# Patient Record
Sex: FEMALE | Race: WHITE | NOT HISPANIC OR LATINO | Employment: OTHER | URBAN - NONMETROPOLITAN AREA
[De-identification: names, ages, dates, MRNs, and addresses within clinical notes are randomized per-mention and may not be internally consistent; named-entity substitution may affect disease eponyms.]

---

## 2017-01-13 ENCOUNTER — OFFICE VISIT (OUTPATIENT)
Dept: FAMILY MEDICINE CLINIC | Facility: CLINIC | Age: 72
End: 2017-01-13

## 2017-01-13 VITALS
BODY MASS INDEX: 33.97 KG/M2 | WEIGHT: 199 LBS | OXYGEN SATURATION: 94 % | DIASTOLIC BLOOD PRESSURE: 77 MMHG | HEIGHT: 64 IN | SYSTOLIC BLOOD PRESSURE: 151 MMHG | HEART RATE: 75 BPM

## 2017-01-13 DIAGNOSIS — F41.9 ANXIETY: ICD-10-CM

## 2017-01-13 DIAGNOSIS — E11.9 TYPE 2 DIABETES MELLITUS WITHOUT COMPLICATION, WITHOUT LONG-TERM CURRENT USE OF INSULIN (HCC): ICD-10-CM

## 2017-01-13 DIAGNOSIS — K21.9 GASTROESOPHAGEAL REFLUX DISEASE WITHOUT ESOPHAGITIS: ICD-10-CM

## 2017-01-13 DIAGNOSIS — N32.81 OVERACTIVE BLADDER: ICD-10-CM

## 2017-01-13 DIAGNOSIS — E83.52 HYPERCALCEMIA: ICD-10-CM

## 2017-01-13 DIAGNOSIS — E78.5 DYSLIPIDEMIA: ICD-10-CM

## 2017-01-13 DIAGNOSIS — E03.8 OTHER SPECIFIED HYPOTHYROIDISM: ICD-10-CM

## 2017-01-13 DIAGNOSIS — I10 ESSENTIAL HYPERTENSION: Primary | ICD-10-CM

## 2017-01-13 DIAGNOSIS — E53.8 VITAMIN B12 DEFICIENCY: ICD-10-CM

## 2017-01-13 PROCEDURE — 99214 OFFICE O/P EST MOD 30 MIN: CPT | Performed by: FAMILY MEDICINE

## 2017-01-13 RX ORDER — LANCETS 30 GAUGE
EACH MISCELLANEOUS
Qty: 100 EACH | Refills: 5 | Status: SHIPPED | OUTPATIENT
Start: 2017-01-13 | End: 2021-01-05 | Stop reason: SDUPTHER

## 2017-01-13 RX ORDER — CYCLOBENZAPRINE HCL 10 MG
10 TABLET ORAL 2 TIMES DAILY PRN
Qty: 60 TABLET | Refills: 2 | Status: SHIPPED | OUTPATIENT
Start: 2017-01-13 | End: 2017-04-11 | Stop reason: SDUPTHER

## 2017-01-13 RX ORDER — LOSARTAN POTASSIUM AND HYDROCHLOROTHIAZIDE 12.5; 5 MG/1; MG/1
1 TABLET ORAL 2 TIMES DAILY
Qty: 60 TABLET | Refills: 5 | Status: SHIPPED | OUTPATIENT
Start: 2017-01-13 | End: 2017-07-07 | Stop reason: SDUPTHER

## 2017-01-13 RX ORDER — LORATADINE 10 MG/1
CAPSULE, LIQUID FILLED ORAL
COMMUNITY
End: 2017-01-13 | Stop reason: SDUPTHER

## 2017-01-13 RX ORDER — LORATADINE 10 MG/1
1 CAPSULE, LIQUID FILLED ORAL DAILY
Qty: 30 EACH | Refills: 5 | Status: SHIPPED | OUTPATIENT
Start: 2017-01-13 | End: 2019-05-12

## 2017-01-13 RX ORDER — AMLODIPINE BESYLATE 5 MG/1
5 TABLET ORAL DAILY
Qty: 30 TABLET | Refills: 5 | Status: SHIPPED | OUTPATIENT
Start: 2017-01-13 | End: 2018-02-02 | Stop reason: SDUPTHER

## 2017-01-13 RX ORDER — DIAZEPAM 5 MG/1
5 TABLET ORAL 2 TIMES DAILY
Qty: 60 TABLET | Refills: 0 | Status: SHIPPED | OUTPATIENT
Start: 2017-01-13 | End: 2017-03-02 | Stop reason: SDUPTHER

## 2017-01-13 RX ORDER — METHOCARBAMOL 500 MG/1
500 TABLET, FILM COATED ORAL 2 TIMES DAILY
Qty: 60 TABLET | Refills: 2 | Status: CANCELLED | OUTPATIENT
Start: 2017-01-13

## 2017-01-13 RX ORDER — LANCETS 30 GAUGE
EACH MISCELLANEOUS
COMMUNITY
End: 2017-01-13 | Stop reason: SDUPTHER

## 2017-01-13 RX ORDER — LANOLIN ALCOHOL/MO/W.PET/CERES
1000 CREAM (GRAM) TOPICAL DAILY
Qty: 30 TABLET | Refills: 5 | Status: SHIPPED | OUTPATIENT
Start: 2017-01-13 | End: 2018-01-04 | Stop reason: SDUPTHER

## 2017-01-13 RX ORDER — CLOPIDOGREL BISULFATE 75 MG/1
75 TABLET ORAL DAILY
Qty: 30 TABLET | Refills: 5 | Status: SHIPPED | OUTPATIENT
Start: 2017-01-13 | End: 2017-07-07 | Stop reason: SDUPTHER

## 2017-01-13 NOTE — PATIENT INSTRUCTIONS
Try the B12 pills and see if they help as well as the shots.    Norvasc 5 mg orally daily (amlodipine) is the new blood pressure medication. Take every day WITH your other blood pressure medications. See if you have this already     Come back for fasting labs.  Stop by any time for a blood pressure check.

## 2017-01-13 NOTE — MR AVS SNAPSHOT
Bianka VALENCIA Agudelo   1/13/2017 9:50 AM   Office Visit    Dept Phone:  [phone]   Encounter #:  [id number]    Provider:  Eva Elliott MD   Department:  [facility]                Your Full Care Plan              Today's Medication Changes          These changes are accurate as of: 1/13/17 11:21 AM.  If you have any questions, ask your nurse or doctor.               New Medication(s)Ordered:     amLODIPine 5 MG tablet   Commonly known as:  NORVASC   Take 1 tablet by mouth Daily.   Started by:  Eva Elliott MD       vitamin B-12 1000 MCG tablet   Commonly known as:  CYANOCOBALAMIN   Take 1 tablet by mouth Daily.   Started by:  Eva Elliott MD         Medication(s)that have changed:     glucose blood test strip   1 each by Other route Daily. Use as instructed   What changed:    - when to take this  - reasons to take this   Changed by:  Eva Elliott MD       Lancets misc   Use as needed daily to check glucose levels   What changed:    - how to take this  - additional instructions   Changed by:  Eva Elliott MD       Loratadine 10 MG capsule   Take 1 tablet by mouth Daily.   What changed:    - how much to take  - when to take this   Changed by:  Eva Elliott MD         Stop taking medication(s)listed here:     methocarbamol 500 MG tablet   Commonly known as:  ROBAXIN   Stopped by:  Eva Elliott MD                Where to Get Your Medications      These medications were sent to [facility] [facility], [place] - [address] - [phone]  - [phone]   [address] [facility] [address]     Phone:  [phone]     amLODIPine 5 MG tablet    clopidogrel 75 MG tablet    cyclobenzaprine 10 MG tablet    glucose blood test strip    Lancets misc    Loratadine 10 MG capsule    losartan-hydrochlorothiazide 50-12.5 MG per tablet    vitamin B-12 1000 MCG tablet         You can get these  medications from any pharmacy     Bring a paper prescription for each of these medications     diazePAM 5 MG tablet                  Your Updated Medication List          This list is accurate as of: 1/13/17 11:21 AM.  Always use your most recent med list.                albuterol 108 (90 BASE) MCG/ACT inhaler   Commonly known as:  PROVENTIL HFA;VENTOLIN HFA   Inhale 2 puffs every 4 (four) hours as needed for wheezing.       amLODIPine 5 MG tablet   Commonly known as:  NORVASC   Take 1 tablet by mouth Daily.       aspirin 81 MG EC tablet   Take 1 tablet by mouth Daily.       atorvastatin 10 MG tablet   Commonly known as:  LIPITOR   Take 1 tablet by mouth daily.       clopidogrel 75 MG tablet   Commonly known as:  PLAVIX   Take 1 tablet by mouth Daily.       cyclobenzaprine 10 MG tablet   Commonly known as:  FLEXERIL   Take 1 tablet by mouth 2 (Two) Times a Day As Needed for muscle spasms.       diazePAM 5 MG tablet   Commonly known as:  VALIUM   Take 1 tablet by mouth 2 (Two) Times a Day.       dicyclomine 20 MG tablet   Commonly known as:  BENTYL   Take 1 tablet by mouth every 6 (six) hours as needed (abdominal pain).       glucose blood test strip   1 each by Other route Daily. Use as instructed       ibuprofen 600 MG tablet   Commonly known as:  ADVIL,MOTRIN   Take 1 tablet by mouth 3 (three) times a day as needed for mild pain (1-3).       insulin NPH-insulin regular (70-30) 100 UNIT/ML injection   Commonly known as:  novoLIN 70/30       Insulin Syringe/Needle U-500 31G X 6MM 0.5 ML misc   1 each 4 (Four) Times a Day.       JANUVIA 100 MG tablet   Generic drug:  SITagliptin   Take 1 tablet by mouth daily.       Lancets misc   Use as needed daily to check glucose levels       LANTUS 100 UNIT/ML injection   Generic drug:  insulin glargine   Inject 35 Units under the skin Every Evening.       levothyroxine 125 MCG tablet   Commonly known as:  SYNTHROID, LEVOTHROID   Take 1 tablet by mouth daily.       Loratadine 10  MG capsule   Take 1 tablet by mouth Daily.       losartan-hydrochlorothiazide 50-12.5 MG per tablet   Commonly known as:  HYZAAR   Take 1 tablet by mouth 2 (Two) Times a Day.       meclizine 25 MG tablet   Commonly known as:  ANTIVERT   Take 1 tablet by mouth 2 (two) times a day as needed for dizziness.       metFORMIN 1000 MG (OSM) 24 hr tablet   Commonly known as:  FORTAMET   Take 1 tablet by mouth 2 (two) times a day with meals.       nitroglycerin 6.5 MG CR capsule   Commonly known as:  NITRO-BID   Take 1 capsule by mouth 3 (three) times a day.       NOVOLOG MIX 70/30 (70-30) 100 UNIT/ML injection   Generic drug:  insulin aspart prot-insulin aspart   Inject 20 Units under the skin 2 (two) times a day with meals.       omeprazole 20 MG capsule   Commonly known as:  priLOSEC   Take 1 capsule by mouth 2 (two) times a day.       ondansetron ODT 4 MG disintegrating tablet   Commonly known as:  ZOFRAN-ODT   Take 1 tablet by mouth every 6 (six) hours as needed for nausea or vomiting.       oxybutynin 5 MG tablet   Commonly known as:  DITROPAN   Take 1 tablet by mouth 2 (Two) Times a Day.       vitamin B-12 1000 MCG tablet   Commonly known as:  CYANOCOBALAMIN   Take 1 tablet by mouth Daily.               We Performed the Following     Comprehensive Metabolic Panel     Hemoglobin A1c     Lipid Panel     T4, Free     TSH       You Were Diagnosed With        Codes Comments    Other specified hypothyroidism    -  Primary ICD-10-CM: E03.8  ICD-9-CM: 244.8     Type 2 diabetes mellitus without complication, without long-term current use of insulin     ICD-10-CM: E11.9  ICD-9-CM: 250.00       Medications to be Given to You by a Medical Professional     Due       Frequency    8/17/2016 cyanocobalamin injection 1,000 mcg  Every 28 Days    11/15/2016 cyanocobalamin injection 1,000 mcg  Every 28 Days      Instructions    Try the B12 pills and see if they help as well as the shots.    Norvasc 5 mg orally daily (amlodipine) is the  "new blood pressure medication. Take every day WITH your other blood pressure medications. See if you have this already     Come back for fasting labs.  Stop by any time for a blood pressure check.     Patient Instructions History      Upcoming Appointments     Visit Type Date Time Department    FOLLOW UP 2017  9:50 AM [facility]    FOLLOW UP 2017 10:30 AM [facility]      [facility]     [facility] [employer] allows you to send messages to your doctor, view your test results, renew your prescriptions, schedule appointments, and more. To sign up, go to [employer] and click on the Sign Up Now link in the New User? box. Enter your [employer] Activation Code exactly as it appears below along with the last four digits of your Social Security Number and your Date of Birth () to complete the sign-up process. If you do not sign up before the expiration date, you must request a new code.    [employer] Activation Code: [email or web address]  Expires: 2017 11:14 AM    If you have questions, you can email [email or web address] or call [phone] to talk to our [employer] staff. Remember, [employer] is NOT to be used for urgent needs. For medical emergencies, dial 911.               Other Info from Your Visit           Your Appointments     2017 10:30 AM EDT   Follow Up with Eva Elliott MD   [facility] MEDICAL [facility]ICINE (--)    [address]  [place] [address]  [facility] [id number] [phone]           Arrive 15 minutes prior to appointment.              Allergies     Nuts        Reason for Visit     Follow-up           Vital Signs     Blood Pressure Pulse Height Weight Oxygen Saturation Body Mass Index    151/77 (BP Location: Left arm, Patient Position: Sitting) 75 63.5\" (161.3 cm) 199 lb (90.3 kg) 94% 34.7 kg/m2    Smoking Status                   Never Smoker           Problems and Diagnoses Noted     Diabetes    Underactive thyroid        "

## 2017-01-16 NOTE — PROGRESS NOTES
"Bianka Agudelo     VITALS: Blood pressure 151/77, pulse 75, height 63.5\" (161.3 cm), weight 199 lb (90.3 kg), SpO2 94 %.    Subjective  Chief Complaint:   Chief Complaint   Patient presents with   • Follow-up        History of Present Illness:  Patient is a 71 y.o. female with a medical history significant for type II diabetes, hypertension, and hyperlipidemia who presents to clinic secondary to medical followup. Is doing well today. No new or acute complaints.      Patient has a history of hypertension and is on losartan/HCTZ 50/12.5 mg orally BID. We had started her on norvasc last time, but she has not taken any of it. Denies any side effects of the medications. Denies any dizziness, lightheadedness, blurry vision, chest pain, or edema. Patient does not take her blood pressures at home. Tries to follow a low-salt diet.    Patient also has a history of type II diabetes and is currently on metformin 1000 mg orally BID and Lantus 35 units every evening. She has been inconsistent with the januvia 100 mg orally daily and Novolog 70/30 20 units BID at meals. Last A1C in October 2016 was 6.9. Denies any side effects of her medications. Patient denies any changes in vision, polydipsia, polyuria, numbness or tingling, or hypoglycemic or hyperglycemic episodes. Patient does follow a diabetic diet and checks her glucose levels regularly. Blood glucose levels are usually in the 120s-130s fasting. Patient does have complications of nephropathy. No worsening or exacerbation of symptoms. No neuropathy or retinopathy. Last eye exam was July 2015 - going back April 2017. Last microalbumin was elevated done February 2016. Last foot exam was in 2015 and patient does not see a podiatrist. She has gone through diabetic teaching/nutrition education. Does take losartan 100 mg orally daily to provide for kidney protection and aspirin 81 mg orally daily to provide for cardiovascular protection.    Patient also has a history of " hyperlipidemia and is currently on atorvastatin 10 mg orally daily. Denies any side effects of the medication. Last lipid panel was 10/2016 and in normal limits. Denies any muscle weakness, jaundice or itching. Tries to follow a low cholesterol diet.    Patient has a history of GERD and is currently on prilosec 20 mg orally BID. Patient denies any side effects of the medication. Denies metallic tastes and has not been having increased symptoms during sleep. Has not had any recent exacerbations. Denies any nausea, vomiting, burping, hiccuping, or midepigastric pain.    Patient does have a history of hypothyroidism and is currently on synthroid 125 mcg orally daily. She is doing well on this medication. Denies any side effects. Denies fatigue, dizziness, palpitations, changes in weight, hair, or nails. Last thyroid panel was 10/2016 and within normal limits.    Patient has a history of anxiety and is currently on diazepam 5 mg orally BID. Patient states that this medication is working. Denies any side effects of the medication. Patient states that she is sleeping well and can get out of bed daily to accomplish daily activities. Has some anhedonia. Mood is stable. Has no feelings of guilt. Has good concentration and memory ability. Has some energy. Is able to make goals. Appetite is stable. Denies any suicidal or homicidal ideations. Does not have any auditory or visual hallucinations. [facility] [id number] WNL. No controlled medication seeking behavior.     Patient has a history of CAD with angina and is on plavix 75 mg orally daily, aspirin 81 mg orally daily, and nitro ER 6.5 mg orally TID for angina. We tried to switch her from nitro to Imdur in 2016 without much success. She reports that she has been doing fine on these medications, denies any side effects, and denies any chest pain.       Patient does have a history of muscle spasms especially in her back when she does physical activity, such as gardening. She is  currently on flexeril 10 mg orally BID as needed and ibuprofen 600 mg orally TID. Denies any side effects of the medications. Denies any sedation effects. Medications allow her enough flexibility and relief from the pain so that she can do activities other than those of daily living. Movement and ambulation are improved on these medications. Robaxin is fairly expensive for her and she would like to change it to a cheaper medication if possible. No pain medication seeking behavior. Tyler #[id number] is clean without any discrepancies.      Patient does have an overactive bladder and is currently on oxybutynin 5 mg orally daily. Medication is treating her well. No side effects. States that it does reduce her urinary frequency.     The following portions of the patient's history were reviewed and updated as appropriate: allergies, current medications, past family history, past medical history, past social history, past surgical history and problem list.    Past Medical History  Past Medical History   Diagnosis Date   • Anxiety    • CAD (coronary artery disease)    • Diabetes mellitus    • Dyslipidemia    • GERD (gastroesophageal reflux disease)    • H/O angina pectoris    • History of bone density study      February 2014   • History of mammogram 07/15/2013   • Hypertension    • Hypothyroidism    • Muscle spasms of both lower extremities    • Overactive bladder    • Vitamin B12 deficiency    • Vitamin D deficiency        Review of Systems  Constitutional: Denies any recent history of HAs, dizziness, fevers, chills, itching.  Eyes: Denies any changes in vision. Denies any blurry vision or diplopia.  Ears, Nose, Mouth, Throat: Denies any sore throat, rhinorrhea, or cough.  Cardiovascular: Denies any chest pain, pressure, or palpitations.  Respiratory: Denies any shortness of breath or wheezing.  Gastrointestinal: Denies any abdominal pain, nausea, vomiting, diarrhea, or constipation.  Genitourinary: Denies any changes in  urination.  Musculoskeletal: Denies any muscle weakness.  Skin and/or breasts: Denies any rashes.  Neurological: Denies any changes in balance or gait.  Psychiatric: Denies any anxiety, depression, or insomnia. Denies any suicidal or homicidal ideations.  Endocrine: Denies any heat or cold intolerance. Denies any voice changes, polydipsia, or polyuria.    Surgical History  Past Surgical History   Procedure Laterality Date   • Oophorectomy       1 removed   • Colonoscopy  01/01/2010   • Tubal abdominal ligation         Family History  Family History   Problem Relation Age of Onset   • Diabetes Mother    • Heart disease Mother    • Stroke Mother    • Cancer Father        Social History  Social History     Social History   • Marital status:      Spouse name: N/A   • Number of children: N/A   • Years of education: N/A     Occupational History   • Not on file.     Social History Main Topics   • Smoking status: Never Smoker   • Smokeless tobacco: Never Used   • Alcohol use No   • Drug use: No   • Sexual activity: Not on file     Other Topics Concern   • Not on file     Social History Narrative       Objective  Physical Exam  Gen: Patient in NAD. Pleasant and answers appropriately. A&Ox3.    Skin: Warm and dry with normal turgor. No purpura, rashes, or unusual pigmentation noted. Hair is normal in appearance and distribution.    HEENT: NC/AT. No lesions noted. Conjunctiva clear, sclera nonicteric. PERRL. O/P nonerythematous and moist without exudate.    Neck: Supple without lymph nodes palpated. FROM. No evidence of tracheal deviation or thyromegaly. Carotid pulses 2+/4 B/L without bruits.     Lungs: CTA B/L without rales, rhonchi, crackles, or wheezes.    Heart: RRR. S1 and S2 normal. No S3 or S4. No MRGT.    Abd: Soft, nontender,nondistended. (+)BSx4 quadrants. No HSM, masses, or bruits noted.    Extrem: No CCE. Radial pulses 2+/4 and equal B/L. FROMx4. No bone, joint, or muscle tenderness noted.    Neuro: No  focal motor/sensory deficits.    Procedures    Assessment/Plan  Bianka Agudelo is a 71 y.o. here for medical followup.  Diagnoses and all orders for this visit:    1) Hypertension  Elevated today. Discussed at length. Refilled losartan/HCTZ 50/12.5 mg orally BID. Will start norvasc 5 mg orally daily. Patient to follow up with a nursing appointment for blood pressure check in a month.     2) Eustachian tube dysfunction, bilateral  Improved. Patient using meclizine 25 mg orally every BID as needed for dizziness.      3) Hypercalcemia  Workup benign. Continue to monitor. Recheck today.     4) Type 2 diabetes mellitus without complication  -     Comprehensive Metabolic Panel  -     Hemoglobin A1c  -     Lipid Panel  Check labs today. On metformin 1000 mg orally BID and Lantus 35 units every evening. Not currently taking januvia 100 mg orally daily or Novolog 70/30 20 units BID at meals. Eye exam and microalbumin 2016. Patient to call [facility] for annual foot exam. Does take losartan 100 mg orally daily to provide for kidney protection and aspirin 81 mg orally daily to provide for cardiovascular protection.       5) GERD  Stable. Continue omeprazole 20 mg orally BID.      6) Hypothyroidism, unspecified hypothyroidism type  -     TSH  -     T4, Free  Stable. Continue synthroid 125 mcg orally daily. Check thyroid panel today.       7) Vitamin D deficiency   Stable 10/2016. Recheck 4/2017.      8) Anxiety.  -diazepam (VALIUM) 5 MG tablet; Take 1 tablet by mouth 2 (Two) Times a Day.  Well controlled. Refilled diazepam 5 mg orally BID.      9) CAD with angina  Stable and asymptomatic. Continue plavix 75 mg orally daily, aspirin 81 mg orally daily, and nitro ER 6.5 mg orally TID for angina.       10) Muscle spasms  Stable. Continue flexeril 10 mg orally BID as needed and ibuprofen 600 mg orally TID.        11) Overactive bladder  Stable. Continue oxybutynin 5 mg orally daily.       12) Asthma  Continue albuterol  inhaler as needed.      13) Vitamin B12 deficiency  Patient has a hard time coming in for B12 shots secondary to cost. Will try B12 1000 mcg orally daily to see if that helps.      14) Preventative Medicine  Colonoscopy 2010 UTD. Declines mammogram. DEXA scan in February 2017. Flu shot 2016 given. Declines pneumo for now.     Findings and plans discussed with patient who verbalizes understanding and agreement. Patient to followup at clinic PRN if symptoms continue or worsen or in 6 months for regular medical followup.   Eva Elliott MD    Other orders  Refilled:  -     Loratadine 10 MG capsule; Take 1 tablet by mouth Daily.  -     clopidogrel (PLAVIX) 75 MG tablet; Take 1 tablet by mouth Daily.  -     losartan-hydrochlorothiazide (HYZAAR) 50-12.5 MG per tablet; Take 1 tablet by mouth 2 (Two) Times a Day.  -     Cancel: methocarbamol (ROBAXIN) 500 MG tablet; Take 1 tablet by mouth 2 (Two) Times a Day.  -     diazePAM (VALIUM) 5 MG tablet; Take 1 tablet by mouth 2 (Two) Times a Day.  -     glucose blood test strip; 1 each by Other route Daily. Use as instructed  -     Lancets misc; Use as needed daily to check glucose levels  -     cyclobenzaprine (FLEXERIL) 10 MG tablet; Take 1 tablet by mouth 2 (Two) Times a Day As Needed for muscle spasms.  -     vitamin B-12 (CYANOCOBALAMIN) 1000 MCG tablet; Take 1 tablet by mouth Daily.  -     amLODIPine (NORVASC) 5 MG tablet; Take 1 tablet by mouth Daily.      Eva Elliott MD

## 2017-03-02 ENCOUNTER — TELEPHONE (OUTPATIENT)
Dept: FAMILY MEDICINE CLINIC | Facility: CLINIC | Age: 72
End: 2017-03-02

## 2017-03-02 DIAGNOSIS — F41.9 ANXIETY: Primary | ICD-10-CM

## 2017-03-02 RX ORDER — DIAZEPAM 5 MG/1
5 TABLET ORAL 2 TIMES DAILY
Qty: 60 TABLET | Refills: 0 | Status: SHIPPED | OUTPATIENT
Start: 2017-03-02 | End: 2017-04-03 | Stop reason: SDUPTHER

## 2017-03-02 NOTE — PROGRESS NOTES
Pete # [id number]  Reviewed and is consistent.  UDS reviewed and is consistent.  Patient comfort assessment guide reviewed and updated today.    As part of the patient's treatment plan they are being prescribed a controlled substance/ substances with potential for abuse.  This patient has been made aware of the appropriate use of such medications, including potential risk of somnolence, limited ability to drive and/or work safely, and potential for overdose.  It has also been made clear these medications are for use by the patient only, without concomitant use of alcohol or other substances unless prescribed/advised by medical provider.    Patient has completed prescribing agreement detailing terms of continued prescribing of controlled substances including monitoring PETE reports, urine drug screens, and pill counts.  The patient is aware PETE reports are reviewed on a regular basis and scanned into the chart.    History and physical exam exhibit continued safe and appropriate use of controlled substances.

## 2017-03-27 RX ORDER — LEVOTHYROXINE SODIUM 0.12 MG/1
125 TABLET ORAL DAILY
Qty: 30 TABLET | Refills: 5 | Status: SHIPPED | OUTPATIENT
Start: 2017-03-27 | End: 2017-04-11 | Stop reason: DRUGHIGH

## 2017-03-27 RX ORDER — METFORMIN HYDROCHLORIDE EXTENDED-RELEASE TABLETS 1000 MG/1
1000 TABLET, FILM COATED, EXTENDED RELEASE ORAL 2 TIMES DAILY WITH MEALS
Qty: 60 TABLET | Refills: 5 | Status: SHIPPED | OUTPATIENT
Start: 2017-03-27 | End: 2018-09-25 | Stop reason: SDUPTHER

## 2017-03-27 RX ORDER — SITAGLIPTIN 100 MG/1
100 TABLET, FILM COATED ORAL DAILY
Qty: 30 TABLET | Refills: 5 | Status: SHIPPED | OUTPATIENT
Start: 2017-03-27 | End: 2017-10-17 | Stop reason: SDUPTHER

## 2017-04-03 ENCOUNTER — TELEPHONE (OUTPATIENT)
Dept: FAMILY MEDICINE CLINIC | Facility: CLINIC | Age: 72
End: 2017-04-03

## 2017-04-03 RX ORDER — DIAZEPAM 5 MG/1
5 TABLET ORAL 2 TIMES DAILY
Qty: 60 TABLET | Refills: 0 | Status: SHIPPED | OUTPATIENT
Start: 2017-04-03 | End: 2017-05-11 | Stop reason: SDUPTHER

## 2017-04-03 NOTE — PROGRESS NOTES
Pete # [id number]  Reviewed and is consistent.  UDS 1/13/17 reviewed and is consistent.  Patient comfort assessment guide reviewed and updated today.    As part of the patient's treatment plan they are being prescribed a controlled substance/ substances with potential for abuse.  This patient has been made aware of the appropriate use of such medications, including potential risk of somnolence, limited ability to drive and/or work safely, and potential for overdose.  It has also been made clear these medications are for use by the patient only, without concomitant use of alcohol or other substances unless prescribed/advised by medical provider.    Patient has completed prescribing agreement detailing terms of continued prescribing of controlled substances including monitoring PETE reports, urine drug screens, and pill counts.  The patient is aware PETE reports are reviewed on a regular basis and scanned into the chart.    History and physical exam exhibit continued safe and appropriate use of controlled substances.

## 2017-04-04 ENCOUNTER — TELEPHONE (OUTPATIENT)
Dept: FAMILY MEDICINE CLINIC | Facility: CLINIC | Age: 72
End: 2017-04-04

## 2017-04-04 ENCOUNTER — CLINICAL SUPPORT (OUTPATIENT)
Dept: FAMILY MEDICINE CLINIC | Facility: CLINIC | Age: 72
End: 2017-04-04

## 2017-04-04 VITALS — OXYGEN SATURATION: 96 % | SYSTOLIC BLOOD PRESSURE: 146 MMHG | HEART RATE: 83 BPM | DIASTOLIC BLOOD PRESSURE: 84 MMHG

## 2017-04-04 NOTE — TELEPHONE ENCOUNTER
Patient came in for a BP check, BP is 146/84. Patient stated she is not taking one of her bp meds, I ask witch one she isn't taking and she stated she didn't know and wanted to make an appt. So we made her an appt for 4/11/17.

## 2017-04-11 ENCOUNTER — OFFICE VISIT (OUTPATIENT)
Dept: FAMILY MEDICINE CLINIC | Facility: CLINIC | Age: 72
End: 2017-04-11

## 2017-04-11 ENCOUNTER — TELEPHONE (OUTPATIENT)
Dept: FAMILY MEDICINE CLINIC | Facility: CLINIC | Age: 72
End: 2017-04-11

## 2017-04-11 VITALS
SYSTOLIC BLOOD PRESSURE: 153 MMHG | BODY MASS INDEX: 33.63 KG/M2 | OXYGEN SATURATION: 98 % | WEIGHT: 197 LBS | HEART RATE: 78 BPM | HEIGHT: 64 IN | DIASTOLIC BLOOD PRESSURE: 81 MMHG

## 2017-04-11 DIAGNOSIS — Z79.4 TYPE 2 DIABETES MELLITUS WITH HYPERGLYCEMIA, WITH LONG-TERM CURRENT USE OF INSULIN (HCC): ICD-10-CM

## 2017-04-11 DIAGNOSIS — I10 ESSENTIAL HYPERTENSION: ICD-10-CM

## 2017-04-11 DIAGNOSIS — M62.838 MUSCLE SPASM: ICD-10-CM

## 2017-04-11 DIAGNOSIS — E11.65 TYPE 2 DIABETES MELLITUS WITH HYPERGLYCEMIA, WITH LONG-TERM CURRENT USE OF INSULIN (HCC): ICD-10-CM

## 2017-04-11 DIAGNOSIS — R35.0 URINARY FREQUENCY: Primary | ICD-10-CM

## 2017-04-11 LAB
ALBUMIN SERPL-MCNC: 4.2 G/DL (ref 3.4–4.8)
ALBUMIN/GLOB SERPL: 1.5 G/DL (ref 1.5–2.5)
ALP SERPL-CCNC: 61 U/L (ref 35–104)
ALT SERPL W P-5'-P-CCNC: 42 U/L (ref 10–36)
ANION GAP SERPL CALCULATED.3IONS-SCNC: 5 MMOL/L (ref 3.6–11.2)
AST SERPL-CCNC: 38 U/L (ref 10–30)
BILIRUB BLD-MCNC: NEGATIVE MG/DL
BILIRUB SERPL-MCNC: 0.7 MG/DL (ref 0.2–1.8)
BUN BLD-MCNC: 16 MG/DL (ref 7–21)
BUN/CREAT SERPL: 28.1 (ref 7–25)
CALCIUM SPEC-SCNC: 9.7 MG/DL (ref 7.7–10)
CHLORIDE SERPL-SCNC: 104 MMOL/L (ref 99–112)
CLARITY, POC: CLEAR
CO2 SERPL-SCNC: 30 MMOL/L (ref 24.3–31.9)
COLOR UR: YELLOW
CREAT BLD-MCNC: 0.57 MG/DL (ref 0.43–1.29)
GFR SERPL CREATININE-BSD FRML MDRD: 105 ML/MIN/1.73
GLOBULIN UR ELPH-MCNC: 2.8 GM/DL
GLUCOSE BLD-MCNC: 195 MG/DL (ref 70–110)
GLUCOSE UR STRIP-MCNC: ABNORMAL MG/DL
HBA1C MFR BLD: 10.4 % (ref 4.5–5.7)
KETONES UR QL: NEGATIVE
LEUKOCYTE EST, POC: NEGATIVE
NITRITE UR-MCNC: NEGATIVE MG/ML
OSMOLALITY SERPL CALC.SUM OF ELEC: 284.1 MOSM/KG (ref 273–305)
PH UR: 6 [PH] (ref 5–8)
POTASSIUM BLD-SCNC: 4 MMOL/L (ref 3.5–5.3)
PROT SERPL-MCNC: 7 G/DL (ref 6–8)
PROT UR STRIP-MCNC: ABNORMAL MG/DL
RBC # UR STRIP: NEGATIVE /UL
SODIUM BLD-SCNC: 139 MMOL/L (ref 135–153)
SP GR UR: 1.03 (ref 1–1.03)
T4 FREE SERPL-MCNC: 1.2 NG/DL (ref 0.89–1.76)
TSH SERPL DL<=0.05 MIU/L-ACNC: 6.55 MIU/ML (ref 0.55–4.78)
UROBILINOGEN UR QL: NORMAL

## 2017-04-11 PROCEDURE — 81003 URINALYSIS AUTO W/O SCOPE: CPT | Performed by: FAMILY MEDICINE

## 2017-04-11 PROCEDURE — 99214 OFFICE O/P EST MOD 30 MIN: CPT | Performed by: FAMILY MEDICINE

## 2017-04-11 PROCEDURE — 84443 ASSAY THYROID STIM HORMONE: CPT | Performed by: FAMILY MEDICINE

## 2017-04-11 PROCEDURE — 83036 HEMOGLOBIN GLYCOSYLATED A1C: CPT | Performed by: FAMILY MEDICINE

## 2017-04-11 PROCEDURE — 84439 ASSAY OF FREE THYROXINE: CPT | Performed by: FAMILY MEDICINE

## 2017-04-11 PROCEDURE — 36415 COLL VENOUS BLD VENIPUNCTURE: CPT | Performed by: FAMILY MEDICINE

## 2017-04-11 PROCEDURE — 80053 COMPREHEN METABOLIC PANEL: CPT | Performed by: FAMILY MEDICINE

## 2017-04-11 RX ORDER — CYCLOBENZAPRINE HCL 10 MG
10 TABLET ORAL 2 TIMES DAILY PRN
Qty: 60 TABLET | Refills: 5 | Status: SHIPPED | OUTPATIENT
Start: 2017-04-11 | End: 2017-07-13 | Stop reason: SDUPTHER

## 2017-04-11 RX ORDER — ASPIRIN 81 MG/1
81 TABLET ORAL DAILY
Qty: 30 TABLET | Refills: 5 | Status: SHIPPED | OUTPATIENT
Start: 2017-04-11 | End: 2017-10-17 | Stop reason: SDUPTHER

## 2017-04-11 RX ORDER — LEVOTHYROXINE SODIUM 137 UG/1
137 TABLET ORAL DAILY
Qty: 30 TABLET | Refills: 5 | Status: SHIPPED | OUTPATIENT
Start: 2017-04-11 | End: 2017-08-07 | Stop reason: SDUPTHER

## 2017-04-11 RX ORDER — OXYBUTYNIN CHLORIDE 5 MG/1
5 TABLET ORAL 2 TIMES DAILY
Qty: 60 TABLET | Refills: 5 | Status: SHIPPED | OUTPATIENT
Start: 2017-04-11 | End: 2017-12-19 | Stop reason: SDUPTHER

## 2017-04-11 NOTE — TELEPHONE ENCOUNTER
----- Message from Eva Elliott MD sent at 4/11/2017  2:15 PM EDT -----  Can you call patient and let her know she really is a mess. (we were joking about it earlier today).    1) Her thyroid is off. I sent in a higher dosage of her thyroid medication. She needs to take synthroid 137 mcg orally daily instead of 125 mcg orally daily. We will recheck in 3 months. May be why she is feeling different too.    2) Her diabetes is really off. Half a year ago she was 6.9. She is now 10.4. Is she taking her insulin?   She should be on:   Lantus 35 at night  Novolin 70/30 20 units BID  Metformin 1000 mg orally BID    Please confirm. If she is, please have her increase the lantus to 38 units a day. Work on cutting out all carbs (she said she did for the last 2 weeks). We will then monitor; recheck when she comes in in a month, if it's better, I'll decrease her back down to 35.     3) Her liver enzymes have also slightly gone up because of the uncontrolled diabetes. It should go back if we control her diabetes.     I'll see her in a month. Need to work on this. Thanks.        Left a message for her to return call.      Left message to call the office.    Left message to return call.    Patient returned call & verbalized understanding of above she reports she is taking her insulin & metformin as we have it recorded.

## 2017-04-17 ENCOUNTER — TELEPHONE (OUTPATIENT)
Dept: FAMILY MEDICINE CLINIC | Facility: CLINIC | Age: 72
End: 2017-04-17

## 2017-04-17 RX ORDER — OMEPRAZOLE 20 MG/1
20 CAPSULE, DELAYED RELEASE ORAL 2 TIMES DAILY
Qty: 60 CAPSULE | Refills: 5 | Status: SHIPPED | OUTPATIENT
Start: 2017-04-17 | End: 2017-05-11 | Stop reason: SDUPTHER

## 2017-04-17 RX ORDER — ATORVASTATIN CALCIUM 10 MG/1
10 TABLET, FILM COATED ORAL DAILY
Qty: 30 TABLET | Refills: 5 | Status: SHIPPED | OUTPATIENT
Start: 2017-04-17 | End: 2017-08-03 | Stop reason: SDUPTHER

## 2017-05-11 ENCOUNTER — OFFICE VISIT (OUTPATIENT)
Dept: FAMILY MEDICINE CLINIC | Facility: CLINIC | Age: 72
End: 2017-05-11

## 2017-05-11 VITALS
DIASTOLIC BLOOD PRESSURE: 80 MMHG | HEIGHT: 64 IN | BODY MASS INDEX: 33.63 KG/M2 | OXYGEN SATURATION: 95 % | WEIGHT: 197 LBS | SYSTOLIC BLOOD PRESSURE: 144 MMHG | HEART RATE: 84 BPM

## 2017-05-11 DIAGNOSIS — K21.9 GASTROESOPHAGEAL REFLUX DISEASE WITHOUT ESOPHAGITIS: ICD-10-CM

## 2017-05-11 DIAGNOSIS — E53.8 VITAMIN B12 DEFICIENCY: ICD-10-CM

## 2017-05-11 DIAGNOSIS — F41.9 ANXIETY: ICD-10-CM

## 2017-05-11 DIAGNOSIS — N32.81 OVERACTIVE BLADDER: ICD-10-CM

## 2017-05-11 DIAGNOSIS — E11.65 TYPE 2 DIABETES MELLITUS WITH HYPERGLYCEMIA, WITH LONG-TERM CURRENT USE OF INSULIN (HCC): ICD-10-CM

## 2017-05-11 DIAGNOSIS — E03.9 ACQUIRED HYPOTHYROIDISM: ICD-10-CM

## 2017-05-11 DIAGNOSIS — Z79.4 TYPE 2 DIABETES MELLITUS WITH HYPERGLYCEMIA, WITH LONG-TERM CURRENT USE OF INSULIN (HCC): ICD-10-CM

## 2017-05-11 DIAGNOSIS — E78.5 DYSLIPIDEMIA: ICD-10-CM

## 2017-05-11 DIAGNOSIS — I10 ESSENTIAL HYPERTENSION: Primary | ICD-10-CM

## 2017-05-11 DIAGNOSIS — E55.9 VITAMIN D DEFICIENCY: ICD-10-CM

## 2017-05-11 DIAGNOSIS — I25.10 CORONARY ARTERY DISEASE INVOLVING NATIVE CORONARY ARTERY OF NATIVE HEART WITHOUT ANGINA PECTORIS: ICD-10-CM

## 2017-05-11 PROCEDURE — 99214 OFFICE O/P EST MOD 30 MIN: CPT | Performed by: FAMILY MEDICINE

## 2017-05-11 RX ORDER — INSULIN GLARGINE 100 [IU]/ML
35 INJECTION, SOLUTION SUBCUTANEOUS EVERY EVENING
Qty: 11 ML | Refills: 5 | Status: SHIPPED | OUTPATIENT
Start: 2017-05-11 | End: 2017-08-03 | Stop reason: SDUPTHER

## 2017-05-11 RX ORDER — NITROGLYCERIN 6.5 MG/1
6.5 CAPSULE ORAL 3 TIMES DAILY
Qty: 90 CAPSULE | Refills: 5 | Status: SHIPPED | OUTPATIENT
Start: 2017-05-11 | End: 2018-02-07 | Stop reason: SDUPTHER

## 2017-05-11 RX ORDER — DIAZEPAM 5 MG/1
5 TABLET ORAL 2 TIMES DAILY
Qty: 60 TABLET | Refills: 0 | Status: SHIPPED | OUTPATIENT
Start: 2017-05-11 | End: 2017-06-14 | Stop reason: SDUPTHER

## 2017-05-11 RX ORDER — OMEPRAZOLE 20 MG/1
20 CAPSULE, DELAYED RELEASE ORAL 2 TIMES DAILY
Qty: 60 CAPSULE | Refills: 5 | Status: SHIPPED | OUTPATIENT
Start: 2017-05-11 | End: 2017-12-07 | Stop reason: SDUPTHER

## 2017-06-14 ENCOUNTER — TELEPHONE (OUTPATIENT)
Dept: FAMILY MEDICINE CLINIC | Facility: CLINIC | Age: 72
End: 2017-06-14

## 2017-06-14 RX ORDER — DIAZEPAM 5 MG/1
5 TABLET ORAL 2 TIMES DAILY
Qty: 60 TABLET | Refills: 0 | Status: SHIPPED | OUTPATIENT
Start: 2017-06-14 | End: 2017-07-13 | Stop reason: SDUPTHER

## 2017-06-14 NOTE — PROGRESS NOTES
[facility] # [id number]  Reviewed and is consistent.  [facility] 5/2017 WN reviewed and is consistent.  Patient comfort assessment guide reviewed and updated today.    As part of the patient's treatment plan they are being prescribed a controlled substance/ substances with potential for abuse.  This patient has been made aware of the appropriate use of such medications, including potential risk of somnolence, limited ability to drive and/or work safely, and potential for overdose.  It has also been made clear these medications are for use by the patient only, without concomitant use of alcohol or other substances unless prescribed/advised by medical provider.    Patient has completed prescribing agreement detailing terms of continued prescribing of controlled substances including monitoring PETE reports, urine drug screens, and pill counts.  The patient is aware PETE reports are reviewed on a regular basis and scanned into the chart.    History and physical exam exhibit continued safe and appropriate use of controlled substances.

## 2017-07-07 RX ORDER — CLOPIDOGREL BISULFATE 75 MG/1
75 TABLET ORAL DAILY
Qty: 30 TABLET | Refills: 5 | Status: SHIPPED | OUTPATIENT
Start: 2017-07-07 | End: 2017-12-19 | Stop reason: SDUPTHER

## 2017-07-07 RX ORDER — LOSARTAN POTASSIUM AND HYDROCHLOROTHIAZIDE 12.5; 5 MG/1; MG/1
1 TABLET ORAL 2 TIMES DAILY
Qty: 60 TABLET | Refills: 5 | Status: SHIPPED | OUTPATIENT
Start: 2017-07-07 | End: 2017-08-03 | Stop reason: SDUPTHER

## 2017-07-13 ENCOUNTER — OFFICE VISIT (OUTPATIENT)
Dept: FAMILY MEDICINE CLINIC | Facility: CLINIC | Age: 72
End: 2017-07-13

## 2017-07-13 VITALS
HEIGHT: 64 IN | HEART RATE: 80 BPM | OXYGEN SATURATION: 95 % | DIASTOLIC BLOOD PRESSURE: 74 MMHG | BODY MASS INDEX: 33.63 KG/M2 | WEIGHT: 197 LBS | SYSTOLIC BLOOD PRESSURE: 150 MMHG

## 2017-07-13 DIAGNOSIS — E11.65 TYPE 2 DIABETES MELLITUS WITH HYPERGLYCEMIA, WITH LONG-TERM CURRENT USE OF INSULIN (HCC): ICD-10-CM

## 2017-07-13 DIAGNOSIS — M62.838 MUSCLE SPASMS OF BOTH LOWER EXTREMITIES: ICD-10-CM

## 2017-07-13 DIAGNOSIS — E83.52 HYPERCALCEMIA: ICD-10-CM

## 2017-07-13 DIAGNOSIS — F41.9 ANXIETY: Primary | ICD-10-CM

## 2017-07-13 DIAGNOSIS — E55.9 VITAMIN D DEFICIENCY: ICD-10-CM

## 2017-07-13 DIAGNOSIS — I10 ESSENTIAL HYPERTENSION: ICD-10-CM

## 2017-07-13 DIAGNOSIS — Z79.4 TYPE 2 DIABETES MELLITUS WITH HYPERGLYCEMIA, WITH LONG-TERM CURRENT USE OF INSULIN (HCC): ICD-10-CM

## 2017-07-13 DIAGNOSIS — E03.9 ACQUIRED HYPOTHYROIDISM: ICD-10-CM

## 2017-07-13 DIAGNOSIS — K21.9 GASTROESOPHAGEAL REFLUX DISEASE WITHOUT ESOPHAGITIS: ICD-10-CM

## 2017-07-13 PROCEDURE — 99214 OFFICE O/P EST MOD 30 MIN: CPT | Performed by: FAMILY MEDICINE

## 2017-07-13 RX ORDER — CYCLOBENZAPRINE HCL 10 MG
10 TABLET ORAL 2 TIMES DAILY PRN
Qty: 60 TABLET | Refills: 5 | Status: SHIPPED | OUTPATIENT
Start: 2017-07-13 | End: 2017-12-19 | Stop reason: SDUPTHER

## 2017-07-13 RX ORDER — DIAZEPAM 5 MG/1
5 TABLET ORAL 2 TIMES DAILY
Qty: 60 TABLET | Refills: 0 | Status: SHIPPED | OUTPATIENT
Start: 2017-07-13 | End: 2017-08-21 | Stop reason: SDUPTHER

## 2017-08-03 ENCOUNTER — OFFICE VISIT (OUTPATIENT)
Dept: FAMILY MEDICINE CLINIC | Facility: CLINIC | Age: 72
End: 2017-08-03

## 2017-08-03 VITALS
WEIGHT: 197 LBS | HEART RATE: 84 BPM | BODY MASS INDEX: 33.63 KG/M2 | HEIGHT: 64 IN | DIASTOLIC BLOOD PRESSURE: 79 MMHG | SYSTOLIC BLOOD PRESSURE: 162 MMHG | OXYGEN SATURATION: 97 %

## 2017-08-03 DIAGNOSIS — I10 ESSENTIAL HYPERTENSION: ICD-10-CM

## 2017-08-03 DIAGNOSIS — Z00.00 MEDICARE ANNUAL WELLNESS VISIT, SUBSEQUENT: Primary | ICD-10-CM

## 2017-08-03 LAB
ALBUMIN SERPL-MCNC: 4.5 G/DL (ref 3.4–4.8)
ALBUMIN UR-MCNC: 64.3 MG/L
ALBUMIN/GLOB SERPL: 1.6 G/DL (ref 1.5–2.5)
ALP SERPL-CCNC: 67 U/L (ref 35–104)
ALT SERPL W P-5'-P-CCNC: 53 U/L (ref 10–36)
ANION GAP SERPL CALCULATED.3IONS-SCNC: 6.7 MMOL/L (ref 3.6–11.2)
AST SERPL-CCNC: 55 U/L (ref 10–30)
BILIRUB SERPL-MCNC: 1.1 MG/DL (ref 0.2–1.8)
BUN BLD-MCNC: 16 MG/DL (ref 7–21)
BUN/CREAT SERPL: 22.5 (ref 7–25)
CALCIUM SPEC-SCNC: 10.4 MG/DL (ref 7.7–10)
CHLORIDE SERPL-SCNC: 103 MMOL/L (ref 99–112)
CHOLEST SERPL-MCNC: 227 MG/DL (ref 0–200)
CO2 SERPL-SCNC: 29.3 MMOL/L (ref 24.3–31.9)
CREAT BLD-MCNC: 0.71 MG/DL (ref 0.43–1.29)
GFR SERPL CREATININE-BSD FRML MDRD: 81 ML/MIN/1.73
GLOBULIN UR ELPH-MCNC: 2.9 GM/DL
GLUCOSE BLD-MCNC: 272 MG/DL (ref 70–110)
HBA1C MFR BLD: 11.1 % (ref 4.5–5.7)
HDLC SERPL-MCNC: 57 MG/DL (ref 60–100)
LDLC SERPL CALC-MCNC: 108 MG/DL (ref 0–100)
LDLC/HDLC SERPL: 1.9 {RATIO}
OSMOLALITY SERPL CALC.SUM OF ELEC: 288.4 MOSM/KG (ref 273–305)
POTASSIUM BLD-SCNC: 4.1 MMOL/L (ref 3.5–5.3)
PROT SERPL-MCNC: 7.4 G/DL (ref 6–8)
SODIUM BLD-SCNC: 139 MMOL/L (ref 135–153)
T4 FREE SERPL-MCNC: 1.12 NG/DL (ref 0.89–1.76)
TRIGL SERPL-MCNC: 309 MG/DL (ref 0–150)
TSH SERPL DL<=0.05 MIU/L-ACNC: 9.53 MIU/ML (ref 0.55–4.78)
VIT B12 BLD-MCNC: 498 PG/ML (ref 211–911)
VLDLC SERPL-MCNC: 61.8 MG/DL

## 2017-08-03 PROCEDURE — 36415 COLL VENOUS BLD VENIPUNCTURE: CPT | Performed by: FAMILY MEDICINE

## 2017-08-03 PROCEDURE — 80061 LIPID PANEL: CPT | Performed by: FAMILY MEDICINE

## 2017-08-03 PROCEDURE — 84439 ASSAY OF FREE THYROXINE: CPT | Performed by: FAMILY MEDICINE

## 2017-08-03 PROCEDURE — 82607 VITAMIN B-12: CPT | Performed by: FAMILY MEDICINE

## 2017-08-03 PROCEDURE — 99213 OFFICE O/P EST LOW 20 MIN: CPT | Performed by: FAMILY MEDICINE

## 2017-08-03 PROCEDURE — 80053 COMPREHEN METABOLIC PANEL: CPT | Performed by: FAMILY MEDICINE

## 2017-08-03 PROCEDURE — 82043 UR ALBUMIN QUANTITATIVE: CPT | Performed by: FAMILY MEDICINE

## 2017-08-03 PROCEDURE — G0439 PPPS, SUBSEQ VISIT: HCPCS | Performed by: FAMILY MEDICINE

## 2017-08-03 PROCEDURE — 84443 ASSAY THYROID STIM HORMONE: CPT | Performed by: FAMILY MEDICINE

## 2017-08-03 PROCEDURE — 83036 HEMOGLOBIN GLYCOSYLATED A1C: CPT | Performed by: FAMILY MEDICINE

## 2017-08-03 RX ORDER — INSULIN GLARGINE 100 [IU]/ML
35 INJECTION, SOLUTION SUBCUTANEOUS EVERY EVENING
Qty: 11 ML | Refills: 5 | Status: SHIPPED | OUTPATIENT
Start: 2017-08-03 | End: 2017-08-07 | Stop reason: SDUPTHER

## 2017-08-03 RX ORDER — ATORVASTATIN CALCIUM 10 MG/1
10 TABLET, FILM COATED ORAL DAILY
Qty: 30 TABLET | Refills: 5 | Status: SHIPPED | OUTPATIENT
Start: 2017-08-03 | End: 2018-02-20 | Stop reason: SDUPTHER

## 2017-08-03 RX ORDER — LOSARTAN POTASSIUM AND HYDROCHLOROTHIAZIDE 12.5; 5 MG/1; MG/1
1 TABLET ORAL 2 TIMES DAILY
Qty: 60 TABLET | Refills: 5 | Status: SHIPPED | OUTPATIENT
Start: 2017-08-03 | End: 2018-05-03 | Stop reason: SDUPTHER

## 2017-08-07 ENCOUNTER — TELEPHONE (OUTPATIENT)
Dept: FAMILY MEDICINE CLINIC | Facility: CLINIC | Age: 72
End: 2017-08-07

## 2017-08-07 RX ORDER — INSULIN GLARGINE 100 [IU]/ML
38 INJECTION, SOLUTION SUBCUTANEOUS EVERY EVENING
Qty: 10 ML | Refills: 2 | Status: SHIPPED | OUTPATIENT
Start: 2017-08-07 | End: 2017-12-02 | Stop reason: SDUPTHER

## 2017-08-07 RX ORDER — LEVOTHYROXINE SODIUM 0.12 MG/1
125 TABLET ORAL DAILY
Qty: 30 TABLET | Refills: 2 | Status: SHIPPED | OUTPATIENT
Start: 2017-08-07 | End: 2017-12-19 | Stop reason: SDUPTHER

## 2017-08-07 NOTE — TELEPHONE ENCOUNTER
----- Message from Eva Elliott MD sent at 8/6/2017 10:03 PM EDT -----  Please call Bianka. We have some stuff to work on.   1) Her thyroid is still bad. I am going to decrease that synthroid to 125 mcg if she is okay with it. She should be on 137 mcg right now. If she is okay with it, please send to pharm, #[phone] with 2 refills. Thanks.   2) Her A1C is worse. I really need to her to work on diet and exercise. I would like her to increase her lantus to 38 units a night. We can send new script with 2 refills if she needs it. Please also make her an appointment in a month-  I need to discuss this with her.   3) Her liver enzymes are also worse. Not super bad, but they have increased slightly. I really do need her to work on diet and exercise with a less processed food diet. We will recheck in a month - may need further liver workup.  4) Her cholesterol has also worsened too - I don't want to increase the atorvastatin because of the liver enzymes, but again, we need to work on the diet and exercise - if the diabetes get better, the cholesterol will too. Thanks.      Spoke with patient & she verbalized understanding.

## 2017-08-08 ENCOUNTER — PATIENT OUTREACH (OUTPATIENT)
Dept: CASE MANAGEMENT | Facility: OTHER | Age: 72
End: 2017-08-08

## 2017-08-08 NOTE — OUTREACH NOTE
Current Barriers & Concerns:  Barriers: Pain  The main concerns and/or symptoms the patient would like to address are: blood sugars    Education/instruction provided by Care Coordinator: CC educated on diabetes mgmt including diet, exercise and importance of regular glucose monitoring at home. Patient admits she had not been checking her glucose at home but states that after her recent bloodwork results, she knows she has to start. Patient's hgba1c had increased to 11.1% - CC advised pt that good glucose control is considered less than 7%, pt voiced understanding. Patient states that she knows what to eat/what to avoid but she just had not been doing it. Patient states that it is very difficult for her to be physically active due to pain in her hips and knees. CC advised pt of water aerobics as this type of exercise might allow her to be active while taking some of the pressure off of her joints. Patient states she is motivated to improve her blood glucose, states she does not want to lose her eyesight.     Follow Up Outreach Due: 2 weeks; Patient has CC contact info in case questions/concerns arise prior to next outreach call.

## 2017-08-08 NOTE — PROGRESS NOTES
"Bianka Agudelo     VITALS: Blood pressure 150/74, pulse 80, height 63.5\" (161.3 cm), weight 197 lb (89.4 kg), SpO2 95 %.    Subjective  Chief Complaint:   Chief Complaint   Patient presents with   • Follow-up   • Bloated        History of Present Illness:  Patient is a 71 y.o. female who presents to clinic secondary to medical followup. No new or acute concerns. She has been doing well. Feeling a little bloated today.      Patient has a history of hypertension and is on losartan/HCTZ 50/12.5 mg orally BID and norvasc 5 mg orally daily (occasionally). Denies any side effects of the medications. Denies any dizziness, lightheadedness, blurry vision, chest pain, or edema. Patient does not take her blood pressures at home. Tries to follow a low-salt diet.    Patient also has a history of type II diabetes and is currently on metformin 1000 mg orally BID and Lantus 38 units every evening. She has been inconsistent with the januvia 100 mg orally daily and Novolog 70/30 20 units BID at meals. Last A1C in 4/2017 was 10.4. Denies any side effects of her medications. Patient denies any changes in vision, polydipsia, polyuria, numbness or tingling, or hypoglycemic or hyperglycemic episodes. Patient does follow a diabetic diet and checks her glucose levels regularly. Blood glucose levels have been elevated. Patient does have complications of nephropathy. No worsening or exacerbation of symptoms. No neuropathy or retinopathy. Last eye exam was July 2015 - going back April 2017. Last microalbumin was elevated done February 2016. Last foot exam was in 2015 and patient does not see a podiatrist. She has gone through diabetic teaching/nutrition education. Does take losartan 100 mg orally daily to provide for kidney protection and aspirin 81 mg orally daily to provide for cardiovascular protection.    Patient also has a history of hyperlipidemia and is currently on atorvastatin 10 mg orally daily. Denies any side effects of the " medication. Last lipid panel was 10/2016 and in normal limits. Denies any muscle weakness, jaundice or itching. Tries to follow a low cholesterol diet.    Patient has a history of GERD and is currently on prilosec 20 mg orally BID. Patient denies any side effects of the medication. Denies metallic tastes and has not been having increased symptoms during sleep. Has not had any recent exacerbations. Denies any nausea, vomiting, burping, hiccuping, or midepigastric pain.    Patient does have a history of hypothyroidism and is currently on synthroid 137 mcg orally daily. She is doing well on this medication. Denies any side effects. Denies fatigue, dizziness, palpitations, changes in weight, hair, or nails. Last thyroid panel was 4/2017 and abnormal.    Patient has a history of anxiety and is currently on diazepam 5 mg orally BID. Patient states that this medication is working. Denies any side effects of the medication. Patient states that she is sleeping well and can get out of bed daily to accomplish daily activities. Has some anhedonia. Mood is stable. Has no feelings of guilt. Has good concentration and memory ability. Has some energy. Is able to make goals. Appetite is stable. Denies any suicidal or homicidal ideations. Does not have any auditory or visual hallucinations. [facility] [id number] WNL. No controlled medication seeking behavior.     Patient has a history of CAD with angina and is on plavix 75 mg orally daily, aspirin 81 mg orally daily, and nitro ER 6.5 mg orally TID for angina. We tried to switch her from nitro to Imdur in 2016 without much success. She reports that she has been doing fine on these medications, denies any side effects, and denies any chest pain.       Patient does have a history of muscle spasms especially in her back when she does physical activity, such as gardening. She is currently on flexeril 10 mg orally BID as needed and ibuprofen 600 mg orally TID. Denies any side effects of the  medications. Denies any sedation effects. Medications allow her enough flexibility and relief from the pain so that she can do activities other than those of daily living. Movement and ambulation are improved on these medications. Robaxin is fairly expensive for her and she would like to change it to a cheaper medication if possible. No pain medication seeking behavior. [facility] #[id number] is clean without any discrepancies.      Patient does have an overactive bladder and is currently on oxybutynin 5 mg orally daily. Medication is treating her well. No side effects. States that it does reduce her urinary frequency.  No complaints about any of the medications.    The following portions of the patient's history were reviewed and updated as appropriate: allergies, current medications, past family history, past medical history, past social history, past surgical history and problem list.    Past Medical History  Past Medical History:   Diagnosis Date   • Anxiety    • CAD (coronary artery disease)    • Diabetes mellitus    • Dyslipidemia    • GERD (gastroesophageal reflux disease)    • H/O angina pectoris    • History of bone density study     February 2014   • History of mammogram 07/15/2013   • Hypertension    • Hypothyroidism    • Muscle spasms of both lower extremities    • Overactive bladder    • Vitamin B12 deficiency    • Vitamin D deficiency        Review of Systems  Constitutional: Denies any recent history of HAs, dizziness, fevers, chills, itching.  Eyes: Denies any changes in vision. Denies any blurry vision or diplopia.  Ears, Nose, Mouth, Throat: Denies any sore throat, rhinorrhea, or cough.  Cardiovascular: Denies any chest pain, pressure, or palpitations.  Respiratory: Denies any shortness of breath or wheezing.  Gastrointestinal: Denies any diarrhea or constipation.  Genitourinary: Denies any changes in urination.  Musculoskeletal: Denies any muscle weakness.  Skin and/or breasts: Denies any  rashes.  Neurological: Denies any changes in balance or gait.  Psychiatric: Denies any anxiety, depression, or insomnia. Denies any suicidal or homicidal ideations.  Endocrine: Denies any heat or cold intolerance. Denies any voice changes, polydipsia, or polyuria.  Hematologic/Lymphatic: Denies any anemia or easy bruising.    Surgical History  Past Surgical History:   Procedure Laterality Date   • COLONOSCOPY  01/01/2010   • OOPHORECTOMY      1 removed   • TUBAL ABDOMINAL LIGATION         Family History  Family History   Problem Relation Age of Onset   • Diabetes Mother    • Heart disease Mother    • Stroke Mother    • Cancer Father        Social History  Social History     Social History   • Marital status:      Spouse name: N/A   • Number of children: N/A   • Years of education: N/A     Occupational History   • Not on file.     Social History Main Topics   • Smoking status: Never Smoker   • Smokeless tobacco: Never Used   • Alcohol use No   • Drug use: No   • Sexual activity: Not on file     Other Topics Concern   • Not on file     Social History Narrative       Objective  Physical Exam  Gen: Patient in NAD. Pleasant and answers appropriately. A&Ox3.    Skin: Warm and dry with normal turgor. No purpura, rashes, or unusual pigmentation noted. Hair is normal in appearance and distribution.    HEENT: NC/AT. No lesions noted. Conjunctiva clear, sclera nonicteric. PERRL. EOMI without nystagmus or strabismus. Fundi appear benign. No hemorrhages or exudates of eyes. Auditory canals are patent bilaterally without lesions. TMs intact,  nonerythematous, nonbulging without lesions. Nasal mucosa pink, nonerythematous, and nonedematous. Frontal and maxillary sinuses are nontender. O/P nonerythematous and moist without exudate.    Neck: Supple without lymph nodes palpated. FROM.     Lungs: CTA B/L without rales, rhonchi, crackles, or wheezes.    Heart: RRR. S1 and S2 normal. No S3 or S4. No MRGT.    Abd: Soft,  nontender,nondistended. (+)BSx4 quadrants. No HSM, masses, or abnormalities.    Extrem: No CCE. Radial pulses 2+/4 and equal B/L. FROMx4. No bone, joint, or muscle tenderness noted.    Neuro: No focal motor/sensory deficits.    Procedures    Assessment/Plan  Bianka Agudelo is a 71 y.o. here for medical followup.  Diagnoses and all orders for this visit:    Anxiety  -     diazePAM (VALIUM) 5 MG tablet; Take 1 tablet by mouth 2 (Two) Times a Day.  Well controlled. Refilled diazepam 5 mg orally BID.    Muscle spasms  -     cyclobenzaprine (FLEXERIL) 10 MG tablet; Take 1 tablet by mouth 2 (Two) Times a Day As Needed for Muscle Spasms.  Stable. Refilled flexeril 10 mg orally BID as needed and will continue ibuprofen 600 mg orally TID and Voltaren gel when not using ibuprofen.  .  Hypertension  Elevated today. Discussed at length. Continue losartan/HCTZ 50/12.5 mg orally BID. Discussed with patient at length to utilize norvasc 5 mg orally daily instead of occasionally. Patient to follow up with a nursing appointment for blood pressure check in a month.      Eustachian tube dysfunction, bilateral  Improved. Patient using meclizine 25 mg orally every BID as needed for dizziness.      Hypercalcemia  Workup benign. Continue to monitor.       Type 2 diabetes mellitus without complication  Uncontrolled. Recheck A1C and microalbumin at next visit. On metformin 1000 mg orally BID and Lantus 38 units every evening. Not currently taking januvia 100 mg orally daily or Novolog 70/30 20 units BID at meals - encouraged patient to do so. Eye exam and microalbumin 2016. Patient to call [facility] for annual foot exam. Does take losartan 100 mg orally daily to provide for kidney protection and aspirin 81 mg orally daily to provide for cardiovascular protection.       GERD  Stable. Continue omeprazole 20 mg orally BID.      Hypothyroidism, unspecified hypothyroidism type  Stable. Continue synthroid 137 mcg orally daily. Check  thyroid panel at next visit.      Vitamin D deficiency   Stable 10/2016. Recheck at next blood draw.        CAD with angina  Stable and asymptomatic. Continue plavix 75 mg orally daily, aspirin 81 mg orally daily, and nitro ER 6.5 mg orally TID for angina.       Overactive bladder  Stable. Continue oxybutynin 5 mg orally daily.       Asthma  Continue albuterol inhaler as needed.      Vitamin B12 deficiency  Patient has a hard time coming in for B12 shots secondary to cost. On B12 1000 mcg orally daily. Will need to recheck at next blood draw.      Preventative Medicine  Colonoscopy 2010 UTD. Declines mammogram. DEXA scan in February 2017. Flu shot 2016 given. Declines pneumo for now.      Findings and plans discussed with patient who verbalizes understanding and agreement. Patient to followup at clinic PRN if symptoms continue or worsen or in one month for regular medical followup. Will get labs at next visit as it is going to be a wellness visit - thyroid, A1C, CMP, microalbumin, lipids.      Eva Elliott MD

## 2017-08-22 RX ORDER — DIAZEPAM 5 MG/1
5 TABLET ORAL 2 TIMES DAILY
Qty: 60 TABLET | Refills: 0 | Status: SHIPPED | OUTPATIENT
Start: 2017-08-22 | End: 2017-10-10 | Stop reason: SDUPTHER

## 2017-08-22 NOTE — TELEPHONE ENCOUNTER
[facility] # [id number]  Reviewed and is consistent.  UDS  reviewed and is consistent.  Patient comfort assessment guide reviewed and updated today.    As part of the patient's treatment plan they are being prescribed a controlled substance/ substances with potential for abuse.  This patient has been made aware of the appropriate use of such medications, including potential risk of somnolence, limited ability to drive and/or work safely, and potential for overdose.  It has also been made clear these medications are for use by the patient only, without concomitant use of alcohol or other substances unless prescribed/advised by medical provider.    Patient has completed prescribing agreement detailing terms of continued prescribing of controlled substances including monitoring PETE reports, urine drug screens, and pill counts.  The patient is aware PETE reports are reviewed on a regular basis and scanned into the chart.    History and physical exam exhibit continued safe and appropriate use of controlled substances.

## 2017-08-28 NOTE — PROGRESS NOTES
QUICK REFERENCE INFORMATION:  The ABCs of the Annual Wellness Visit    Subsequent Medicare Wellness Visit    HEALTH RISK ASSESSMENT    1945    Recent Hospitalizations:  No hospitalization(s) within the last year..    Current Medical Providers:  Patient Care Team:  Eva Elliott MD as PCP - General (Family Medicine)  Eva Elliott MD as PCP - Family Medicine  Eva Elliott MD as PCP - Claims Attributed  Mirian Resendiz RN as Care Coordinator (Population Health)    Smoking Status:  History   Smoking Status   • Never Smoker   Smokeless Tobacco   • Never Used       Alcohol Consumption:  History   Alcohol Use No       Depression Screen:   PHQ-9 Depression Screening 8/3/2017   Little interest or pleasure in doing things 0   Feeling down, depressed, or hopeless 0   Trouble falling or staying asleep, or sleeping too much -   Feeling tired or having little energy -   Poor appetite or overeating -   Feeling bad about yourself - or that you are a failure or have let yourself or your family down -   Trouble concentrating on things, such as reading the newspaper or watching television -   Moving or speaking so slowly that other people could have noticed. Or the opposite - being so fidgety or restless that you have been moving around a lot more than usual -   Thoughts that you would be better off dead, or of hurting yourself in some way -   PHQ-9 Total Score 0   If you checked off any problems, how difficult have these problems made it for you to do your work, take care of things at home, or get along with other people? -       Health Habits and Functional and Cognitive Screening:  No flowsheet data found.         Does the patient have evidence of cognitive impairment? No    Aspirin use counseling: Taking ASA appropriately as indicated      Recent Lab Results:  CMP:  Lab Results   Component Value Date     (H) 10/21/2016    BUN 16 08/03/2017    CREATININE 0.71 08/03/2017    EGFRIFNONA 81 08/03/2017     EGFRIFAFRI 127 10/21/2016    BCR 22.5 08/03/2017     08/03/2017    K 4.1 08/03/2017    CO2 29.3 08/03/2017    CALCIUM 10.4 (H) 08/03/2017    PROTENTOTREF 6.9 10/21/2016    ALBUMIN 4.50 08/03/2017    LABGLOBREF 2.3 10/21/2016    LABIL2 1.6 08/03/2017    BILITOT 1.1 08/03/2017    ALKPHOS 67 08/03/2017    AST 55 (H) 08/03/2017    ALT 53 (H) 08/03/2017     Lipid Panel:  Lab Results   Component Value Date    CHOL 227 (H) 08/03/2017    TRIG 309 (H) 08/03/2017    HDL 57 (L) 08/03/2017    VLDL 61.8 08/03/2017    LDLCALC 108 (H) 08/03/2017    LDLHDL 1.90 08/03/2017     HbA1c:  Lab Results   Component Value Date    HGBA1C 11.10 (H) 08/03/2017       Visual Acuity:   Visual Acuity Screening    Right eye Left eye Both eyes   Without correction: 20/100 20/25 20/25   With correction:          Age-appropriate Screening Schedule:  Refer to the list below for future screening recommendations based on patient's age, sex and/or medical conditions. Orders for these recommended tests are listed in the plan section. The patient has been provided with a written plan.    Health Maintenance   Topic Date Due   • DIABETIC FOOT EXAM  06/14/2016   • DIABETIC EYE EXAM  03/07/2017   • INFLUENZA VACCINE  09/01/2017   • HEMOGLOBIN A1C  02/03/2018   • PNEUMOCOCCAL VACCINES (65+ LOW/MEDIUM RISK) (2 of 2 - PPSV23) 05/11/2018   • URINE MICROALBUMIN  08/03/2018   • MAMMOGRAM  09/14/2018   • COLONOSCOPY  06/14/2020   • TDAP/TD VACCINES (3 - Td) 05/11/2027   • ZOSTER VACCINE  Addressed        Subjective   History of Present Illness    Bianka Agudelo is a 71 y.o. female who presents for an Subsequent Wellness Visit.    The following portions of the patient's history were reviewed and updated as appropriate: allergies, current medications, past family history, past medical history, past social history, past surgical history and problem list.    Outpatient Medications Prior to Visit   Medication Sig Dispense Refill   • albuterol (PROVENTIL HFA;VENTOLIN  HFA) 108 (90 BASE) MCG/ACT inhaler Inhale 2 puffs every 4 (four) hours as needed for wheezing. 1 inhaler 5   • amLODIPine (NORVASC) 5 MG tablet Take 1 tablet by mouth Daily. 30 tablet 5   • aspirin 81 MG EC tablet Take 1 tablet by mouth Daily. 30 tablet 5   • clopidogrel (PLAVIX) 75 MG tablet Take 1 tablet by mouth Daily. 30 tablet 5   • cyclobenzaprine (FLEXERIL) 10 MG tablet Take 1 tablet by mouth 2 (Two) Times a Day As Needed for Muscle Spasms. 60 tablet 5   • diclofenac (VOLTAREN) 1 % gel gel Apply 4 gm to affected areas below the waist QID PRN pain and 2 gm to affected areas above the waist QID PRN pain 100 g 1   • dicyclomine (BENTYL) 20 MG tablet Take 1 tablet by mouth every 6 (six) hours as needed (abdominal pain). 30 tablet 0   • glucose blood test strip 1 each by Other route Daily. Use as instructed 100 each 5   • ibuprofen (ADVIL,MOTRIN) 600 MG tablet Take 1 tablet by mouth 3 (three) times a day as needed for mild pain (1-3). 90 tablet 5   • Insulin Syringe/Needle U-500 31G X 6MM 0.5 ML misc 1 each 4 (Four) Times a Day. 120 each 11   • JANUVIA 100 MG tablet Take 1 tablet by mouth Daily. 30 tablet 5   • Lancets misc Use as needed daily to check glucose levels 100 each 5   • Loratadine 10 MG capsule Take 1 tablet by mouth Daily. 30 each 5   • meclizine (ANTIVERT) 25 MG tablet Take 1 tablet by mouth 2 (two) times a day as needed for dizziness. 60 tablet 5   • metFORMIN (FORTAMET) 1000 MG (OSM) 24 hr tablet Take 1 tablet by mouth 2 (Two) Times a Day With Meals. 60 tablet 5   • nitroglycerin (NITRO-BID) 6.5 MG CR capsule Take 1 capsule by mouth 3 (Three) Times a Day. 90 capsule 5   • NOVOLOG MIX 70/30 (70-30) 100 UNIT/ML injection Inject 20 Units under the skin 2 (two) times a day with meals. 1200 mL 5   • omeprazole (priLOSEC) 20 MG capsule Take 1 capsule by mouth 2 (Two) Times a Day. 60 capsule 5   • ondansetron ODT (ZOFRAN-ODT) 4 MG disintegrating tablet Take 1 tablet by mouth every 6 (six) hours as needed  for nausea or vomiting. 12 tablet 0   • oxybutynin (DITROPAN) 5 MG tablet Take 1 tablet by mouth 2 (Two) Times a Day. 60 tablet 5   • vitamin B-12 (CYANOCOBALAMIN) 1000 MCG tablet Take 1 tablet by mouth Daily. 30 tablet 5   • atorvastatin (LIPITOR) 10 MG tablet Take 1 tablet by mouth Daily. 30 tablet 5   • diazePAM (VALIUM) 5 MG tablet Take 1 tablet by mouth 2 (Two) Times a Day. 60 tablet 0   • insulin NPH-insulin regular (novoLIN 70/30) (70-30) 100 UNIT/ML injection Inject 20 Units under the skin 2 (Two) Times a Day With Meals. 12 mL 5   • LANTUS 100 UNIT/ML injection Inject 35 Units under the skin Every Evening. 11 mL 5   • levothyroxine (SYNTHROID) 137 MCG tablet Take 1 tablet by mouth Daily. 30 tablet 5   • losartan-hydrochlorothiazide (HYZAAR) 50-12.5 MG per tablet Take 1 tablet by mouth 2 (Two) Times a Day. 60 tablet 5     No facility-administered medications prior to visit.        Patient Active Problem List   Diagnosis   • Vitamin D deficiency   • Vitamin B12 deficiency   • Overactive bladder   • Hypothyroidism   • Hypertension   • GERD (gastroesophageal reflux disease)   • Dyslipidemia   • Diabetes mellitus   • Anxiety   • CAD (coronary artery disease)   • Hypercalcemia       Advance Care Planning:  has an advance directive - a copy HAS NOT been provided. Have asked the patient to send this to us to add to record.    Identification of Risk Factors:  Risk factors include: weight , cardiovascular risk, inactivity, inadequate social support, caretaker stress, depression and financial stress.    Review of Systems  Constitutional: Denies any recent history of HAs, dizziness, fevers, chills, itching.  Eyes: Denies any changes in vision. Denies any blurry vision or diplopia.  Ears, Nose, Mouth, Throat: Denies any sore throat, rhinorrhea, or cough.  Cardiovascular: Denies any chest pain, pressure, or palpitations.  Respiratory: Denies any shortness of breath or wheezing.  Gastrointestinal: Denies any diarrhea or  "constipation.  Genitourinary: Denies any changes in urination.  Musculoskeletal: Denies any muscle weakness.  Skin and/or breasts: Denies any rashes.  Neurological: Denies any changes in balance or gait.  Psychiatric: Denies any anxiety, depression, or insomnia. Denies any suicidal or homicidal ideations.  Endocrine: Denies any heat or cold intolerance. Denies any voice changes, polydipsia, or polyuria.  Hematologic/Lymphatic: Denies any anemia or easy bruising.    Compared to one year ago, the patient feels her physical health is the same.  Compared to one year ago, the patient feels her mental health is the same.    Objective     Physical Exam    Vitals:    08/03/17 0954   BP: 162/79   BP Location: Left arm   Patient Position: Sitting   Pulse: 84   SpO2: 97%   Weight: 197 lb (89.4 kg)   Height: 63.5\" (161.3 cm)       Body mass index is 34.35 kg/(m^2).  Discussed the patient's BMI with her. The BMI is above average; BMI management plan is completed.    Gen: Patient in NAD. Pleasant and answers appropriately. A&Ox3.    Skin: Warm and dry with normal turgor. No purpura, rashes, or unusual pigmentation noted. Hair is normal in appearance and distribution.    HEENT: NC/AT. No lesions noted. Conjunctiva clear, sclera nonicteric. PERRL. EOMI without nystagmus or strabismus. Fundi appear benign. No hemorrhages or exudates of eyes. Auditory canals are patent bilaterally without lesions. TMs intact,  nonerythematous, nonbulging without lesions. Nasal mucosa pink, nonerythematous, and nonedematous. Frontal and maxillary sinuses are nontender. O/P nonerythematous and moist without exudate.    Neck: Supple without lymph nodes palpated. FROM.      Lungs: CTA B/L without rales, rhonchi, crackles, or wheezes.    Heart: RRR. S1 and S2 normal. No S3 or S4. No MRGT.    Abd: Soft, nontender,nondistended. (+)BSx4 quadrants. No HSM, masses, or abnormalities.    Extrem: No CCE. Radial pulses 2+/4 and equal B/L. FROMx4. No bone, joint, " or muscle tenderness noted.    Neuro: No focal motor/sensory deficits.      Assessment/Plan   Patient Self-Management and Personalized Health Advice  The patient has been provided with information about: diet, exercise, weight management, prevention of cardiac or vascular disease and the relationship between weight and GERD and preventive services including:   · Exercise counseling provided, Nutrition counseling provided.    Visit Diagnoses:    ICD-10-CM ICD-9-CM   1. Medicare annual wellness visit, subsequent Z00.00 V70.0   2. Essential hypertension I10 401.9       Orders Placed This Encounter   Procedures   • Hemoglobin A1c   • Comprehensive Metabolic Panel   • TSH   • T4, Free   • Lipid Panel   • Vitamin B12   • MicroAlbumin, Urine, Random   • Osmolality, Calculated     Standing Status:   Future     Number of Occurrences:   1     Standing Expiration Date:   8/3/2018       Outpatient Encounter Prescriptions as of 8/3/2017   Medication Sig Dispense Refill   • albuterol (PROVENTIL HFA;VENTOLIN HFA) 108 (90 BASE) MCG/ACT inhaler Inhale 2 puffs every 4 (four) hours as needed for wheezing. 1 inhaler 5   • amLODIPine (NORVASC) 5 MG tablet Take 1 tablet by mouth Daily. 30 tablet 5   • aspirin 81 MG EC tablet Take 1 tablet by mouth Daily. 30 tablet 5   • atorvastatin (LIPITOR) 10 MG tablet Take 1 tablet by mouth Daily. 30 tablet 5   • clopidogrel (PLAVIX) 75 MG tablet Take 1 tablet by mouth Daily. 30 tablet 5   • cyclobenzaprine (FLEXERIL) 10 MG tablet Take 1 tablet by mouth 2 (Two) Times a Day As Needed for Muscle Spasms. 60 tablet 5   • diclofenac (VOLTAREN) 1 % gel gel Apply 4 gm to affected areas below the waist QID PRN pain and 2 gm to affected areas above the waist QID PRN pain 100 g 1   • dicyclomine (BENTYL) 20 MG tablet Take 1 tablet by mouth every 6 (six) hours as needed (abdominal pain). 30 tablet 0   • glucose blood test strip 1 each by Other route Daily. Use as instructed 100 each 5   • ibuprofen  (ADVIL,MOTRIN) 600 MG tablet Take 1 tablet by mouth 3 (three) times a day as needed for mild pain (1-3). 90 tablet 5   • insulin NPH-insulin regular (novoLIN 70/30) (70-30) 100 UNIT/ML injection Inject 20 Units under the skin 2 (Two) Times a Day With Meals. 12 mL 5   • Insulin Syringe/Needle U-500 31G X 6MM 0.5 ML misc 1 each 4 (Four) Times a Day. 120 each 11   • JANUVIA 100 MG tablet Take 1 tablet by mouth Daily. 30 tablet 5   • Lancets misc Use as needed daily to check glucose levels 100 each 5   • Loratadine 10 MG capsule Take 1 tablet by mouth Daily. 30 each 5   • losartan-hydrochlorothiazide (HYZAAR) 50-12.5 MG per tablet Take 1 tablet by mouth 2 (Two) Times a Day. 60 tablet 5   • meclizine (ANTIVERT) 25 MG tablet Take 1 tablet by mouth 2 (two) times a day as needed for dizziness. 60 tablet 5   • metFORMIN (FORTAMET) 1000 MG (OSM) 24 hr tablet Take 1 tablet by mouth 2 (Two) Times a Day With Meals. 60 tablet 5   • nitroglycerin (NITRO-BID) 6.5 MG CR capsule Take 1 capsule by mouth 3 (Three) Times a Day. 90 capsule 5   • NOVOLOG MIX 70/30 (70-30) 100 UNIT/ML injection Inject 20 Units under the skin 2 (two) times a day with meals. 1200 mL 5   • omeprazole (priLOSEC) 20 MG capsule Take 1 capsule by mouth 2 (Two) Times a Day. 60 capsule 5   • ondansetron ODT (ZOFRAN-ODT) 4 MG disintegrating tablet Take 1 tablet by mouth every 6 (six) hours as needed for nausea or vomiting. 12 tablet 0   • oxybutynin (DITROPAN) 5 MG tablet Take 1 tablet by mouth 2 (Two) Times a Day. 60 tablet 5   • vitamin B-12 (CYANOCOBALAMIN) 1000 MCG tablet Take 1 tablet by mouth Daily. 30 tablet 5   • [DISCONTINUED] atorvastatin (LIPITOR) 10 MG tablet Take 1 tablet by mouth Daily. 30 tablet 5   • [DISCONTINUED] diazePAM (VALIUM) 5 MG tablet Take 1 tablet by mouth 2 (Two) Times a Day. 60 tablet 0   • [DISCONTINUED] insulin NPH-insulin regular (novoLIN 70/30) (70-30) 100 UNIT/ML injection Inject 20 Units under the skin 2 (Two) Times a Day With  Meals. 12 mL 5   • [DISCONTINUED] LANTUS 100 UNIT/ML injection Inject 35 Units under the skin Every Evening. 11 mL 5   • [DISCONTINUED] LANTUS 100 UNIT/ML injection Inject 35 Units under the skin Every Evening. 11 mL 5   • [DISCONTINUED] levothyroxine (SYNTHROID) 137 MCG tablet Take 1 tablet by mouth Daily. 30 tablet 5   • [DISCONTINUED] losartan-hydrochlorothiazide (HYZAAR) 50-12.5 MG per tablet Take 1 tablet by mouth 2 (Two) Times a Day. 60 tablet 5     No facility-administered encounter medications on file as of 8/3/2017.        Reviewed use of high risk medication in the elderly: yes  Reviewed for potential of harmful drug interactions in the elderly: yes    Hypertension  Patient to monitor. Elevated today. Patient to continue losartan/HCTZ 50/12.5 mg orally BID and norvasc 5 mg orally daily. Will take a second norvasc at night if blood pressures are elevated. Continue to monitor.    Follow Up:  Return in about 3 months (around 11/3/2017).     An After Visit Summary and PPPS with all of these plans were given to the patient.

## 2017-08-29 ENCOUNTER — TELEPHONE (OUTPATIENT)
Dept: FAMILY MEDICINE CLINIC | Facility: CLINIC | Age: 72
End: 2017-08-29

## 2017-08-29 NOTE — TELEPHONE ENCOUNTER
----- Message from Eva Elliott MD sent at 8/28/2017  2:39 AM EDT -----  Please send a copy of Bianka's most recent labs to her eye doctor, Dr. Fletcher (I think that's the right name). Thanks.        Done

## 2017-09-15 ENCOUNTER — PATIENT OUTREACH (OUTPATIENT)
Dept: CASE MANAGEMENT | Facility: OTHER | Age: 72
End: 2017-09-15

## 2017-09-15 NOTE — OUTREACH NOTE
Current Barriers & Concerns:     The main concerns and/or symptoms the patient would like to address are: blood glucose.    Education/instruction provided by Care Coordinator: CC asked patient about any changes she had made to her diet or exercise habits. Patient reports she has been eating healthier, including a lot of salads without dressing. Pt states she hasn't checked her BG in a couple of days, but that the last time she checked it, it was 152. CC praised patient for her dietary changes and let her know that although she hadn't previously been checking her BG, that it was probably staying well over 200 considering her a1c was 11.1%, so a reading of 152 is likely a big improvement. Patient denies any questions/concerns or needs at this time but has CC name/number if something comes up prior to next outreach.    Follow Up Outreach Due: 1-2 months or PRN

## 2017-10-10 ENCOUNTER — TELEPHONE (OUTPATIENT)
Dept: FAMILY MEDICINE CLINIC | Facility: CLINIC | Age: 72
End: 2017-10-10

## 2017-10-10 RX ORDER — DIAZEPAM 5 MG/1
5 TABLET ORAL 2 TIMES DAILY
Qty: 60 TABLET | Refills: 0 | Status: SHIPPED | OUTPATIENT
Start: 2017-10-10 | End: 2017-11-17 | Stop reason: SDUPTHER

## 2017-10-10 NOTE — PROGRESS NOTES
[facility] # [id number]  Reviewed and is consistent.  [facility] 5/2017 reviewed and is consistent.  Patient comfort assessment guide reviewed and updated today.    As part of the patient's treatment plan they are being prescribed a controlled substance/ substances with potential for abuse.  This patient has been made aware of the appropriate use of such medications, including potential risk of somnolence, limited ability to drive and/or work safely, and potential for overdose.  It has also been made clear these medications are for use by the patient only, without concomitant use of alcohol or other substances unless prescribed/advised by medical provider.    Patient has completed prescribing agreement detailing terms of continued prescribing of controlled substances including monitoring PETE reports, urine drug screens, and pill counts.  The patient is aware PETE reports are reviewed on a regular basis and scanned into the chart.    History and physical exam exhibit continued safe and appropriate use of controlled substances.

## 2017-10-17 ENCOUNTER — TELEPHONE (OUTPATIENT)
Dept: FAMILY MEDICINE CLINIC | Facility: CLINIC | Age: 72
End: 2017-10-17

## 2017-10-17 RX ORDER — ASPIRIN 81 MG/1
81 TABLET ORAL DAILY
Qty: 30 TABLET | Refills: 3 | Status: SHIPPED | OUTPATIENT
Start: 2017-10-17 | End: 2018-02-17 | Stop reason: SDUPTHER

## 2017-10-17 RX ORDER — SITAGLIPTIN 100 MG/1
100 TABLET, FILM COATED ORAL DAILY
Qty: 30 TABLET | Refills: 3 | Status: SHIPPED | OUTPATIENT
Start: 2017-10-17 | End: 2018-02-20 | Stop reason: SDUPTHER

## 2017-11-17 ENCOUNTER — TELEPHONE (OUTPATIENT)
Dept: FAMILY MEDICINE CLINIC | Facility: CLINIC | Age: 72
End: 2017-11-17

## 2017-11-17 RX ORDER — DIAZEPAM 5 MG/1
5 TABLET ORAL 2 TIMES DAILY
Qty: 60 TABLET | Refills: 0 | Status: SHIPPED | OUTPATIENT
Start: 2017-11-17 | End: 2017-12-19 | Stop reason: SDUPTHER

## 2017-11-17 RX ORDER — IBUPROFEN 600 MG/1
600 TABLET ORAL 3 TIMES DAILY PRN
Qty: 90 TABLET | Refills: 5 | Status: SHIPPED | OUTPATIENT
Start: 2017-11-17 | End: 2018-10-11 | Stop reason: DRUGHIGH

## 2017-11-17 NOTE — PROGRESS NOTES
[facility] # [id number]  Reviewed and is consistent.  [facility] 5/2017 reviewed and is consistent.  Patient comfort assessment guide reviewed and updated today.    As part of the patient's treatment plan they are being prescribed a controlled substance/ substances with potential for abuse.  This patient has been made aware of the appropriate use of such medications, including potential risk of somnolence, limited ability to drive and/or work safely, and potential for overdose.  It has also been made clear these medications are for use by the patient only, without concomitant use of alcohol or other substances unless prescribed/advised by medical provider.    Patient has completed prescribing agreement detailing terms of continued prescribing of controlled substances including monitoring PETE reports, urine drug screens, and pill counts.  The patient is aware PETE reports are reviewed on a regular basis and scanned into the chart.    History and physical exam exhibit continued safe and appropriate use of controlled substances.

## 2017-11-29 ENCOUNTER — PATIENT OUTREACH (OUTPATIENT)
Dept: CASE MANAGEMENT | Facility: OTHER | Age: 72
End: 2017-11-29

## 2017-11-29 ENCOUNTER — EPISODE CHANGES (OUTPATIENT)
Dept: CASE MANAGEMENT | Facility: OTHER | Age: 72
End: 2017-11-29

## 2017-11-29 NOTE — OUTREACH NOTE
Current Barriers & Concerns:     The main concerns and/or symptoms the patient would like to address are: blood glucose    Education/instruction provided by Care Coordinator: Patient reports her blood sugar levels are ranging from 150's to 190's and she has been trying to work on her diet. CC educ on diabetes mgmt, pt voiced understanding. Pt states she is most concerned about diabetes affecting her vision. CC asked patient about recent missed appointment with her PCP on Nov 3rd. Patient stated she didn't know she had missed an appointment. CC offered to schedule for pt and pt agreed - CC scheduled patient to see her PCP on Dec 19th at 10:10 am, pt agreed to date/time and asked that CC call to remind her of the appointment; CC agreed.    Follow Up Outreach Due: Dec 18th for an appointment reminder.

## 2017-12-04 RX ORDER — INSULIN GLARGINE 100 [IU]/ML
INJECTION, SOLUTION SUBCUTANEOUS
Qty: 10 ML | Refills: 0 | Status: SHIPPED | OUTPATIENT
Start: 2017-12-04 | End: 2017-12-24 | Stop reason: SDUPTHER

## 2017-12-07 RX ORDER — OMEPRAZOLE 20 MG/1
20 CAPSULE, DELAYED RELEASE ORAL 2 TIMES DAILY
Qty: 60 CAPSULE | Refills: 5 | Status: SHIPPED | OUTPATIENT
Start: 2017-12-07 | End: 2018-07-12 | Stop reason: SDUPTHER

## 2017-12-18 ENCOUNTER — PATIENT OUTREACH (OUTPATIENT)
Dept: CASE MANAGEMENT | Facility: OTHER | Age: 72
End: 2017-12-18

## 2017-12-18 NOTE — OUTREACH NOTE
RN Care Coordinator contacted patient to remind her of tomorrow's appointment with her PCP. Patient states she is doing well, and celebrating her birthday today. Patient had no questions/concerns at this time. CC reminded patient of her availability in case she wanted to discuss anything after her appointment tomorrow, pt voiced understanding. CC will outreach again in a month or PRN.

## 2017-12-19 ENCOUNTER — OFFICE VISIT (OUTPATIENT)
Dept: FAMILY MEDICINE CLINIC | Facility: CLINIC | Age: 72
End: 2017-12-19

## 2017-12-19 VITALS
HEART RATE: 89 BPM | WEIGHT: 195 LBS | OXYGEN SATURATION: 96 % | HEIGHT: 64 IN | SYSTOLIC BLOOD PRESSURE: 134 MMHG | DIASTOLIC BLOOD PRESSURE: 78 MMHG | BODY MASS INDEX: 33.29 KG/M2

## 2017-12-19 DIAGNOSIS — E55.9 VITAMIN D DEFICIENCY: ICD-10-CM

## 2017-12-19 DIAGNOSIS — E53.8 B12 DEFICIENCY: ICD-10-CM

## 2017-12-19 DIAGNOSIS — E11.3393 TYPE 2 DIABETES MELLITUS WITH BOTH EYES AFFECTED BY MODERATE NONPROLIFERATIVE RETINOPATHY WITHOUT MACULAR EDEMA, WITH LONG-TERM CURRENT USE OF INSULIN (HCC): ICD-10-CM

## 2017-12-19 DIAGNOSIS — Z79.4 TYPE 2 DIABETES MELLITUS WITH BOTH EYES AFFECTED BY MODERATE NONPROLIFERATIVE RETINOPATHY WITHOUT MACULAR EDEMA, WITH LONG-TERM CURRENT USE OF INSULIN (HCC): ICD-10-CM

## 2017-12-19 DIAGNOSIS — E03.8 OTHER SPECIFIED HYPOTHYROIDISM: Primary | ICD-10-CM

## 2017-12-19 LAB
25(OH)D3 SERPL-MCNC: 16 NG/ML
ALBUMIN SERPL-MCNC: 4.1 G/DL (ref 3.4–4.8)
ALBUMIN/GLOB SERPL: 1.5 G/DL (ref 1.5–2.5)
ALP SERPL-CCNC: 77 U/L (ref 35–104)
ALT SERPL W P-5'-P-CCNC: 44 U/L (ref 10–36)
ANION GAP SERPL CALCULATED.3IONS-SCNC: 9 MMOL/L (ref 3.6–11.2)
AST SERPL-CCNC: 39 U/L (ref 10–30)
BILIRUB SERPL-MCNC: 0.8 MG/DL (ref 0.2–1.8)
BUN BLD-MCNC: 12 MG/DL (ref 7–21)
BUN/CREAT SERPL: 14.8 (ref 7–25)
CALCIUM SPEC-SCNC: 9.8 MG/DL (ref 7.7–10)
CHLORIDE SERPL-SCNC: 103 MMOL/L (ref 99–112)
CHOLEST SERPL-MCNC: 211 MG/DL (ref 0–200)
CO2 SERPL-SCNC: 27 MMOL/L (ref 24.3–31.9)
CREAT BLD-MCNC: 0.81 MG/DL (ref 0.43–1.29)
GFR SERPL CREATININE-BSD FRML MDRD: 70 ML/MIN/1.73
GLOBULIN UR ELPH-MCNC: 2.7 GM/DL
GLUCOSE BLD-MCNC: 340 MG/DL (ref 70–110)
HBA1C MFR BLD: 10.9 % (ref 4.5–5.7)
HDLC SERPL-MCNC: 55 MG/DL (ref 60–100)
LDLC SERPL CALC-MCNC: 104 MG/DL (ref 0–100)
LDLC/HDLC SERPL: 1.89 {RATIO}
OSMOLALITY SERPL CALC.SUM OF ELEC: 290.7 MOSM/KG (ref 273–305)
POTASSIUM BLD-SCNC: 3.6 MMOL/L (ref 3.5–5.3)
PROT SERPL-MCNC: 6.8 G/DL (ref 6–8)
SODIUM BLD-SCNC: 139 MMOL/L (ref 135–153)
T4 FREE SERPL-MCNC: 1.28 NG/DL (ref 0.89–1.76)
TRIGL SERPL-MCNC: 260 MG/DL (ref 0–150)
TSH SERPL DL<=0.05 MIU/L-ACNC: 2.77 MIU/ML (ref 0.55–4.78)
VIT B12 BLD-MCNC: 401 PG/ML (ref 211–911)
VLDLC SERPL-MCNC: 52 MG/DL

## 2017-12-19 PROCEDURE — 99214 OFFICE O/P EST MOD 30 MIN: CPT | Performed by: FAMILY MEDICINE

## 2017-12-19 PROCEDURE — 80061 LIPID PANEL: CPT | Performed by: FAMILY MEDICINE

## 2017-12-19 PROCEDURE — 83036 HEMOGLOBIN GLYCOSYLATED A1C: CPT | Performed by: FAMILY MEDICINE

## 2017-12-19 PROCEDURE — 84443 ASSAY THYROID STIM HORMONE: CPT | Performed by: FAMILY MEDICINE

## 2017-12-19 PROCEDURE — 84439 ASSAY OF FREE THYROXINE: CPT | Performed by: FAMILY MEDICINE

## 2017-12-19 PROCEDURE — 82306 VITAMIN D 25 HYDROXY: CPT | Performed by: FAMILY MEDICINE

## 2017-12-19 PROCEDURE — 82607 VITAMIN B-12: CPT | Performed by: FAMILY MEDICINE

## 2017-12-19 PROCEDURE — 80053 COMPREHEN METABOLIC PANEL: CPT | Performed by: FAMILY MEDICINE

## 2017-12-19 RX ORDER — DIAZEPAM 5 MG/1
5 TABLET ORAL EVERY 12 HOURS PRN
Qty: 60 TABLET | Refills: 0 | Status: SHIPPED | OUTPATIENT
Start: 2017-12-19 | End: 2018-01-25 | Stop reason: SDUPTHER

## 2017-12-19 RX ORDER — CYCLOBENZAPRINE HCL 10 MG
10 TABLET ORAL 2 TIMES DAILY PRN
Qty: 60 TABLET | Refills: 5 | Status: SHIPPED | OUTPATIENT
Start: 2017-12-19 | End: 2019-01-02 | Stop reason: SDUPTHER

## 2017-12-19 RX ORDER — LEVOTHYROXINE SODIUM 0.12 MG/1
125 TABLET ORAL DAILY
Qty: 30 TABLET | Refills: 2 | Status: SHIPPED | OUTPATIENT
Start: 2017-12-19 | End: 2018-05-23 | Stop reason: SDUPTHER

## 2017-12-19 RX ORDER — OXYBUTYNIN CHLORIDE 5 MG/1
5 TABLET ORAL
Qty: 60 TABLET | Refills: 5 | Status: SHIPPED | OUTPATIENT
Start: 2017-12-19 | End: 2018-09-17 | Stop reason: SDUPTHER

## 2017-12-19 RX ORDER — CLOPIDOGREL BISULFATE 75 MG/1
75 TABLET ORAL DAILY
Qty: 30 TABLET | Refills: 5 | Status: SHIPPED | OUTPATIENT
Start: 2017-12-19 | End: 2018-07-03 | Stop reason: SDUPTHER

## 2017-12-26 RX ORDER — INSULIN GLARGINE 100 [IU]/ML
INJECTION, SOLUTION SUBCUTANEOUS
Qty: 10 ML | Refills: 0 | Status: SHIPPED | OUTPATIENT
Start: 2017-12-26 | End: 2018-01-22 | Stop reason: SDUPTHER

## 2018-01-03 ENCOUNTER — TELEPHONE (OUTPATIENT)
Dept: FAMILY MEDICINE CLINIC | Facility: CLINIC | Age: 73
End: 2018-01-03

## 2018-01-03 RX ORDER — ERGOCALCIFEROL 1.25 MG/1
50000 CAPSULE ORAL WEEKLY
Qty: 8 CAPSULE | Refills: 0 | Status: SHIPPED | OUTPATIENT
Start: 2018-01-03 | End: 2019-05-12

## 2018-01-03 NOTE — TELEPHONE ENCOUNTER
That's fine if she is taking the oral B12 tablets - I remember now what the deal was about the shots. Just make sure that she takes the tablets daily. Thanks.      Left a message to return call.      Patient notified & verbalized understanding.

## 2018-01-03 NOTE — TELEPHONE ENCOUNTER
----- Message from Eva Elliott MD sent at 1/3/2018  1:09 AM EST -----  Please call Bianka. Several things:  1) Her cholesterol is pretty stable. It hasn't really moved. At her next appointment, I'm going to discuss with her about either trying an increase in her atorvastatin or adding on a fibrate. She does have increased liver enzymes because of a fatty liver so it makes things a bit tricky, but one of the things that she needs to do is to work on her diet and exercise.  2) Is she still taking the B12 tablets daily? I would recommend B12 shots monthly instead of the tablets.  3) Her D is also low. I would recommend Vitamin D3 29458 IU weekly, #8 with no refills - send to pharmacy if she is agreeable - it can help with fatigue.  4) Her diabetes is just a bit better, but not even close to being good. She should be on lantus 38 units at night and 70/30 20 units BID. Please increase lantus to 40 units at night and 70/30 to 22 units BID. Also because of how bad these numbers are, I'd like her to schedule an appointment late January/early February so we can work on these numbers. Please have her work on a diabetic diet and exercise. (She may be cheating - has said she's been eating sweets in the past). Can send in new scripts to pharmacy. Thanks.      Spoke with patient ,she reports she hasnt been taking any B12 in awhile,reports her insurance does not pay for B12 injections that they stopped paying for them months ago,she is agreeable to the weekly Vit. D (sent to pharmacy),follow up scheduled & she verbalized understanding of insulin increases.

## 2018-01-03 NOTE — PROGRESS NOTES
"Bianka Augdelo     VITALS: Blood pressure 134/78, pulse 89, height 161.3 cm (63.5\"), weight 88.5 kg (195 lb), SpO2 96 %.    Subjective  Chief Complaint:   Chief Complaint   Patient presents with   • Follow-up        History of Present Illness:  Patient is a 72 y.o. female who presents to clinic secondary to medical followup. No new or acute concerns. She has been doing well.       Patient has a history of hypertension and is on losartan/HCTZ 50/12.5 mg orally BID and norvasc 5 mg orally daily (occasionally). Denies any side effects of the medications. Denies any dizziness, lightheadedness, blurry vision, chest pain, or edema. Patient does not take her blood pressures at home. Tries to follow a low-salt diet.    Patient also has a history of type II diabetes and is currently on metformin 1000 mg orally BID and Lantus 38 units every evening. She has been inconsistent with the januvia 100 mg orally daily and Novolog 70/30 20 units BID at meals. Last A1C in 4/2017 was 10.4. Denies any side effects of her medications. Patient denies any changes in vision, polydipsia, polyuria, numbness or tingling, or hypoglycemic or hyperglycemic episodes. Patient does follow a diabetic diet and checks her glucose levels regularly. Blood glucose levels have been elevated. Patient does have complications of nephropathy. No worsening or exacerbation of symptoms. No neuropathy or retinopathy. Last eye exam was July 2015 - going back April 2017. Last microalbumin was elevated done 8/2017. Last foot exam was in 2015 and patient does not see a podiatrist. She has gone through diabetic teaching/nutrition education. Does take losartan 100 mg orally daily to provide for kidney protection and aspirin 81 mg orally daily to provide for cardiovascular protection.    Patient also has a history of hyperlipidemia and is currently on atorvastatin 10 mg orally daily. Denies any side effects of the medication. Last lipid panel was 8/2017 and in normal " limits. Denies any muscle weakness, jaundice or itching. Tries to follow a low cholesterol diet.    Patient has a history of GERD and is currently on prilosec 20 mg orally BID. Patient denies any side effects of the medication. Denies metallic tastes and has not been having increased symptoms during sleep. Has not had any recent exacerbations. Denies any nausea, vomiting, burping, hiccuping, or midepigastric pain.    Patient does have a history of hypothyroidism and is currently on synthroid 137 mcg orally daily. She is doing well on this medication. Denies any side effects. Denies fatigue, dizziness, palpitations, changes in weight, hair, or nails. Last thyroid panel was 4/2017 and abnormal.    Patient has a history of anxiety and is currently on diazepam 5 mg orally BID. Patient states that this medication is working. Denies any side effects of the medication. Patient states that she is sleeping well and can get out of bed daily to accomplish daily activities. Has some anhedonia. Mood is stable. Has no feelings of guilt. Has good concentration and memory ability. Has some energy. Is able to make goals. Appetite is stable. Denies any suicidal or homicidal ideations. Does not have any auditory or visual hallucinations. PETE [id number] WNL. No controlled medication seeking behavior.     Patient has a history of CAD with angina and is on plavix 75 mg orally daily, aspirin 81 mg orally daily, and nitro ER 6.5 mg orally TID for angina. We tried to switch her from nitro to Imdur in 2016 without much success. She reports that she has been doing fine on these medications, denies any side effects, and denies any chest pain.       Patient does have a history of muscle spasms especially in her back when she does physical activity, such as gardening. She is currently on flexeril 10 mg orally BID as needed and ibuprofen 600 mg orally TID. Denies any side effects of the medications. Denies any sedation effects. Medications allow  her enough flexibility and relief from the pain so that she can do activities other than those of daily living. Movement and ambulation are improved on these medications. Robaxin is fairly expensive for her and she would like to change it to a cheaper medication if possible. No pain medication seeking behavior. Tyler #[id number] is clean without any discrepancies.      Patient does have an overactive bladder and is currently on oxybutynin 5 mg orally daily. Medication is treating her well. No side effects. States that it does reduce her urinary frequency.  No complaints about any of the medications.    The following portions of the patient's history were reviewed and updated as appropriate: allergies, current medications, past family history, past medical history, past social history, past surgical history and problem list.    Past Medical History  Past Medical History:   Diagnosis Date   • Anxiety    • CAD (coronary artery disease)    • Diabetes mellitus    • Dyslipidemia    • GERD (gastroesophageal reflux disease)    • H/O angina pectoris    • History of bone density study     February 2014   • History of mammogram 07/15/2013   • Hypertension    • Hypothyroidism    • Muscle spasms of both lower extremities    • Overactive bladder    • Vitamin B12 deficiency    • Vitamin D deficiency        Review of Systems  Constitutional: Denies any recent history of HAs, dizziness, fevers, chills, itching.  Eyes: Denies any changes in vision. Denies any blurry vision or diplopia.  Ears, Nose, Mouth, Throat: Denies any sore throat, rhinorrhea, or cough.  Cardiovascular: Denies any chest pain, pressure, or palpitations.  Respiratory: Denies any shortness of breath or wheezing.  Gastrointestinal: Denies any abdominal pain, nausea, vomiting, diarrhea, or constipation.  Genitourinary: Denies any changes in urination.  Musculoskeletal: Denies any muscle weakness.  Skin and/or breasts: Denies any rashes.  Neurological: Denies any  changes in balance or gait.  Psychiatric: Denies any anxiety, depression, or insomnia. Denies any suicidal or homicidal ideations.  Endocrine: Denies any heat or cold intolerance. Denies any voice changes, polydipsia, or polyuria.  Hematologic/Lymphatic: Denies any anemia or easy bruising.    Surgical History  Past Surgical History:   Procedure Laterality Date   • COLONOSCOPY  01/01/2010   • OOPHORECTOMY      1 removed   • TUBAL ABDOMINAL LIGATION         Family History  Family History   Problem Relation Age of Onset   • Diabetes Mother    • Heart disease Mother    • Stroke Mother    • Cancer Father        Social History  Social History     Social History   • Marital status:      Spouse name: N/A   • Number of children: N/A   • Years of education: N/A     Occupational History   • Not on file.     Social History Main Topics   • Smoking status: Never Smoker   • Smokeless tobacco: Never Used   • Alcohol use No   • Drug use: No   • Sexual activity: Not on file     Other Topics Concern   • Not on file     Social History Narrative       Objective  Physical Exam  Gen: Patient in NAD. Pleasant and answers appropriately. A&Ox3.    Skin: Warm and dry with normal turgor. No purpura, rashes, or unusual pigmentation noted. Hair is normal in appearance and distribution.    HEENT: NC/AT. No lesions noted. Conjunctiva clear, sclera nonicteric. PERRL. EOMI without nystagmus or strabismus. Fundi appear benign. No hemorrhages or exudates of eyes. Auditory canals are patent bilaterally without lesions. TMs intact,  nonerythematous, nonbulging without lesions. Nasal mucosa pink, nonerythematous, and nonedematous. Frontal and maxillary sinuses are nontender. O/P nonerythematous and moist without exudate.    Neck: Supple without lymph nodes palpated. FROM.     Lungs: CTA B/L without rales, rhonchi, crackles, or wheezes.    Heart: RRR. S1 and S2 normal. No S3 or S4. No MRGT.    Abd: Soft, nontender,nondistended. (+)BSx4 quadrants.      Extrem: No CCE. Radial pulses 2+/4 and equal B/L. FROMx4. No bone, joint, or muscle tenderness noted.    Neuro: No focal motor/sensory deficits.    Procedures    Assessment/Plan  Bianka Agudelo is a 72 y.o. here for medical followup.  Diagnoses and all orders for this visit:    Other specified hypothyroidism  -     TSH  -     T4, Free  -     Comprehensive Metabolic Panel  -     Osmolality, Calculated; Future  -     Osmolality, Calculated    Type 2 diabetes mellitus with both eyes affected by moderate nonproliferative retinopathy without macular edema, with long-term current use of insulin  -     Hemoglobin A1c    B12 deficiency  -     Vitamin B12    Vitamin D deficiency  -     Vitamin D 25 Hydroxy    Other orders  Refilled:  -     diazePAM (VALIUM) 5 MG tablet; Take 1 tablet by mouth Every 12 (Twelve) Hours As Needed for Anxiety.  -     levothyroxine (SYNTHROID, LEVOTHROID) 125 MCG tablet; Take 1 tablet by mouth Daily.  -     cyclobenzaprine (FLEXERIL) 10 MG tablet; Take 1 tablet by mouth 2 (Two) Times a Day As Needed for Muscle Spasms.  -     clopidogrel (PLAVIX) 75 MG tablet; Take 1 tablet by mouth Daily.  -     oxybutynin (DITROPAN) 5 MG tablet; Take 1 tablet by mouth 2 (Two) Times a Day.    Findings and plans discussed with patient who verbalizes understanding and agreement. Will followup with patient once results are in. Patient to followup at clinic PRN or in three months for further medical followup.    Eva Elliott MD

## 2018-01-03 NOTE — TELEPHONE ENCOUNTER
That's fine if she is taking the oral B12 tablets - I remember now what the deal was about the shots. Just make sure that she takes the tablets daily. Thanks.

## 2018-01-04 RX ORDER — LANOLIN ALCOHOL/MO/W.PET/CERES
1000 CREAM (GRAM) TOPICAL DAILY
Qty: 30 TABLET | Refills: 5 | Status: SHIPPED | OUTPATIENT
Start: 2018-01-04 | End: 2018-09-17 | Stop reason: SDUPTHER

## 2018-01-22 RX ORDER — INSULIN GLARGINE 100 [IU]/ML
INJECTION, SOLUTION SUBCUTANEOUS
Qty: 10 ML | Refills: 5 | Status: SHIPPED | OUTPATIENT
Start: 2018-01-22 | End: 2018-05-03 | Stop reason: ALTCHOICE

## 2018-01-25 ENCOUNTER — OFFICE VISIT (OUTPATIENT)
Dept: FAMILY MEDICINE CLINIC | Facility: CLINIC | Age: 73
End: 2018-01-25

## 2018-01-25 VITALS
BODY MASS INDEX: 33.8 KG/M2 | SYSTOLIC BLOOD PRESSURE: 149 MMHG | WEIGHT: 198 LBS | HEIGHT: 64 IN | DIASTOLIC BLOOD PRESSURE: 75 MMHG | HEART RATE: 83 BPM

## 2018-01-25 DIAGNOSIS — M62.838 MUSCLE SPASMS OF BOTH LOWER EXTREMITIES: ICD-10-CM

## 2018-01-25 DIAGNOSIS — E55.9 VITAMIN D DEFICIENCY: ICD-10-CM

## 2018-01-25 DIAGNOSIS — E53.8 B12 DEFICIENCY: ICD-10-CM

## 2018-01-25 DIAGNOSIS — K21.9 GASTROESOPHAGEAL REFLUX DISEASE WITHOUT ESOPHAGITIS: ICD-10-CM

## 2018-01-25 DIAGNOSIS — Z79.4 TYPE 2 DIABETES MELLITUS WITH HYPERGLYCEMIA, WITH LONG-TERM CURRENT USE OF INSULIN (HCC): Primary | ICD-10-CM

## 2018-01-25 DIAGNOSIS — F41.9 ANXIETY: ICD-10-CM

## 2018-01-25 DIAGNOSIS — I10 ESSENTIAL HYPERTENSION: ICD-10-CM

## 2018-01-25 DIAGNOSIS — E11.65 TYPE 2 DIABETES MELLITUS WITH HYPERGLYCEMIA, WITH LONG-TERM CURRENT USE OF INSULIN (HCC): Primary | ICD-10-CM

## 2018-01-25 DIAGNOSIS — H69.83 EUSTACHIAN TUBE DYSFUNCTION, BILATERAL: ICD-10-CM

## 2018-01-25 DIAGNOSIS — E03.8 OTHER SPECIFIED HYPOTHYROIDISM: ICD-10-CM

## 2018-01-25 DIAGNOSIS — I25.10 CORONARY ARTERY DISEASE INVOLVING NATIVE CORONARY ARTERY OF NATIVE HEART WITHOUT ANGINA PECTORIS: ICD-10-CM

## 2018-01-25 LAB — GLUCOSE BLDC GLUCOMTR-MCNC: 348 MG/DL (ref 70–130)

## 2018-01-25 PROCEDURE — 99214 OFFICE O/P EST MOD 30 MIN: CPT | Performed by: FAMILY MEDICINE

## 2018-01-25 PROCEDURE — 82962 GLUCOSE BLOOD TEST: CPT | Performed by: FAMILY MEDICINE

## 2018-01-25 RX ORDER — DIAZEPAM 5 MG/1
5 TABLET ORAL EVERY 12 HOURS PRN
Qty: 60 TABLET | Refills: 0 | Status: SHIPPED | OUTPATIENT
Start: 2018-01-25 | End: 2018-03-22 | Stop reason: SDUPTHER

## 2018-01-25 RX ORDER — INSULIN GLARGINE 100 [IU]/ML
42 INJECTION, SOLUTION SUBCUTANEOUS NIGHTLY
Qty: 13 ML | Refills: 5 | Status: SHIPPED | OUTPATIENT
Start: 2018-01-25 | End: 2018-05-03 | Stop reason: ALTCHOICE

## 2018-01-29 ENCOUNTER — HOSPITAL ENCOUNTER (EMERGENCY)
Facility: HOSPITAL | Age: 73
Discharge: HOME | End: 2018-01-29
Attending: EMERGENCY MEDICINE | Admitting: EMERGENCY MEDICINE

## 2018-01-29 ENCOUNTER — APPOINTMENT (OUTPATIENT)
Dept: GENERAL RADIOLOGY | Facility: HOSPITAL | Age: 73
End: 2018-01-29

## 2018-01-29 VITALS
RESPIRATION RATE: 20 BRPM | HEART RATE: 80 BPM | DIASTOLIC BLOOD PRESSURE: 84 MMHG | HEIGHT: 64 IN | SYSTOLIC BLOOD PRESSURE: 142 MMHG | WEIGHT: 185 LBS | BODY MASS INDEX: 31.58 KG/M2 | OXYGEN SATURATION: 96 % | TEMPERATURE: 97.3 F

## 2018-01-29 DIAGNOSIS — S93.401A SPRAIN OF RIGHT ANKLE, UNSPECIFIED LIGAMENT, INITIAL ENCOUNTER: Primary | ICD-10-CM

## 2018-01-29 PROCEDURE — 99283 EMERGENCY DEPT VISIT LOW MDM: CPT

## 2018-01-29 PROCEDURE — 73610 X-RAY EXAM OF ANKLE: CPT | Performed by: RADIOLOGY

## 2018-01-29 PROCEDURE — 73630 X-RAY EXAM OF FOOT: CPT | Performed by: RADIOLOGY

## 2018-01-29 PROCEDURE — 73610 X-RAY EXAM OF ANKLE: CPT

## 2018-01-29 PROCEDURE — 73630 X-RAY EXAM OF FOOT: CPT

## 2018-01-30 ENCOUNTER — PATIENT OUTREACH (OUTPATIENT)
Dept: CASE MANAGEMENT | Facility: OTHER | Age: 73
End: 2018-01-30

## 2018-01-30 NOTE — OUTREACH NOTE
Current Barriers & Concerns:     The main concerns and/or symptoms the patient would like to address are: ankle sprain and blood sugar    Education/instruction provided by Care Coordinator: Patient reports she sprained her ankle yesterday and went to the ED. Pt states she was given a boot to wear but that she has found it very difficult to get around in the boot so she took it off. CC educ on RICE, rest, ice, compression, and elevation, pt voiced understanding. CC discussed diabetes management and carb counting as patient has been instructed by her PCP to reduce daily carbs to  in an effort to get blood glucose levels under control. Patient states she has been really trying but sometimes it is overwhelming. CC offered to send a few low carb recipes with the amount of carbs listed to help her with this, pt accepted.     Follow Up Outreach Due: 1 month

## 2018-02-02 ENCOUNTER — TELEPHONE (OUTPATIENT)
Dept: FAMILY MEDICINE CLINIC | Facility: CLINIC | Age: 73
End: 2018-02-02

## 2018-02-02 RX ORDER — AMLODIPINE BESYLATE 5 MG/1
5 TABLET ORAL DAILY
Qty: 30 TABLET | Refills: 5 | Status: SHIPPED | OUTPATIENT
Start: 2018-02-02 | End: 2018-08-05 | Stop reason: SDUPTHER

## 2018-02-02 NOTE — TELEPHONE ENCOUNTER
Requests refill on amlodopine. [facility]. Patient states she has refill on bottle but when she called pharmacy they told her she didn't have refills.      Refilled per orders.

## 2018-02-02 NOTE — TELEPHONE ENCOUNTER
Requests refill on amlodopine. [facility]. Patient states she has refill on bottle but when she called pharmacy they told her she didn't have refills.

## 2018-02-07 RX ORDER — NITROGLYCERIN 6.5 MG/1
6.5 CAPSULE ORAL 3 TIMES DAILY
Qty: 90 CAPSULE | Refills: 5 | Status: SHIPPED | OUTPATIENT
Start: 2018-02-07 | End: 2018-02-08 | Stop reason: SDUPTHER

## 2018-02-08 RX ORDER — NITROGLYCERIN 6.5 MG/1
6.5 CAPSULE ORAL 3 TIMES DAILY
Qty: 90 CAPSULE | Refills: 5 | Status: SHIPPED | OUTPATIENT
Start: 2018-02-08 | End: 2018-09-17 | Stop reason: SDUPTHER

## 2018-02-14 NOTE — PROGRESS NOTES
"Bianka Agudelo     VITALS: Blood pressure 149/75, pulse 83, height 161.3 cm (63.5\"), weight 89.8 kg (198 lb).    Subjective  Chief Complaint:   Chief Complaint   Patient presents with   • Follow-up   • Diabetes        History of Present Illness:  Patient is a 72 y.o. female who presents to clinic secondary to medical followup. No new or acute concerns. She has been doing well.       Patient has hypertension and is on losartan/HCTZ 50/12.5 mg orally BID and norvasc 5 mg orally daily (occasionally). Denies any side effects of the medications. Denies any dizziness, lightheadedness, blurry vision, chest pain, or edema. Patient does not take her blood pressures at home. Tries to follow a low-salt diet.    Patient also has type II diabetes and is currently on metformin 1000 mg orally BID and Lantus 38 units every evening. She has been inconsistent with the januvia 100 mg orally daily and Novolog 70/30 20 units BID at meals. Last A1C in 12/2017 was 10.9. Denies any side effects of her medications. Patient denies any changes in vision, polydipsia, polyuria, numbness or tingling, or hypoglycemic or hyperglycemic episodes. Patient does follow a diabetic diet and checks her glucose levels regularly. Blood glucose levels have been elevated. Patient does have complications of nephropathy. No worsening or exacerbation of symptoms. No neuropathy or retinopathy. Last eye exam was April 2017. Last microalbumin was elevated done 8/2017. Last foot exam was in 2015 and patient does see a podiatrist. She has gone through diabetic teaching/nutrition education. Does take losartan 100 mg orally daily to provide for kidney protection and aspirin 81 mg orally daily to provide for cardiovascular protection.    Patient also has hyperlipidemia and is currently on atorvastatin 10 mg orally daily. Denies any side effects of the medication. Last lipid panel was 8/2017 and in normal limits. Denies any muscle weakness, jaundice or itching. Tries to " follow a low cholesterol diet.    Patient has GERD and is currently on prilosec 20 mg orally BID. Patient denies any side effects of the medication. Denies metallic tastes and has not been having increased symptoms during sleep. Has not had any recent exacerbations. Denies any nausea, vomiting, burping, hiccuping, or midepigastric pain.    Patient does have hypothyroidism and is currently on synthroid 125 mcg orally daily. She is doing well on this medication. Denies any side effects. Denies fatigue, dizziness, palpitations, changes in weight, hair, or nails. Last thyroid panel was 12/2017 and normal.    Patient has anxiety and is currently on diazepam 5 mg orally BID. Patient states that this medication is working. Denies any side effects of the medication. Patient states that she is sleeping well and can get out of bed daily to accomplish daily activities. Has some anhedonia. Mood is stable. Has no feelings of guilt. Has good concentration and memory ability. Has some energy. Is able to make goals. Appetite is stable. Denies any suicidal or homicidal ideations. Does not have any auditory or visual hallucinations. PETE [id number] WNL. No controlled medication seeking behavior.     Patient has CAD with angina and is on plavix 75 mg orally daily, aspirin 81 mg orally daily, and nitro ER 6.5 mg orally TID for angina. We tried to switch her from nitro to Imdur in 2016 without much success. She reports that she has been doing fine on these medications, denies any side effects, and denies any chest pain.       Patient does have muscle spasms especially in her back when she does physical activity, such as gardening. She is currently on flexeril 10 mg orally BID as needed and ibuprofen 600 mg orally TID. Denies any side effects of the medications. Denies any sedation effects. Medications allow her enough flexibility and relief from the pain so that she can do activities other than those of daily living. Movement and  ambulation are improved on these medications. Robaxin is fairly expensive for her and she would like to change it to a cheaper medication if possible. No pain medication seeking behavior. Tyler #[id number] is clean without any discrepancies.      Patient does have an overactive bladder and is currently on oxybutynin 5 mg orally daily. Medication is treating her well. No side effects. States that it does reduce her urinary frequency.    No complaints about any of the medications.    The following portions of the patient's history were reviewed and updated as appropriate: allergies, current medications, past family history, past medical history, past social history, past surgical history and problem list.    Past Medical History  Past Medical History:   Diagnosis Date   • Anxiety    • CAD (coronary artery disease)    • Diabetes mellitus    • Dyslipidemia    • GERD (gastroesophageal reflux disease)    • H/O angina pectoris    • History of bone density study     February 2014   • History of mammogram 07/15/2013   • Hypertension    • Hypothyroidism    • Muscle spasms of both lower extremities    • Overactive bladder    • Vitamin B12 deficiency    • Vitamin D deficiency        Review of Systems  Constitutional: Denies any recent history of HAs, dizziness, fevers, chills, itching.  Eyes: Denies any changes in vision. Denies any blurry vision or diplopia.  Ears, Nose, Mouth, Throat: Denies any sore throat, rhinorrhea, or cough.  Cardiovascular: Denies any chest pain, pressure, or palpitations.  Respiratory: Denies any shortness of breath or wheezing.  Gastrointestinal: Denies any abdominal pain, nausea, vomiting, diarrhea, or constipation.  Genitourinary: Denies any changes in urination.  Musculoskeletal: Denies any muscle weakness.  Skin and/or breasts: Denies any rashes.  Neurological: Denies any changes in balance or gait.  Psychiatric: Denies any anxiety, depression, or insomnia. Denies any suicidal or homicidal  ideations.    Surgical History  Past Surgical History:   Procedure Laterality Date   • COLONOSCOPY  01/01/2010   • OOPHORECTOMY      1 removed   • TUBAL ABDOMINAL LIGATION         Family History  Family History   Problem Relation Age of Onset   • Diabetes Mother    • Heart disease Mother    • Stroke Mother    • Cancer Father        Social History  Social History     Social History   • Marital status:      Spouse name: N/A   • Number of children: N/A   • Years of education: N/A     Occupational History   • Not on file.     Social History Main Topics   • Smoking status: Never Smoker   • Smokeless tobacco: Never Used   • Alcohol use No   • Drug use: No   • Sexual activity: Not on file     Other Topics Concern   • Not on file     Social History Narrative       Objective  Physical Exam  Gen: Patient in NAD. Pleasant and answers appropriately. A&Ox3.    Skin: Warm and dry with normal turgor. No purpura, rashes, or unusual pigmentation noted. Hair is normal in appearance and distribution.    HEENT: NC/AT. No lesions noted. Conjunctiva clear, sclera nonicteric. PERRL. EOMI without nystagmus or strabismus. Fundi appear benign. No hemorrhages or exudates of eyes. Auditory canals are patent bilaterally without lesions. TMs intact,  nonerythematous, nonbulging without lesions. Nasal mucosa pink, nonerythematous, and nonedematous. Frontal and maxillary sinuses are nontender. O/P nonerythematous and moist without exudate.    Neck: Supple without lymph nodes palpated. FROM.     Lungs: CTA B/L without rales, rhonchi, crackles, or wheezes.    Heart: RRR. S1 and S2 normal. No S3 or S4. No MRGT.    Abd: Soft, nontender,nondistended. (+)BSx4 quadrants.     Extrem: No CCE. Radial pulses 2+/4 and equal B/L. FROMx4. No bone, joint, or muscle tenderness noted.    Neuro: No focal motor/sensory deficits.    Procedures    Assessment/Plan  Bianka Agudelo is a 72 y.o. here for medical followup.  Diagnoses and all orders for this  visit:    Type 2 diabetes mellitus with hyperglycemia, with long-term current use of insulin  -     POCT Glucose  Uncontrolled. Recheck A1C and microalbumin at next visit. On metformin 1000 mg orally BID and will increase Lantus to 42 units every evening. May need to switch to basaglar secondary to insurance. Not currently taking januvia 100 mg orally daily or Novolog 70/30 20 units BID at meals - encouraged patient to do so. Eye exam and microalbumin 2017 done. Patient to call [facility] for annual foot exam. Does take losartan 100 mg orally daily to provide for kidney protection and aspirin 81 mg orally daily to provide for cardiovascular protection.     Anxiety  Well controlled. Refilled diazepam 5 mg orally BID.     Muscle spasms  Stable. Continue flexeril 10 mg orally BID as needed and will continue ibuprofen 600 mg orally TID and Voltaren gel when not using ibuprofen.  .  Hypertension  Elevated today. Discussed at length. Continue losartan/HCTZ 50/12.5 mg orally BID and norvasc 5 mg orally daily. Patient to follow up with a nursing appointment for blood pressure check in a month.      Eustachian tube dysfunction, bilateral  Improved. Patient using meclizine 25 mg orally every BID as needed for dizziness.      Hypercalcemia  Workup benign. Continue to monitor.         GERD  Stable. Continue omeprazole 20 mg orally BID.      Hypothyroidism, unspecified hypothyroidism type  Stable. Continue synthroid 125 mcg orally daily. Check thyroid panel 3/2018.      Vitamin D deficiency   Decreased 12/2017. Currently on 24328 IU weekly repletion.       CAD with angina  Stable and asymptomatic. Continue plavix 75 mg orally daily, aspirin 81 mg orally daily, and nitro ER 6.5 mg orally TID for angina.       Overactive bladder  Stable. Continue oxybutynin 5 mg orally daily.       Asthma  Continue albuterol inhaler as needed.      Vitamin B12 deficiency  Patient has a hard time coming in for B12 shots secondary to  cost. On B12 1000 mcg orally daily. 12/2017 levels okay.      Preventative Medicine  Colonoscopy 2010 UTD. Declines mammogram. Declined DEXA scan. Flu shot 2017 given. Declines pneumo for now.      Findings and plans discussed with patient who verbalizes understanding and agreement. Patient to followup at clinic PRN if symptoms continue or worsen or in two months for regular medical followup.     Other orders  Refilled:  -     diazePAM (VALIUM) 5 MG tablet; Take 1 tablet by mouth Every 12 (Twelve) Hours As Needed for Anxiety.  -     Insulin Glargine (BASAGLAR KWIKPEN) 100 UNIT/ML injection pen; Inject 42 Units under the skin Every Night.    Eva Elliott MD

## 2018-02-19 ENCOUNTER — TELEPHONE (OUTPATIENT)
Dept: FAMILY MEDICINE CLINIC | Facility: CLINIC | Age: 73
End: 2018-02-19

## 2018-02-19 RX ORDER — ACETAMINOPHEN/DIPHENHYDRAMINE 500MG-25MG
TABLET ORAL
Qty: 30 TABLET | Refills: 3 | Status: SHIPPED | OUTPATIENT
Start: 2018-02-19 | End: 2018-05-03 | Stop reason: SDUPTHER

## 2018-02-19 NOTE — TELEPHONE ENCOUNTER
Patient & her pharmacy called ,reports her Lantus is no longer covered,suggested Levemir or basaglar.

## 2018-02-19 NOTE — TELEPHONE ENCOUNTER
I already sent in basaglar 42 units in the middle of January. Did they not get it? Thanks.         Spoke with [facility] she said she would check on this & call us back if she did not have it.

## 2018-02-20 RX ORDER — SITAGLIPTIN 100 MG/1
TABLET, FILM COATED ORAL
Qty: 30 TABLET | Refills: 3 | Status: SHIPPED | OUTPATIENT
Start: 2018-02-20 | End: 2018-05-03 | Stop reason: SDUPTHER

## 2018-02-20 RX ORDER — ATORVASTATIN CALCIUM 10 MG/1
TABLET, FILM COATED ORAL
Qty: 30 TABLET | Refills: 5 | Status: SHIPPED | OUTPATIENT
Start: 2018-02-20 | End: 2018-08-14 | Stop reason: SDUPTHER

## 2018-03-22 ENCOUNTER — TELEPHONE (OUTPATIENT)
Dept: FAMILY MEDICINE CLINIC | Facility: CLINIC | Age: 73
End: 2018-03-22

## 2018-03-22 RX ORDER — DIAZEPAM 5 MG/1
5 TABLET ORAL EVERY 12 HOURS PRN
Qty: 60 TABLET | Refills: 0 | Status: SHIPPED | OUTPATIENT
Start: 2018-03-22 | End: 2018-04-30 | Stop reason: SDUPTHER

## 2018-03-22 NOTE — PROGRESS NOTES
[facility] # [id number]  Reviewed and is consistent.  [facility] 12/2017 reviewed and is consistent.  Patient comfort assessment guide reviewed and updated today.    As part of the patient's treatment plan they are being prescribed a controlled substance/ substances with potential for abuse.  This patient has been made aware of the appropriate use of such medications, including potential risk of somnolence, limited ability to drive and/or work safely, and potential for overdose.  It has also been made clear these medications are for use by the patient only, without concomitant use of alcohol or other substances unless prescribed/advised by medical provider.    Patient has completed prescribing agreement detailing terms of continued prescribing of controlled substances including monitoring PETE reports, urine drug screens, and pill counts.  The patient is aware PETE reports are reviewed on a regular basis and scanned into the chart.    History and physical exam exhibit continued safe and appropriate use of controlled substances.

## 2018-03-22 NOTE — TELEPHONE ENCOUNTER
Ready for . Please let her know.      Left a message to return call.      Patient returned call & verbalized understanding.

## 2018-04-24 ENCOUNTER — EPISODE CHANGES (OUTPATIENT)
Dept: CASE MANAGEMENT | Facility: OTHER | Age: 73
End: 2018-04-24

## 2018-04-30 ENCOUNTER — TELEPHONE (OUTPATIENT)
Dept: FAMILY MEDICINE CLINIC | Facility: CLINIC | Age: 73
End: 2018-04-30

## 2018-04-30 RX ORDER — DIAZEPAM 5 MG/1
5 TABLET ORAL EVERY 12 HOURS PRN
Qty: 60 TABLET | Refills: 0 | Status: SHIPPED | OUTPATIENT
Start: 2018-04-30 | End: 2018-05-31 | Stop reason: SDUPTHER

## 2018-05-01 NOTE — TELEPHONE ENCOUNTER
Please call and let her know that it is ready for pickup. Also, she missed her March appointment and we really need to get her diabetes down. When you call her, please let her know that I am going to be out July, August, Sept on maternity leave so I would like to see her and get some more things in order before I leave and set her up with an appointment. Thanks.    Attempted to contact patient,no answer at home & voice mailbox is not set up,will attempt later.      Patient notified & a follow up scheduled.

## 2018-05-01 NOTE — TELEPHONE ENCOUNTER
Please call and let her know that it is ready for pickup. Also, she missed her March appointment and we really need to get her diabetes down. When you call her, please let her know that I am going to be out July, August, Sept on maternity leave so I would like to see her and get some more things in order before I leave and set her up with an appointment. Thanks.

## 2018-05-03 ENCOUNTER — OFFICE VISIT (OUTPATIENT)
Dept: FAMILY MEDICINE CLINIC | Facility: CLINIC | Age: 73
End: 2018-05-03

## 2018-05-03 VITALS
DIASTOLIC BLOOD PRESSURE: 72 MMHG | SYSTOLIC BLOOD PRESSURE: 126 MMHG | OXYGEN SATURATION: 98 % | HEART RATE: 82 BPM | BODY MASS INDEX: 33.63 KG/M2 | HEIGHT: 64 IN | WEIGHT: 197 LBS

## 2018-05-03 DIAGNOSIS — Z79.4 TYPE 2 DIABETES MELLITUS WITH HYPERGLYCEMIA, WITH LONG-TERM CURRENT USE OF INSULIN (HCC): Primary | ICD-10-CM

## 2018-05-03 DIAGNOSIS — I25.10 CORONARY ARTERY DISEASE INVOLVING NATIVE CORONARY ARTERY OF NATIVE HEART WITHOUT ANGINA PECTORIS: ICD-10-CM

## 2018-05-03 DIAGNOSIS — E55.9 VITAMIN D DEFICIENCY: ICD-10-CM

## 2018-05-03 DIAGNOSIS — I10 ESSENTIAL HYPERTENSION: ICD-10-CM

## 2018-05-03 DIAGNOSIS — E53.8 B12 DEFICIENCY: ICD-10-CM

## 2018-05-03 DIAGNOSIS — F41.9 ANXIETY: ICD-10-CM

## 2018-05-03 DIAGNOSIS — E11.65 TYPE 2 DIABETES MELLITUS WITH HYPERGLYCEMIA, WITH LONG-TERM CURRENT USE OF INSULIN (HCC): Primary | ICD-10-CM

## 2018-05-03 DIAGNOSIS — E03.8 OTHER SPECIFIED HYPOTHYROIDISM: ICD-10-CM

## 2018-05-03 DIAGNOSIS — K21.9 GASTROESOPHAGEAL REFLUX DISEASE WITHOUT ESOPHAGITIS: ICD-10-CM

## 2018-05-03 LAB
ALBUMIN SERPL-MCNC: 4.1 G/DL (ref 3.4–4.8)
ALBUMIN/GLOB SERPL: 1.6 G/DL (ref 1.5–2.5)
ALP SERPL-CCNC: 69 U/L (ref 35–104)
ALT SERPL W P-5'-P-CCNC: 39 U/L (ref 10–36)
ANION GAP SERPL CALCULATED.3IONS-SCNC: 7.5 MMOL/L (ref 3.6–11.2)
AST SERPL-CCNC: 29 U/L (ref 10–30)
BASOPHILS # BLD AUTO: 0.02 10*3/MM3 (ref 0–0.3)
BASOPHILS NFR BLD AUTO: 0.3 % (ref 0–2)
BILIRUB SERPL-MCNC: 1 MG/DL (ref 0.2–1.8)
BUN BLD-MCNC: 18 MG/DL (ref 7–21)
BUN/CREAT SERPL: 26.1 (ref 7–25)
CALCIUM SPEC-SCNC: 9.6 MG/DL (ref 7.7–10)
CHLORIDE SERPL-SCNC: 102 MMOL/L (ref 99–112)
CO2 SERPL-SCNC: 26.5 MMOL/L (ref 24.3–31.9)
CREAT BLD-MCNC: 0.69 MG/DL (ref 0.43–1.29)
DEPRECATED RDW RBC AUTO: 43.4 FL (ref 37–54)
EOSINOPHIL # BLD AUTO: 0.26 10*3/MM3 (ref 0–0.7)
EOSINOPHIL NFR BLD AUTO: 3.6 % (ref 0–7)
ERYTHROCYTE [DISTWIDTH] IN BLOOD BY AUTOMATED COUNT: 13.7 % (ref 11.5–14.5)
GFR SERPL CREATININE-BSD FRML MDRD: 84 ML/MIN/1.73
GLOBULIN UR ELPH-MCNC: 2.5 GM/DL
GLUCOSE BLD-MCNC: 215 MG/DL (ref 70–110)
HBA1C MFR BLD: 11.4 % (ref 4.5–5.7)
HCT VFR BLD AUTO: 41.4 % (ref 37–47)
HGB BLD-MCNC: 13.9 G/DL (ref 12–16)
IMM GRANULOCYTES # BLD: 0.03 10*3/MM3 (ref 0–0.03)
IMM GRANULOCYTES NFR BLD: 0.4 % (ref 0–0.5)
LYMPHOCYTES # BLD AUTO: 3.46 10*3/MM3 (ref 1–3)
LYMPHOCYTES NFR BLD AUTO: 47.8 % (ref 16–46)
MCH RBC QN AUTO: 29.8 PG (ref 27–33)
MCHC RBC AUTO-ENTMCNC: 33.6 G/DL (ref 33–37)
MCV RBC AUTO: 88.8 FL (ref 80–94)
MONOCYTES # BLD AUTO: 0.48 10*3/MM3 (ref 0.1–0.9)
MONOCYTES NFR BLD AUTO: 6.6 % (ref 0–12)
NEUTROPHILS # BLD AUTO: 2.99 10*3/MM3 (ref 1.4–6.5)
NEUTROPHILS NFR BLD AUTO: 41.3 % (ref 40–75)
OSMOLALITY SERPL CALC.SUM OF ELEC: 280.3 MOSM/KG (ref 273–305)
PLATELET # BLD AUTO: 214 10*3/MM3 (ref 130–400)
PMV BLD AUTO: 12.1 FL (ref 6–10)
POTASSIUM BLD-SCNC: 3.9 MMOL/L (ref 3.5–5.3)
PROT SERPL-MCNC: 6.6 G/DL (ref 6–8)
RBC # BLD AUTO: 4.66 10*6/MM3 (ref 4.2–5.4)
SODIUM BLD-SCNC: 136 MMOL/L (ref 135–153)
T4 FREE SERPL-MCNC: 1.11 NG/DL (ref 0.89–1.76)
TSH SERPL DL<=0.05 MIU/L-ACNC: 6.47 MIU/ML (ref 0.55–4.78)
WBC NRBC COR # BLD: 7.24 10*3/MM3 (ref 4.5–12.5)

## 2018-05-03 PROCEDURE — 80053 COMPREHEN METABOLIC PANEL: CPT | Performed by: FAMILY MEDICINE

## 2018-05-03 PROCEDURE — 36415 COLL VENOUS BLD VENIPUNCTURE: CPT | Performed by: FAMILY MEDICINE

## 2018-05-03 PROCEDURE — 99214 OFFICE O/P EST MOD 30 MIN: CPT | Performed by: FAMILY MEDICINE

## 2018-05-03 PROCEDURE — 84439 ASSAY OF FREE THYROXINE: CPT | Performed by: FAMILY MEDICINE

## 2018-05-03 PROCEDURE — 83036 HEMOGLOBIN GLYCOSYLATED A1C: CPT | Performed by: FAMILY MEDICINE

## 2018-05-03 PROCEDURE — 85025 COMPLETE CBC W/AUTO DIFF WBC: CPT | Performed by: FAMILY MEDICINE

## 2018-05-03 PROCEDURE — 84443 ASSAY THYROID STIM HORMONE: CPT | Performed by: FAMILY MEDICINE

## 2018-05-03 RX ORDER — INSULIN DETEMIR 100 [IU]/ML
42 INJECTION, SOLUTION SUBCUTANEOUS NIGHTLY
Refills: 0 | COMMUNITY
Start: 2018-02-20 | End: 2018-12-10 | Stop reason: SDUPTHER

## 2018-05-03 RX ORDER — ASPIRIN 81 MG/1
81 TABLET ORAL DAILY
Qty: 30 TABLET | Refills: 5 | Status: SHIPPED | OUTPATIENT
Start: 2018-05-03 | End: 2018-11-29 | Stop reason: SDUPTHER

## 2018-05-03 RX ORDER — LOSARTAN POTASSIUM AND HYDROCHLOROTHIAZIDE 12.5; 5 MG/1; MG/1
1 TABLET ORAL 2 TIMES DAILY
Qty: 60 TABLET | Refills: 5 | Status: SHIPPED | OUTPATIENT
Start: 2018-05-03 | End: 2019-08-20 | Stop reason: SDUPTHER

## 2018-05-17 NOTE — PROGRESS NOTES
"Bianka Agudelo     VITALS: Blood pressure 126/72, pulse 82, height 161.3 cm (63.5\"), weight 89.4 kg (197 lb), SpO2 98 %.    Subjective  Chief Complaint:   Chief Complaint   Patient presents with   • Follow-up   • Diabetes        History of Present Illness:  Patient is a 72 y.o. female who presents to clinic secondary to medical followup. No new or acute concerns. She has been doing well.       Patient has hypertension and is on losartan/HCTZ 50/12.5 mg orally BID and norvasc 5 mg orally daily (occasionally). Denies any side effects of the medications. Denies any dizziness, lightheadedness, blurry vision, chest pain, or edema. Patient does not take her blood pressures at home. Tries to follow a low-salt diet.    Patient also has type II diabetes and is currently on metformin 1000 mg orally BID and basaglar 42 units every evening. She has been inconsistent with the januvia 100 mg orally daily and Novolog 70/30 20 units BID at meals. Has recently switched from lantus to basaglar secondary to insurance and feels that basaglar does not do her as well as the lantus does. Last A1C in 12/2017 was 10.9. Denies any side effects of her medications. Patient denies any changes in vision, polydipsia, polyuria, numbness or tingling, or hypoglycemic or hyperglycemic episodes. Patient does follow a diabetic diet and checks her glucose levels regularly. Blood glucose levels have been elevated. Patient does have complications of nephropathy. No worsening or exacerbation of symptoms. No neuropathy or retinopathy. Last eye exam was April 2017. Last microalbumin was elevated done 8/2017. Last foot exam was in 2015 and patient does see a podiatrist. She has gone through diabetic teaching/nutrition education. Does take losartan 100 mg orally daily to provide for kidney protection and aspirin 81 mg orally daily to provide for cardiovascular protection.    Patient also has hyperlipidemia and is currently on atorvastatin 10 mg orally daily. " Denies any side effects of the medication. Last lipid panel was 8/2017 and in normal limits. Denies any muscle weakness, jaundice or itching. Tries to follow a low cholesterol diet.    Patient has GERD and is currently on prilosec 20 mg orally BID. Patient denies any side effects of the medication. Denies metallic tastes and has not been having increased symptoms during sleep. Has not had any recent exacerbations. Denies any nausea, vomiting, burping, hiccuping, or midepigastric pain.    Patient does have hypothyroidism and is currently on synthroid 125 mcg orally daily. She is doing well on this medication. Denies any side effects. Denies fatigue, dizziness, palpitations, changes in weight, hair, or nails. Last thyroid panel was 12/2017 and normal.    Patient has anxiety and is currently on diazepam 5 mg orally BID. Patient states that this medication is working. Denies any side effects of the medication. Patient states that she is sleeping well and can get out of bed daily to accomplish daily activities. Has some anhedonia. Mood is stable. Has no feelings of guilt. Has good concentration and memory ability. Has some energy. Is able to make goals. Appetite is stable. Denies any suicidal or homicidal ideations. Does not have any auditory or visual hallucinations. PETE [id number] WNL. No controlled medication seeking behavior.     Patient has CAD with angina and is on plavix 75 mg orally daily, aspirin 81 mg orally daily, and nitro ER 6.5 mg orally TID for angina. We tried to switch her from nitro to Imdur in 2016 without much success. She reports that she has been doing fine on these medications, denies any side effects, and denies any chest pain.       Patient does have muscle spasms especially in her back when she does physical activity, such as gardening. She is currently on flexeril 10 mg orally BID as needed and ibuprofen 600 mg orally TID. Denies any side effects of the medications. Denies any sedation  effects. Medications allow her enough flexibility and relief from the pain so that she can do activities other than those of daily living. Movement and ambulation are improved on these medications. Robaxin is fairly expensive for her and she would like to change it to a cheaper medication if possible. No pain medication seeking behavior. Tyler #[id number] is clean without any discrepancies.      Patient does have an overactive bladder and is currently on oxybutynin 5 mg orally daily. Medication is treating her well. No side effects. States that it does reduce her urinary frequency.  No complaints about any of the medications.    The following portions of the patient's history were reviewed and updated as appropriate: allergies, current medications, past family history, past medical history, past social history, past surgical history and problem list.    Past Medical History  Past Medical History:   Diagnosis Date   • Anxiety    • CAD (coronary artery disease)    • Diabetes mellitus    • Dyslipidemia    • GERD (gastroesophageal reflux disease)    • H/O angina pectoris    • History of bone density study     February 2014   • History of mammogram 07/15/2013   • Hypertension    • Hypothyroidism    • Muscle spasms of both lower extremities    • Overactive bladder    • Vitamin B12 deficiency    • Vitamin D deficiency        Review of Systems  Constitutional: Denies any recent history of HAs, dizziness, fevers, chills, itching.  Eyes: Denies any changes in vision. Denies any blurry vision or diplopia.  Ears, Nose, Mouth, Throat: Denies any sore throat, rhinorrhea, or cough.  Cardiovascular: Denies any chest pain, pressure, or palpitations.  Respiratory: Denies any shortness of breath or wheezing.  Gastrointestinal: Denies any abdominal pain, nausea, vomiting, diarrhea, or constipation.  Genitourinary: Denies any changes in urination.  Musculoskeletal: Denies any muscle weakness.  Skin and/or breasts: Denies any  rashes.  Neurological: Denies any changes in balance or gait.  Psychiatric: Denies any anxiety, depression, or insomnia. Denies any suicidal or homicidal ideations.  Endocrine: Denies any heat or cold intolerance. Denies any voice changes, polydipsia, or polyuria.  Hematologic/Lymphatic: Denies any anemia or easy bruising.    Surgical History  Past Surgical History:   Procedure Laterality Date   • COLONOSCOPY  01/01/2010   • OOPHORECTOMY      1 removed   • TUBAL ABDOMINAL LIGATION         Family History  Family History   Problem Relation Age of Onset   • Diabetes Mother    • Heart disease Mother    • Stroke Mother    • Cancer Father        Social History  Social History     Social History   • Marital status:      Spouse name: N/A   • Number of children: N/A   • Years of education: N/A     Occupational History   • Not on file.     Social History Main Topics   • Smoking status: Never Smoker   • Smokeless tobacco: Never Used   • Alcohol use No   • Drug use: No   • Sexual activity: Not on file     Other Topics Concern   • Not on file     Social History Narrative   • No narrative on file       Objective  Physical Exam  Gen: Patient in NAD. Pleasant and answers appropriately. A&Ox3.    Skin: Warm and dry with normal turgor. No purpura, rashes, or unusual pigmentation noted. Hair is normal in appearance and distribution.    HEENT: NC/AT. No lesions noted. Conjunctiva clear, sclera nonicteric. PERRL. EOMI without nystagmus or strabismus. Fundi appear benign. No hemorrhages or exudates of eyes. Auditory canals are patent bilaterally without lesions. TMs intact,  nonerythematous, nonbulging without lesions. Nasal mucosa pink, nonerythematous, and nonedematous. Frontal and maxillary sinuses are nontender. O/P nonerythematous and moist without exudate.    Neck: Supple without lymph nodes palpated. FROM.     Lungs: CTA B/L without rales, rhonchi, crackles, or wheezes.    Heart: RRR. S1 and S2 normal. No S3 or S4. No  MRGT.    Abd: Soft, nontender,nondistended. (+)BSx4 quadrants.     Extrem: No CCE. Radial pulses 2+/4 and equal B/L. FROMx4. No bone, joint, or muscle tenderness noted.    Neuro: No focal motor/sensory deficits.    Procedures    Assessment/Plan  Bianka Agudelo is a 72 y.o. here for medical followup.  Diagnoses and all orders for this visit:    Type 2 diabetes mellitus with hyperglycemia, with long-term current use of insulin  -     Comprehensive Metabolic Panel  -     CBC & Differential  -     Hemoglobin A1c  -     CBC Auto Differential  -     Osmolality, Calculated; Future  -     Osmolality, Calculated  Uncontrolled. Recheck A1C today. Microalbumin elevated 8/2017. On metformin 1000 mg orally BID and will try to change back to Lantus at 42 units every evening. Encouraged patient to take januvia 100 mg orally daily or Novolog 70/30 22 units BID at meals - encouraged patient to do so. Eye exam and microalbumin 2017 done. Patient to call [facility] for annual foot exam. Does take losartan 100 mg orally daily to provide for kidney protection and aspirin 81 mg orally daily to provide for cardiovascular protection.     Other specified hypothyroidism  -     TSH  -     T4, Free  Stable. Continue synthroid 125 mcg orally daily. Check thyroid panel today.    Anxiety  Well controlled. Continue diazepam 5 mg orally BID.     Muscle spasms  Stable. Continue flexeril 10 mg orally BID as needed and will continue ibuprofen 600 mg orally TID and Voltaren gel when not using ibuprofen.  .  Hypertension  Stable today. Discussed at length. Continue losartan/HCTZ 50/12.5 mg orally BID and norvasc 5 mg orally daily.     Eustachian tube dysfunction, bilateral  Improved. Patient using meclizine 25 mg orally every BID as needed for dizziness.      Hypercalcemia  Workup benign. Continue to monitor.         GERD  Stable. Continue omeprazole 20 mg orally BID.        Vitamin D deficiency   Decreased 12/2017. Finished 03863 IU weekly  repletion. Will need to recheck.      CAD with angina  Stable and asymptomatic. Continue plavix 75 mg orally daily, aspirin 81 mg orally daily, and nitro ER 6.5 mg orally TID for angina.       Overactive bladder  Stable. Continue oxybutynin 5 mg orally daily.       Asthma  Continue albuterol inhaler as needed.      Vitamin B12 deficiency  Patient has a hard time coming in for B12 shots secondary to cost. On B12 1000 mcg orally daily. 12/2017 levels okay.      Preventative Medicine  Colonoscopy 2010 UTD. Declines mammogram. Declined DEXA scan. Flu shot 2017 given. Declines pneumo for now.      Other orders  Refilled:  -     aspirin (RA ASPIRIN EC ADULT LOW ST) 81 MG EC tablet; Take 1 tablet by mouth Daily.  -     SITagliptin (JANUVIA) 100 MG tablet; Take 1 tablet by mouth Daily.  -     losartan-hydrochlorothiazide (HYZAAR) 50-12.5 MG per tablet; Take 1 tablet by mouth 2 (Two) Times a Day.  -     Insulin Glargine (LANTUS SOLOSTAR) 100 UNIT/ML injection pen; Inject 42 Units under the skin Every Night.    Findings and plans discussed with patient who verbalizes understanding and agreement. Will followup with patient once results are in. Patient to followup at clinic PRN or in six months for further medical followup.    Eva lEliott MD

## 2018-05-23 RX ORDER — LEVOTHYROXINE SODIUM 0.12 MG/1
TABLET ORAL
Qty: 30 TABLET | Refills: 2 | Status: SHIPPED | OUTPATIENT
Start: 2018-05-23 | End: 2018-09-05 | Stop reason: SDUPTHER

## 2018-05-23 RX ORDER — HUMAN INSULIN 100 [USP'U]/ML
INJECTION, SUSPENSION SUBCUTANEOUS
Qty: 20 ML | Refills: 5 | Status: SHIPPED | OUTPATIENT
Start: 2018-05-23 | End: 2019-03-01 | Stop reason: SDUPTHER

## 2018-05-31 ENCOUNTER — OFFICE VISIT (OUTPATIENT)
Dept: FAMILY MEDICINE CLINIC | Facility: CLINIC | Age: 73
End: 2018-05-31

## 2018-05-31 VITALS
OXYGEN SATURATION: 96 % | WEIGHT: 198 LBS | BODY MASS INDEX: 33.8 KG/M2 | HEART RATE: 87 BPM | SYSTOLIC BLOOD PRESSURE: 155 MMHG | HEIGHT: 64 IN | DIASTOLIC BLOOD PRESSURE: 74 MMHG

## 2018-05-31 DIAGNOSIS — E11.65 TYPE 2 DIABETES MELLITUS WITH HYPERGLYCEMIA, WITH LONG-TERM CURRENT USE OF INSULIN (HCC): ICD-10-CM

## 2018-05-31 DIAGNOSIS — Z79.4 TYPE 2 DIABETES MELLITUS WITH HYPERGLYCEMIA, WITH LONG-TERM CURRENT USE OF INSULIN (HCC): ICD-10-CM
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
## 2018-05-31 DIAGNOSIS — F41.9 ANXIETY: ICD-10-CM

## 2018-05-31 DIAGNOSIS — L23.7 POISON IVY: Primary | ICD-10-CM

## 2018-05-31 PROCEDURE — 96372 THER/PROPH/DIAG INJ SC/IM: CPT | Performed by: FAMILY MEDICINE

## 2018-05-31 PROCEDURE — 99214 OFFICE O/P EST MOD 30 MIN: CPT | Performed by: FAMILY MEDICINE

## 2018-05-31 RX ORDER — DOXYCYCLINE HYCLATE 100 MG/1
200 TABLET, DELAYED RELEASE ORAL ONCE
Qty: 2 TABLET | Refills: 0 | Status: SHIPPED | OUTPATIENT
Start: 2018-05-31 | End: 2018-05-31

## 2018-05-31 RX ORDER — DIAZEPAM 5 MG/1
5 TABLET ORAL EVERY 12 HOURS PRN
Qty: 60 TABLET | Refills: 0 | Status: SHIPPED | OUTPATIENT
Start: 2018-05-31 | End: 2018-07-09 | Stop reason: SDUPTHER

## 2018-05-31 RX ORDER — METHYLPREDNISOLONE ACETATE 80 MG/ML
40 INJECTION, SUSPENSION INTRA-ARTICULAR; INTRALESIONAL; INTRAMUSCULAR; SOFT TISSUE ONCE
Status: COMPLETED | OUTPATIENT
Start: 2018-05-31 | End: 2018-05-31

## 2018-05-31 RX ORDER — PREDNISONE 10 MG/1
TABLET ORAL
Qty: 30 TABLET | Refills: 0 | Status: SHIPPED | OUTPATIENT
Start: 2018-05-31 | End: 2018-10-29

## 2018-05-31 RX ADMIN — METHYLPREDNISOLONE ACETATE 40 MG: 80 INJECTION, SUSPENSION INTRA-ARTICULAR; INTRALESIONAL; INTRAMUSCULAR; SOFT TISSUE at 14:58

## 2018-06-14 NOTE — PROGRESS NOTES
"Bianka Agudelo     VITALS: Blood pressure 155/74, pulse 87, height 161.3 cm (63.5\"), weight 89.8 kg (198 lb), SpO2 96 %.    Subjective  Chief Complaint:   Chief Complaint   Patient presents with   • Rash     arms   • Eye Drainage        History of Present Illness:  Patient is a 72 y.o.  female who presents to clinic secondary to an acute concern.    Patient was seen today for these conditions and concerns:  Patient states that she was gardening and must have gotten into some poison ivy She has a rash on her bilateral arms that is not very swollen, but very itchy and red. She states that she has been scratching. She states that she may have swiped her eyes after coming into contact with the poison ivy because her eyes are becoming scratchy and having some serous drainage also. No fevers or chills.     No complaints about any of the medications.    The following portions of the patient's history were reviewed and updated as appropriate: allergies, current medications, past family history, past medical history, past social history, past surgical history and problem list.    Past Medical History  Past Medical History:   Diagnosis Date   • Anxiety    • CAD (coronary artery disease)    • Diabetes mellitus    • Dyslipidemia    • GERD (gastroesophageal reflux disease)    • H/O angina pectoris    • History of bone density study     February 2014   • History of mammogram 07/15/2013   • Hypertension    • Hypothyroidism    • Muscle spasms of both lower extremities    • Overactive bladder    • Vitamin B12 deficiency    • Vitamin D deficiency        Review of Systems  Constitutional: Denies any recent history of HAs, dizziness, fevers, chills.  Eyes: Denies any changes in vision. Denies any blurry vision or diplopia.  Ears, Nose, Mouth, Throat: Denies any sore throat, rhinorrhea, or cough.  Cardiovascular: Denies any chest pain, pressure, or palpitations.  Respiratory: Denies any shortness of breath or wheezing.  Gastrointestinal: " Denies any abdominal pain, nausea, vomiting, diarrhea, or constipation.  Genitourinary: Denies any changes in urination.  Musculoskeletal: Denies any muscle weakness.  Psychiatric: Denies any anxiety, depression, or insomnia. Denies any suicidal or homicidal ideations.  Endocrine: Denies any heat or cold intolerance. Denies any voice changes, polydipsia, or polyuria.  Hematologic/Lymphatic: Denies any anemia or easy bruising.    Surgical History  Past Surgical History:   Procedure Laterality Date   • COLONOSCOPY  01/01/2010   • OOPHORECTOMY      1 removed   • TUBAL ABDOMINAL LIGATION         Family History  Family History   Problem Relation Age of Onset   • Diabetes Mother    • Heart disease Mother    • Stroke Mother    • Cancer Father        Social History  Social History     Social History   • Marital status:      Spouse name: N/A   • Number of children: N/A   • Years of education: N/A     Occupational History   • Not on file.     Social History Main Topics   • Smoking status: Never Smoker   • Smokeless tobacco: Never Used   • Alcohol use No   • Drug use: No   • Sexual activity: Not on file     Other Topics Concern   • Not on file     Social History Narrative   • No narrative on file       Objective  Physical Exam  Gen: Patient in NAD. Pleasant and answers appropriately. A&Ox3.    Skin: Warm and dry with normal turgor. No purpura or unusual pigmentation noted. Hair is normal in appearance and distribution. Erythematous, slightly edematous poison ivy rash with some excoriation on both upper extremities.     HEENT: NC/AT. No lesions noted. Conjunctiva injected, sclera nonicteric. PERRL. EOMI without nystagmus or strabismus. Fundi appear benign. No hemorrhages or exudates of eyes. Auditory canals are patent bilaterally without lesions. TMs intact,  nonerythematous, nonbulging without lesions. Nasal mucosa pink, nonerythematous, and nonedematous. Frontal and maxillary sinuses are nontender. O/P nonerythematous  and moist without exudate.    Neck: Supple without lymph nodes palpated. FROM.     Lungs: CTA B/L without rales, rhonchi, crackles, or wheezes.    Heart: RRR. S1 and S2 normal. No S3 or S4. No MRGT.    Abd: Soft, nontender,nondistended. (+)BSx4 quadrants.     Extrem: No CCE. Radial pulses 2+/4 and equal B/L. FROMx4. No bone, joint, or muscle tenderness noted.    Neuro: No focal motor/sensory deficits.    Procedures    Assessment/Plan  Bianka Agudelo is a 72 y.o. here for an acute concern.  Diagnoses and all orders for this visit:    Poison ivy  -     methylPREDNISolone acetate (DEPO-medrol) injection 40 mg; Inject 0.5 mL into the shoulder, thigh, or buttocks 1 (One) Time.  -     predniSONE (DELTASONE) 10 MG tablet; Take 40 mg orally dailyx 4 days, then 30 mg orally daily x 3 days, then 20 mg daily x 2 days, then 10 mg.    Anxiety  -     diazePAM (VALIUM) 5 MG tablet; Take 1 tablet by mouth Every 12 (Twelve) Hours As Needed for Anxiety.    Tick Bite  -     doxycycline (DORYX) 100 MG enteric coated tablet; Take 2 tablets by mouth 1 (One) Time for 1 dose.    Type II Diabetes  Uncontrolled. Will be exacerbated with the prednsione. WATCH your sugars.  If they are high, increase the long acting to 45 units at night and then call on Monday.     Patient's Body mass index is 34.52 kg/m². BMI is above normal parameters. Recommendations include: educational material, exercise counseling and nutrition counseling.      Findings and plans discussed with patient who verbalizes understanding and agreement. Will followup with patient once results are in. Patient to followup at clinic PRN or in four months for further medical followup.    Eva Elliott MD

## 2018-07-03 RX ORDER — CLOPIDOGREL BISULFATE 75 MG/1
TABLET ORAL
Qty: 30 TABLET | Refills: 5 | Status: SHIPPED | OUTPATIENT
Start: 2018-07-03 | End: 2019-01-02 | Stop reason: SDUPTHER

## 2018-07-09 RX ORDER — DIAZEPAM 5 MG/1
5 TABLET ORAL EVERY 12 HOURS PRN
Qty: 60 TABLET | Refills: 0 | Status: SHIPPED | OUTPATIENT
Start: 2018-07-09 | End: 2018-08-29 | Stop reason: SDUPTHER

## 2018-07-09 NOTE — TELEPHONE ENCOUNTER
----- Message from Eva Elliott MD sent at 7/9/2018  6:04 AM EDT -----  Please call and check in with Bianka.  1) How are her sugars? She had failed levemir and basaglar and should be currently on lantus 42 units at night.  2) Is she feeling tired at all? I hadn't had a chance to adjust her thyroid meds - if she is, she should be on 125 mcg synthroid - we can increase it to 137 mcg synthroid orally daily, #30 with 2 refills, please send to pharm. Thanks.        Called and spoke with patient, she is taking Levemir 42 units qhs, she said her sugars are running about the same, said the last time she checked it was 192 fasting. She said she is not feeling tired said the B12 shot helped her.

## 2018-07-09 NOTE — TELEPHONE ENCOUNTER
Okay. Let her know to increase the levemir to 46 units at night and see how that does her. Does she want to try something else? If we have samples of toujeo or soliqua, she can get some and do the same dosage (go a little lower on soliqua, start at 40 or something), and see if those treat her better.     I'm going to wait on the thyroid - we'll recheck in October. In the meantime, stop by for monthly B12 shots. Thanks.

## 2018-07-09 NOTE — TELEPHONE ENCOUNTER
Okay. Let her know to increase the levemir to 46 units at night and see how that does her. Does she want to try something else? If we have samples of toujeo or soliqua, she can get some and do the same dosage (go a little lower on soliqua, start at 40 or something), and see if those treat her better.     I'm going to wait on the thyroid - we'll recheck in October. In the meantime, stop by for monthly B12 shots. Thanks.         Spoke with Bianka, she said she would just take the Levemir she did not want to try anything new.

## 2018-07-12 RX ORDER — OMEPRAZOLE 20 MG/1
20 CAPSULE, DELAYED RELEASE ORAL 2 TIMES DAILY
Qty: 60 CAPSULE | Refills: 5 | Status: SHIPPED | OUTPATIENT
Start: 2018-07-12 | End: 2018-08-01 | Stop reason: SDUPTHER

## 2018-08-01 RX ORDER — OMEPRAZOLE 20 MG/1
20 CAPSULE, DELAYED RELEASE ORAL 2 TIMES DAILY
Qty: 180 CAPSULE | Refills: 1 | Status: SHIPPED | OUTPATIENT
Start: 2018-08-01 | End: 2018-11-21 | Stop reason: SDUPTHER

## 2018-08-06 RX ORDER — AMLODIPINE BESYLATE 5 MG/1
TABLET ORAL
Qty: 30 TABLET | Refills: 5 | Status: SHIPPED | OUTPATIENT
Start: 2018-08-06 | End: 2019-01-03 | Stop reason: SDUPTHER

## 2018-08-14 RX ORDER — ATORVASTATIN CALCIUM 10 MG/1
10 TABLET, FILM COATED ORAL DAILY
Qty: 90 TABLET | Refills: 0 | Status: SHIPPED | OUTPATIENT
Start: 2018-08-14 | End: 2018-11-21 | Stop reason: SDUPTHER

## 2018-08-29 ENCOUNTER — TELEPHONE (OUTPATIENT)
Dept: FAMILY MEDICINE CLINIC | Facility: CLINIC | Age: 73
End: 2018-08-29

## 2018-08-29 RX ORDER — DIAZEPAM 5 MG/1
5 TABLET ORAL EVERY 12 HOURS PRN
Qty: 60 TABLET | Refills: 0 | Status: SHIPPED | OUTPATIENT
Start: 2018-08-29 | End: 2018-10-03 | Stop reason: SDUPTHER

## 2018-09-04 RX ORDER — LEVOTHYROXINE SODIUM 0.12 MG/1
TABLET ORAL
Qty: 30 TABLET | Refills: 2 | OUTPATIENT
Start: 2018-09-04

## 2018-09-05 DIAGNOSIS — E03.9 HYPOTHYROIDISM, UNSPECIFIED TYPE: Primary | ICD-10-CM

## 2018-09-05 RX ORDER — LEVOTHYROXINE SODIUM 0.12 MG/1
125 TABLET ORAL DAILY
Qty: 30 TABLET | Refills: 0 | Status: SHIPPED | OUTPATIENT
Start: 2018-09-05 | End: 2018-10-02 | Stop reason: SDUPTHER

## 2018-09-05 NOTE — TELEPHONE ENCOUNTER
Patient called for a refill on her Levothyroxine,is still taking 125 mcg,reports she does not feel more fatigued than usual,will come in next week for repeat lab per orders. Refilled x 1 month pending follow up labs per orders.

## 2018-09-17 RX ORDER — OXYBUTYNIN CHLORIDE 5 MG/1
5 TABLET ORAL
Qty: 60 TABLET | Refills: 1 | Status: SHIPPED | OUTPATIENT
Start: 2018-09-17 | End: 2018-11-21 | Stop reason: SDUPTHER

## 2018-09-17 RX ORDER — NITROGLYCERIN 6.5 MG/1
6.5 CAPSULE ORAL 3 TIMES DAILY
Qty: 90 CAPSULE | Refills: 1 | Status: SHIPPED | OUTPATIENT
Start: 2018-09-17 | End: 2019-03-11 | Stop reason: SDUPTHER

## 2018-09-17 RX ORDER — LANOLIN ALCOHOL/MO/W.PET/CERES
1000 CREAM (GRAM) TOPICAL DAILY
Qty: 30 TABLET | Refills: 1 | Status: SHIPPED | OUTPATIENT
Start: 2018-09-17 | End: 2018-12-10 | Stop reason: SDUPTHER

## 2018-09-18 ENCOUNTER — OFFICE VISIT (OUTPATIENT)
Dept: FAMILY MEDICINE CLINIC | Facility: CLINIC | Age: 73
End: 2018-09-18

## 2018-09-18 ENCOUNTER — LAB (OUTPATIENT)
Dept: FAMILY MEDICINE CLINIC | Facility: CLINIC | Age: 73
End: 2018-09-18

## 2018-09-18 VITALS
SYSTOLIC BLOOD PRESSURE: 131 MMHG | DIASTOLIC BLOOD PRESSURE: 66 MMHG | HEART RATE: 81 BPM | WEIGHT: 198 LBS | HEIGHT: 64 IN | OXYGEN SATURATION: 94 % | BODY MASS INDEX: 33.8 KG/M2 | TEMPERATURE: 98.2 F

## 2018-09-18 DIAGNOSIS — E03.9 HYPOTHYROIDISM, UNSPECIFIED TYPE: ICD-10-CM

## 2018-09-18 DIAGNOSIS — Z87.828 HISTORY OF LACERATION OF SKIN: Primary | ICD-10-CM

## 2018-09-18 LAB — TSH SERPL DL<=0.05 MIU/L-ACNC: 7.88 MIU/ML (ref 0.55–4.78)

## 2018-09-18 PROCEDURE — 84443 ASSAY THYROID STIM HORMONE: CPT | Performed by: NURSE PRACTITIONER

## 2018-09-18 PROCEDURE — 99213 OFFICE O/P EST LOW 20 MIN: CPT | Performed by: FAMILY MEDICINE

## 2018-09-18 PROCEDURE — 36415 COLL VENOUS BLD VENIPUNCTURE: CPT

## 2018-09-23 NOTE — PROGRESS NOTES
Her TSH is still a little elevated.  Is she taking her levothyroxine on an empty stomach and waiting before taking any other medicaitons?

## 2018-09-24 ENCOUNTER — TELEPHONE (OUTPATIENT)
Dept: FAMILY MEDICINE CLINIC | Facility: CLINIC | Age: 73
End: 2018-09-24

## 2018-09-24 NOTE — TELEPHONE ENCOUNTER
----- Message from SARHA Villarreal sent at 9/23/2018  7:14 PM EDT -----  Her TSH is still a little elevated.  Is she taking her levothyroxine on an empty stomach and waiting before taking any other medicaitons?    Spoke with patient,she was not taking it correctly instructed in proper administration & she verbalized understanding.

## 2018-09-25 RX ORDER — METFORMIN HYDROCHLORIDE 500 MG/1
500 TABLET, EXTENDED RELEASE ORAL 2 TIMES DAILY WITH MEALS
Qty: 60 TABLET | Refills: 0 | Status: SHIPPED | OUTPATIENT
Start: 2018-09-25 | End: 2019-05-30 | Stop reason: SDUPTHER

## 2018-10-02 RX ORDER — LEVOTHYROXINE SODIUM 0.12 MG/1
125 TABLET ORAL DAILY
Qty: 30 TABLET | Refills: 2 | Status: SHIPPED | OUTPATIENT
Start: 2018-10-02 | End: 2019-01-11 | Stop reason: DRUGHIGH

## 2018-10-03 ENCOUNTER — TELEPHONE (OUTPATIENT)
Dept: FAMILY MEDICINE CLINIC | Facility: CLINIC | Age: 73
End: 2018-10-03

## 2018-10-03 DIAGNOSIS — F41.9 ANXIETY: Primary | ICD-10-CM

## 2018-10-03 RX ORDER — DIAZEPAM 5 MG/1
5 TABLET ORAL EVERY 12 HOURS PRN
Qty: 60 TABLET | Refills: 0 | Status: SHIPPED | OUTPATIENT
Start: 2018-10-03 | End: 2018-12-10 | Stop reason: SDUPTHER

## 2018-10-03 NOTE — PROGRESS NOTES
[facility] # [id number]  Reviewed and is consistent.  [facility] 5/2018 reviewed and is consistent.  Patient comfort assessment guide reviewed and updated today.    As part of the patient's treatment plan they are being prescribed a controlled substance/ substances with potential for abuse.  This patient has been made aware of the appropriate use of such medications, including potential risk of somnolence, limited ability to drive and/or work safely, and potential for overdose.  It has also been made clear these medications are for use by the patient only, without concomitant use of alcohol or other substances unless prescribed/advised by medical provider.    Patient has completed prescribing agreement detailing terms of continued prescribing of controlled substances including monitoring PETE reports, urine drug screens, and pill counts.  The patient is aware PETE reports are reviewed on a regular basis and scanned into the chart.    History and physical exam exhibit continued safe and appropriate use of controlled substances.

## 2018-10-04 NOTE — TELEPHONE ENCOUNTER
Please let her know ready for pickup. Thanks.       Left a message to return call.      Left a detailed message that her RX is here at the clinic for her to .

## 2018-10-11 ENCOUNTER — OFFICE VISIT (OUTPATIENT)
Dept: FAMILY MEDICINE CLINIC | Facility: CLINIC | Age: 73
End: 2018-10-11

## 2018-10-11 VITALS
SYSTOLIC BLOOD PRESSURE: 150 MMHG | OXYGEN SATURATION: 98 % | HEIGHT: 64 IN | WEIGHT: 195 LBS | HEART RATE: 83 BPM | DIASTOLIC BLOOD PRESSURE: 81 MMHG | BODY MASS INDEX: 33.29 KG/M2

## 2018-10-11 DIAGNOSIS — E11.65 UNCONTROLLED TYPE 2 DIABETES MELLITUS WITH HYPERGLYCEMIA (HCC): ICD-10-CM

## 2018-10-11 DIAGNOSIS — Z79.4 TYPE 2 DIABETES MELLITUS WITH BOTH EYES AFFECTED BY MODERATE NONPROLIFERATIVE RETINOPATHY WITHOUT MACULAR EDEMA, WITH LONG-TERM CURRENT USE OF INSULIN (HCC): ICD-10-CM

## 2018-10-11 DIAGNOSIS — E11.3393 TYPE 2 DIABETES MELLITUS WITH BOTH EYES AFFECTED BY MODERATE NONPROLIFERATIVE RETINOPATHY WITHOUT MACULAR EDEMA, WITH LONG-TERM CURRENT USE OF INSULIN (HCC): ICD-10-CM

## 2018-10-11 DIAGNOSIS — E03.8 OTHER SPECIFIED HYPOTHYROIDISM: ICD-10-CM

## 2018-10-11 DIAGNOSIS — I25.119 CORONARY ARTERY DISEASE INVOLVING NATIVE CORONARY ARTERY OF NATIVE HEART WITH ANGINA PECTORIS (HCC): ICD-10-CM

## 2018-10-11 DIAGNOSIS — Z00.00 MEDICARE ANNUAL WELLNESS VISIT, SUBSEQUENT: Primary | ICD-10-CM

## 2018-10-11 PROCEDURE — 90662 IIV NO PRSV INCREASED AG IM: CPT | Performed by: FAMILY MEDICINE

## 2018-10-11 PROCEDURE — G0439 PPPS, SUBSEQ VISIT: HCPCS | Performed by: FAMILY MEDICINE

## 2018-10-11 PROCEDURE — G0008 ADMIN INFLUENZA VIRUS VAC: HCPCS | Performed by: FAMILY MEDICINE

## 2018-10-11 RX ORDER — ALBUTEROL SULFATE 90 UG/1
2 AEROSOL, METERED RESPIRATORY (INHALATION) EVERY 4 HOURS PRN
Qty: 1 INHALER | Refills: 5 | Status: SHIPPED | OUTPATIENT
Start: 2018-10-11 | End: 2019-12-12 | Stop reason: SDUPTHER

## 2018-10-11 RX ORDER — MECLIZINE HYDROCHLORIDE 25 MG/1
25 TABLET ORAL 2 TIMES DAILY PRN
Qty: 60 TABLET | Refills: 5 | Status: SHIPPED | OUTPATIENT
Start: 2018-10-11 | End: 2019-05-12

## 2018-10-11 RX ORDER — IBUPROFEN 800 MG/1
800 TABLET ORAL EVERY 8 HOURS PRN
Qty: 90 TABLET | Refills: 5 | Status: SHIPPED | OUTPATIENT
Start: 2018-10-11 | End: 2018-10-29

## 2018-10-30 NOTE — PROGRESS NOTES
QUICK REFERENCE INFORMATION:  The ABCs of the Annual Wellness Visit    Subsequent Medicare Wellness Visit    HEALTH RISK ASSESSMENT    1945    Recent Hospitalizations:  No hospitalization(s) within the last year..        Current Medical Providers:  Patient Care Team:  Eva Elliott MD as PCP - General (Family Medicine)  Eva Elliott MD as PCP - Family Medicine  Kenny Posada MD as PCP - [facility]  Mirian Resendiz RN as Care Coordinator (Population Health)        Smoking Status:  History   Smoking Status   • Never Smoker   Smokeless Tobacco   • Never Used       Alcohol Consumption:  History   Alcohol Use No       Depression Screen:   PHQ-2/PHQ-9 Depression Screening 10/11/2018   Little interest or pleasure in doing things 0   Feeling down, depressed, or hopeless 0   Trouble falling or staying asleep, or sleeping too much -   Feeling tired or having little energy -   Poor appetite or overeating -   Feeling bad about yourself - or that you are a failure or have let yourself or your family down -   Trouble concentrating on things, such as reading the newspaper or watching television -   Moving or speaking so slowly that other people could have noticed. Or the opposite - being so fidgety or restless that you have been moving around a lot more than usual -   Thoughts that you would be better off dead, or of hurting yourself in some way -   Total Score 0   If you checked off any problems, how difficult have these problems made it for you to do your work, take care of things at home, or get along with other people? -       Health Habits and Functional and Cognitive Screening:  Functional & Cognitive Status 10/11/2018   Do you have difficulty preparing food and eating? No   Do you have difficulty bathing yourself, getting dressed or grooming yourself? No   Do you have difficulty using the toilet? No   Do you have difficulty moving around from place to place? No   Do you have trouble with steps  or getting out of a bed or a chair? No   In the past year have you fallen or experienced a near fall? No   Current Diet Well Balanced Diet   Dental Exam Up to date   Eye Exam Not up to date   Exercise (times per week) 2 times per week   Current Exercise Activities Include Walking   Do you need help using the phone?  No   Are you deaf or do you have serious difficulty hearing?  No   Do you need help with transportation? No   Do you need help shopping? No   Do you need help preparing meals?  No   Do you need help with housework?  No   Do you need help with laundry? No   Do you need help taking your medications? No   Do you need help managing money? No   Do you ever drive or ride in a car without wearing a seat belt? No   Have you felt unusual stress, anger or loneliness in the last month? Yes   Who do you live with? Sibling   If you need help, do you have trouble finding someone available to you? No   Have you been bothered in the last four weeks by sexual problems? No           Does the patient have evidence of cognitive impairment? No    Aspirin use counseling: Taking ASA appropriately as indicated      Recent Lab Results:  CMP:  Lab Results   Component Value Date     (H) 10/21/2016    BUN 18 05/03/2018    CREATININE 0.69 05/03/2018    EGFRIFNONA 84 05/03/2018    EGFRIFAFRI 127 10/21/2016    BCR 26.1 (H) 05/03/2018     05/03/2018    K 3.9 05/03/2018    CO2 26.5 05/03/2018    CALCIUM 9.6 05/03/2018    PROTENTOTREF 6.9 10/21/2016    ALBUMIN 4.10 05/03/2018    LABGLOBREF 2.3 10/21/2016    LABIL2 2.0 10/21/2016    BILITOT 1.0 05/03/2018    ALKPHOS 69 05/03/2018    AST 29 05/03/2018    ALT 39 (H) 05/03/2018     Lipid Panel:  Lab Results   Component Value Date    CHOL 211 (H) 12/19/2017    TRIG 260 (H) 12/19/2017    HDL 55 (L) 12/19/2017    VLDL 52 12/19/2017    LDLHDL 1.89 12/19/2017     HbA1c:  Lab Results   Component Value Date    HGBA1C 11.40 (H) 05/03/2018       Visual Acuity:   Visual Acuity Screening  "   Right eye Left eye Both eyes   Without correction: 20/50 20/25 20/20   With correction:          Age-appropriate Screening Schedule:  Refer to the list below for future screening recommendations based on patient's age, sex and/or medical conditions. Orders for these recommended tests are listed in the plan section. The patient has been provided with a written plan.    Health Maintenance   Topic Date Due   • DIABETIC FOOT EXAM  06/13/2016   • DIABETIC EYE EXAM  03/07/2017   • ZOSTER VACCINE (2 of 2) 07/06/2017   • PNEUMOCOCCAL VACCINES (65+ LOW/MEDIUM RISK) (2 of 2 - PPSV23) 05/11/2018   • URINE MICROALBUMIN  08/03/2018   • MAMMOGRAM  09/14/2018   • HEMOGLOBIN A1C  11/03/2018   • COLONOSCOPY  06/14/2020   • TDAP/TD VACCINES (3 - Td) 05/11/2027   • INFLUENZA VACCINE  Completed        Subjective   History of Present Illness    Bianka Agudelo is a 72 y.o. female who presents for an Subsequent Wellness Visit.    The following portions of the patient's history were reviewed and updated as appropriate: allergies, current medications, past family history, past medical history, past social history, past surgical history and problem list.    Outpatient Medications Prior to Visit   Medication Sig Dispense Refill   • amLODIPine (NORVASC) 5 MG tablet take 1 tablet by mouth once daily 30 tablet 5   • aspirin (RA ASPIRIN EC ADULT LOW ST) 81 MG EC tablet Take 1 tablet by mouth Daily. 30 tablet 5   • atorvastatin (LIPITOR) 10 MG tablet Take 1 tablet by mouth Daily. 90 tablet 0   • BD INSULIN SYRINGE ULTRAFINE 31G X 15/64\" 0.5 ML misc   1   • clopidogrel (PLAVIX) 75 MG tablet take 1 tablet by mouth once daily 30 tablet 5   • cyclobenzaprine (FLEXERIL) 10 MG tablet Take 1 tablet by mouth 2 (Two) Times a Day As Needed for Muscle Spasms. 60 tablet 5   • diazePAM (VALIUM) 5 MG tablet Take 1 tablet by mouth Every 12 (Twelve) Hours As Needed for Anxiety. 60 tablet 0   • diclofenac (VOLTAREN) 1 % gel gel Apply 4 gm to affected areas below " the waist QID PRN pain and 2 gm to affected areas above the waist QID PRN pain 100 g 1   • glucose blood test strip 1 each by Other route Daily. Use as instructed 100 each 5   • Insulin Syringe/Needle U-500 31G X 6MM 0.5 ML misc 1 each 4 (Four) Times a Day. 120 each 11   • Lancets misc Use as needed daily to check glucose levels 100 each 5   • LEVEMIR 100 UNIT/ML injection Inject 42 Units as directed Every Night.  0   • levothyroxine (SYNTHROID, LEVOTHROID) 125 MCG tablet Take 1 tablet by mouth Daily. 30 tablet 2   • Loratadine 10 MG capsule Take 1 tablet by mouth Daily. 30 each 5   • losartan-hydrochlorothiazide (HYZAAR) 50-12.5 MG per tablet Take 1 tablet by mouth 2 (Two) Times a Day. 60 tablet 5   • metFORMIN (GLUCOPHAGE-XR) 500 MG 24 hr tablet Take 1 tablet by mouth 2 (Two) Times a Day With Meals. 60 tablet 0   • nitroglycerin (NITRO-BID) 6.5 MG CR capsule Take 1 capsule by mouth 3 (Three) Times a Day. 90 capsule 1   • NOVOLIN 70/30 (70-30) 100 UNIT/ML injection INJECT 20 UNITS UNDER THE SKIN TWICE A DAY WITH MEALS 20 mL 5   • omeprazole (priLOSEC) 20 MG capsule Take 1 capsule by mouth 2 (Two) Times a Day. 180 capsule 1   • ondansetron ODT (ZOFRAN-ODT) 4 MG disintegrating tablet Take 1 tablet by mouth every 6 (six) hours as needed for nausea or vomiting. 12 tablet 0   • oxybutynin (DITROPAN) 5 MG tablet Take 1 tablet by mouth 2 (Two) Times a Day. 60 tablet 1   • SITagliptin (JANUVIA) 100 MG tablet Take 1 tablet by mouth Daily. 90 tablet 0   • vitamin B-12 (CYANOCOBALAMIN) 1000 MCG tablet Take 1 tablet by mouth Daily. 30 tablet 1   • albuterol (PROVENTIL HFA;VENTOLIN HFA) 108 (90 BASE) MCG/ACT inhaler Inhale 2 puffs every 4 (four) hours as needed for wheezing. 1 inhaler 5   • ibuprofen (ADVIL,MOTRIN) 600 MG tablet Take 1 tablet by mouth 3 (Three) Times a Day As Needed for Mild Pain . 90 tablet 5   • meclizine (ANTIVERT) 25 MG tablet Take 1 tablet by mouth 2 (two) times a day as needed for dizziness. 60 tablet 5    • Insulin Glargine (LANTUS SOLOSTAR) 100 UNIT/ML injection pen Inject 42 Units under the skin Every Night. 13 mL 5   • NOVOLOG MIX 70/30 (70-30) 100 UNIT/ML injection Inject 20 Units under the skin 2 (two) times a day with meals. 1200 mL 5   • vitamin D (ERGOCALCIFEROL) 33413 units capsule capsule Take 1 capsule by mouth 1 (One) Time Per Week. 8 capsule 0   • predniSONE (DELTASONE) 10 MG tablet Take 40 mg orally dailyx 4 days, then 30 mg orally daily x 3 days, then 20 mg daily x 2 days, then 10 mg. 30 tablet 0     No facility-administered medications prior to visit.        Patient Active Problem List   Diagnosis   • Vitamin D deficiency   • Vitamin B12 deficiency   • Overactive bladder   • Hypothyroidism   • Hypertension   • GERD (gastroesophageal reflux disease)   • Dyslipidemia   • Type 2 diabetes mellitus with both eyes affected by moderate nonproliferative retinopathy without macular edema, with long-term current use of insulin (CMS/[facility])   • Anxiety   • Coronary artery disease involving native coronary artery of native heart with angina pectoris (CMS/[facility])   • Hypercalcemia   • History of laceration of skin       Advance Care Planning:  has an advance directive - a copy HAS NOT been provided. Have asked the patient to send this to us to add to record.    Identification of Risk Factors:  Risk factors include: weight , unhealthy diet, cardiovascular risk, inactivity, increased fall risk, chronic pain, caretaker stress, isolation, depression, financial stress, vision limitations and polypharmacy.    Review of Systems  Constitutional: Denies any recent history of HAs, dizziness, fevers, chills.  Eyes: Denies any changes in vision. Denies any blurry vision or diplopia.  Ears, Nose, Mouth, Throat: Denies any sore throat, rhinorrhea, or cough.  Cardiovascular: Denies any chest pain, pressure, or palpitations.  Respiratory: Denies any shortness of breath or wheezing.  Gastrointestinal: Denies any abdominal pain,  "nausea, vomiting, diarrhea, or constipation.  Genitourinary: Denies any changes in urination.  Musculoskeletal: Denies any muscle weakness.  Psychiatric: Denies any anxiety, depression, or insomnia. Denies any suicidal or homicidal ideations.  Endocrine: Denies any heat or cold intolerance. Denies any voice changes, polydipsia, or polyuria.  Hematologic/Lymphatic: Denies any anemia or easy bruising.    Compared to one year ago, the patient feels her physical health is the same.  Compared to one year ago, the patient feels her mental health is the same.    Objective     Physical Exam    Vitals:    10/11/18 1113   BP: 150/81   BP Location: Left arm   Patient Position: Sitting   Pulse: 83   SpO2: 98%   Weight: 88.5 kg (195 lb)   Height: 161.3 cm (63.5\")       Patient's Body mass index is 34 kg/m². BMI is above normal parameters. Recommendations include: exercise counseling and nutrition counseling.    Gen: Patient in NAD. Pleasant and answers appropriately. A&Ox3.    Skin: Warm and dry with normal turgor. No purpura or unusual pigmentation noted. Hair is normal in appearance and distribution. Erythematous, slightly edematous poison ivy rash with some excoriation on both upper extremities.     HEENT: NC/AT. No lesions noted. Conjunctiva injected, sclera nonicteric. PERRL. EOMI without nystagmus or strabismus. Fundi appear benign. No hemorrhages or exudates of eyes. Auditory canals are patent bilaterally without lesions. TMs intact,  nonerythematous, nonbulging without lesions. Nasal mucosa pink, nonerythematous, and nonedematous. Frontal and maxillary sinuses are nontender. O/P nonerythematous and moist without exudate.    Neck: Supple without lymph nodes palpated. FROM.      Lungs: CTA B/L without rales, rhonchi, crackles, or wheezes.    Heart: RRR. S1 and S2 normal. No S3 or S4. No MRGT.    Abd: Soft, nontender,nondistended. (+)BSx4 quadrants.     Extrem: No CCE. Radial pulses 2+/4 and equal B/L. FROMx4. No bone, " joint, or muscle tenderness noted.    Neuro: No focal motor/sensory deficits.    Assessment/Plan   Patient Self-Management and Personalized Health Advice  The patient has been provided with information about: diet, exercise, weight management, prevention of cardiac or vascular disease, the relationship between weight and GERD and designing advance directives and preventive services including:   · Advance directive, Alcohol use counseling completed, Exercise counseling provided, Fall Risk assessment done, Fall Risk plan of care done, Glaucoma screening recommended, Influenza vaccine, Nutrition counseling provided, Pneumococcal vaccine .    Visit Diagnoses:    ICD-10-CM ICD-9-CM   1. Medicare annual wellness visit, subsequent Z00.00 V70.0   2. Other specified hypothyroidism E03.8 244.8   3. Uncontrolled type 2 diabetes mellitus with hyperglycemia (CMS/HCC) E11.65 250.02   4. Type 2 diabetes mellitus with both eyes affected by moderate nonproliferative retinopathy without macular edema, with long-term current use of insulin (CMS/HCC) E11.3393 250.50    Z79.4 362.05     V58.67   5. Coronary artery disease involving native coronary artery of native heart with angina pectoris (CMS/HCC) I25.119 414.01     413.9       Orders Placed This Encounter   Procedures   • Flu Vaccine High Dose PF 65YR+ ([id number])   • Lipid Panel     Standing Status:   Future   • Comprehensive Metabolic Panel     Standing Status:   Future   • TSH     Standing Status:   Future   • T4, Free     Standing Status:   Future   • Hemoglobin A1c     Standing Status:   Future   • MicroAlbumin, Urine, Random - Urine, Clean Catch     Standing Status:   Future       Outpatient Encounter Prescriptions as of 10/11/2018   Medication Sig Dispense Refill   • albuterol (PROVENTIL HFA;VENTOLIN HFA) 108 (90 Base) MCG/ACT inhaler Inhale 2 puffs Every 4 (Four) Hours As Needed for Wheezing. 1 inhaler 5   • amLODIPine (NORVASC) 5 MG tablet take 1 tablet by mouth once daily  "30 tablet 5   • aspirin (RA ASPIRIN EC ADULT LOW ST) 81 MG EC tablet Take 1 tablet by mouth Daily. 30 tablet 5   • atorvastatin (LIPITOR) 10 MG tablet Take 1 tablet by mouth Daily. 90 tablet 0   • BD INSULIN SYRINGE ULTRAFINE 31G X 15/64\" 0.5 ML misc   1   • clopidogrel (PLAVIX) 75 MG tablet take 1 tablet by mouth once daily 30 tablet 5   • cyclobenzaprine (FLEXERIL) 10 MG tablet Take 1 tablet by mouth 2 (Two) Times a Day As Needed for Muscle Spasms. 60 tablet 5   • diazePAM (VALIUM) 5 MG tablet Take 1 tablet by mouth Every 12 (Twelve) Hours As Needed for Anxiety. 60 tablet 0   • diclofenac (VOLTAREN) 1 % gel gel Apply 4 gm to affected areas below the waist QID PRN pain and 2 gm to affected areas above the waist QID PRN pain 100 g 1   • glucose blood test strip 1 each by Other route Daily. Use as instructed 100 each 5   • Insulin Syringe/Needle U-500 31G X 6MM 0.5 ML misc 1 each 4 (Four) Times a Day. 120 each 11   • Lancets misc Use as needed daily to check glucose levels 100 each 5   • LEVEMIR 100 UNIT/ML injection Inject 42 Units as directed Every Night.  0   • levothyroxine (SYNTHROID, LEVOTHROID) 125 MCG tablet Take 1 tablet by mouth Daily. 30 tablet 2   • Loratadine 10 MG capsule Take 1 tablet by mouth Daily. 30 each 5   • losartan-hydrochlorothiazide (HYZAAR) 50-12.5 MG per tablet Take 1 tablet by mouth 2 (Two) Times a Day. 60 tablet 5   • meclizine (ANTIVERT) 25 MG tablet Take 1 tablet by mouth 2 (Two) Times a Day As Needed for dizziness. 60 tablet 5   • metFORMIN (GLUCOPHAGE-XR) 500 MG 24 hr tablet Take 1 tablet by mouth 2 (Two) Times a Day With Meals. 60 tablet 0   • nitroglycerin (NITRO-BID) 6.5 MG CR capsule Take 1 capsule by mouth 3 (Three) Times a Day. 90 capsule 1   • NOVOLIN 70/30 (70-30) 100 UNIT/ML injection INJECT 20 UNITS UNDER THE SKIN TWICE A DAY WITH MEALS 20 mL 5   • omeprazole (priLOSEC) 20 MG capsule Take 1 capsule by mouth 2 (Two) Times a Day. 180 capsule 1   • ondansetron ODT (ZOFRAN-ODT) " 4 MG disintegrating tablet Take 1 tablet by mouth every 6 (six) hours as needed for nausea or vomiting. 12 tablet 0   • oxybutynin (DITROPAN) 5 MG tablet Take 1 tablet by mouth 2 (Two) Times a Day. 60 tablet 1   • SITagliptin (JANUVIA) 100 MG tablet Take 1 tablet by mouth Daily. 90 tablet 0   • vitamin B-12 (CYANOCOBALAMIN) 1000 MCG tablet Take 1 tablet by mouth Daily. 30 tablet 1   • [DISCONTINUED] albuterol (PROVENTIL HFA;VENTOLIN HFA) 108 (90 BASE) MCG/ACT inhaler Inhale 2 puffs every 4 (four) hours as needed for wheezing. 1 inhaler 5   • [DISCONTINUED] ibuprofen (ADVIL,MOTRIN) 600 MG tablet Take 1 tablet by mouth 3 (Three) Times a Day As Needed for Mild Pain . 90 tablet 5   • [DISCONTINUED] meclizine (ANTIVERT) 25 MG tablet Take 1 tablet by mouth 2 (two) times a day as needed for dizziness. 60 tablet 5   • Insulin Glargine (LANTUS SOLOSTAR) 100 UNIT/ML injection pen Inject 42 Units under the skin Every Night. 13 mL 5   • NOVOLOG MIX 70/30 (70-30) 100 UNIT/ML injection Inject 20 Units under the skin 2 (two) times a day with meals. 1200 mL 5   • vitamin D (ERGOCALCIFEROL) 63365 units capsule capsule Take 1 capsule by mouth 1 (One) Time Per Week. 8 capsule 0   • [DISCONTINUED] ibuprofen (ADVIL,MOTRIN) 800 MG tablet Take 1 tablet by mouth Every 8 (Eight) Hours As Needed for Mild Pain . 90 tablet 5   • [DISCONTINUED] predniSONE (DELTASONE) 10 MG tablet Take 40 mg orally dailyx 4 days, then 30 mg orally daily x 3 days, then 20 mg daily x 2 days, then 10 mg. 30 tablet 0     No facility-administered encounter medications on file as of 10/11/2018.        Reviewed use of high risk medication in the elderly: yes  Reviewed for potential of harmful drug interactions in the elderly: yes    Follow Up:  Return in about 3 months (around 1/11/2019).     An After Visit Summary and PPPS with all of these plans were given to the patient.

## 2018-11-12 ENCOUNTER — TELEPHONE (OUTPATIENT)
Dept: FAMILY MEDICINE CLINIC | Facility: CLINIC | Age: 73
End: 2018-11-12

## 2018-11-13 RX ORDER — FLUCONAZOLE 150 MG/1
150 TABLET ORAL ONCE
Qty: 1 TABLET | Refills: 0 | Status: SHIPPED | OUTPATIENT
Start: 2018-11-13 | End: 2018-11-13

## 2018-11-13 NOTE — TELEPHONE ENCOUNTER
Is it bad enough that she needs a pill? I would prefer her to use the cream, but will send the pill if she needs to. She has a history. Thanks.     Spoke with patient she said she thinks she needs the pill,it helps her better & wants it sent to [facility] (closer for her to ).

## 2018-11-13 NOTE — TELEPHONE ENCOUNTER
Is it bad enough that she needs a pill? I would prefer her to use the cream, but will send the pill if she needs to. She has a history. Thanks.

## 2018-11-21 ENCOUNTER — TELEPHONE (OUTPATIENT)
Dept: FAMILY MEDICINE CLINIC | Facility: CLINIC | Age: 73
End: 2018-11-21

## 2018-11-21 RX ORDER — OXYBUTYNIN CHLORIDE 5 MG/1
5 TABLET ORAL
Qty: 60 TABLET | Refills: 1 | Status: SHIPPED | OUTPATIENT
Start: 2018-11-21 | End: 2019-04-24 | Stop reason: SDUPTHER

## 2018-11-21 RX ORDER — ATORVASTATIN CALCIUM 10 MG/1
10 TABLET, FILM COATED ORAL DAILY
Qty: 90 TABLET | Refills: 0 | Status: SHIPPED | OUTPATIENT
Start: 2018-11-21 | End: 2019-02-19 | Stop reason: SDUPTHER

## 2018-11-21 RX ORDER — OMEPRAZOLE 20 MG/1
20 CAPSULE, DELAYED RELEASE ORAL 2 TIMES DAILY
Qty: 180 CAPSULE | Refills: 1 | Status: SHIPPED | OUTPATIENT
Start: 2018-11-21 | End: 2019-08-25 | Stop reason: SDUPTHER

## 2018-11-21 NOTE — TELEPHONE ENCOUNTER
PATIENT NEEDS US TO CALL [facility] AND SEE WHAT ALL MEDS SHE NEEDS REFILLS ON. SHE WASN'T FOR SURE.

## 2018-11-29 RX ORDER — ACETAMINOPHEN/DIPHENHYDRAMINE 500MG-25MG
TABLET ORAL
Qty: 30 TABLET | Refills: 5 | Status: SHIPPED | OUTPATIENT
Start: 2018-11-29 | End: 2019-04-24 | Stop reason: SDUPTHER

## 2018-12-04 RX ORDER — PSYLLIUM HUSK 3.4 G/7G
POWDER ORAL
Qty: 30 EACH | Refills: 5 | Status: SHIPPED | OUTPATIENT
Start: 2018-12-04 | End: 2019-05-12

## 2018-12-10 ENCOUNTER — TELEPHONE (OUTPATIENT)
Dept: FAMILY MEDICINE CLINIC | Facility: CLINIC | Age: 73
End: 2018-12-10

## 2018-12-10 RX ORDER — DIAZEPAM 5 MG/1
5 TABLET ORAL EVERY 12 HOURS PRN
Qty: 60 TABLET | Refills: 0 | Status: SHIPPED | OUTPATIENT
Start: 2018-12-10 | End: 2019-01-11 | Stop reason: SDUPTHER

## 2018-12-10 RX ORDER — LANOLIN ALCOHOL/MO/W.PET/CERES
1000 CREAM (GRAM) TOPICAL DAILY
Qty: 30 TABLET | Refills: 0 | Status: SHIPPED | OUTPATIENT
Start: 2018-12-10 | End: 2019-12-12 | Stop reason: SDUPTHER

## 2018-12-10 RX ORDER — FLUCONAZOLE 150 MG/1
150 TABLET ORAL WEEKLY
Qty: 3 TABLET | Refills: 0 | Status: SHIPPED | OUTPATIENT
Start: 2018-12-10 | End: 2019-05-12

## 2018-12-10 RX ORDER — INSULIN DETEMIR 100 [IU]/ML
42 INJECTION, SOLUTION SUBCUTANEOUS NIGHTLY
Qty: 10 ML | Refills: 0 | Status: SHIPPED | OUTPATIENT
Start: 2018-12-10 | End: 2019-01-11 | Stop reason: SDUPTHER

## 2018-12-10 NOTE — TELEPHONE ENCOUNTER
Please let her know that I sent in three. If she doesn't need to take all three, she shouldn't - save the extra for a rainy day. Thanks.       Patient notified & verbalized understanding.

## 2018-12-10 NOTE — TELEPHONE ENCOUNTER
Please let her know that I sent in three. If she doesn't need to take all three, she shouldn't - save the extra for a rainy day. Thanks.

## 2018-12-11 ENCOUNTER — LAB (OUTPATIENT)
Dept: FAMILY MEDICINE CLINIC | Facility: CLINIC | Age: 73
End: 2018-12-11

## 2018-12-11 DIAGNOSIS — Z00.00 MEDICARE ANNUAL WELLNESS VISIT, SUBSEQUENT: ICD-10-CM

## 2018-12-11 DIAGNOSIS — E11.3393 TYPE 2 DIABETES MELLITUS WITH BOTH EYES AFFECTED BY MODERATE NONPROLIFERATIVE RETINOPATHY WITHOUT MACULAR EDEMA, WITH LONG-TERM CURRENT USE OF INSULIN (HCC): ICD-10-CM

## 2018-12-11 DIAGNOSIS — E03.8 OTHER SPECIFIED HYPOTHYROIDISM: ICD-10-CM

## 2018-12-11 DIAGNOSIS — Z79.4 TYPE 2 DIABETES MELLITUS WITH BOTH EYES AFFECTED BY MODERATE NONPROLIFERATIVE RETINOPATHY WITHOUT MACULAR EDEMA, WITH LONG-TERM CURRENT USE OF INSULIN (HCC): ICD-10-CM

## 2018-12-11 LAB
ALBUMIN SERPL-MCNC: 4.1 G/DL (ref 3.4–4.8)
ALBUMIN UR-MCNC: 160 MG/L
ALBUMIN/GLOB SERPL: 1.6 G/DL (ref 1.5–2.5)
ALP SERPL-CCNC: 71 U/L (ref 35–104)
ALT SERPL W P-5'-P-CCNC: 33 U/L (ref 10–36)
ANION GAP SERPL CALCULATED.3IONS-SCNC: 8.2 MMOL/L (ref 3.6–11.2)
AST SERPL-CCNC: 29 U/L (ref 10–30)
BILIRUB SERPL-MCNC: 0.8 MG/DL (ref 0.2–1.8)
BUN BLD-MCNC: 14 MG/DL (ref 7–21)
BUN/CREAT SERPL: 21.5 (ref 7–25)
CALCIUM SPEC-SCNC: 9.4 MG/DL (ref 7.7–10)
CHLORIDE SERPL-SCNC: 102 MMOL/L (ref 99–112)
CHOLEST SERPL-MCNC: 143 MG/DL (ref 0–200)
CO2 SERPL-SCNC: 26.8 MMOL/L (ref 24.3–31.9)
CREAT BLD-MCNC: 0.65 MG/DL (ref 0.43–1.29)
GFR SERPL CREATININE-BSD FRML MDRD: 90 ML/MIN/1.73
GLOBULIN UR ELPH-MCNC: 2.5 GM/DL
GLUCOSE BLD-MCNC: 267 MG/DL (ref 70–110)
HBA1C MFR BLD: 12.1 % (ref 4.5–5.7)
HDLC SERPL-MCNC: 46 MG/DL (ref 60–100)
LDLC SERPL CALC-MCNC: 65 MG/DL (ref 0–100)
LDLC/HDLC SERPL: 1.42 {RATIO}
OSMOLALITY SERPL CALC.SUM OF ELEC: 283.7 MOSM/KG (ref 273–305)
POTASSIUM BLD-SCNC: 4.1 MMOL/L (ref 3.5–5.3)
PROT SERPL-MCNC: 6.6 G/DL (ref 6–8)
SODIUM BLD-SCNC: 137 MMOL/L (ref 135–153)
T4 FREE SERPL-MCNC: 1.27 NG/DL (ref 0.89–1.76)
TRIGL SERPL-MCNC: 158 MG/DL (ref 0–150)
TSH SERPL DL<=0.05 MIU/L-ACNC: 7.22 MIU/ML (ref 0.55–4.78)
VLDLC SERPL-MCNC: 31.6 MG/DL

## 2018-12-11 PROCEDURE — 80053 COMPREHEN METABOLIC PANEL: CPT | Performed by: FAMILY MEDICINE

## 2018-12-11 PROCEDURE — 82043 UR ALBUMIN QUANTITATIVE: CPT | Performed by: FAMILY MEDICINE

## 2018-12-11 PROCEDURE — 80061 LIPID PANEL: CPT | Performed by: FAMILY MEDICINE

## 2018-12-11 PROCEDURE — 84439 ASSAY OF FREE THYROXINE: CPT | Performed by: FAMILY MEDICINE

## 2018-12-11 PROCEDURE — 83036 HEMOGLOBIN GLYCOSYLATED A1C: CPT | Performed by: FAMILY MEDICINE

## 2018-12-11 PROCEDURE — 84443 ASSAY THYROID STIM HORMONE: CPT | Performed by: FAMILY MEDICINE

## 2018-12-11 NOTE — PROGRESS NOTES
[facility] # [id number]  Reviewed and is consistent.  [facility] 5/2018 reviewed and is consistent.  Patient comfort assessment guide reviewed and updated today.    As part of the patient's treatment plan they are being prescribed a controlled substance/ substances with potential for abuse.  This patient has been made aware of the appropriate use of such medications, including potential risk of somnolence, limited ability to drive and/or work safely, and potential for overdose.  It has also been made clear these medications are for use by the patient only, without concomitant use of alcohol or other substances unless prescribed/advised by medical provider.    Patient has completed prescribing agreement detailing terms of continued prescribing of controlled substances including monitoring PETE reports, urine drug screens, and pill counts.  The patient is aware PTEE reports are reviewed on a regular basis and scanned into the chart.    History and physical exam exhibit continued safe and appropriate use of controlled substances.

## 2019-01-03 RX ORDER — CLOPIDOGREL BISULFATE 75 MG/1
TABLET ORAL
Qty: 30 TABLET | Refills: 5 | Status: SHIPPED | OUTPATIENT
Start: 2019-01-03 | End: 2019-06-28 | Stop reason: SDUPTHER

## 2019-01-03 RX ORDER — CYCLOBENZAPRINE HCL 10 MG
TABLET ORAL
Qty: 60 TABLET | Refills: 5 | Status: SHIPPED | OUTPATIENT
Start: 2019-01-03 | End: 2019-12-01 | Stop reason: SDUPTHER

## 2019-01-03 RX ORDER — AMLODIPINE BESYLATE 5 MG/1
5 TABLET ORAL DAILY
Qty: 30 TABLET | Refills: 5 | Status: SHIPPED | OUTPATIENT
Start: 2019-01-03 | End: 2019-07-31 | Stop reason: SDUPTHER

## 2019-01-11 ENCOUNTER — OFFICE VISIT (OUTPATIENT)
Dept: FAMILY MEDICINE CLINIC | Facility: CLINIC | Age: 74
End: 2019-01-11

## 2019-01-11 VITALS
WEIGHT: 199 LBS | TEMPERATURE: 97.6 F | BODY MASS INDEX: 33.97 KG/M2 | SYSTOLIC BLOOD PRESSURE: 154 MMHG | DIASTOLIC BLOOD PRESSURE: 82 MMHG | HEIGHT: 64 IN | HEART RATE: 80 BPM

## 2019-01-11 DIAGNOSIS — E11.3393 TYPE 2 DIABETES MELLITUS WITH BOTH EYES AFFECTED BY MODERATE NONPROLIFERATIVE RETINOPATHY WITHOUT MACULAR EDEMA, WITH LONG-TERM CURRENT USE OF INSULIN (HCC): ICD-10-CM

## 2019-01-11 DIAGNOSIS — E11.69 HYPERLIPIDEMIA DUE TO TYPE 2 DIABETES MELLITUS (HCC): ICD-10-CM

## 2019-01-11 DIAGNOSIS — I15.2 HYPERTENSION ASSOCIATED WITH DIABETES (HCC): ICD-10-CM

## 2019-01-11 DIAGNOSIS — Z79.4 TYPE 2 DIABETES MELLITUS WITH BOTH EYES AFFECTED BY MODERATE NONPROLIFERATIVE RETINOPATHY WITHOUT MACULAR EDEMA, WITH LONG-TERM CURRENT USE OF INSULIN (HCC): ICD-10-CM

## 2019-01-11 DIAGNOSIS — F41.9 ANXIETY: ICD-10-CM

## 2019-01-11 DIAGNOSIS — E03.8 OTHER SPECIFIED HYPOTHYROIDISM: ICD-10-CM

## 2019-01-11 DIAGNOSIS — E11.59 HYPERTENSION ASSOCIATED WITH DIABETES (HCC): ICD-10-CM

## 2019-01-11 DIAGNOSIS — I25.119 CORONARY ARTERY DISEASE INVOLVING NATIVE CORONARY ARTERY OF NATIVE HEART WITH ANGINA PECTORIS (HCC): ICD-10-CM

## 2019-01-11 DIAGNOSIS — K21.9 GASTROESOPHAGEAL REFLUX DISEASE WITHOUT ESOPHAGITIS: ICD-10-CM

## 2019-01-11 DIAGNOSIS — E78.5 HYPERLIPIDEMIA DUE TO TYPE 2 DIABETES MELLITUS (HCC): ICD-10-CM

## 2019-01-11 DIAGNOSIS — M25.551 RIGHT HIP PAIN: Primary | ICD-10-CM

## 2019-01-11 PROCEDURE — 99214 OFFICE O/P EST MOD 30 MIN: CPT | Performed by: FAMILY MEDICINE

## 2019-01-11 RX ORDER — LEVOTHYROXINE SODIUM 137 UG/1
137 TABLET ORAL DAILY
Qty: 30 TABLET | Refills: 5 | Status: SHIPPED | OUTPATIENT
Start: 2019-01-11 | End: 2019-04-24 | Stop reason: SDUPTHER

## 2019-01-11 RX ORDER — INSULIN DETEMIR 100 [IU]/ML
46 INJECTION, SOLUTION SUBCUTANEOUS NIGHTLY
Qty: 15 ML | Refills: 5 | Status: SHIPPED | OUTPATIENT
Start: 2019-01-11 | End: 2019-06-24 | Stop reason: DRUGHIGH

## 2019-01-11 RX ORDER — DIAZEPAM 5 MG/1
5 TABLET ORAL EVERY 12 HOURS PRN
Qty: 60 TABLET | Refills: 0 | Status: SHIPPED | OUTPATIENT
Start: 2019-01-11 | End: 2019-03-20 | Stop reason: SDUPTHER

## 2019-01-11 RX ORDER — LEVOTHYROXINE SODIUM 0.12 MG/1
125 TABLET ORAL DAILY
Qty: 30 TABLET | Refills: 2 | Status: CANCELLED | OUTPATIENT
Start: 2019-01-11

## 2019-01-11 NOTE — PATIENT INSTRUCTIONS
Increase levemir to 46 units at night.   Keep the short acting the same.  Work on your diabetic diet.

## 2019-01-29 NOTE — PROGRESS NOTES
"Bianka Agudelo     VITALS: Blood pressure 154/82, pulse 80, temperature 97.6 °F (36.4 °C), temperature source Oral, height 161.3 cm (63.5\"), weight 90.3 kg (199 lb), not currently breastfeeding.    Subjective  Chief Complaint:   Chief Complaint   Patient presents with   • Hip Pain     patient had a fall         History of Present Illness:  Patient is a 73 y.o. female who presents to clinic secondary to medical followup. Patient fell on her right hip since her last visit. She states that it is a little sore, but she is able to ambulate well on it. Has been using heating pads. Declines xray.      Patient has hypertension and is on losartan/HCTZ 50/12.5 mg orally BID and norvasc 5 mg orally daily (occasionally). Denies any side effects of the medications. Denies any dizziness, lightheadedness, blurry vision, chest pain, or edema. Patient does not take her blood pressures at home. Tries to follow a low-salt diet.    Patient also has type II diabetes and is currently on metformin 1000 mg orally BID and basaglar 42 units every evening. She has been inconsistent with the januvia 100 mg orally daily and Novolog 70/30 20 units BID at meals. Has recently switched from lantus to basaglar secondary to insurance and feels that basaglar does not do her as well as the lantus does. Last A1C in 12/2018 was 12.1. Denies any side effects of her medications. Patient denies any changes in vision, polydipsia, polyuria, numbness or tingling, or hypoglycemic or hyperglycemic episodes. Patient does follow a diabetic diet and checks her glucose levels regularly. Blood glucose levels have been elevated. Patient does have complications of nephropathy. No worsening or exacerbation of symptoms. No neuropathy or retinopathy. Last eye exam was April 2017. Last microalbumin was elevated done 12/2018. Last foot exam was in 2017 and patient does see a podiatrist. She has gone through diabetic teaching/nutrition education. Does take losartan 100 mg " orally daily to provide for kidney protection and aspirin 81 mg orally daily to provide for cardiovascular protection.    Patient also has hyperlipidemia and is currently on atorvastatin 10 mg orally daily. Denies any side effects of the medication. Last lipid panel was 12/2018 and in normal limits. Denies any muscle weakness, jaundice or itching. Tries to follow a low cholesterol diet.    Patient has GERD and is currently on prilosec 20 mg orally BID. Patient denies any side effects of the medication. Denies metallic tastes and has not been having increased symptoms during sleep. Has not had any recent exacerbations. Denies any nausea, vomiting, burping, hiccuping, or midepigastric pain.    Patient does have hypothyroidism and is currently on synthroid 125 mcg orally daily. She is doing well on this medication. Denies any side effects. Denies fatigue, dizziness, palpitations, changes in weight, hair, or nails. Last thyroid panel was 12/[id number] normal.    Patient has anxiety and is currently on diazepam 5 mg orally BID. Patient states that this medication is working. Denies any side effects of the medication. Patient states that she is sleeping well and can get out of bed daily to accomplish daily activities. Has some anhedonia. Mood is stable. Has no feelings of guilt. Has good concentration and memory ability. Has some energy. Is able to make goals. Appetite is stable. Denies any suicidal or homicidal ideations. Does not have any auditory or visual hallucinations. [facility] [phone] WNL. No controlled medication seeking behavior.     Patient has CAD with angina and is on plavix 75 mg orally daily, aspirin 81 mg orally daily, and nitro ER 6.5 mg orally TID for angina. We tried to switch her from nitro to Imdur in 2016 without much success. She reports that she has been doing fine on these medications, denies any side effects, and denies any chest pain.       Patient does have muscle spasms especially in her back  when she does physical activity, such as gardening. She is currently on flexeril 10 mg orally BID as needed and ibuprofen 600 mg orally TID. Denies any side effects of the medications. Denies any sedation effects. Medications allow her enough flexibility and relief from the pain so that she can do activities other than those of daily living. Movement and ambulation are improved on these medications. Robaxin is fairly expensive for her and she would like to change it to a cheaper medication if possible. No pain medication seeking behavior. Tyler #[id number] is clean without any discrepancies.      Patient does have an overactive bladder and is currently on oxybutynin 5 mg orally daily. Medication is treating her well. No side effects. States that it does reduce her urinary frequency.    No complaints about any of the medications.    The following portions of the patient's history were reviewed and updated as appropriate: allergies, current medications, past family history, past medical history, past social history, past surgical history and problem list.    Past Medical History  Past Medical History:   Diagnosis Date   • Anxiety    • CAD (coronary artery disease)    • Diabetes mellitus (CMS/[facility])    • Dyslipidemia    • GERD (gastroesophageal reflux disease)    • H/O angina pectoris    • History of bone density study     February 2014   • History of mammogram 07/15/2013   • Hypertension    • Hypothyroidism    • Muscle spasms of both lower extremities    • Overactive bladder    • Vitamin B12 deficiency    • Vitamin D deficiency        Review of Systems  Constitutional: Denies any recent history of HAs, dizziness, fevers, chills, itching.  Eyes: Denies any changes in vision. Denies any blurry vision or diplopia.  Ears, Nose, Mouth, Throat: Denies any sore throat, rhinorrhea, or cough.  Cardiovascular: Denies any chest pain, pressure, or palpitations.  Respiratory: Denies any shortness of breath or  wheezing.  Gastrointestinal: Denies any abdominal pain, nausea, vomiting, diarrhea, or constipation.  Genitourinary: Denies any changes in urination.  Musculoskeletal: Denies any muscle weakness.  Skin and/or breasts: Denies any rashes.  Neurological: Denies any changes in balance or gait.  Psychiatric: Denies any anxiety, depression, or insomnia. Denies any suicidal or homicidal ideations.  Endocrine: Denies any heat or cold intolerance. Denies any voice changes, polydipsia, or polyuria.  Hematologic/Lymphatic: Denies any anemia or easy bruising.    Surgical History  Past Surgical History:   Procedure Laterality Date   • COLONOSCOPY  01/01/2010   • OOPHORECTOMY      1 removed   • TUBAL ABDOMINAL LIGATION         Family History  Family History   Problem Relation Age of Onset   • Diabetes Mother    • Heart disease Mother    • Stroke Mother    • Cancer Father        Social History  Social History     Socioeconomic History   • Marital status:      Spouse name: Not on file   • Number of children: Not on file   • Years of education: Not on file   • Highest education level: Not on file   Social Needs   • Financial resource strain: Not on file   • Food insecurity - worry: Not on file   • Food insecurity - inability: Not on file   • Transportation needs - medical: Not on file   • Transportation needs - non-medical: Not on file   Occupational History   • Not on file   Tobacco Use   • Smoking status: Never Smoker   • Smokeless tobacco: Never Used   Substance and Sexual Activity   • Alcohol use: No   • Drug use: No   • Sexual activity: Not on file   Other Topics Concern   • Not on file   Social History Narrative   • Not on file       Objective  Physical Exam    Bianka had a diabetic foot exam performed today.   During the foot exam she had a monofilament test performed.    Neurological Sensory Findings - Unaltered hot/cold right ankle/foot discrimination and unaltered hot/cold left ankle/foot discrimination. Unaltered  sharp/dull right ankle/foot discrimination and unaltered sharp/dull left ankle/foot discrimination. No right ankle/foot altered proprioception and no left ankle/foot altered proprioception  Vascular Status -  Her right foot exhibits normal foot vasculature  and no edema. Her left foot exhibits normal foot vasculature  and no edema.  Skin Integrity  -  Her right foot skin is intact.Her left foot skin is intact..    Gen: Patient in NAD. Pleasant and answers appropriately. A&Ox3.    Skin: Warm and dry with normal turgor. No purpura, rashes, or unusual pigmentation noted. Hair is normal in appearance and distribution.    HEENT: NC/AT. No lesions noted. Conjunctiva clear, sclera nonicteric. PERRL. EOMI without nystagmus or strabismus. Fundi appear benign. No hemorrhages or exudates of eyes. Auditory canals are patent bilaterally without lesions. TMs intact,  nonerythematous, nonbulging without lesions. Nasal mucosa pink, nonerythematous, and nonedematous. Frontal and maxillary sinuses are nontender. O/P nonerythematous and moist without exudate.    Neck: Supple without lymph nodes palpated. FROM.     Lungs: CTA B/L without rales, rhonchi, crackles, or wheezes.    Heart: RRR. S1 and S2 normal. No S3 or S4. No MRGT.    Abd: Soft, nontender,nondistended. (+)BSx4 quadrants.     Extrem: No CCE. Radial pulses 2+/4 and equal B/L. FROMx4. No bone, joint, or muscle tenderness noted.    Neuro: No focal motor/sensory deficits.    Procedures    Assessment/Plan  Bianka Agudelo is a 73 y.o. here for medical followup.  Diagnoses and all orders for this visit:    Right hip pain  -     diclofenac (VOLTAREN) 1 % gel gel; Apply 4 gm to affected areas below the waist QID PRN pain and 2 gm to affected areas above the waist QID PRN pain    Type 2 diabetes mellitus with both eyes affected by moderate nonproliferative retinopathy without macular edema, with long-term current use of insulin (CMS/[facility])  -     LEVEMIR 100 UNIT/ML injection; Inject 46  Units under the skin into the appropriate area as directed Every Night.  Uncontrolled. Recheck A1C 3/2019. Microalbumin elevated 12/2018. On metformin 1000 mg orally BID and will increase levemir to 46 units every evening. Encouraged patient to take januvia 100 mg orally daily or Novolog 70/30 22 units BID at meals. Eye exam overdue. Foot exam done today. Does take losartan 100 mg orally daily to provide for kidney protection and aspirin 81 mg orally daily to provide for cardiovascular protection.     Other specified hypothyroidism  -     levothyroxine (SYNTHROID, LEVOTHROID) 137 MCG tablet; Take 1 tablet by mouth Daily.  Continue synthroid 137 mcg orally daily. Check thyroid panel 3/2019.    Coronary artery disease involving native coronary artery of native heart with angina pectoris (CMS/HCC)  Stable and asymptomatic. Continue plavix 75 mg orally daily, aspirin 81 mg orally daily, and nitro ER 6.5 mg orally TID for angina.     Anxiety  -     diazePAM (VALIUM) 5 MG tablet; Take 1 tablet by mouth Every 12 (Twelve) Hours As Needed for Anxiety.  Well controlled. Continue diazepam 5 mg orally BID.  Pete # [id number]  Reviewed and is consistent.  UDS 5/2018 reviewed and is consistent.  Patient comfort assessment guide reviewed and updated today.    As part of the patient's treatment plan they are being prescribed a controlled substance/ substances with potential for abuse.  This patient has been made aware of the appropriate use of such medications, including potential risk of somnolence, limited ability to drive and/or work safely, and potential for overdose.  It has also been made clear these medications are for use by the patient only, without concomitant use of alcohol or other substances unless prescribed/advised by medical provider.    Patient has completed prescribing agreement detailing terms of continued prescribing of controlled substances including monitoring PETE reports, urine drug screens, and pill  counts.  The patient is aware PETE reports are reviewed on a regular basis and scanned into the chart.    History and physical exam exhibit continued safe and appropriate use of controlled substances.    Hypertension associated with diabetes (CMS/HCC)  Elevated today. Discussed at length. Continue losartan/HCTZ 50/12.5 mg orally BID and norvasc 5 mg orally daily.       Gastroesophageal reflux disease without esophagitis  Stable. Continue omeprazole 20 mg orally BID.    Hyperlipidemia due to type 2 diabetes mellitus (CMS/HCC)    Muscle spasms  Stable. Continue flexeril 10 mg orally BID as needed and will continue ibuprofen 600 mg orally TID and Voltaren gel when not using ibuprofen.  .  Eustachian tube dysfunction, bilateral  Improved. Patient using meclizine 25 mg orally every BID as needed for dizziness.      Hypercalcemia  Workup benign. Continue to monitor.         Vitamin D deficiency   Decreased 12/2017. Finished 12071 IU weekly repletion. Will need to recheck.        Overactive bladder  Stable. Continue oxybutynin 5 mg orally daily.       Asthma  Continue albuterol inhaler as needed.      Vitamin B12 deficiency  Patient has a hard time coming in for B12 shots secondary to cost. On B12 1000 mcg orally daily. 12/2017 levels okay.      Preventative Medicine  Colonoscopy 2010 UTD. Declines mammogram. Declined DEXA scan. Flu shot 2018 given. Declines pneumo for now.      Patient's Body mass index is 34.69 kg/m². BMI is above normal parameters. Recommendations include: exercise counseling and nutrition counseling.     Findings and plans discussed with patient who verbalizes understanding and agreement. Will followup with patient once results are in. Patient to followup at clinic PRN or in three months for further medical followup.    Eva Elliott MD

## 2019-02-19 RX ORDER — ATORVASTATIN CALCIUM 10 MG/1
TABLET, FILM COATED ORAL
Qty: 90 TABLET | Refills: 0 | Status: SHIPPED | OUTPATIENT
Start: 2019-02-19 | End: 2019-04-24 | Stop reason: SDUPTHER

## 2019-03-01 RX ORDER — SITAGLIPTIN 100 MG/1
TABLET, FILM COATED ORAL
Qty: 90 TABLET | Refills: 0 | Status: SHIPPED | OUTPATIENT
Start: 2019-03-01 | End: 2019-04-24 | Stop reason: SDUPTHER

## 2019-03-01 RX ORDER — HUMAN INSULIN 100 [USP'U]/ML
INJECTION, SUSPENSION SUBCUTANEOUS
Qty: 20 ML | Refills: 5 | Status: SHIPPED | OUTPATIENT
Start: 2019-03-01 | End: 2019-05-12 | Stop reason: SDUPTHER

## 2019-03-11 RX ORDER — NITROGLYCERIN 6.5 MG/1
CAPSULE ORAL
Qty: 90 CAPSULE | Refills: 5 | Status: SHIPPED | OUTPATIENT
Start: 2019-03-11 | End: 2020-04-07 | Stop reason: SDUPTHER

## 2019-03-20 ENCOUNTER — TELEPHONE (OUTPATIENT)
Dept: FAMILY MEDICINE CLINIC | Facility: CLINIC | Age: 74
End: 2019-03-20

## 2019-03-20 DIAGNOSIS — F41.9 ANXIETY: Primary | ICD-10-CM

## 2019-03-20 RX ORDER — DIAZEPAM 5 MG/1
5 TABLET ORAL EVERY 12 HOURS PRN
Qty: 60 TABLET | Refills: 0 | Status: SHIPPED | OUTPATIENT
Start: 2019-03-20 | End: 2019-04-24 | Stop reason: SDUPTHER

## 2019-03-20 NOTE — TELEPHONE ENCOUNTER
Please call and let her know that it's ready for pickup. Thanks.      Left a detailed message that the requested Rx is ready to  at the office.

## 2019-03-20 NOTE — PROGRESS NOTES
[facility] # [id number]  Reviewed and is consistent.  RUST 5/2018 reviewed and is consistent.  Patient comfort assessment guide reviewed and updated today.    As part of the patient's treatment plan they are being prescribed a controlled substance/ substances with potential for abuse.  This patient has been made aware of the appropriate use of such medications, including potential risk of somnolence, limited ability to drive and/or work safely, and potential for overdose.  It has also been made clear these medications are for use by the patient only, without concomitant use of alcohol or other substances unless prescribed/advised by medical provider.    Patient has completed prescribing agreement detailing terms of continued prescribing of controlled substances including monitoring PETE reports, urine drug screens, and pill counts.  The patient is aware PETE reports are reviewed on a regular basis and scanned into the chart.    History and physical exam exhibit continued safe and appropriate use of controlled substances.

## 2019-04-19 RX ORDER — HUMAN INSULIN 100 [USP'U]/ML
INJECTION, SUSPENSION SUBCUTANEOUS
Qty: 20 ML | Refills: 5 | OUTPATIENT
Start: 2019-04-19

## 2019-04-24 ENCOUNTER — OFFICE VISIT (OUTPATIENT)
Dept: FAMILY MEDICINE CLINIC | Facility: CLINIC | Age: 74
End: 2019-04-24

## 2019-04-24 VITALS
HEIGHT: 64 IN | WEIGHT: 195 LBS | BODY MASS INDEX: 33.29 KG/M2 | OXYGEN SATURATION: 94 % | TEMPERATURE: 98 F | SYSTOLIC BLOOD PRESSURE: 134 MMHG | HEART RATE: 84 BPM | DIASTOLIC BLOOD PRESSURE: 70 MMHG

## 2019-04-24 DIAGNOSIS — F41.9 ANXIETY: ICD-10-CM

## 2019-04-24 DIAGNOSIS — E11.3393 TYPE 2 DIABETES MELLITUS WITH BOTH EYES AFFECTED BY MODERATE NONPROLIFERATIVE RETINOPATHY WITHOUT MACULAR EDEMA, WITH LONG-TERM CURRENT USE OF INSULIN (HCC): ICD-10-CM

## 2019-04-24 DIAGNOSIS — E03.8 OTHER SPECIFIED HYPOTHYROIDISM: ICD-10-CM

## 2019-04-24 DIAGNOSIS — Z79.4 TYPE 2 DIABETES MELLITUS WITH BOTH EYES AFFECTED BY MODERATE NONPROLIFERATIVE RETINOPATHY WITHOUT MACULAR EDEMA, WITH LONG-TERM CURRENT USE OF INSULIN (HCC): ICD-10-CM

## 2019-04-24 DIAGNOSIS — M25.551 RIGHT HIP PAIN: Primary | ICD-10-CM

## 2019-04-24 DIAGNOSIS — E78.5 DYSLIPIDEMIA: ICD-10-CM

## 2019-04-24 DIAGNOSIS — N32.81 OVERACTIVE BLADDER: ICD-10-CM

## 2019-04-24 LAB
ALBUMIN SERPL-MCNC: 4.2 G/DL (ref 3.5–5.2)
ALBUMIN/GLOB SERPL: 1.4 G/DL
ALP SERPL-CCNC: 65 U/L (ref 39–117)
ALT SERPL W P-5'-P-CCNC: 29 U/L (ref 1–33)
ANION GAP SERPL CALCULATED.3IONS-SCNC: 12.4 MMOL/L
AST SERPL-CCNC: 30 U/L (ref 1–32)
BILIRUB SERPL-MCNC: 0.7 MG/DL (ref 0.2–1.2)
BUN BLD-MCNC: 14 MG/DL (ref 8–23)
BUN/CREAT SERPL: 22.6 (ref 7–25)
CALCIUM SPEC-SCNC: 10.5 MG/DL (ref 8.6–10.5)
CHLORIDE SERPL-SCNC: 98 MMOL/L (ref 98–107)
CO2 SERPL-SCNC: 25.6 MMOL/L (ref 22–29)
CREAT BLD-MCNC: 0.62 MG/DL (ref 0.57–1)
GFR SERPL CREATININE-BSD FRML MDRD: 94 ML/MIN/1.73
GLOBULIN UR ELPH-MCNC: 2.9 GM/DL
GLUCOSE BLD-MCNC: 283 MG/DL (ref 65–99)
HBA1C MFR BLD: 11.33 % (ref 4.8–5.6)
POTASSIUM BLD-SCNC: 4.1 MMOL/L (ref 3.5–5.2)
PROT SERPL-MCNC: 7.1 G/DL (ref 6–8.5)
SODIUM BLD-SCNC: 136 MMOL/L (ref 136–145)
T4 FREE SERPL-MCNC: 1.34 NG/DL (ref 0.93–1.7)
TSH SERPL DL<=0.05 MIU/L-ACNC: 4.83 MIU/ML (ref 0.27–4.2)

## 2019-04-24 PROCEDURE — 83036 HEMOGLOBIN GLYCOSYLATED A1C: CPT | Performed by: FAMILY MEDICINE

## 2019-04-24 PROCEDURE — 99214 OFFICE O/P EST MOD 30 MIN: CPT | Performed by: FAMILY MEDICINE

## 2019-04-24 PROCEDURE — 80053 COMPREHEN METABOLIC PANEL: CPT | Performed by: FAMILY MEDICINE

## 2019-04-24 PROCEDURE — 84443 ASSAY THYROID STIM HORMONE: CPT | Performed by: FAMILY MEDICINE

## 2019-04-24 PROCEDURE — 84439 ASSAY OF FREE THYROXINE: CPT | Performed by: FAMILY MEDICINE

## 2019-04-24 RX ORDER — ATORVASTATIN CALCIUM 10 MG/1
10 TABLET, FILM COATED ORAL DAILY
Qty: 90 TABLET | Refills: 1 | Status: SHIPPED | OUTPATIENT
Start: 2019-04-24 | End: 2019-11-19 | Stop reason: SDUPTHER

## 2019-04-24 RX ORDER — ASPIRIN 81 MG/1
81 TABLET ORAL DAILY
Qty: 90 TABLET | Refills: 1 | Status: SHIPPED | OUTPATIENT
Start: 2019-04-24 | End: 2019-08-28 | Stop reason: SDUPTHER

## 2019-04-24 RX ORDER — LEVOTHYROXINE SODIUM 137 UG/1
137 TABLET ORAL DAILY
Qty: 90 TABLET | Refills: 1 | Status: SHIPPED | OUTPATIENT
Start: 2019-04-24 | End: 2019-10-24 | Stop reason: SDUPTHER

## 2019-04-24 RX ORDER — IBUPROFEN 800 MG/1
800 TABLET ORAL EVERY 8 HOURS
Refills: 0 | COMMUNITY
Start: 2019-04-05 | End: 2019-12-02 | Stop reason: SDUPTHER

## 2019-04-24 RX ORDER — OXYBUTYNIN CHLORIDE 5 MG/1
5 TABLET ORAL
Qty: 180 TABLET | Refills: 1 | Status: SHIPPED | OUTPATIENT
Start: 2019-04-24 | End: 2019-08-28 | Stop reason: SDUPTHER

## 2019-04-24 RX ORDER — DIAZEPAM 5 MG/1
5 TABLET ORAL EVERY 12 HOURS PRN
Qty: 60 TABLET | Refills: 0 | Status: SHIPPED | OUTPATIENT
Start: 2019-04-24 | End: 2019-06-24 | Stop reason: SDUPTHER

## 2019-04-26 ENCOUNTER — HOSPITAL ENCOUNTER (OUTPATIENT)
Dept: GENERAL RADIOLOGY | Facility: HOSPITAL | Age: 74
Discharge: HOME | End: 2019-04-26
Admitting: FAMILY MEDICINE

## 2019-04-26 PROCEDURE — 73523 X-RAY EXAM HIPS BI 5/> VIEWS: CPT | Performed by: RADIOLOGY

## 2019-04-26 PROCEDURE — 72100 X-RAY EXAM L-S SPINE 2/3 VWS: CPT

## 2019-04-26 PROCEDURE — 73523 X-RAY EXAM HIPS BI 5/> VIEWS: CPT

## 2019-04-26 PROCEDURE — 72100 X-RAY EXAM L-S SPINE 2/3 VWS: CPT | Performed by: RADIOLOGY

## 2019-05-01 ENCOUNTER — TELEPHONE (OUTPATIENT)
Dept: FAMILY MEDICINE CLINIC | Facility: CLINIC | Age: 74
End: 2019-05-01

## 2019-05-02 NOTE — TELEPHONE ENCOUNTER
Let her know - both hips have moderate arthritis - not very bad. However, the arthritis in her lower spine is worse than the hips - I think that's what is giving her trouble. I would recommend PT to see if we can get it better and if not, it would be a MRI or ortho referral. What does she think?

## 2019-05-02 NOTE — TELEPHONE ENCOUNTER
Let her know - both hips have moderate arthritis - not very bad. However, the arthritis in her lower spine is worse than the hips - I think that's what is giving her trouble. I would recommend PT to see if we can get it better and if not, it would be a MRI or ortho referral. What does she think?      Left a message to return call.    Spoke with patient she wants to think about it because she has problems with transportation & will let you know if she decides to.

## 2019-05-07 ENCOUNTER — TELEPHONE (OUTPATIENT)
Dept: FAMILY MEDICINE CLINIC | Facility: CLINIC | Age: 74
End: 2019-05-07

## 2019-05-07 NOTE — TELEPHONE ENCOUNTER
Patient called is willing to try Therapy but must be the closest to her location because she has transportation problems & requests her appointments be around 10-11 am.

## 2019-05-09 DIAGNOSIS — M25.552 BILATERAL HIP PAIN: ICD-10-CM

## 2019-05-09 DIAGNOSIS — G89.29 CHRONIC BILATERAL LOW BACK PAIN WITHOUT SCIATICA: Primary | ICD-10-CM

## 2019-05-09 DIAGNOSIS — M25.551 BILATERAL HIP PAIN: ICD-10-CM

## 2019-05-09 DIAGNOSIS — M54.50 CHRONIC BILATERAL LOW BACK PAIN WITHOUT SCIATICA: Primary | ICD-10-CM

## 2019-05-13 NOTE — PROGRESS NOTES
"Bianka Agudelo     VITALS: Blood pressure 134/70, pulse 84, temperature 98 °F (36.7 °C), temperature source Oral, height 161.3 cm (63.5\"), weight 88.5 kg (195 lb), SpO2 94 %, not currently breastfeeding.    Subjective  Chief Complaint:   Chief Complaint   Patient presents with   • Hip Pain     right         History of Present Illness:  Patient is a 73 y.o. female who presents to clinic secondary to medical followup.   She continues to have some right hip pain, but she states that she thinks it is coming more from her lower back.  She has been doing some stretching exercises at home with some success.  She denies any numbness or tingling of her lower extremities.  She denies any changes in her bowel movements or any urinary incontinence.      Patient has hypertension and is on losartan/HCTZ 50/12.5 mg orally BID and norvasc 5 mg orally daily (occasionally). Denies any side effects of the medications. Denies any dizziness, lightheadedness, blurry vision, chest pain, or edema. Patient does not take her blood pressures at home. Tries to follow a low-salt diet.    Patient also has type II diabetes and is currently on metformin 1000 mg orally BID and basaglar 42 units every evening. She has been inconsistent with the januvia 100 mg orally daily and Novolog 70/30 20 units BID at meals. Has recently switched from lantus to basaglar secondary to insurance and feels that basaglar does not do her as well as the lantus does. Last A1C in 12/2018 was 12.1. Denies any side effects of her medications. Patient denies any changes in vision, polydipsia, polyuria, numbness or tingling, or hypoglycemic or hyperglycemic episodes. Patient does follow a diabetic diet and checks her glucose levels regularly. Blood glucose levels have been elevated. Patient does have complications of nephropathy. No worsening or exacerbation of symptoms. No neuropathy or retinopathy. Last eye exam was April 2017. Last microalbumin was elevated done 12/2018. " Last foot exam was in 2017 and patient does see a podiatrist. She has gone through diabetic teaching/nutrition education. Does take losartan 100 mg orally daily to provide for kidney protection and aspirin 81 mg orally daily to provide for cardiovascular protection.    Patient also has hyperlipidemia and is currently on atorvastatin 10 mg orally daily. Denies any side effects of the medication. Last lipid panel was 12/2018 and in normal limits. Denies any muscle weakness, jaundice or itching. Tries to follow a low cholesterol diet.    Patient has GERD and is currently on prilosec 20 mg orally BID. Patient denies any side effects of the medication. Denies metallic tastes and has not been having increased symptoms during sleep. Has not had any recent exacerbations. Denies any nausea, vomiting, burping, hiccuping, or midepigastric pain.    Patient does have hypothyroidism and is currently on synthroid 125 mcg orally daily. She is doing well on this medication. Denies any side effects. Denies fatigue, dizziness, palpitations, changes in weight, hair, or nails. Last thyroid panel was 12/2018 and was normal.    Patient has anxiety and is currently on diazepam 5 mg orally BID. Patient states that this medication is working. Denies any side effects of the medication. Patient states that she is sleeping well and can get out of bed daily to accomplish daily activities. Has some anhedonia. Mood is stable. Has no feelings of guilt. Has good concentration and memory ability. Has some energy. Is able to make goals. Appetite is stable. Denies any suicidal or homicidal ideations. Does not have any auditory or visual hallucinations. [facility] [phone] WNL. No controlled medication seeking behavior.     Patient has CAD with angina and is on plavix 75 mg orally daily, aspirin 81 mg orally daily, and nitro ER 6.5 mg orally TID for angina. We tried to switch her from nitro to Imdur in 2016 without much success. She reports that she has  been doing fine on these medications, denies any side effects, and denies any chest pain.       Patient does have muscle spasms especially in her back when she does physical activity, such as gardening. She is currently on flexeril 10 mg orally BID as needed and ibuprofen 600 mg orally TID. Denies any side effects of the medications. Denies any sedation effects. Medications allow her enough flexibility and relief from the pain so that she can do activities other than those of daily living. Movement and ambulation are improved on these medications. Robaxin is fairly expensive for her and she would like to change it to a cheaper medication if possible. No pain medication seeking behavior. [facility] #[id number] is clean without any discrepancies.      Patient does have an overactive bladder and is currently on oxybutynin 5 mg orally daily. Medication is treating her well. No side effects. States that it does reduce her urinary frequency.    No complaints about any of the medications.    The following portions of the patient's history were reviewed and updated as appropriate: allergies, current medications, past family history, past medical history, past social history, past surgical history and problem list.    Past Medical History  Past Medical History:   Diagnosis Date   • Anxiety    • CAD (coronary artery disease)    • Diabetes mellitus (CMS/[facility])    • Dyslipidemia    • GERD (gastroesophageal reflux disease)    • H/O angina pectoris    • History of bone density study     February 2014   • History of mammogram 07/15/2013   • Hypertension    • Hypothyroidism    • Muscle spasms of both lower extremities    • Overactive bladder    • Vitamin B12 deficiency    • Vitamin D deficiency        Review of Systems  Constitutional: Denies any recent history of HAs, dizziness, fevers, chills, itching.  Eyes: Denies any changes in vision. Denies any blurry vision or diplopia.  Ears, Nose, Mouth, Throat: Denies any sore throat,  rhinorrhea, or cough.  Cardiovascular: Denies any chest pain, pressure, or palpitations.  Respiratory: Denies any shortness of breath or wheezing.  Gastrointestinal: Denies any abdominal pain, nausea, vomiting, diarrhea, or constipation.  Genitourinary: Denies any changes in urination.  Musculoskeletal: Denies any muscle weakness.  Skin and/or breasts: Denies any rashes.  Neurological: Denies any changes in balance or gait.  Psychiatric: Denies any anxiety, depression, or insomnia. Denies any suicidal or homicidal ideations.  Endocrine: Denies any heat or cold intolerance. Denies any voice changes, polydipsia, or polyuria.  Hematologic/Lymphatic: Denies any anemia or easy bruising.    Surgical History  Past Surgical History:   Procedure Laterality Date   • COLONOSCOPY  01/01/2010   • OOPHORECTOMY      1 removed   • TUBAL ABDOMINAL LIGATION         Family History  Family History   Problem Relation Age of Onset   • Diabetes Mother    • Heart disease Mother    • Stroke Mother    • Cancer Father        Social History  Social History     Socioeconomic History   • Marital status:      Spouse name: Not on file   • Number of children: Not on file   • Years of education: Not on file   • Highest education level: Not on file   Tobacco Use   • Smoking status: Never Smoker   • Smokeless tobacco: Never Used   Substance and Sexual Activity   • Alcohol use: No   • Drug use: No       Objective  Physical Exam    Gen: Patient in NAD. Pleasant and answers appropriately. A&Ox3.    Skin: Warm and dry with normal turgor. No purpura, rashes, or unusual pigmentation noted. Hair is normal in appearance and distribution.    HEENT: NC/AT. No lesions noted. Conjunctiva clear, sclera nonicteric. PERRL. EOMI without nystagmus or strabismus. Fundi appear benign. No hemorrhages or exudates of eyes. Auditory canals are patent bilaterally without lesions. TMs intact,  nonerythematous, nonbulging without lesions. Nasal mucosa pink,  nonerythematous, and nonedematous. Frontal and maxillary sinuses are nontender. O/P nonerythematous and moist without exudate.    Neck: Supple without lymph nodes palpated. FROM.     Lungs: CTA B/L without rales, rhonchi, crackles, or wheezes.    Heart: RRR. S1 and S2 normal. No S3 or S4. No MRGT.    Abd: Soft, nontender,nondistended. (+)BSx4 quadrants.     Extrem: No CCE. Radial pulses 2+/4 and equal B/L. FROMx4. No bone, joint, or muscle tenderness noted.    Neuro: No focal motor/sensory deficits.    Procedures    Assessment/Plan  Bianka Agudelo is a 73 y.o. here for medical followup.  Diagnoses and all orders for this visit:    Right hip pain  -     XR Spine Lumbar 2 or 3 View  -     XR Hip With or Without Pelvis 2 - 3 View Right  -     Cancel: XR Hip With or Without Pelvis 2 - 3 View Left  -     XR Hips Bilateral With or Without Pelvis 5 View  Walker with seat written for patient as she is having trouble ambulating.    Other specified hypothyroidism  -     levothyroxine (SYNTHROID, LEVOTHROID) 137 MCG tablet; Take 1 tablet by mouth Daily.  -     TSH  -     T4, Free    Type 2 diabetes mellitus with both eyes affected by moderate nonproliferative retinopathy without macular edema, with long-term current use of insulin (CMS/Former[facility])  -     SITagliptin (JANUVIA) 100 MG tablet; Take 1 tablet by mouth Daily.  -     aspirin (RA ASPIRIN EC ADULT LOW ST) 81 MG EC tablet; Take 1 tablet by mouth Daily.  -     Comprehensive Metabolic Panel  -     Hemoglobin A1c  Diabetic supplies also written.    Overactive bladder  -     oxybutynin (DITROPAN) 5 MG tablet; Take 1 tablet by mouth 2 (Two) Times a Day.    Anxiety  -     diazePAM (VALIUM) 5 MG tablet; Take 1 tablet by mouth Every 12 (Twelve) Hours As Needed for Anxiety.    Dyslipidemia  -     atorvastatin (LIPITOR) 10 MG tablet; Take 1 tablet by mouth Daily.      Patient's Body mass index is 34 kg/m². BMI is above normal parameters. Recommendations include: exercise counseling and  nutrition counseling.        Findings and plans discussed with patient who verbalizes understanding and agreement. Will followup with patient once results are in. Patient to followup at clinic PRN or in three months for further medical followup.    Eva Elliott MD    EMR Dragon/Transcription Disclaimer:  Much of this encounter note is an electronic transcription/translation of spoken language to printed text.  The electronic translation of spoken language may permit erroneous, or at times, nonsensical words or phrases to be inadvertently transcribed.  Although I have reviewed the note for such errors, some may still exist.

## 2019-05-21 ENCOUNTER — HOSPITAL ENCOUNTER (OUTPATIENT)
Dept: PHYSICAL THERAPY | Facility: HOSPITAL | Age: 74
Setting detail: THERAPIES SERIES
Discharge: HOME | End: 2019-05-21

## 2019-05-21 DIAGNOSIS — M54.50 CHRONIC BILATERAL LOW BACK PAIN WITHOUT SCIATICA: Primary | ICD-10-CM

## 2019-05-21 DIAGNOSIS — M25.551 BILATERAL HIP PAIN: ICD-10-CM

## 2019-05-21 DIAGNOSIS — M25.552 BILATERAL HIP PAIN: ICD-10-CM

## 2019-05-21 DIAGNOSIS — G89.29 CHRONIC BILATERAL LOW BACK PAIN WITHOUT SCIATICA: Primary | ICD-10-CM

## 2019-05-21 PROCEDURE — 97163 PT EVAL HIGH COMPLEX 45 MIN: CPT | Performed by: PHYSICAL THERAPIST

## 2019-05-21 NOTE — THERAPY EVALUATION
Outpatient Physical Therapy Ortho Initial Evaluation  SARAH Richards     Patient Name: Bianka Agudelo  : 1945  MRN: [id number]  Today's Date: 2019      Visit Date: 2019    Patient Active Problem List   Diagnosis   • Vitamin D deficiency   • Vitamin B12 deficiency   • Overactive bladder   • Hypothyroidism   • Hypertension   • GERD (gastroesophageal reflux disease)   • Dyslipidemia   • Type 2 diabetes mellitus with both eyes affected by moderate nonproliferative retinopathy without macular edema, with long-term current use of insulin (CMS/[facility])   • Anxiety   • Coronary artery disease involving native coronary artery of native heart with angina pectoris (CMS/[facility])   • Hypercalcemia   • History of laceration of skin        Past Medical History:   Diagnosis Date   • Anxiety    • CAD (coronary artery disease)    • Diabetes mellitus (CMS/[facility])    • Dyslipidemia    • GERD (gastroesophageal reflux disease)    • H/O angina pectoris    • History of bone density study     2014   • History of mammogram 07/15/2013   • Hypertension    • Hypothyroidism    • Muscle spasms of both lower extremities    • Overactive bladder    • Vitamin B12 deficiency    • Vitamin D deficiency         Past Surgical History:   Procedure Laterality Date   • COLONOSCOPY  2010   • OOPHORECTOMY      1 removed   • TUBAL ABDOMINAL LIGATION         Visit Dx:     ICD-10-CM ICD-9-CM   1. Chronic bilateral low back pain without sciatica M54.5 724.2    G89.29 338.29   2. Bilateral hip pain M25.551 719.45    M25.552          Patient History     Row Name 19 1000             History    Chief Complaint  Pain  -AD      Type of Pain  Back pain;Hip pain  -AD      Date Current Problem(s) Began  -- 2019  -AD      Brief Description of Current Complaint  Pt reports low back and right hip pain since 2019. She stated the pain increases with bending over. She stated she fell on the left side in December, but is unsure if that  is related to low back pain. She stated she experiences numbness and tingling in both upper thighs, which pt reports is worse on the right side. She reported she is unable to stand for prolonged periods of time due to pain. The patient stated she experiences relief with ibuprofen and a muscle relaxer. She stated she has some relief while lying down on her left side. She stated she has to lean on the grocery cart while shopping to tolerated prolonged standing and walking, as well as decreasing weight on the right side.   -AD      Patient/Caregiver Goals  Relieve pain;Return to prior level of function;Improve mobility;Improve strength  -AD      Current Tobacco Use  None  -AD      Patient's Rating of General Health  Fair  -AD      Hand Dominance  right-handed  -AD      Patient seeing anyone else for problem(s)?  Physician  -AD      What clinical tests have you had for this problem?  X-ray  -AD      Results of Clinical Tests  L4-5 DDD, bilateral hip osteoarthritis  -AD      Are you or can you be pregnant  No  -AD         Pain     Pain Location  Back;Hip  -AD      Pain at Present  7  -AD      Pain at Best  5 With ibuprofen  -AD      Pain at Worst  9  -AD      Pain Frequency  Constant/continuous  -AD      Pain Description  Pins and needles;Numbness;Stabbing  -AD      What Performance Factors Make the Current Problem(s) WORSE?  Daily activities, housework  -AD      What Performance Factors Make the Current Problem(s) BETTER?  Ibuprofen, lying down  -AD      Tolerance Time- Standing  5-10 minutes  -AD      Difficulties with ADL's?  Bathing, dressing  -AD         Fall Risk Assessment    Any falls in the past year:  Yes  -AD      Number of falls reported in the last 12 months  1  -AD      Factors that contributed to the fall:  Lost balance  -AD         Daily Activities    Primary Language  English  -AD      Are you able to read  Yes  -AD      Are you able to write  Yes  -AD      How does patient learn best?  Demonstration   -AD      Pt Participated in POC and Goals  Yes  -AD         Safety    Are you being hurt, hit, or frightened by anyone at home or in your life?  No  -AD      Are you being neglected by a caregiver  No  -AD        User Key  (r) = Recorded By, (t) = Taken By, (c) = Cosigned By    Initials Name Provider Type    AD Dalton, Ashley Claudene, MYRNA Physical Therapist          PT Ortho     Row Name 05/21/19 1000       Posture/Observations    Posture/Observations Comments  Flexed forward posture, decreased right side weight bearing. LBP improved with postural correction.  -AD       Neural Tension Signs- Lower Quarter Clearing    SLR  -- Seated SLR positive on right.  -AD       Pathological Reflexes- Lower Quarter Clearing    Clonus  Bilateral:;Negative  -AD       Sensory Screen for Light Touch- Lower Quarter Clearing    L2 (anterior mid thigh)  Right:;Diminished  -AD    L3 (distal anterior thigh)  Right:;Diminished  -AD    L4 (medial lower leg/foot)  Right:;Diminished  -AD       Myotomal Screen- Lower Quarter Clearing    Hip flexion (L2)  Right:;3+ (Fair +);Left:;4 (Good)  -AD    Knee extension (L3)  Right:;3+ (Fair +);Left:;4 (Good)  -AD    Knee flexion (S2)  Right:;4- (Good -);Left:;4 (Good)  -AD       Lumbar ROM Screen- Lower Quarter Clearing    Lumbar Flexion  Impaired 25% available  -AD    Lumbar Extension  Impaired 25% available  -AD    Lumbar Lateral Flexion  Impaired 25% available  -AD    Lumbar Rotation  Impaired 25% available  -AD       Special Tests/Palpation    Special Tests/Palpation  -- Positive right seated SLR  -AD       Sensation    Additional Comments  Diminished RLE L2-L4 sensation  -AD      User Key  (r) = Recorded By, (t) = Taken By, (c) = Cosigned By    Initials Name Provider Type    AD Dalton, Ashley Claudene, PT Physical Therapist                      Therapy Education  Given: HEP, Symptoms/condition management, Pain management, Posture/body mechanics, Fall prevention and home safety, Mobility  training  Program: New  How Provided: Verbal, Demonstration  Provided to: Patient  Level of Understanding: Teach back education performed, Verbalized, Demonstrated     PT OP Goals     Row Name 05/21/19 1100          PT Short Term Goals    STG Date to Achieve  06/04/19  -AD     STG 1  Pt will be instructed in HEP for pain relief.  -AD     STG 1 Progress  New  -AD     STG 2  Pt will demonstrate 25% improvement in lumbar ROM for improved function.  -AD     STG 2 Progress  New  -AD     STG 3  Pt will report no radicular pain below the knee, demonstrating centralization.  -AD     STG 3 Progress  New  -AD        Long Term Goals    LTG Date to Achieve  06/20/19  -AD     LTG 1  Pt will report the absence of radicular pain, with no more than 2/10 centralized low back pain.  -AD     LTG 1 Progress  New  -AD     LTG 2  Pt will report at least 40/80 on the Modified Oswestry for improved functional independence.  -AD     LTG 2 Progress  New  -AD     LTG 3  Pt will demonstrate 4+/5 BLE strength for improved function and balance.  -AD     LTG 3 Progress  New  -AD     LTG 4  Pt will demonstrate proper understanding of HEP for improved independence.  -AD        Time Calculation    PT Goal Re-Cert Due Date  06/20/19  -AD       User Key  (r) = Recorded By, (t) = Taken By, (c) = Cosigned By    Initials Name Provider Type    AD Dalton, Ashley Claudene, PT Physical Therapist          PT Assessment/Plan     Row Name 05/21/19 1129          PT Assessment    Functional Limitations  Decreased safety during functional activities;Impaired gait;Limitation in home management;Limitations in community activities;Performance in self-care ADL;Limitations in functional capacity and performance;Performance in leisure activities  -AD     Impairments  Balance;Endurance;Gait;Motor function;Joint mobility;Joint integrity;Muscle strength;Pain;Poor body mechanics;Posture;Range of motion;Sensation  -AD     Assessment Comments  The patient is a 73 year old  female presenting to the clinic with low back and bilateral hip pain. She reported 12/80 on the LEFS, demonstrated impaired lumbar ROM, and presented with bilateral lower extremity weakness. She also had a positive right seated straight leg raise. With postural correction, the patient reported decreased pain. She was provided with education regarding lumbar roll and the importance of upright posture. With ten repetitions of standing flexion, the patient reported peripheralization of pain into the right knee. Standing extension was then performed, with centralization from the right knee to the hip occurring with twenty repetitions. Sustained extension was then achieved with prone lying, with patient reporting abolished left hip pain and diminished low back and right hip pain. With prone press ups, the patient reported further decrease in pain. Based on assessment, the patient may have a posterior derangement with a directional preference into extension. She was instructed in a HEP involving prone lying and prone press-ups. She was educated on radicular pain and instructed to contact PT if HEP results in peripheralized pain. She verbalized and demonstrated understanding of HEP. She will be progressed as tolerated, with directional preference and radicular pain monitored.   -AD     Please refer to paper survey for additional self-reported information  Yes  -AD     Rehab Potential  Good  -AD     Patient/caregiver participated in establishment of treatment plan and goals  Yes  -AD     Patient would benefit from skilled therapy intervention  Yes  -AD        PT Plan    PT Frequency  2x/week  -AD     Predicted Duration of Therapy Intervention (Therapy Eval)  4 weeks  -AD     Planned CPT's?  PT EVAL HIGH COMPLEXITY: 09318;PT THER ACT EA 15 MIN: 14487;PT THER PROC EA 15 MIN: 12858;PT RE-EVAL: 23114;PT MANUAL THERAPY EA 15 MIN: 04105;PT NEUROMUSC RE-EDUCATION EA 15 MIN: 60682;PT GAIT TRAINING EA 15 MIN: 34339;PT SELF CARE/HOME  MGMT/TRAIN EA 15: 89540;PT HOT OR COLD PACK TREAT MCARE;PT ELECTRICAL STIM UNATTEND: ;PT ELECTRICAL STIM ATTD EA 15 MIN: 83345;PT TRACTION LUMBAR: 62417;PT ULTRASOUND EA 15 MIN: 80314;PT HOT/COLD PACK WC NONMCARE: 19120;PT THER SUPP EA 15 MIN;PT IONTOPHORESIS EA 15 MIN: 15150  -AD     Physical Therapy Interventions (Optional Details)  balance training;gait training;gross motor skills;neuromuscular re-education;motor coordination training;modalities;manual therapy techniques;lumbar stabilization;joint mobilization;home exercise program;patient/family education;postural re-education;ROM (Range of Motion);stair training;strengthening;stretching;transfer training  -AD     PT Plan Comments  The above noted interventions will be used to address functional limitations and impairments. She will be progressed as tolerated.  -AD       User Key  (r) = Recorded By, (t) = Taken By, (c) = Cosigned By    Initials Name Provider Type    AD Dalton, Ashley Claudene, MYRNA Physical Therapist            Exercises     Row Name 05/21/19 1100             Exercise 1    Exercise Name 1  HEP: Prone lying, prone push-ups  -AD        User Key  (r) = Recorded By, (t) = Taken By, (c) = Cosigned By    Initials Name Provider Type    AD Dalton, Ashley Claudene, MYRNA Physical Therapist                        Outcome Measure Options: Lower Extremity Functional Scale (LEFS)  Lower Extremity Functional Index  Any of your usual work, housework or school activities: Quite a bit of difficulty  Your usual hobbies, recreational or sporting activities: Extreme difficulty or unable to perform activity  Getting into or out of the bath: Moderate difficulty  Walking between rooms: Quite a bit of difficulty  Putting on your shoes or socks: Quite a bit of difficulty  Squatting: Extreme difficulty or unable to perform activity  Lifting an object, like a bag of groceries from the floor: Extreme difficulty or unable to perform activity  Performing light activities  around your home: Quite a bit of difficulty  Performing heavy activities around your home: Extreme difficulty or unable to perform activity  Getting into or out of a car: Extreme difficulty or unable to perform activity  Walking 2 blocks: Extreme difficulty or unable to perform activity  Walking a mile: Extreme difficulty or unable to perform activity  Going up or down 10 stairs (about 1 flight of stairs): Extreme difficulty or unable to perform activity  Standing for 1 hour: Quite a bit of difficulty  Sitting for 1 hour: No difficulty  Running on even ground: Extreme difficulty or unable to perform activity  Running on uneven ground: Extreme difficulty or unable to perform activity  Making sharp turns while running fast: Extreme difficulty or unable to perform activity  Hopping: Extreme difficulty or unable to perform activity  Rolling over in bed: Quite a bit of difficulty  Total: 12      Time Calculation:     Start Time: 1000  Stop Time: 1100  Time Calculation (min): 60 min     Therapy Charges for Today     Code Description Service Date Service Provider Modifiers Qty    38875578848 HC PT EVAL HIGH COMPLEXITY 4 5/21/2019 Dalton, Ashley Claudene, PT GP 1          PT G-Codes  Outcome Measure Options: Lower Extremity Functional Scale (LEFS)  Total: 12         Ashley Claudene Dalton, PT  5/21/2019

## 2019-05-24 ENCOUNTER — HOSPITAL ENCOUNTER (OUTPATIENT)
Dept: PHYSICAL THERAPY | Facility: HOSPITAL | Age: 74
Setting detail: THERAPIES SERIES
Discharge: HOME | End: 2019-05-24

## 2019-05-24 DIAGNOSIS — G89.29 CHRONIC BILATERAL LOW BACK PAIN WITHOUT SCIATICA: Primary | ICD-10-CM

## 2019-05-24 DIAGNOSIS — M25.551 BILATERAL HIP PAIN: ICD-10-CM

## 2019-05-24 DIAGNOSIS — M25.552 BILATERAL HIP PAIN: ICD-10-CM

## 2019-05-24 DIAGNOSIS — M54.50 CHRONIC BILATERAL LOW BACK PAIN WITHOUT SCIATICA: Primary | ICD-10-CM

## 2019-05-24 PROCEDURE — G0283 ELEC STIM OTHER THAN WOUND: HCPCS

## 2019-05-24 PROCEDURE — 97140 MANUAL THERAPY 1/> REGIONS: CPT

## 2019-05-24 PROCEDURE — 97110 THERAPEUTIC EXERCISES: CPT

## 2019-05-24 NOTE — THERAPY TREATMENT NOTE
Outpatient Physical Therapy Ortho Treatment Note   Maurice     Patient Name: Bianka Agudelo  : 1945  MRN: [id number]  Today's Date: 2019      Visit Date: 2019    Visit Dx:    ICD-10-CM ICD-9-CM   1. Chronic bilateral low back pain without sciatica M54.5 724.2    G89.29 338.29   2. Bilateral hip pain M25.551 719.45    M25.552        Patient Active Problem List   Diagnosis   • Vitamin D deficiency   • Vitamin B12 deficiency   • Overactive bladder   • Hypothyroidism   • Hypertension   • GERD (gastroesophageal reflux disease)   • Dyslipidemia   • Type 2 diabetes mellitus with both eyes affected by moderate nonproliferative retinopathy without macular edema, with long-term current use of insulin (CMS/[facility])   • Anxiety   • Coronary artery disease involving native coronary artery of native heart with angina pectoris (CMS/[facility])   • Hypercalcemia   • History of laceration of skin        Past Medical History:   Diagnosis Date   • Anxiety    • CAD (coronary artery disease)    • Diabetes mellitus (CMS/[facility])    • Dyslipidemia    • GERD (gastroesophageal reflux disease)    • H/O angina pectoris    • History of bone density study     2014   • History of mammogram 07/15/2013   • Hypertension    • Hypothyroidism    • Muscle spasms of both lower extremities    • Overactive bladder    • Vitamin B12 deficiency    • Vitamin D deficiency         Past Surgical History:   Procedure Laterality Date   • COLONOSCOPY  2010   • OOPHORECTOMY      1 removed   • TUBAL ABDOMINAL LIGATION                         PT Assessment/Plan     Row Name 19 1027          PT Assessment    Assessment Comments  Tx today consisted of mh and estim to low back followed by ther ex with extension exercises followed by stm and grade 3-4 mobs and ended with ice.  Pt responded well to tx today with reports of centralization of pain and no reports of pain following session.  Pt encouraged to increased prone lying and st back bends  at home.  -RT        PT Plan    PT Plan Comments  Will follow progressing mobility and function.  -RT       User Key  (r) = Recorded By, (t) = Taken By, (c) = Cosigned By    Initials Name Provider Type    RT Jamal Montenegro, PT Physical Therapist          Modalities     Row Name 05/24/19 0700             Subjective Comments    Subjective Comments  Pt reports having increased back and right leg pain today.  -RT         Subjective Pain    Able to rate subjective pain?  yes  -RT      Pre-Treatment Pain Level  9  -RT      Post-Treatment Pain Level  0  -RT         Moist Heat    MH Applied  Yes  -RT      Location  low back  -RT      Rx Minutes  15 mins  -RT      MH Prior to Rx  Yes  -RT         ELECTRICAL STIMULATION    Attended/Unattended  Unattended  -RT      Stimulation Type  IFC  -RT      Location/Electrode Placement/Other  lumbar region  -RT       PT E-Stim Unattended (Manual) Minutes  15  -RT        User Key  (r) = Recorded By, (t) = Taken By, (c) = Cosigned By    Initials Name Provider Type    RT Jamal Montenegro, PT Physical Therapist        Exercises     Row Name 05/24/19 0700             Subjective Comments    Subjective Comments  Pt reports having increased back and right leg pain today.  -RT         Subjective Pain    Able to rate subjective pain?  yes  -RT      Pre-Treatment Pain Level  9  -RT      Post-Treatment Pain Level  0  -RT         Exercise 1    Exercise Name 1  HEP; st ext x30, scap sq 10, gs 10, ppt 10, prone lying 2min  -RT      Cueing 1  Verbal;Tactile  -RT      Time 1  15 min  -RT        User Key  (r) = Recorded By, (t) = Taken By, (c) = Cosigned By    Initials Name Provider Type    RT Jamal Montenegro, PT Physical Therapist                      Manual Rx (last 36 hours)      Manual Treatments     Row Name 05/24/19 0900             Manual Rx 1    Manual Rx 1 Location  lumbar region  -RT      Manual Rx 1 Type  stm and grade3-4 mobs low back  -RT      Manual Rx 1 Grade  3-4  -RT      Manual  Rx 1 Duration  15min  -RT        User Key  (r) = Recorded By, (t) = Taken By, (c) = Cosigned By    Initials Name Provider Type    RT Jamal Montenegro, PT Physical Therapist              Therapy Education  Given: HEP, Symptoms/condition management, Pain management, Posture/body mechanics, Fall prevention and home safety, Mobility training  Program: Reinforced  How Provided: Verbal, Demonstration  Provided to: Patient  Level of Understanding: Teach back education performed, Verbalized, Demonstrated              Time Calculation:   Start Time: 0800  Stop Time: 0858  Time Calculation (min): 58 min  Therapy Charges for Today     Code Description Service Date Service Provider Modifiers Qty    35350952308 HC PT THER PROC EA 15 MIN 5/24/2019 Jamal Montenegro, PT GP 1    99851123665 HC PT MANUAL THERAPY EA 15 MIN 5/24/2019 Jamal Montenegro, PT GP 1    83827972767 HC PT ELECTRICAL STIM UNATTENDED 5/24/2019 Jamal Montenegro, PT  1                    Jamal Montenegro, PT  5/24/2019

## 2019-05-28 ENCOUNTER — HOSPITAL ENCOUNTER (OUTPATIENT)
Dept: PHYSICAL THERAPY | Facility: HOSPITAL | Age: 74
Setting detail: THERAPIES SERIES
Discharge: HOME | End: 2019-05-28

## 2019-05-28 DIAGNOSIS — M25.551 BILATERAL HIP PAIN: ICD-10-CM

## 2019-05-28 DIAGNOSIS — M25.552 BILATERAL HIP PAIN: ICD-10-CM

## 2019-05-28 DIAGNOSIS — G89.29 CHRONIC BILATERAL LOW BACK PAIN WITHOUT SCIATICA: Primary | ICD-10-CM

## 2019-05-28 DIAGNOSIS — M54.50 CHRONIC BILATERAL LOW BACK PAIN WITHOUT SCIATICA: Primary | ICD-10-CM

## 2019-05-28 PROCEDURE — G0283 ELEC STIM OTHER THAN WOUND: HCPCS

## 2019-05-28 PROCEDURE — 97140 MANUAL THERAPY 1/> REGIONS: CPT

## 2019-05-28 PROCEDURE — 97110 THERAPEUTIC EXERCISES: CPT

## 2019-05-28 RX ORDER — SYRING-NEEDL,DISP,INSUL,0.3 ML 31GX15/64"
SYRINGE, EMPTY DISPOSABLE MISCELLANEOUS
Qty: 100 EACH | Refills: 1 | Status: SHIPPED | OUTPATIENT
Start: 2019-05-28 | End: 2019-08-18 | Stop reason: SDUPTHER

## 2019-05-28 NOTE — THERAPY TREATMENT NOTE
Outpatient Physical Therapy Ortho Treatment Note   Maurice     Patient Name: Bianka Agudelo  : 1945  MRN: [id number]  Today's Date: 2019      Visit Date: 2019    Visit Dx:    ICD-10-CM ICD-9-CM   1. Chronic bilateral low back pain without sciatica M54.5 724.2    G89.29 338.29   2. Bilateral hip pain M25.551 719.45    M25.552        Patient Active Problem List   Diagnosis   • Vitamin D deficiency   • Vitamin B12 deficiency   • Overactive bladder   • Hypothyroidism   • Hypertension   • GERD (gastroesophageal reflux disease)   • Dyslipidemia   • Type 2 diabetes mellitus with both eyes affected by moderate nonproliferative retinopathy without macular edema, with long-term current use of insulin (CMS/[facility])   • Anxiety   • Coronary artery disease involving native coronary artery of native heart with angina pectoris (CMS/[facility])   • Hypercalcemia   • History of laceration of skin        Past Medical History:   Diagnosis Date   • Anxiety    • CAD (coronary artery disease)    • Diabetes mellitus (CMS/[facility])    • Dyslipidemia    • GERD (gastroesophageal reflux disease)    • H/O angina pectoris    • History of bone density study     2014   • History of mammogram 07/15/2013   • Hypertension    • Hypothyroidism    • Muscle spasms of both lower extremities    • Overactive bladder    • Vitamin B12 deficiency    • Vitamin D deficiency         Past Surgical History:   Procedure Laterality Date   • COLONOSCOPY  2010   • OOPHORECTOMY      1 removed   • TUBAL ABDOMINAL LIGATION         PT Ortho     Row Name 19 1200       Subjective Comments    Subjective Comments  Patient states that she was sore after last session.  -AC       Subjective Pain    Able to rate subjective pain?  yes  -AC    Pre-Treatment Pain Level  9  -AC    Post-Treatment Pain Level  2  -AC      User Key  (r) = Recorded By, (t) = Taken By, (c) = Cosigned By    Initials Name Provider Type    Joy Winston, TUCKER Physical  Therapy Assistant                      PT Assessment/Plan     Row Name 05/28/19 1210          PT Assessment    Assessment Comments  Patient tolerated treatment session well with rest breaks taken as needed by the patient. STM performed to help decrease tightness and pain. Educated patient to perform ther-ex per her tolerance, patient verbalized understanding. No adverse reactions with modalities or treatment session. Decrease in pain noted following treatment session.  -AC        PT Plan    PT Plan Comments  Continue per PT's POC, progress per the patient's tolerance.  -AC       User Key  (r) = Recorded By, (t) = Taken By, (c) = Cosigned By    Initials Name Provider Type    Joy Winston PTA Physical Therapy Assistant          Modalities     Row Name 05/28/19 1200             Moist Heat    MH Applied  Yes No redness noted following moist heat  -AC      Location  low back  -AC      Rx Minutes  15 mins  -AC      MH Prior to Rx  Yes  -AC         Ice    Ice Applied  Yes  -AC      Location  low back  -AC      Rx Minutes  Other: 5 minutes  -AC      Ice S/P Rx  Yes  -AC         ELECTRICAL STIMULATION    Attended/Unattended  Unattended No irritation noted following estim  -AC      Stimulation Type  IFC  -AC      Max mAmp  -- per the patient's tolerance, 15 minutes with MH  -AC      Location/Electrode Placement/Other  lumbar region  -AC       PT E-Stim Unattended (Manual) Minutes  15  -AC        User Key  (r) = Recorded By, (t) = Taken By, (c) = Cosigned By    Initials Name Provider Type    Joy Winston PTA Physical Therapy Assistant        Exercises     Row Name 05/28/19 1200 05/28/19 1100          Subjective Comments    Subjective Comments  Patient states that she was sore after last session.  -AC  --        Subjective Pain    Able to rate subjective pain?  yes  -AC  --     Pre-Treatment Pain Level  9  -AC  --     Post-Treatment Pain Level  2  -AC  --        Total Minutes    15502 - PT  Therapeutic Exercise Minutes  15  -AC  --     96779 - PT Manual Therapy Minutes  --  15  -AC        Exercise 1    Exercise Name 1  st ext x10, scap squeeze x10, GS x10, PPT x10, prone lying 3 min, prone on elbows x10  -AC  --     Cueing 1  Verbal;Tactile;Demo  -AC  --     Time 1  15 minutes  -AC  --       User Key  (r) = Recorded By, (t) = Taken By, (c) = Cosigned By    Initials Name Provider Type    AC Joy Edwards PTA Physical Therapy Assistant                      Manual Rx (last 36 hours)      Manual Treatments     Row Name 05/28/19 1100             Total Minutes    29081 - PT Manual Therapy Minutes  15  -AC         Manual Rx 1    Manual Rx 1 Location  lumbar region  -AC      Manual Rx 1 Type  STM, grade two and three mobs  -AC      Manual Rx 1 Grade  per the patient's tolerance  -AC      Manual Rx 1 Duration  15min  -AC        User Key  (r) = Recorded By, (t) = Taken By, (c) = Cosigned By    Initials Name Provider Type    AC Joy Edwards PTA Physical Therapy Assistant              Therapy Education  Given: HEP, Symptoms/condition management, Posture/body mechanics, Pain management  Program: Reinforced, New  How Provided: Verbal, Demonstration  Provided to: Patient  Level of Understanding: Verbalized, Demonstrated              Time Calculation:   Start Time: 1100  Stop Time: 1200  Time Calculation (min): 60 min  Therapy Charges for Today     Code Description Service Date Service Provider Modifiers Qty    98060394441 HC PT THER PROC EA 15 MIN 5/28/2019 Joy Edwards PTA GP 1    19042839609 HC PT MANUAL THERAPY EA 15 MIN 5/28/2019 Joy Edwards PTA GP 1    11480479637 HC PT ELECTRICAL STIM UNATTENDED 5/28/2019 Joy Edwards PTA  1                    Joy Edwards PTA  5/28/2019

## 2019-05-30 ENCOUNTER — HOSPITAL ENCOUNTER (OUTPATIENT)
Dept: PHYSICAL THERAPY | Facility: HOSPITAL | Age: 74
Setting detail: THERAPIES SERIES
Discharge: HOME | End: 2019-05-30

## 2019-05-30 ENCOUNTER — TELEPHONE (OUTPATIENT)
Dept: FAMILY MEDICINE CLINIC | Facility: CLINIC | Age: 74
End: 2019-05-30

## 2019-05-30 DIAGNOSIS — M54.50 CHRONIC BILATERAL LOW BACK PAIN WITHOUT SCIATICA: Primary | ICD-10-CM

## 2019-05-30 DIAGNOSIS — M25.551 BILATERAL HIP PAIN: ICD-10-CM

## 2019-05-30 DIAGNOSIS — M25.552 BILATERAL HIP PAIN: ICD-10-CM

## 2019-05-30 DIAGNOSIS — G89.29 CHRONIC BILATERAL LOW BACK PAIN WITHOUT SCIATICA: Primary | ICD-10-CM

## 2019-05-30 PROCEDURE — G0283 ELEC STIM OTHER THAN WOUND: HCPCS

## 2019-05-30 PROCEDURE — 97140 MANUAL THERAPY 1/> REGIONS: CPT

## 2019-05-30 PROCEDURE — 97110 THERAPEUTIC EXERCISES: CPT

## 2019-05-30 RX ORDER — METFORMIN HYDROCHLORIDE 500 MG/1
TABLET, EXTENDED RELEASE ORAL
Qty: 60 TABLET | Refills: 0 | Status: SHIPPED | OUTPATIENT
Start: 2019-05-30 | End: 2019-07-03 | Stop reason: SDUPTHER

## 2019-05-30 NOTE — THERAPY TREATMENT NOTE
Outpatient Physical Therapy Ortho Treatment Note   Maurice     Patient Name: Bianka Agudelo  : 1945  MRN: [id number]  Today's Date: 2019      Visit Date: 2019    Visit Dx:    ICD-10-CM ICD-9-CM   1. Chronic bilateral low back pain without sciatica M54.5 724.2    G89.29 338.29   2. Bilateral hip pain M25.551 719.45    M25.552        Patient Active Problem List   Diagnosis   • Vitamin D deficiency   • Vitamin B12 deficiency   • Overactive bladder   • Hypothyroidism   • Hypertension   • GERD (gastroesophageal reflux disease)   • Dyslipidemia   • Type 2 diabetes mellitus with both eyes affected by moderate nonproliferative retinopathy without macular edema, with long-term current use of insulin (CMS/[facility])   • Anxiety   • Coronary artery disease involving native coronary artery of native heart with angina pectoris (CMS/[facility])   • Hypercalcemia   • History of laceration of skin        Past Medical History:   Diagnosis Date   • Anxiety    • CAD (coronary artery disease)    • Diabetes mellitus (CMS/[facility])    • Dyslipidemia    • GERD (gastroesophageal reflux disease)    • H/O angina pectoris    • History of bone density study     2014   • History of mammogram 07/15/2013   • Hypertension    • Hypothyroidism    • Muscle spasms of both lower extremities    • Overactive bladder    • Vitamin B12 deficiency    • Vitamin D deficiency         Past Surgical History:   Procedure Laterality Date   • COLONOSCOPY  2010   • OOPHORECTOMY      1 removed   • TUBAL ABDOMINAL LIGATION         PT Ortho     Row Name 19 1100       Subjective Comments    Subjective Comments  Patient reports of soreness following last session. Patient states that is having more soreness on the right side of her low back.  -AC       Subjective Pain    Able to rate subjective pain?  yes  -AC    Pre-Treatment Pain Level  9  -AC    Post-Treatment Pain Level  3  -AC    Row Name 19 1200       Subjective Comments    Subjective  Comments  Patient states that she was sore after last session.  -AC       Subjective Pain    Able to rate subjective pain?  yes  -AC    Pre-Treatment Pain Level  9  -AC    Post-Treatment Pain Level  2  -AC      User Key  (r) = Recorded By, (t) = Taken By, (c) = Cosigned By    Initials Name Provider Type    Joy Winston PTA Physical Therapy Assistant                      PT Assessment/Plan     Row Name 05/30/19 1157          PT Assessment    Assessment Comments  Treatment session kept the same secondary to patient's reports of increased soreness following last treatment session. Patient tolerated treatment session well with rest breaks taken as needed by the patient. STM performed to help decrease tightness and pain in the B) low back. Decrease in pain noted following treatment session.  -AC        PT Plan    PT Plan Comments  Continue per PT's POC, progress per the patient's tolerance.  -AC       User Key  (r) = Recorded By, (t) = Taken By, (c) = Cosigned By    Initials Name Provider Type    Joy Winston PTA Physical Therapy Assistant          Modalities     Row Name 05/30/19 1100             Moist Heat    MH Applied  Yes No redness noted following moist heat  -AC      Location  low back  -AC      Rx Minutes  15 mins  -AC      MH Prior to Rx  Yes  -AC         Ice    Ice Applied  Yes  -AC      Location  low back  -AC      Rx Minutes  Other: 5 minutes  -AC      Ice S/P Rx  Yes  -AC         ELECTRICAL STIMULATION    Attended/Unattended  Unattended No irritation noted following estim  -AC      Stimulation Type  IFC  -AC      Max mAmp  -- per the patient's tolerance, 15 minutes with MH  -AC      Location/Electrode Placement/Other  lumbar region  -AC       PT E-Stim Unattended (Manual) Minutes  15  -AC        User Key  (r) = Recorded By, (t) = Taken By, (c) = Cosigned By    Initials Name Provider Type    Joy Winston PTA Physical Therapy Assistant        Exercises     Row  Name 05/30/19 1100             Subjective Comments    Subjective Comments  Patient reports of soreness following last session. Patient states that is having more soreness on the right side of her low back.  -AC         Subjective Pain    Able to rate subjective pain?  yes  -AC      Pre-Treatment Pain Level  9  -AC      Post-Treatment Pain Level  3  -AC         Total Minutes    93457 - PT Therapeutic Exercise Minutes  15  -AC      84293 - PT Manual Therapy Minutes  15  -AC         Exercise 1    Exercise Name 1  st ext x10, scap squeeze x10, GS x10, PPT x10, prone lying 3 min, prone on elbows x10  -AC      Cueing 1  Verbal;Tactile;Demo  -AC      Time 1  15 minutes  -AC        User Key  (r) = Recorded By, (t) = Taken By, (c) = Cosigned By    Initials Name Provider Type    AC Joy Edwards PTA Physical Therapy Assistant                      Manual Rx (last 36 hours)      Manual Treatments     Row Name 05/30/19 1100             Total Minutes    97835 - PT Manual Therapy Minutes  15  -AC         Manual Rx 1    Manual Rx 1 Location  lumbar region  -AC      Manual Rx 1 Type  STM, grade two and three mobs  -AC      Manual Rx 1 Grade  per the patient's tolerance  -AC      Manual Rx 1 Duration  15min  -AC        User Key  (r) = Recorded By, (t) = Taken By, (c) = Cosigned By    Initials Name Provider Type    Joy Winston PTA Physical Therapy Assistant              Therapy Education  Given: HEP, Symptoms/condition management, Pain management, Posture/body mechanics  Program: Reinforced  How Provided: Verbal, Demonstration  Provided to: Patient  Level of Understanding: Verbalized, Demonstrated              Time Calculation:   Start Time: 1100  Stop Time: 1155  Time Calculation (min): 55 min  Therapy Charges for Today     Code Description Service Date Service Provider Modifiers Qty    18603763482 HC PT THER PROC EA 15 MIN 5/30/2019 Joy Edwards PTA GP 1    76164556306 HC PT MANUAL THERAPY EA 15  MIN 5/30/2019 Joy Edwards, PTA GP 1    22156116120 HC PT ELECTRICAL STIM UNATTENDED 5/30/2019 Joy Edwards, TUCKER  1                    Joy Edwards, TUCKER  5/30/2019

## 2019-05-30 NOTE — TELEPHONE ENCOUNTER
Patient called reports [facility] is waiting on a form so she can get her diabetic supplies,do you have this?

## 2019-06-03 ENCOUNTER — HOSPITAL ENCOUNTER (OUTPATIENT)
Dept: PHYSICAL THERAPY | Facility: HOSPITAL | Age: 74
Setting detail: THERAPIES SERIES
Discharge: HOME | End: 2019-06-03

## 2019-06-03 DIAGNOSIS — M25.552 BILATERAL HIP PAIN: ICD-10-CM

## 2019-06-03 DIAGNOSIS — M54.50 CHRONIC BILATERAL LOW BACK PAIN WITHOUT SCIATICA: Primary | ICD-10-CM

## 2019-06-03 DIAGNOSIS — M25.551 BILATERAL HIP PAIN: ICD-10-CM

## 2019-06-03 DIAGNOSIS — G89.29 CHRONIC BILATERAL LOW BACK PAIN WITHOUT SCIATICA: Primary | ICD-10-CM

## 2019-06-03 PROCEDURE — G0283 ELEC STIM OTHER THAN WOUND: HCPCS

## 2019-06-03 PROCEDURE — 97140 MANUAL THERAPY 1/> REGIONS: CPT

## 2019-06-03 PROCEDURE — 97110 THERAPEUTIC EXERCISES: CPT

## 2019-06-03 NOTE — THERAPY TREATMENT NOTE
Outpatient Physical Therapy Ortho Treatment Note  SARAH Richards     Patient Name: Bianka Agudelo  : 1945  MRN: [id number]  Today's Date: 6/3/2019      Visit Date: 2019    Visit Dx:    ICD-10-CM ICD-9-CM   1. Chronic bilateral low back pain without sciatica M54.5 724.2    G89.29 338.29   2. 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 pectoris (CMS/[facility])   • Hypercalcemia   • History of laceration of skin        Past Medical History:   Diagnosis Date   • Anxiety    • CAD (coronary artery disease)    • Diabetes mellitus (CMS/[facility])    • Dyslipidemia    • GERD (gastroesophageal reflux disease)    • H/O angina pectoris    • History of bone density study     2014   • History of mammogram 07/15/2013   • Hypertension    • Hypothyroidism    • Muscle spasms of both lower extremities    • Overactive bladder    • Vitamin B12 deficiency    • Vitamin D deficiency         Past Surgical History:   Procedure Laterality Date   • COLONOSCOPY  2010   • OOPHORECTOMY      1 removed   • TUBAL ABDOMINAL LIGATION         PT Ortho     Row Name 19 1500       Subjective Comments    Subjective Comments  Patient arrives to therapy w/ reports of 8/10 low back and bilateral hip pain.  Patient states she feels her hip pain may have improved some, however she reports of no significant change.    -TUCKER       Subjective Pain    Able to rate subjective pain?  yes  -TUCKER    Pre-Treatment Pain Level  8  -TUCKER    Post-Treatment Pain Level  5  -TUCKER        Posture/Observations    Posture/Observations Comments  Pt ambulates into clinic w/ forward flexed posture, slow pace,  utilizing rollator.    -TUCKER      User Key  (r) = Recorded By, (t) = Taken By, (c) = Cosigned By    Initials Name Provider Type    Gayatri Vick PTA Physical Therapy Assistant                      PT Assessment/Plan     Row Name 06/03/19 1617          PT Assessment    Assessment Comments  Patient completed today's session w/ reports of slight decrease in pain following, 5/10.  Pt received MH and Estim to low back in seated position f/b therex as listed w/ focus on improved lumbar ROM, lumbar stability and improved postural awareness.  Treatment concluded manual therapy and cryotherapy in prone position.  Pt received cues and demonstration throughout session for improved mechanics and for max benefit w/ activities.  Pt will be progressed as tolerated.  No adverse reactions observed following modalities.   -TUCKER        PT Plan    PT Plan Comments  Continue with PT's POC and progress tx as tolerated by patient.   -TUCKER       User Key  (r) = Recorded By, (t) = Taken By, (c) = Cosigned By    Initials Name Provider Type    Gayatri Vick PTA Physical Therapy Assistant          Modalities     Row Name 06/03/19 1500             Moist Heat    MH Applied  Yes no redness observed following MH  -TUCKER      Location  low back  -TUCKER      Rx Minutes  15 mins  -TUCKER      MH Prior to Rx  Yes w/ estim in seated position  -TUCKER         Ice    Ice Applied  Yes  -TUCKER      Location  low back  -TUCKER      Rx Minutes  10 mins  -TUCKER      Ice S/P Rx  Yes  -TUCKER         ELECTRICAL STIMULATION    Attended/Unattended  Unattended no skin irritation observed following estim  -TUCKER      Stimulation Type  IFC  -TUCKER      Max mAmp  -- as to pt's tolerance  -TUCKER      Location/Electrode Placement/Other  lumbar region  -TUCKER       PT E-Stim Unattended (Manual) Minutes  15  -TUCKER        User Key  (r) = Recorded By, (t) = Taken By, (c) =  Cosigned By    Initials Name Provider Type    Gayatri Vick PTA Physical Therapy Assistant        Exercises     Row Name 06/03/19 1500             Subjective Comments    Subjective Comments  Patient arrives to therapy w/ reports of 8/10 low back and bilateral hip pain.  Patient states she feels her hip pain may have improved some, however she reports of no significant change.    -TUCKER         Subjective Pain    Able to rate subjective pain?  yes  -TUCKER      Pre-Treatment Pain Level  8  -TUCKER      Post-Treatment Pain Level  5  -TUCKER         Total Minutes    59788 - PT Therapeutic Exercise Minutes  20  -TUCKER      35472 - PT Manual Therapy Minutes  10  -TUCKER         Exercise 1    Exercise Name 1  St. ext (gentle) x15, scap squeeze 10x2, GS 10x2, PPT 10x2, prone lying, QS 10x2, Clams B) x 10  -TUCKER      Cueing 1  Verbal;Tactile;Demo  -TUCKER      Time 1  20 min  -TUCKER        User Key  (r) = Recorded By, (t) = Taken By, (c) = Cosigned By    Initials Name Provider Type    Gayatri Vick PTA Physical Therapy Assistant                      Manual Rx (last 36 hours)      Manual Treatments     Row Name 06/03/19 1500             Total Minutes    20290 - PT Manual Therapy Minutes  10  -TUCKER         Manual Rx 1    Manual Rx 1 Location  lumbar region  -TUCKER      Manual Rx 1 Type  STM, PA glides to lumbar spine  -TUCKER      Manual Rx 1 Grade  per the patient's tolerance, grade II and III  -TUCKER      Manual Rx 1 Duration  10 min  -TUCKER        User Key  (r) = Recorded By, (t) = Taken By, (c) = Cosigned By    Initials Name Provider Type    Gayatri Vick PTA Physical Therapy Assistant              Therapy Education  Given: HEP, Symptoms/condition management, Pain management, Posture/body mechanics  Program: Reinforced  How Provided: Verbal, Demonstration  Provided to: Patient  Level of Understanding: Verbalized, Demonstrated              Time Calculation:   Start Time: 1445  Stop Time: 1545  Time Calculation (min): 60  min  Therapy Charges for Today     Code Description Service Date Service Provider Modifiers Qty    82767170080 HC PT ELECTRICAL STIM UNATTENDED 6/3/2019 Gayatri Roque, PTA  1    76375383590 HC PT THER PROC EA 15 MIN 6/3/2019 Gayatri Roque, PTA GP 1    19438840920 HC PT MANUAL THERAPY EA 15 MIN 6/3/2019 Gayatri Roque, PTA GP 1                    Gayatri Chisholm. TUCKER Roque  6/3/2019

## 2019-06-07 ENCOUNTER — HOSPITAL ENCOUNTER (OUTPATIENT)
Dept: PHYSICAL THERAPY | Facility: HOSPITAL | Age: 74
Setting detail: THERAPIES SERIES
Discharge: HOME | End: 2019-06-07

## 2019-06-07 DIAGNOSIS — M25.552 BILATERAL HIP PAIN: ICD-10-CM

## 2019-06-07 DIAGNOSIS — G89.29 CHRONIC BILATERAL LOW BACK PAIN WITHOUT SCIATICA: Primary | ICD-10-CM

## 2019-06-07 DIAGNOSIS — M25.551 BILATERAL HIP PAIN: ICD-10-CM

## 2019-06-07 DIAGNOSIS — M54.50 CHRONIC BILATERAL LOW BACK PAIN WITHOUT SCIATICA: Primary | ICD-10-CM

## 2019-06-07 PROCEDURE — 97110 THERAPEUTIC EXERCISES: CPT

## 2019-06-07 PROCEDURE — G0283 ELEC STIM OTHER THAN WOUND: HCPCS

## 2019-06-07 PROCEDURE — 97140 MANUAL THERAPY 1/> REGIONS: CPT

## 2019-06-07 NOTE — THERAPY TREATMENT NOTE
Outpatient Physical Therapy Ortho Treatment Note   Maurice     Patient Name: Bianka Agudelo  : 1945  MRN: [id number]  Today's Date: 2019      Visit Date: 2019    Visit Dx:    ICD-10-CM ICD-9-CM   1. Chronic bilateral low back pain without sciatica M54.5 724.2    G89.29 338.29   2. Bilateral hip pain M25.551 719.45    M25.552        Patient Active Problem List   Diagnosis   • Vitamin D deficiency   • Vitamin B12 deficiency   • Overactive bladder   • Hypothyroidism   • Hypertension   • GERD (gastroesophageal reflux disease)   • Dyslipidemia   • Type 2 diabetes mellitus with both eyes affected by moderate nonproliferative retinopathy without macular edema, with long-term current use of insulin (CMS/[facility])   • Anxiety   • Coronary artery disease involving native coronary artery of native heart with angina pectoris (CMS/[facility])   • Hypercalcemia   • History of laceration of skin        Past Medical History:   Diagnosis Date   • Anxiety    • CAD (coronary artery disease)    • Diabetes mellitus (CMS/[facility])    • Dyslipidemia    • GERD (gastroesophageal reflux disease)    • H/O angina pectoris    • History of bone density study     2014   • History of mammogram 07/15/2013   • Hypertension    • Hypothyroidism    • Muscle spasms of both lower extremities    • Overactive bladder    • Vitamin B12 deficiency    • Vitamin D deficiency         Past Surgical History:   Procedure Laterality Date   • COLONOSCOPY  2010   • OOPHORECTOMY      1 removed   • TUBAL ABDOMINAL LIGATION         PT Ortho     Row Name 19 1000       Subjective Comments    Subjective Comments  Patient states that is having increased soreness in her hips. Patient states that the weather has made her worse.  -AC       Subjective Pain    Able to rate subjective pain?  yes  -AC    Pre-Treatment Pain Level  8  -AC    Post-Treatment Pain Level  2  -AC      User Key  (r) = Recorded By, (t) = Taken By, (c) = Cosigned By    Initials Name  Provider Type    Joy Winston PTA Physical Therapy Assistant                      PT Assessment/Plan     Row Name 06/07/19 1104          PT Assessment    Assessment Comments  Patient tolerated treatment session well with rest breaks taken as needed by the patient. New ther-ex added per the patient's tolerance, patient demonstrated and understood new ther-ex with no increase in pain noted. Educated patient to perform ther-ex per her tolerance, patient verbalized understanding. No adverse reactions with modalities or treatment session. Verbal and tactile cues needed on proper technique of exercises. Decrease in pain noted following treatment session.   -AC        PT Plan    PT Plan Comments  Continue per PT's POC, progress per the patient's tolerance.  -AC       User Key  (r) = Recorded By, (t) = Taken By, (c) = Cosigned By    Initials Name Provider Type    Joy Winston PTA Physical Therapy Assistant          Modalities     Row Name 06/07/19 1100             Moist Heat    MH Applied  Yes No redness noted following moist heat  -AC      Location  low back  -AC      Rx Minutes  15 mins  -AC      MH Prior to Rx  Yes  -AC         Ice    Ice Applied  Yes  -AC      Location  low back  -AC      Rx Minutes  Other: 8 minutes  -AC      Ice S/P Rx  Yes  -AC         ELECTRICAL STIMULATION    Attended/Unattended  Unattended No irritation noted following estim  -AC      Stimulation Type  IFC  -AC      Max mAmp  -- per the patient's tolerance, 15 minutes with MH  -AC      Location/Electrode Placement/Other  lumbar region  -AC       PT E-Stim Unattended (Manual) Minutes  15  -AC        User Key  (r) = Recorded By, (t) = Taken By, (c) = Cosigned By    Initials Name Provider Type    Joy Winston PTA Physical Therapy Assistant        Exercises     Row Name 06/07/19 1100 06/07/19 1000          Subjective Comments    Subjective Comments  --  Patient states that is having increased soreness in  her hips. Patient states that the weather has made her worse.  -AC        Subjective Pain    Able to rate subjective pain?  --  yes  -AC     Pre-Treatment Pain Level  --  8  -AC     Post-Treatment Pain Level  --  2  -AC        Total Minutes    59400 - PT Therapeutic Exercise Minutes  --  15  -AC     33301 - PT Manual Therapy Minutes  10  -AC  --        Exercise 1    Exercise Name 1  --  St. ext(gentle) x15, scap squeeze x15, GS 10x2, PPT x10, supine clams x15, ball squeeze x10, prone lying x4min  -AC     Cueing 1  --  Verbal;Tactile;Demo  -AC     Time 1  --  15 minutes  -AC       User Key  (r) = Recorded By, (t) = Taken By, (c) = Cosigned By    Initials Name Provider Type    AC Joy Edwards PTA Physical Therapy Assistant                      Manual Rx (last 36 hours)      Manual Treatments     Row Name 06/07/19 1100             Total Minutes    38636 - PT Manual Therapy Minutes  10  -AC         Manual Rx 1    Manual Rx 1 Location  lumbar region  -AC      Manual Rx 1 Type  STM, PA glides to lumbar spine  -AC      Manual Rx 1 Grade  per the patient's tolerance, grade II and III  -AC      Manual Rx 1 Duration  10 min  -AC        User Key  (r) = Recorded By, (t) = Taken By, (c) = Cosigned By    Initials Name Provider Type    AC Joy Edwards PTA Physical Therapy Assistant              Therapy Education  Given: HEP, Symptoms/condition management, Pain management, Posture/body mechanics  Program: Reinforced, New  How Provided: Verbal, Demonstration  Provided to: Patient  Level of Understanding: Verbalized, Demonstrated              Time Calculation:   Start Time: 0948  Stop Time: 1050  Time Calculation (min): 62 min  Therapy Charges for Today     Code Description Service Date Service Provider Modifiers Qty    14925186308 HC PT THER PROC EA 15 MIN 6/7/2019 Joy Edwards PTA GP 1    11011540307 HC PT MANUAL THERAPY EA 15 MIN 6/7/2019 Joy Edwards PTA GP 1    32534958224 HC PT  ELECTRICAL STIM UNATTENDED 6/7/2019 Joy Edwards, PTA  1                    Joy Edwards, PTA  6/7/2019

## 2019-06-10 ENCOUNTER — HOSPITAL ENCOUNTER (OUTPATIENT)
Dept: PHYSICAL THERAPY | Facility: HOSPITAL | Age: 74
Setting detail: THERAPIES SERIES
Discharge: HOME | End: 2019-06-10

## 2019-06-10 DIAGNOSIS — M25.552 BILATERAL HIP PAIN: ICD-10-CM

## 2019-06-10 DIAGNOSIS — G89.29 CHRONIC BILATERAL LOW BACK PAIN WITHOUT SCIATICA: Primary | ICD-10-CM

## 2019-06-10 DIAGNOSIS — M25.551 BILATERAL HIP PAIN: ICD-10-CM

## 2019-06-10 DIAGNOSIS — M54.50 CHRONIC BILATERAL LOW BACK PAIN WITHOUT SCIATICA: Primary | ICD-10-CM

## 2019-06-10 PROCEDURE — 97140 MANUAL THERAPY 1/> REGIONS: CPT | Performed by: PHYSICAL THERAPIST

## 2019-06-10 PROCEDURE — G0283 ELEC STIM OTHER THAN WOUND: HCPCS | Performed by: PHYSICAL THERAPIST

## 2019-06-10 PROCEDURE — 97110 THERAPEUTIC EXERCISES: CPT | Performed by: PHYSICAL THERAPIST

## 2019-06-10 NOTE — THERAPY TREATMENT NOTE
Outpatient Physical Therapy Ortho Treatment Note  SARAH Richards     Patient Name: Bianka Agudelo  : 1945  MRN: [id number]  Today's Date: 6/10/2019      Visit Date: 06/10/2019    Visit Dx:    ICD-10-CM ICD-9-CM   1. Chronic bilateral low back pain without sciatica M54.5 724.2    G89.29 338.29   2. Bilateral hip pain M25.551 719.45    M25.552        Patient Active Problem List   Diagnosis   • Vitamin D deficiency   • Vitamin B12 deficiency   • Overactive bladder   • Hypothyroidism   • Hypertension   • GERD (gastroesophageal reflux disease)   • Dyslipidemia   • Type 2 diabetes mellitus with both eyes affected by moderate nonproliferative retinopathy without macular edema, with long-term current use of insulin (CMS/[facility])   • Anxiety   • Coronary artery disease involving native coronary artery of native heart with angina pectoris (CMS/[facility])   • Hypercalcemia   • History of laceration of skin        Past Medical History:   Diagnosis Date   • Anxiety    • CAD (coronary artery disease)    • Diabetes mellitus (CMS/[facility])    • Dyslipidemia    • GERD (gastroesophageal reflux disease)    • H/O angina pectoris    • History of bone density study     2014   • History of mammogram 07/15/2013   • Hypertension    • Hypothyroidism    • Muscle spasms of both lower extremities    • Overactive bladder    • Vitamin B12 deficiency    • Vitamin D deficiency         Past Surgical History:   Procedure Laterality Date   • COLONOSCOPY  2010   • OOPHORECTOMY      1 removed   • TUBAL ABDOMINAL LIGATION         PT Ortho     Row Name 06/10/19 1300       Subjective Comments    Subjective Comments  Patient reports that she has 8/10 pain today.  She notes that pain is worse on the right side of her back.   -BE       Subjective Pain    Able to rate subjective pain?  yes  -BE    Pre-Treatment Pain Level  8  -BE    Post-Treatment Pain Level  0  -BE      User Key  (r) = Recorded By, (t) = Taken By, (c) = Cosigned By    Initials Name  Provider Type    BE Suha Mattson PT Physical Therapist                      PT Assessment/Plan     Row Name 06/10/19 1311          PT Assessment    Assessment Comments  Therapy session initiated by Jamal Montenegro PT, with MH and ESTIM to the lumbar region.  Patient performed ther ex per flow sheet, with exercises focusing on LE strengthening, core strengthening.  Exercises performed in extension, to promote decreased radicular symptoms.  Session concluded with manual therapy, including soft tissue mobilizations and PA glides.  Patient reported 8/10 pain pre-tx and 0/10 pain post-tx.  She will continue to be progressed per her tolerance and POC.  -BE        PT Plan    PT Plan Comments  Progress per patient's tolerance and POC.  -BE       User Key  (r) = Recorded By, (t) = Taken By, (c) = Cosigned By    Initials Name Provider Type    BE Suha Mattson PT Physical Therapist          Modalities     Row Name 06/10/19 1300             Moist Heat    MH Applied  Yes no redness following MH  -BE      Location  low back  -BE      Rx Minutes  15 mins  -BE      MH Prior to Rx  Yes with ESTIM in seated position  -BE         ELECTRICAL STIMULATION    Attended/Unattended  Unattended No irritation noted following estim  -BE      Stimulation Type  IFC  -BE      Max mAmp  -- per the patient's tolerance  -BE      Location/Electrode Placement/Other  lumbar region  -BE       PT E-Stim Unattended (Manual) Minutes  15  -BE        User Key  (r) = Recorded By, (t) = Taken By, (c) = Cosigned By    Initials Name Provider Type    BE Suha Mattson PT Physical Therapist        Exercises     Row Name 06/10/19 1300             Subjective Comments    Subjective Comments  Patient reports that she has 8/10 pain today.  She notes that pain is worse on the right side of her back.   -BE         Subjective Pain    Able to rate subjective pain?  yes  -BE      Pre-Treatment Pain Level  8  -BE      Post-Treatment Pain Level  0  -BE          Total Minutes    56607 - PT Therapeutic Exercise Minutes  25  -BE      67687 - PT Manual Therapy Minutes  15  -BE         Exercise 1    Exercise Name 1  St. ext(gentle) x15, scap squeeze 2x10, GS 10x2, PPT x10, supine clams x15, ball squeeze x15, prone lying x4min, QS 2x10, Prone on elbows 10x  -BE      Cueing 1  Verbal;Tactile;Demo  -BE      Time 1  25 min  -BE        User Key  (r) = Recorded By, (t) = Taken By, (c) = Cosigned By    Initials Name Provider Type    BE Suha Mattson PT Physical Therapist                      Manual Rx (last 36 hours)      Manual Treatments     Row Name 06/10/19 1300             Total Minutes    48467 - PT Manual Therapy Minutes  15  -BE         Manual Rx 1    Manual Rx 1 Location  lumbar region  -BE      Manual Rx 1 Type  STM, PA glides to lumbar spine  -BE      Manual Rx 1 Grade  per the patient's tolerance; grade I, II, and III  -BE      Manual Rx 1 Duration  15 min  -BE        User Key  (r) = Recorded By, (t) = Taken By, (c) = Cosigned By    Initials Name Provider Type    BE Suha Mattson, PT Physical Therapist              Therapy Education  Given: HEP, Symptoms/condition management, Pain management, Posture/body mechanics  Program: Reinforced, Progressed  How Provided: Verbal, Demonstration  Provided to: Patient  Level of Understanding: Verbalized, Demonstrated              Time Calculation:   Start Time: 1052  Stop Time: 1155  Time Calculation (min): 63 min  Therapy Charges for Today     Code Description Service Date Service Provider Modifiers Qty    10343721905 HC PT THER PROC EA 15 MIN 6/10/2019 Suha Mattson, PT GP 2    03242918028 HC PT MANUAL THERAPY EA 15 MIN 6/10/2019 Suha Mattson, PT GP 1    47863147420 HC PT ELECTRICAL STIM UNATTENDED 6/10/2019 Suha Mattson PT  1                    Suha Mattson PT  6/10/2019

## 2019-06-12 ENCOUNTER — HOSPITAL ENCOUNTER (OUTPATIENT)
Dept: PHYSICAL THERAPY | Facility: HOSPITAL | Age: 74
Setting detail: THERAPIES SERIES
Discharge: HOME | End: 2019-06-12

## 2019-06-12 DIAGNOSIS — M25.551 BILATERAL HIP PAIN: ICD-10-CM

## 2019-06-12 DIAGNOSIS — M54.50 CHRONIC BILATERAL LOW BACK PAIN WITHOUT SCIATICA: Primary | ICD-10-CM

## 2019-06-12 DIAGNOSIS — G89.29 CHRONIC BILATERAL LOW BACK PAIN WITHOUT SCIATICA: Primary | ICD-10-CM

## 2019-06-12 DIAGNOSIS — M25.552 BILATERAL HIP PAIN: ICD-10-CM

## 2019-06-12 PROCEDURE — 97110 THERAPEUTIC EXERCISES: CPT

## 2019-06-12 PROCEDURE — 97140 MANUAL THERAPY 1/> REGIONS: CPT

## 2019-06-12 PROCEDURE — G0283 ELEC STIM OTHER THAN WOUND: HCPCS

## 2019-06-12 NOTE — THERAPY TREATMENT NOTE
Outpatient Physical Therapy Ortho Treatment Note   Maurice     Patient Name: Bianka Agudelo  : 1945  MRN: [id number]  Today's Date: 2019      Visit Date: 2019    Visit Dx:    ICD-10-CM ICD-9-CM   1. Chronic bilateral low back pain without sciatica M54.5 724.2    G89.29 338.29   2. Bilateral hip pain M25.551 719.45    M25.552        Patient Active Problem List   Diagnosis   • Vitamin D deficiency   • Vitamin B12 deficiency   • Overactive bladder   • Hypothyroidism   • Hypertension   • GERD (gastroesophageal reflux disease)   • Dyslipidemia   • Type 2 diabetes mellitus with both eyes affected by moderate nonproliferative retinopathy without macular edema, with long-term current use of insulin (CMS/[facility])   • Anxiety   • Coronary artery disease involving native coronary artery of native heart with angina pectoris (CMS/[facility])   • Hypercalcemia   • History of laceration of skin        Past Medical History:   Diagnosis Date   • Anxiety    • CAD (coronary artery disease)    • Diabetes mellitus (CMS/[facility])    • Dyslipidemia    • GERD (gastroesophageal reflux disease)    • H/O angina pectoris    • History of bone density study     2014   • History of mammogram 07/15/2013   • Hypertension    • Hypothyroidism    • Muscle spasms of both lower extremities    • Overactive bladder    • Vitamin B12 deficiency    • Vitamin D deficiency         Past Surgical History:   Procedure Laterality Date   • COLONOSCOPY  2010   • OOPHORECTOMY      1 removed   • TUBAL ABDOMINAL LIGATION         PT Ortho     Row Name 19 1300       Subjective Comments    Subjective Comments  Patient arrives to therapy w/ complaints of increased low back and R) hip pain.  Pt states she believes her pain is increased due to weather changes.   -TUCKER       Subjective Pain    Able to rate subjective pain?  yes  -TUCKER    Pre-Treatment Pain Level  9  -TUCKER    Post-Treatment Pain Level  0  -TUCKER    Row Name 06/10/19 1300       Subjective  Comments    Subjective Comments  Patient reports that she has 8/10 pain today.  She notes that pain is worse on the right side of her back.   -BE       Subjective Pain    Able to rate subjective pain?  yes  -BE    Pre-Treatment Pain Level  8  -BE    Post-Treatment Pain Level  0  -BE      User Key  (r) = Recorded By, (t) = Taken By, (c) = Cosigned By    Initials Name Provider Type    Gayatri Vick PTA Physical Therapy Assistant    Suha Guzman, PT Physical Therapist                      PT Assessment/Plan     Row Name 06/12/19 1507          PT Assessment    Assessment Comments  Patient responded well to today's session w/ no reports of pain noted at conclusion.  MH and Estim applied to low back in seated position f/b therex as listed, and manual therapy including STM and PA glides.  Pt increased LE strengthening reps w/ good tolerance noted.  Pt required verbal and tactile cues throughout session for improved mechanics w/ therex.  Treatment concluded with cryotherapy.  Pt continues to progress as tolerated.  No adverse reactions observed following modalities.   -TUCKER        PT Plan    PT Plan Comments  Continue with PT's POC and progress tx as tolerated by patient.   -TUCKER       User Key  (r) = Recorded By, (t) = Taken By, (c) = Cosigned By    Initials Name Provider Type    Gayatri Vick PTA Physical Therapy Assistant          Modalities     Row Name 06/12/19 1300             Moist Heat    MH Applied  Yes no redness observed following MH  -TUCKER      Location  low back  -TUCKER      Rx Minutes  15 mins  -TUCKER      MH Prior to Rx  Yes w/ estim in seated position  -TUCKER         Ice    Ice Applied  Yes pt in prone position  -TUCKER      Location  low back  -TUCKER      Rx Minutes  10 mins  -TUCKER      Ice S/P Rx  Yes  -TUCKER         ELECTRICAL STIMULATION    Attended/Unattended  Unattended no skin irritation observed following  -TUCKER      Stimulation Type  IFC  -TUCKER      Max mAmp  -- as to pt's tolerance  -TUCKER       Location/Electrode Placement/Other  lumbar region  -TUCKER       PT E-Stim Unattended (Manual) Minutes  15  -TUCKER        User Key  (r) = Recorded By, (t) = Taken By, (c) = Cosigned By    Initials Name Provider Type    Gayatri Vick PTA Physical Therapy Assistant        Exercises     Row Name 06/12/19 1500 06/12/19 1300          Subjective Comments    Subjective Comments  --  Patient arrives to therapy w/ complaints of increased low back and R) hip pain.  Pt states she believes her pain is increased due to weather changes.   -TUCKER        Subjective Pain    Able to rate subjective pain?  --  yes  -TUCKER     Pre-Treatment Pain Level  --  9  -TUCKER     Post-Treatment Pain Level  --  0  -TUCKER        Total Minutes    95678 - PT Therapeutic Exercise Minutes  --  28  -TUCKER     48594 - PT Manual Therapy Minutes  10  -TUCKER  --        Exercise 1    Exercise Name 1  --  st. gentle extension x15, scap squeeze 10x3, GS 15x2, PPT 10x2, clams (sidelying) 10x2, ball squeeze 10x3, prone lying x 4min, QS 10x3, SAQ 10x2  -TUCKER     Cueing 1  --  Verbal;Tactile;Demo  -TUCKER     Time 1  --  28 min  -TUCKER       User Key  (r) = Recorded By, (t) = Taken By, (c) = Cosigned By    Initials Name Provider Type    Gayatri Vick PTA Physical Therapy Assistant                      Manual Rx (last 36 hours)      Manual Treatments     Row Name 06/12/19 1500             Total Minutes    68369 - PT Manual Therapy Minutes  10  -TUCKER         Manual Rx 1    Manual Rx 1 Location  lumbar region  -TUCKER      Manual Rx 1 Type  STM, PA glides to lumbar spine  -TUCKER      Manual Rx 1 Grade  per the patient's tolerance; grade I, II  -TUCKER      Manual Rx 1 Duration  10 min  -TUCKER        User Key  (r) = Recorded By, (t) = Taken By, (c) = Cosigned By    Initials Name Provider Type    Gayatri Vick PTA Physical Therapy Assistant              Therapy Education  Given: HEP, Symptoms/condition management, Pain management, Posture/body mechanics  Program:  Reinforced  How Provided: Verbal, Demonstration  Provided to: Patient  Level of Understanding: Verbalized, Demonstrated              Time Calculation:   Start Time: 1302  Stop Time: 1415  Time Calculation (min): 73 min  Therapy Charges for Today     Code Description Service Date Service Provider Modifiers Qty    20376834241 HC PT THER PROC EA 15 MIN 6/12/2019 Gayatri Roque, TUCKER GP 2    42171420849 HC PT MANUAL THERAPY EA 15 MIN 6/12/2019 Gayatri Roque, TUCKER GP 1    91632927940 HC PT ELECTRICAL STIM UNATTENDED 6/12/2019 Gayatri Roque, TUCKER  1                    Gayatri Chisholm. TUCKER Roque  6/12/2019

## 2019-06-17 ENCOUNTER — HOSPITAL ENCOUNTER (OUTPATIENT)
Dept: PHYSICAL THERAPY | Facility: HOSPITAL | Age: 74
Setting detail: THERAPIES SERIES
Discharge: HOME | End: 2019-06-17

## 2019-06-17 DIAGNOSIS — M54.50 CHRONIC BILATERAL LOW BACK PAIN WITHOUT SCIATICA: Primary | ICD-10-CM

## 2019-06-17 DIAGNOSIS — M25.552 BILATERAL HIP PAIN: ICD-10-CM

## 2019-06-17 DIAGNOSIS — M25.551 BILATERAL HIP PAIN: ICD-10-CM

## 2019-06-17 DIAGNOSIS — G89.29 CHRONIC BILATERAL LOW BACK PAIN WITHOUT SCIATICA: Primary | ICD-10-CM

## 2019-06-17 PROCEDURE — G0283 ELEC STIM OTHER THAN WOUND: HCPCS | Performed by: PHYSICAL THERAPIST

## 2019-06-17 PROCEDURE — 97110 THERAPEUTIC EXERCISES: CPT | Performed by: PHYSICAL THERAPIST

## 2019-06-17 NOTE — THERAPY DISCHARGE NOTE
Outpatient Physical Therapy Ortho Treatment Note/Discharge Summary  SARAH Richards     Patient Name: Bianka Agudelo  : 1945  MRN: [id number]  Today's Date: 2019      Visit Date: 2019    Visit Dx:    ICD-10-CM ICD-9-CM   1. Chronic bilateral low back pain without sciatica M54.5 724.2    G89.29 338.29   2. Bilateral hip pain M25.551 719.45    M25.552        Patient Active Problem List   Diagnosis   • Vitamin D deficiency   • Vitamin B12 deficiency   • Overactive bladder   • Hypothyroidism   • Hypertension   • GERD (gastroesophageal reflux disease)   • Dyslipidemia   • Type 2 diabetes mellitus with both eyes affected by moderate nonproliferative retinopathy without macular edema, with long-term current use of insulin (CMS/[facility])   • Anxiety   • Coronary artery disease involving native coronary artery of native heart with angina pectoris (CMS/[facility])   • Hypercalcemia   • History of laceration of skin        Past Medical History:   Diagnosis Date   • Anxiety    • CAD (coronary artery disease)    • Diabetes mellitus (CMS/[facility])    • Dyslipidemia    • GERD (gastroesophageal reflux disease)    • H/O angina pectoris    • History of bone density study     2014   • History of mammogram 07/15/2013   • Hypertension    • Hypothyroidism    • Muscle spasms of both lower extremities    • Overactive bladder    • Vitamin B12 deficiency    • Vitamin D deficiency         Past Surgical History:   Procedure Laterality Date   • COLONOSCOPY  2010   • OOPHORECTOMY      1 removed   • TUBAL ABDOMINAL LIGATION         PT Ortho     Row Name 19 1100       Subjective Comments    Subjective Comments  Pt reports 2/10 low back and right hip pain prior to today's session. She stated she has not noticed improvements since beginning physical therapy. She reported she is scheduled to return to [facility] this week.  -AD       Subjective Pain    Able to rate subjective pain?  yes  -AD    Pre-Treatment Pain Level  2  -AD     Post-Treatment Pain Level  2  -AD       Myotomal Screen- Lower Quarter Clearing    Hip flexion (L2)  Right:;3+ (Fair +);Left:;4 (Good)  -AD    Knee extension (L3)  Right:;3+ (Fair +);Left:;4 (Good)  -AD    Knee flexion (S2)  Right:;4- (Good -);Left:;4 (Good)  -AD       Lumbar ROM Screen- Lower Quarter Clearing    Lumbar Flexion  Impaired 25% available  -AD    Lumbar Extension  Impaired 25% available  -AD    Lumbar Lateral Flexion  Impaired 25% available  -AD    Lumbar Rotation  Impaired 25% available  -AD      User Key  (r) = Recorded By, (t) = Taken By, (c) = Cosigned By    Initials Name Provider Type    AD Dalton, Ashley Claudene, PT Physical Therapist                      PT Assessment/Plan     Row Name 06/17/19 1116          PT Assessment    Functional Limitations  Decreased safety during functional activities;Impaired gait;Limitation in home management;Limitations in community activities;Performance in self-care ADL;Limitations in functional capacity and performance;Performance in leisure activities  -AD     Impairments  Balance;Endurance;Gait;Motor function;Joint mobility;Joint integrity;Muscle strength;Pain;Poor body mechanics;Posture;Range of motion;Sensation  -AD     Assessment Comments  A re-evaluation was performed during today's session, with patient demonstrating no change in lumbar range of motion and bilateral lower extremity strength. Due to lack of progress, discharge was discussed with the patient during today's session. She was agreeable to discharge at today's time, stating she returns to [facility] this week and is hopeful to find answers. She performed therapeutic exercises during today's session to review HEP, with patient demonstrating proper understanding.    -AD        PT Plan    PT Plan Comments  Due to lack of progress, patient is being discharged at this time.  -AD       User Key  (r) = Recorded By, (t) = Taken By, (c) = Cosigned By    Initials Name Provider Type    AD Dalton, Ashley Claudene,  PT Physical Therapist          Modalities     Row Name 06/17/19 1100             Moist Heat    MH Applied  Yes With IFC, no skin irritation observed.  -AD      Location  Lumbar  -AD      Rx Minutes  15 mins  -AD      MH Prior to Rx  Yes  -AD         Ice    Ice Applied  Yes No skin irritation observed.  -AD      Location  Lumbar  -AD      Rx Minutes  Other: 6 minutes  -AD      Ice S/P Rx  Yes  -AD         ELECTRICAL STIMULATION    Attended/Unattended  Unattended With MH, no skin irritation observed.  -AD      Stimulation Type  IFC  -AD      Max mAmp  -- as to pt's tolerance  -AD      Location/Electrode Placement/Other  Lumbar  -AD       PT E-Stim Unattended (Manual) Minutes  15  -AD        User Key  (r) = Recorded By, (t) = Taken By, (c) = Cosigned By    Initials Name Provider Type    AD Dalton, Ashley Claudene, PT Physical Therapist          Exercises     Row Name 06/17/19 1100             Subjective Comments    Subjective Comments  Pt reports 2/10 low back and right hip pain prior to today's session. She stated she has not noticed improvements since beginning physical therapy. She reported she is scheduled to return to [facility] this week.  -AD         Subjective Pain    Able to rate subjective pain?  yes  -AD      Pre-Treatment Pain Level  2  -AD      Post-Treatment Pain Level  2  -AD         Total Minutes    45021 - PT Therapeutic Exercise Minutes  30  -AD         Exercise 1    Exercise Name 1  Scapular squeeze, GS, PPT, supine clams, ball squeeze, QS, SAQ  -AD      Cueing 1  Verbal;Tactile;Demo  -AD      Time 1  30 minutes  -AD      Additional Comments  Re-eval  -AD        User Key  (r) = Recorded By, (t) = Taken By, (c) = Cosigned By    Initials Name Provider Type    AD Dalton, Ashley Claudene, PT Physical Therapist                         PT OP Goals     Row Name 06/17/19 1100          PT Short Term Goals    STG 1  Pt will be instructed in HEP for pain relief.  -AD     STG 1 Progress  Met  -AD     STG 2  Pt will  demonstrate 25% improvement in lumbar ROM for improved function.  -AD     STG 2 Progress  Not Met  -AD     STG 2 Progress Comments  No change  -AD     STG 3  Pt will report no radicular pain below the knee, demonstrating centralization.  -AD     STG 3 Progress  Met  -AD     STG 3 Progress Comments  No pain reported below the knee.  -AD        Long Term Goals    LTG 1  Pt will report the absence of radicular pain, with no more than 2/10 centralized low back pain.  -AD     LTG 1 Progress  Not Met  -AD     LTG 2  Pt will report at least 40/80 on the Modified Oswestry for improved functional independence.  -AD     LTG 2 Progress  Not Met  -AD     LTG 2 Progress Comments  21/80  -AD     LTG 3  Pt will demonstrate 4+/5 BLE strength for improved function and balance.  -AD     LTG 3 Progress  Not Met  -AD     LTG 3 Progress Comments  No change in strength observed.  -AD     LTG 4  Pt will demonstrate proper understanding of HEP for improved independence.  -AD     LTG 4 Progress  Met  -AD       User Key  (r) = Recorded By, (t) = Taken By, (c) = Cosigned By    Initials Name Provider Type    AD Dalton, Ashley Claudene, PT Physical Therapist          Therapy Education  Given: HEP, Symptoms/condition management, Pain management, Posture/body mechanics  Program: Reinforced  How Provided: Verbal, Demonstration  Provided to: Patient  Level of Understanding: Verbalized, Demonstrated    Outcome Measure Options: Lower Extremity Functional Scale (LEFS)  Lower Extremity Functional Index  Any of your usual work, housework or school activities: Moderate difficulty  Your usual hobbies, recreational or sporting activities: Moderate difficulty  Getting into or out of the bath: Moderate difficulty  Walking between rooms: Moderate difficulty  Putting on your shoes or socks: Moderate difficulty  Squatting: Moderate difficulty  Lifting an object, like a bag of groceries from the floor: Quite a bit of difficulty  Performing light activities  around your home: A little bit of difficulty  Performing heavy activities around your home: Extreme difficulty or unable to perform activity  Getting into or out of a car: Moderate difficulty  Walking 2 blocks: Extreme difficulty or unable to perform activity  Walking a mile: Extreme difficulty or unable to perform activity  Going up or down 10 stairs (about 1 flight of stairs): Extreme difficulty or unable to perform activity  Standing for 1 hour: Extreme difficulty or unable to perform activity  Sitting for 1 hour: Moderate difficulty  Running on even ground: Extreme difficulty or unable to perform activity  Running on uneven ground: Extreme difficulty or unable to perform activity  Making sharp turns while running fast: Extreme difficulty or unable to perform activity  Hopping: Extreme difficulty or unable to perform activity  Rolling over in bed: Quite a bit of difficulty  Total: 21      Time Calculation:   Start Time: 0957  Stop Time: 1050  Time Calculation (min): 53 min  Therapy Charges for Today     Code Description Service Date Service Provider Modifiers Qty    27299534528 HC PT THER PROC EA 15 MIN 6/17/2019 Dalton, Ashley Claudene, PT GP 2    88076869135 HC PT ELECTRICAL STIM UNATTENDED 6/17/2019 Dalton, Ashley Claudene, PT  1          PT G-Codes  Outcome Measure Options: Lower Extremity Functional Scale (LEFS)  Total: 21     OP PT Discharge Summary  Date of Discharge: 06/17/19  Reason for Discharge: Lack of progress  Outcomes Achieved: Refer to plan of care for updates on goals achieved  Discharge Destination: Home with home program  Discharge Instructions/Additional Comments: The patient was evaluated on 5/21/19 and attended eight follow-up appointments. Due to lack of progress, the patient is being discharged at this time. She stated she is scheduled to return to [facility] this week. The patient was agreeable to discharge from physical therapy. Thank you for the referral.      Ashley Claudene Dalton,  PT  6/17/2019

## 2019-06-19 ENCOUNTER — HOSPITAL ENCOUNTER (EMERGENCY)
Facility: HOSPITAL | Age: 74
Discharge: HOME | End: 2019-06-19
Admitting: FAMILY MEDICINE

## 2019-06-19 VITALS
HEART RATE: 70 BPM | SYSTOLIC BLOOD PRESSURE: 135 MMHG | WEIGHT: 188 LBS | OXYGEN SATURATION: 100 % | TEMPERATURE: 98.7 F | RESPIRATION RATE: 18 BRPM | DIASTOLIC BLOOD PRESSURE: 80 MMHG | BODY MASS INDEX: 32.1 KG/M2 | HEIGHT: 64 IN

## 2019-06-19 DIAGNOSIS — G89.29 ACUTE EXACERBATION OF CHRONIC LOW BACK PAIN: Primary | ICD-10-CM

## 2019-06-19 DIAGNOSIS — M54.50 ACUTE EXACERBATION OF CHRONIC LOW BACK PAIN: Primary | ICD-10-CM

## 2019-06-19 PROCEDURE — 99283 EMERGENCY DEPT VISIT LOW MDM: CPT

## 2019-06-19 PROCEDURE — 96372 THER/PROPH/DIAG INJ SC/IM: CPT

## 2019-06-19 PROCEDURE — 25010000002 KETOROLAC TROMETHAMINE PER 15 MG: Performed by: NURSE PRACTITIONER

## 2019-06-19 PROCEDURE — 25010000002 DEXAMETHASONE PER 1 MG: Performed by: NURSE PRACTITIONER

## 2019-06-19 RX ORDER — KETOROLAC TROMETHAMINE 30 MG/ML
30 INJECTION, SOLUTION INTRAMUSCULAR; INTRAVENOUS ONCE
Status: COMPLETED | OUTPATIENT
Start: 2019-06-19 | End: 2019-06-19

## 2019-06-19 RX ORDER — DEXAMETHASONE SODIUM PHOSPHATE 4 MG/ML
4 INJECTION, SOLUTION INTRA-ARTICULAR; INTRALESIONAL; INTRAMUSCULAR; INTRAVENOUS; SOFT TISSUE ONCE
Status: COMPLETED | OUTPATIENT
Start: 2019-06-19 | End: 2019-06-19

## 2019-06-19 RX ADMIN — DEXAMETHASONE SODIUM PHOSPHATE 4 MG: 4 INJECTION, SOLUTION INTRAMUSCULAR; INTRAVENOUS at 20:53

## 2019-06-19 RX ADMIN — KETOROLAC TROMETHAMINE 30 MG: 30 INJECTION, SOLUTION INTRAMUSCULAR; INTRAVENOUS at 20:53

## 2019-06-21 ENCOUNTER — TELEPHONE (OUTPATIENT)
Dept: FAMILY MEDICINE CLINIC | Facility: CLINIC | Age: 74
End: 2019-06-21

## 2019-06-21 NOTE — TELEPHONE ENCOUNTER
Patient called reports her right hip is killing her,went to PT reports it just made it worse,had to go to ER & got 2 injections it got so bad,is wondering if she could get an MRI?

## 2019-06-21 NOTE — TELEPHONE ENCOUNTER
Yes, she probably could because she's done it all, but I need it documented - I hate to ask her, but can you give her an appointment for next week so we can document it all for the insurance. Let her know that it's a bad insurance year and won't approve of it until she has documentation, including a physical exam of no improvement. Thanks.      Patient notified & a follow up scheduled.

## 2019-06-21 NOTE — TELEPHONE ENCOUNTER
Yes, she probably could because she's done it all, but I need it documented - I hate to ask her, but can you give her an appointment for next week so we can document it all for the insurance. Let her know that it's a bad insurance year and won't approve of it until she has documentation, including a physical exam of no improvement. Thanks.

## 2019-06-24 ENCOUNTER — OFFICE VISIT (OUTPATIENT)
Dept: FAMILY MEDICINE CLINIC | Facility: CLINIC | Age: 74
End: 2019-06-24

## 2019-06-24 VITALS
BODY MASS INDEX: 32.78 KG/M2 | TEMPERATURE: 97.8 F | SYSTOLIC BLOOD PRESSURE: 130 MMHG | HEIGHT: 64 IN | WEIGHT: 192 LBS | OXYGEN SATURATION: 98 % | HEART RATE: 84 BPM | DIASTOLIC BLOOD PRESSURE: 81 MMHG

## 2019-06-24 DIAGNOSIS — E03.8 OTHER SPECIFIED HYPOTHYROIDISM: ICD-10-CM

## 2019-06-24 DIAGNOSIS — G89.29 CHRONIC LOW BACK PAIN WITH RIGHT-SIDED SCIATICA, UNSPECIFIED BACK PAIN LATERALITY: ICD-10-CM

## 2019-06-24 DIAGNOSIS — F41.9 ANXIETY: ICD-10-CM

## 2019-06-24 DIAGNOSIS — M54.41 CHRONIC LOW BACK PAIN WITH RIGHT-SIDED SCIATICA, UNSPECIFIED BACK PAIN LATERALITY: ICD-10-CM

## 2019-06-24 DIAGNOSIS — E11.3393 TYPE 2 DIABETES MELLITUS WITH BOTH EYES AFFECTED BY MODERATE NONPROLIFERATIVE RETINOPATHY WITHOUT MACULAR EDEMA, WITH LONG-TERM CURRENT USE OF INSULIN (HCC): Primary | ICD-10-CM

## 2019-06-24 DIAGNOSIS — Z79.4 TYPE 2 DIABETES MELLITUS WITH BOTH EYES AFFECTED BY MODERATE NONPROLIFERATIVE RETINOPATHY WITHOUT MACULAR EDEMA, WITH LONG-TERM CURRENT USE OF INSULIN (HCC): Primary | ICD-10-CM

## 2019-06-24 DIAGNOSIS — M25.551 RIGHT HIP PAIN: ICD-10-CM

## 2019-06-24 LAB
ALBUMIN SERPL-MCNC: 4.3 G/DL (ref 3.5–5.2)
ALBUMIN/GLOB SERPL: 1.5 G/DL
ALP SERPL-CCNC: 61 U/L (ref 39–117)
ALT SERPL W P-5'-P-CCNC: 34 U/L (ref 1–33)
ANION GAP SERPL CALCULATED.3IONS-SCNC: 12.1 MMOL/L
AST SERPL-CCNC: 24 U/L (ref 1–32)
BILIRUB SERPL-MCNC: 0.7 MG/DL (ref 0.2–1.2)
BUN BLD-MCNC: 14 MG/DL (ref 8–23)
BUN/CREAT SERPL: 19.4 (ref 7–25)
CALCIUM SPEC-SCNC: 10.3 MG/DL (ref 8.6–10.5)
CHLORIDE SERPL-SCNC: 100 MMOL/L (ref 98–107)
CO2 SERPL-SCNC: 26.9 MMOL/L (ref 22–29)
CREAT BLD-MCNC: 0.72 MG/DL (ref 0.57–1)
GFR SERPL CREATININE-BSD FRML MDRD: 79 ML/MIN/1.73
GLOBULIN UR ELPH-MCNC: 2.9 GM/DL
GLUCOSE BLD-MCNC: 211 MG/DL (ref 65–99)
POTASSIUM BLD-SCNC: 3.8 MMOL/L (ref 3.5–5.2)
PROT SERPL-MCNC: 7.2 G/DL (ref 6–8.5)
SODIUM BLD-SCNC: 139 MMOL/L (ref 136–145)
T4 FREE SERPL-MCNC: 1.38 NG/DL (ref 0.93–1.7)
TSH SERPL DL<=0.05 MIU/L-ACNC: 3.49 MIU/ML (ref 0.27–4.2)

## 2019-06-24 PROCEDURE — 84443 ASSAY THYROID STIM HORMONE: CPT | Performed by: FAMILY MEDICINE

## 2019-06-24 PROCEDURE — 84439 ASSAY OF FREE THYROXINE: CPT | Performed by: FAMILY MEDICINE

## 2019-06-24 PROCEDURE — 80053 COMPREHEN METABOLIC PANEL: CPT | Performed by: FAMILY MEDICINE

## 2019-06-24 PROCEDURE — 99214 OFFICE O/P EST MOD 30 MIN: CPT | Performed by: FAMILY MEDICINE

## 2019-06-24 RX ORDER — INSULIN DETEMIR 100 [IU]/ML
50 INJECTION, SOLUTION SUBCUTANEOUS NIGHTLY
Qty: 15 ML | Refills: 5 | Status: SHIPPED | OUTPATIENT
Start: 2019-06-24 | End: 2020-02-17

## 2019-06-24 RX ORDER — DIAZEPAM 5 MG/1
5 TABLET ORAL EVERY 12 HOURS PRN
Qty: 60 TABLET | Refills: 0 | Status: SHIPPED | OUTPATIENT
Start: 2019-06-24 | End: 2019-08-05 | Stop reason: SDUPTHER

## 2019-06-28 RX ORDER — CLOPIDOGREL BISULFATE 75 MG/1
TABLET ORAL
Qty: 90 TABLET | Refills: 1 | Status: SHIPPED | OUTPATIENT
Start: 2019-06-28 | End: 2019-09-10 | Stop reason: SDUPTHER

## 2019-07-05 RX ORDER — METFORMIN HYDROCHLORIDE 500 MG/1
TABLET, EXTENDED RELEASE ORAL
Qty: 60 TABLET | Refills: 2 | Status: SHIPPED | OUTPATIENT
Start: 2019-07-05 | End: 2019-09-24 | Stop reason: SDUPTHER

## 2019-07-10 ENCOUNTER — TELEPHONE (OUTPATIENT)
Dept: FAMILY MEDICINE CLINIC | Facility: CLINIC | Age: 74
End: 2019-07-10

## 2019-07-10 NOTE — TELEPHONE ENCOUNTER
INSURANCE DENIED AUTH ON MRI LSPINE. REASON LISTED BELOW.  THE MRI OF HIP WAS APPROVED.    Based on  [employer] Preface Imaging Guidelines Section: PREFACE 3 Clinical Information, we cannot approve this request.  Your records do not show results of face-to-face contact with your doctor within the last 60 days that relates to the test requested. This contact should include an office visit, an exam done by your doctor, details regarding what brought you to the doctor, and lab tests. Advanced imaging, detailed picture study, is not supported without these results

## 2019-07-14 NOTE — PROGRESS NOTES
"Bianka Agudelo     VITALS: Blood pressure 130/81, pulse 84, temperature 97.8 °F (36.6 °C), temperature source Oral, height 162.6 cm (64.02\"), weight 87.1 kg (192 lb), SpO2 98 %, not currently breastfeeding.    Subjective  Chief Complaint:   Chief Complaint   Patient presents with   • Extremity Weakness     legs    • Back Pain        History of Present Illness:  Patient is a 73 y.o. female who presents to clinic secondary to medical followup.   She continues to have  worsening right hip pain, but she states that she thinks it is coming more from her lower back.  She has been doing some stretching exercises at home with some success.  She denies any numbness or tingling of her lower extremities.  She denies any changes in her bowel movements or any urinary incontinence.  We have sent her to physical therapy.  She is approximately done 4 weeks worth.  Patient also states that the pain was so bad, she had to go to the ER for this.      Patient has hypertension and is on losartan/HCTZ 50/12.5 mg orally BID and norvasc 5 mg orally daily (occasionally). Denies any side effects of the medications. Denies any dizziness, lightheadedness, blurry vision, chest pain, or edema. Patient does not take her blood pressures at home. Tries to follow a low-salt diet.    Patient also has type II diabetes and is currently on metformin 1000 mg orally BID and basaglar 42 units every evening. She has been inconsistent with the januvia 100 mg orally daily and Novolog 70/30 20 units BID at meals. Has recently switched from lantus to basaglar secondary to insurance and feels that basaglar does not do her as well as the lantus does. Last A1C in April 2019 was 11.33. Denies any side effects of her medications. Patient denies any changes in vision, polydipsia, polyuria, numbness or tingling, or hypoglycemic or hyperglycemic episodes. Patient does follow a diabetic diet and checks her glucose levels regularly. Blood glucose levels have been elevated. " Patient does have complications of nephropathy. No worsening or exacerbation of symptoms. No neuropathy or retinopathy. Last eye exam was April 2017. Last microalbumin was elevated done 12/2018. Last foot exam was in January 2019 and patient does see a podiatrist. She has gone through diabetic teaching/nutrition education. Does take losartan 100 mg orally daily to provide for kidney protection and aspirin 81 mg orally daily to provide for cardiovascular protection.    Patient also has hyperlipidemia and is currently on atorvastatin 10 mg orally daily. Denies any side effects of the medication. Last lipid panel was 12/2018 and in normal limits. Denies any muscle weakness, jaundice or itching. Tries to follow a low cholesterol diet.    Patient has GERD and is currently on prilosec 20 mg orally BID. Patient denies any side effects of the medication. Denies metallic tastes and has not been having increased symptoms during sleep. Has not had any recent exacerbations. Denies any nausea, vomiting, burping, hiccuping, or midepigastric pain.    Patient does have hypothyroidism and is currently on synthroid 137 mcg orally daily. She is doing well on this medication. Denies any side effects. Denies fatigue, dizziness, palpitations, changes in weight, hair, or nails. Last thyroid panel was April 2019 and was abnormal.    Patient has anxiety and is currently on diazepam 5 mg orally BID. Patient states that this medication is working. Denies any side effects of the medication. Patient states that she is sleeping well and can get out of bed daily to accomplish daily activities. Has some anhedonia. Mood is stable. Has no feelings of guilt. Has good concentration and memory ability. Has some energy. Is able to make goals. Appetite is stable. Denies any suicidal or homicidal ideations. Does not have any auditory or visual hallucinations. [facility] [phone] WNL. No controlled medication seeking behavior.  January 2019 UDS was  consistent.    Patient has CAD with angina and is on plavix 75 mg orally daily, aspirin 81 mg orally daily, and nitro ER 6.5 mg orally TID for angina. We tried to switch her from nitro to Imdur in 2016 without much success. She reports that she has been doing fine on these medications, denies any side effects, and denies any chest pain.       Patient does have muscle spasms especially in her back when she does physical activity, such as gardening. She is currently on flexeril 10 mg orally BID as needed and ibuprofen 600 mg orally TID. Denies any side effects of the medications. Denies any sedation effects. Medications allow her enough flexibility and relief from the pain so that she can do activities other than those of daily living. Movement and ambulation are improved on these medications. Robaxin is fairly expensive for her and she would like to change it to a cheaper medication if possible. No pain medication seeking behavior. [facility] # [id number] is clean without any discrepancies.      Patient does have an overactive bladder and is currently on oxybutynin 5 mg orally daily. Medication is treating her well. No side effects. States that it does reduce her urinary frequency.    No complaints about any of the medications.    The following portions of the patient's history were reviewed and updated as appropriate: allergies, current medications, past family history, past medical history, past social history, past surgical history and problem list.    Past Medical History  Past Medical History:   Diagnosis Date   • Anxiety    • CAD (coronary artery disease)    • Diabetes mellitus (CMS/[facility])    • Dyslipidemia    • GERD (gastroesophageal reflux disease)    • H/O angina pectoris    • History of bone density study     February 2014   • History of mammogram 07/15/2013   • Hypertension    • Hypothyroidism    • Muscle spasms of both lower extremities    • Overactive bladder    • Vitamin B12 deficiency    • Vitamin D deficiency         Review of Systems  Constitutional: Denies any recent history of HAs, dizziness, fevers, chills, itching.  Eyes: Denies any changes in vision. Denies any blurry vision or diplopia.  Ears, Nose, Mouth, Throat: Denies any sore throat, rhinorrhea, or cough.  Cardiovascular: Denies any chest pain, pressure, or palpitations.  Respiratory: Denies any shortness of breath or wheezing.  Gastrointestinal: Denies any abdominal pain, nausea, vomiting, diarrhea, or constipation.  Genitourinary: Denies any changes in urination.  Musculoskeletal: Denies any muscle weakness.  Skin and/or breasts: Denies any rashes.  Neurological: Denies any changes in balance or gait.  Psychiatric: Denies any anxiety, depression, or insomnia. Denies any suicidal or homicidal ideations.  Endocrine: Denies any heat or cold intolerance. Denies any voice changes, polydipsia, or polyuria.  Hematologic/Lymphatic: Denies any anemia or easy bruising.    Surgical History  Past Surgical History:   Procedure Laterality Date   • COLONOSCOPY  01/01/2010   • OOPHORECTOMY      1 removed   • TUBAL ABDOMINAL LIGATION         Family History  Family History   Problem Relation Age of Onset   • Diabetes Mother    • Heart disease Mother    • Stroke Mother    • Cancer Father        Social History  Social History     Socioeconomic History   • Marital status:      Spouse name: Not on file   • Number of children: Not on file   • Years of education: Not on file   • Highest education level: Not on file   Tobacco Use   • Smoking status: Never Smoker   • Smokeless tobacco: Never Used   Substance and Sexual Activity   • Alcohol use: No   • Drug use: No       Objective  Physical Exam    Gen: Patient in NAD. Pleasant and answers appropriately. A&Ox3.    Skin: Warm and dry with normal turgor. No purpura, rashes, or unusual pigmentation noted. Hair is normal in appearance and distribution.    HEENT: NC/AT. No lesions noted. Conjunctiva clear, sclera nonicteric. PERRL.  EOMI without nystagmus or strabismus. Fundi appear benign. No hemorrhages or exudates of eyes. Auditory canals are patent bilaterally without lesions. TMs intact,  nonerythematous, nonbulging without lesions. Nasal mucosa pink, nonerythematous, and nonedematous. Frontal and maxillary sinuses are nontender. O/P nonerythematous and moist without exudate.    Neck: Supple without lymph nodes palpated. FROM.     Lungs: CTA B/L without rales, rhonchi, crackles, or wheezes.    Heart: RRR. S1 and S2 normal. No S3 or S4. No MRGT.    Abd: Soft, nontender,nondistended. (+)BSx4 quadrants.     Extrem: No CCE. Radial pulses 2+/4 and equal B/L. FROMx4. No bone, joint, or muscle tenderness noted.    Neuro: No focal motor/sensory deficits.    Procedures    Assessment/Plan  Bianka Agudelo is a 73 y.o. here for medical followup.  Diagnoses and all orders for this visit:    Type 2 diabetes mellitus with both eyes affected by moderate nonproliferative retinopathy without macular edema, with long-term current use of insulin (CMS/[facility])  -     LEVEMIR 100 UNIT/ML injection; Inject 50 Units under the skin into the appropriate area as directed Every Night.  Will increase Levemir to 50 units at night.  Discussed compliance.    Anxiety  -     diazePAM (VALIUM) 5 MG tablet; Take 1 tablet by mouth Every 12 (Twelve) Hours As Needed for Anxiety.  [facility] #[id number] reviewed and is consistent.  [facility] January 2019 reviewed and is consistent.  Patient comfort assessment guide reviewed and updated today.    As part of the patient's treatment plan they are being prescribed a controlled substance/ substances with potential for abuse.  This patient has been made aware of the appropriate use of such medications, including potential risk of somnolence, limited ability to drive and/or work safely, and potential for overdose.  It has also been made clear these medications are for use by the patient only, without concomitant use of alcohol or other substances unless  prescribed/advised by medical provider.    Patient has completed prescribing agreement detailing terms of continued prescribing of controlled substances including monitoring PETE reports, urine drug screens, and pill counts.  The patient is aware PETE reports are reviewed on a regular basis and scanned into the chart.    History and physical exam exhibit continued safe and appropriate use of controlled substances.    Other specified hypothyroidism  -     TSH; Future  -     T4, Free; Future  -     Comprehensive Metabolic Panel; Future  We will check today.  In the meantime, patient to continue Synthroid 137 mcg orally daily.    Right hip pain  -     diclofenac (VOLTAREN) 1 % gel gel; Apply 4 gm to affected areas below the waist QID PRN pain and 2 gm to affected areas above the waist QID PRN pain  -     MRI hip right wo contrast; Future  Secondary to worsening symptoms, will order right hip MRI.  Will try Voltaren gel. Patient to continue physical therapy.    Chronic low back pain with right-sided sciatica, unspecified back pain laterality  -     diclofenac (VOLTAREN) 1 % gel gel; Apply 4 gm to affected areas below the waist QID PRN pain and 2 gm to affected areas above the waist QID PRN pain  -     MRI Lumbar Spine Without Contrast; Future  Secondary to worsening symptoms, will order lumbar back MRI. Will try Voltaren gel.  Patient to continue physical therapy.      Patient's Body mass index is 32.94 kg/m². BMI is above normal parameters. Recommendations include: exercise counseling and nutrition counseling.     Findings and plans discussed with patient who verbalizes understanding and agreement. Will followup with patient once results are in. Patient to followup at clinic PRN or in 1 month for further medical followup.    Eva Elliott MD    EMR Dragon/Transcription Disclaimer:  Much of this encounter note is an electronic transcription/translation of spoken language to printed text.  The electronic  translation of spoken language may permit erroneous, or at times, nonsensical words or phrases to be inadvertently transcribed.  Although I have reviewed the note for such errors, some may still exist.

## 2019-07-15 ENCOUNTER — HOSPITAL ENCOUNTER (OUTPATIENT)
Dept: MRI IMAGING | Facility: HOSPITAL | Age: 74
Discharge: HOME | End: 2019-07-15
Admitting: FAMILY MEDICINE

## 2019-07-15 DIAGNOSIS — M25.551 RIGHT HIP PAIN: ICD-10-CM

## 2019-07-15 PROCEDURE — 73721 MRI JNT OF LWR EXTRE W/O DYE: CPT

## 2019-07-15 PROCEDURE — 73721 MRI JNT OF LWR EXTRE W/O DYE: CPT | Performed by: RADIOLOGY

## 2019-07-16 ENCOUNTER — TELEPHONE (OUTPATIENT)
Dept: FAMILY MEDICINE CLINIC | Facility: CLINIC | Age: 74
End: 2019-07-16

## 2019-07-16 RX ORDER — INSULIN ASPART 100 [IU]/ML
22 INJECTION, SUSPENSION SUBCUTANEOUS 2 TIMES DAILY WITH MEALS
Qty: 30 ML | Refills: 5 | Status: SHIPPED | OUTPATIENT
Start: 2019-07-16 | End: 2020-03-12 | Stop reason: SDUPTHER

## 2019-07-17 NOTE — TELEPHONE ENCOUNTER
It's not her hip. Her hip just showed mild OA. They denied her MRI lumbar. I will have to find out why and we may have to put it in again at her next appointment next week. Please let her know to keep that. Thanks.

## 2019-07-17 NOTE — TELEPHONE ENCOUNTER
It's not her hip. Her hip just showed mild OA. They denied her MRI lumbar. I will have to find out why and we may have to put it in again at her next appointment next week. Please let her know to keep that. Thanks.      Spoke with patient & she verbalized understanding.

## 2019-07-30 ENCOUNTER — NURSE TRIAGE (OUTPATIENT)
Dept: CALL CENTER | Facility: HOSPITAL | Age: 74
End: 2019-07-30

## 2019-07-31 RX ORDER — AMLODIPINE BESYLATE 5 MG/1
TABLET ORAL
Qty: 150 TABLET | Refills: 0 | Status: SHIPPED | OUTPATIENT
Start: 2019-07-31 | End: 2020-01-02

## 2019-07-31 NOTE — TELEPHONE ENCOUNTER
Caller's greatest concern:  Took 40 units humalog 70/30 when she was only to take 22 units.  She had not taken her BG today.  While on the phone, BG checked, 450.  She wanted to know whether to take her regular dose of Levemir, she was advised to go ahead and take the regular dose of Levemir and call her PCP.    Reason for Disposition  • [1] Blood glucose > 240 mg/dl (13 mmol/l) AND [2] uses insulin (e.g., insulin-dependent, all people with type 1 diabetes)    Additional Information  • Negative: Unconscious or difficult to awaken  • Negative: Acting confused (e.g., disoriented, slurred speech)  • Negative: Very weak (e.g., can't stand)  • Negative: Sounds like a life-threatening emergency to the triager  • Negative: [1] Vomiting AND [2] signs of dehydration (e.g., very dry mouth, lightheaded, etc.)  • Negative: [1] Blood glucose > 240 mg/dl (13 mmol/l) AND [2] rapid breathing  • Negative: Blood glucose > 500 mg/dl (27.5 mmol/l)  • Negative: [1] Blood glucose > 240 mg/dl (13 mmol/l) AND [2] urine ketones moderate-large (or more than 1+)  • Negative: [1] Blood glucose > 240 mg/dl (13 mmol/l) AND [2] blood ketones > 1.5 mmol/l  • Negative: [1] Blood glucose > 240 mg/dl (13 mmol/l) AND [2] vomiting AND [3] unable to check for ketones (in blood or urine)  • Negative: [1] New onset Diabetes suspected (e.g., frequent urination, weak, weight loss) AND [2] vomiting or rapid breathing  • Negative: Vomiting lasts > 4 hours  • Negative: Patient sounds very sick or weak to the triager  • Negative: Fever > 100.5 F (38.1 C)  • Negative: Blood glucose > 400 mg/dl (22 mmol/l)  • Negative: [1] Blood glucose > 300 mg/dl (16.5 mmol/l) AND [2] two or more times in a row  • Negative: Urine ketones moderate - large  • Negative: Caller has URGENT medication or insulin pump question and triager unable to answer question  • Negative: [1] Symptoms of high blood sugar (e.g., frequent urination, weak, weight loss) AND [2] not able to test blood  "glucose  • Negative: New onset Diabetes suspected (e.g., frequent urination, weakness, weight loss)  • Negative: Caller has NON-URGENT medication question about med that PCP prescribed and triager unable to answer question    Answer Assessment - Initial Assessment Questions  1. BLOOD GLUCOSE: \"What is your blood glucose level?\"       450  2. ONSET: \"When did you check the blood glucose?\"      During triage  3. USUAL RANGE: \"What is your glucose level usually?\" (e.g., usual fasting morning value, usual evening value)      300  4. KETONES: \"Do you check for ketones (urine or blood test strips)?\" If yes, ask: \"What does the test show now?\"       denies  5. TYPE 1 or 2:  \"Do you know what type of diabetes you have?\"  (e.g., Type 1, Type 2, Gestational; doesn't know)       DMT2  6. INSULIN: \"Do you take insulin?\" If yes, ask: \"Have you missed any shots recently?\"      Yes; denies missed doses  7. DIABETES PILLS: \"Do you take any pills for your diabetes?\" If yes, ask: \"Have you missed taking any pills recently?\"      metformin  8. OTHER SYMPTOMS: \"Do you have any symptoms?\" (e.g., fever, frequent urination, difficulty breathing, dizziness, weakness, vomiting)      denies  9. PREGNANCY: \"Is there any chance you are pregnant?\" \"When was your last menstrual period?\"      na    Protocols used: DIABETES - HIGH BLOOD SUGAR-ADULT-AH      "

## 2019-08-05 ENCOUNTER — OFFICE VISIT (OUTPATIENT)
Dept: FAMILY MEDICINE CLINIC | Facility: CLINIC | Age: 74
End: 2019-08-05

## 2019-08-05 VITALS
HEIGHT: 64 IN | SYSTOLIC BLOOD PRESSURE: 130 MMHG | WEIGHT: 191 LBS | BODY MASS INDEX: 32.61 KG/M2 | DIASTOLIC BLOOD PRESSURE: 61 MMHG | OXYGEN SATURATION: 98 % | HEART RATE: 88 BPM | TEMPERATURE: 97.8 F

## 2019-08-05 DIAGNOSIS — M62.838 MUSCLE SPASMS OF BOTH LOWER EXTREMITIES: ICD-10-CM

## 2019-08-05 DIAGNOSIS — E13.40 NEUROPATHY DUE TO SECONDARY DIABETES (HCC): ICD-10-CM

## 2019-08-05 DIAGNOSIS — F41.9 ANXIETY: ICD-10-CM

## 2019-08-05 DIAGNOSIS — E11.59 HYPERTENSION ASSOCIATED WITH DIABETES (HCC): ICD-10-CM

## 2019-08-05 DIAGNOSIS — I15.2 HYPERTENSION ASSOCIATED WITH DIABETES (HCC): ICD-10-CM

## 2019-08-05 DIAGNOSIS — E03.8 OTHER SPECIFIED HYPOTHYROIDISM: ICD-10-CM

## 2019-08-05 DIAGNOSIS — Z79.4 TYPE 2 DIABETES MELLITUS WITH BOTH EYES AFFECTED BY MODERATE NONPROLIFERATIVE RETINOPATHY WITHOUT MACULAR EDEMA, WITH LONG-TERM CURRENT USE OF INSULIN (HCC): Primary | ICD-10-CM

## 2019-08-05 DIAGNOSIS — E11.3393 TYPE 2 DIABETES MELLITUS WITH BOTH EYES AFFECTED BY MODERATE NONPROLIFERATIVE RETINOPATHY WITHOUT MACULAR EDEMA, WITH LONG-TERM CURRENT USE OF INSULIN (HCC): Primary | ICD-10-CM

## 2019-08-05 PROCEDURE — 99214 OFFICE O/P EST MOD 30 MIN: CPT | Performed by: FAMILY MEDICINE

## 2019-08-05 RX ORDER — DIAZEPAM 5 MG/1
5 TABLET ORAL EVERY 12 HOURS PRN
Qty: 60 TABLET | Refills: 0 | Status: SHIPPED | OUTPATIENT
Start: 2019-08-05 | End: 2019-10-08 | Stop reason: SDUPTHER

## 2019-08-19 RX ORDER — SYRING-NEEDL,DISP,INSUL,0.3 ML 31GX15/64"
SYRINGE, EMPTY DISPOSABLE MISCELLANEOUS
Qty: 100 EACH | Refills: 5 | Status: SHIPPED | OUTPATIENT
Start: 2019-08-19 | End: 2020-12-16

## 2019-08-20 RX ORDER — LOSARTAN POTASSIUM AND HYDROCHLOROTHIAZIDE 12.5; 5 MG/1; MG/1
TABLET ORAL
Qty: 180 TABLET | Refills: 1 | Status: SHIPPED | OUTPATIENT
Start: 2019-08-20 | End: 2019-12-23

## 2019-08-26 RX ORDER — OMEPRAZOLE 20 MG/1
CAPSULE, DELAYED RELEASE ORAL
Qty: 180 CAPSULE | Refills: 1 | Status: SHIPPED | OUTPATIENT
Start: 2019-08-26 | End: 2019-12-23

## 2019-08-26 RX ORDER — INSULIN GLARGINE 100 [IU]/ML
INJECTION, SOLUTION SUBCUTANEOUS
Qty: 10 ML | Refills: 5 | OUTPATIENT
Start: 2019-08-26

## 2019-08-26 NOTE — PROGRESS NOTES
"Bianka Agudelo     VITALS: Blood pressure 130/61, pulse 88, temperature 97.8 °F (36.6 °C), temperature source Oral, height 162.6 cm (64.02\"), weight 86.6 kg (191 lb), SpO2 98 %, not currently breastfeeding.    Subjective  Chief Complaint:   Chief Complaint   Patient presents with   • Tingling     left leg    • Edema     left leg         History of Present Illness:  Patient is a 73 y.o.  female who presents to clinic secondary to medical followup.  She complains of a 3-week history of worsening left leg numbness, tingling, edema.  She does have a history of lower back pain.  She denies any worsening lower back pain.  The symptoms are worse in the morning and in the evening when she is resting.  Symptoms are better during the day when she is mobilizing around.  She denies any changes in urination or bowel movements.  She denies any incontinence.    No complaints about any of the medications.    The following portions of the patient's history were reviewed and updated as appropriate: allergies, current medications, past family history, past medical history, past social history, past surgical history and problem list.    Past Medical History  Past Medical History:   Diagnosis Date   • Anxiety    • CAD (coronary artery disease)    • Diabetes mellitus (CMS/HCC)    • Dyslipidemia    • GERD (gastroesophageal reflux disease)    • H/O angina pectoris    • History of bone density study     February 2014   • History of mammogram 07/15/2013   • Hypertension    • Hypothyroidism    • Muscle spasms of both lower extremities    • Overactive bladder    • Vitamin B12 deficiency    • Vitamin D deficiency        Review of Systems   Constitutional: Negative for chills, fatigue and fever.   HENT: Negative for congestion, ear pain, facial swelling, rhinorrhea, sinus pressure, sore throat and trouble swallowing.    Eyes: Negative for photophobia, discharge, itching and visual disturbance.   Respiratory: Negative for cough, shortness of breath " and wheezing.    Cardiovascular: Positive for leg swelling. Negative for chest pain and palpitations.   Gastrointestinal: Negative for abdominal pain, constipation, diarrhea, nausea and vomiting.   Endocrine: Negative for cold intolerance, heat intolerance, polydipsia and polyuria.   Genitourinary: Negative for difficulty urinating, dysuria and hematuria.   Skin: Negative for rash.   Neurological: Positive for numbness. Negative for dizziness, light-headedness and headaches.   Psychiatric/Behavioral: Negative for agitation and suicidal ideas. The patient is not nervous/anxious.        Surgical History  Past Surgical History:   Procedure Laterality Date   • COLONOSCOPY  01/01/2010   • OOPHORECTOMY      1 removed   • TUBAL ABDOMINAL LIGATION         Family History  Family History   Problem Relation Age of Onset   • Diabetes Mother    • Heart disease Mother    • Stroke Mother    • Cancer Father        Social History  Social History     Socioeconomic History   • Marital status:      Spouse name: Not on file   • Number of children: Not on file   • Years of education: Not on file   • Highest education level: Not on file   Tobacco Use   • Smoking status: Never Smoker   • Smokeless tobacco: Never Used   Substance and Sexual Activity   • Alcohol use: No   • Drug use: No       Objective  Physical Exam    Gen: Patient in NAD. Pleasant and answers appropriately. A&Ox3.    Skin: Warm and dry with normal turgor. No purpura, rashes, or unusual pigmentation noted. Hair is normal in appearance and distribution.    HEENT: NC/AT. No lesions noted. Conjunctiva clear, sclera nonicteric. PERRL. EOMI without nystagmus or strabismus. Fundi appear benign. No hemorrhages or exudates of eyes. Auditory canals are patent bilaterally without lesions. TMs intact,  nonerythematous, nonbulging without lesions. Nasal mucosa pink, nonerythematous, and nonedematous. Frontal and maxillary sinuses are nontender. O/P nonerythematous and moist  without exudate.    Neck: Supple without lymph nodes palpated. FROM.     Lungs: CTA B/L without rales, rhonchi, crackles, or wheezes.    Heart: RRR. S1 and S2 normal. No S3 or S4. No MRGT.    Abd: Soft, nontender,nondistended. (+)BSx4 quadrants.     Extrem: No CCE. Radial pulses 2+/4 and equal B/L. FROMx4. SLRT bilaterally benign.    Neuro: No focal motor/sensory deficits.    Procedures    Assessment/Plan  Bianka Agudelo is a 73 y.o. here for medical followup.  Diagnoses and all orders for this visit:    Type 2 diabetes mellitus with both eyes affected by moderate nonproliferative retinopathy without macular edema, with long-term current use of insulin (CMS/[facility])  -     Dulaglutide 0.75 MG/0.5ML solution pen-injector; Inject 0.75 mg under the skin into the appropriate area as directed 1 (One) Time Per Week.    Anxiety  -     diazePAM (VALIUM) 5 MG tablet; Take 1 tablet by mouth Every 12 (Twelve) Hours As Needed for Anxiety.  Pete #[id number] reviewed and is consistent.  S January 2019 reviewed and is consistent.  Patient comfort assessment guide reviewed and updated today.    As part of the patient's treatment plan they are being prescribed a controlled substance/ substances with potential for abuse.  This patient has been made aware of the appropriate use of such medications, including potential risk of somnolence, limited ability to drive and/or work safely, and potential for overdose.  It has also been made clear these medications are for use by the patient only, without concomitant use of alcohol or other substances unless prescribed/advised by medical provider.    Patient has completed prescribing agreement detailing terms of continued prescribing of controlled substances including monitoring PETE reports, urine drug screens, and pill counts.  The patient is aware PETE reports are reviewed on a regular basis and scanned into the chart.    History and physical exam exhibit continued safe and appropriate use  of controlled substances.      Muscle spasms of both lower extremities  Patient to utilize Flexeril 10 mg orally twice a day.    Hypertension associated with diabetes (CMS/HCC)  Stable today.  Patient to utilize amlodipine 5 mg orally daily and losartan/hydrochlorothiazide 50/12.5 milligrams orally twice a day.    Other specified hypothyroidism  Patient currently taking Synthroid 137 mcg orally daily.    Neuropathy due to secondary diabetes (CMS/HCC)  Encouraged diet and exercise.  Patient currently does decline any medications.    Patient's Body mass index is 32.77 kg/m². BMI is above normal parameters. Recommendations include: exercise counseling and nutrition counseling.     Findings and plans discussed with patient who verbalizes understanding and agreement. Will followup with patient once results are in. Patient to followup at clinic PRN or in two months for further medical followup.    MD NIDIA Travis Dragon/Transcription Disclaimer:  Much of this encounter note is an electronic transcription/translation of spoken language to printed text.  The electronic translation of spoken language may permit erroneous, or at times, nonsensical words or phrases to be inadvertently transcribed.  Although I have reviewed the note for such errors, some may still exist.

## 2019-08-28 DIAGNOSIS — N32.81 OVERACTIVE BLADDER: ICD-10-CM

## 2019-08-28 DIAGNOSIS — Z79.4 TYPE 2 DIABETES MELLITUS WITH BOTH EYES AFFECTED BY MODERATE NONPROLIFERATIVE RETINOPATHY WITHOUT MACULAR EDEMA, WITH LONG-TERM CURRENT USE OF INSULIN (HCC): ICD-10-CM

## 2019-08-28 DIAGNOSIS — E11.3393 TYPE 2 DIABETES MELLITUS WITH BOTH EYES AFFECTED BY MODERATE NONPROLIFERATIVE RETINOPATHY WITHOUT MACULAR EDEMA, WITH LONG-TERM CURRENT USE OF INSULIN (HCC): ICD-10-CM

## 2019-08-28 RX ORDER — ASPIRIN 81 MG/1
TABLET, COATED ORAL
Qty: 90 TABLET | Refills: 1 | Status: SHIPPED | OUTPATIENT
Start: 2019-08-28 | End: 2019-12-02 | Stop reason: SDUPTHER

## 2019-08-28 RX ORDER — OXYBUTYNIN CHLORIDE 5 MG/1
TABLET ORAL
Qty: 180 TABLET | Refills: 1 | Status: SHIPPED | OUTPATIENT
Start: 2019-08-28 | End: 2020-01-28

## 2019-09-10 RX ORDER — CLOPIDOGREL BISULFATE 75 MG/1
TABLET ORAL
Qty: 90 TABLET | Refills: 1 | Status: SHIPPED | OUTPATIENT
Start: 2019-09-10 | End: 2020-02-28

## 2019-09-24 RX ORDER — METFORMIN HYDROCHLORIDE 500 MG/1
TABLET, EXTENDED RELEASE ORAL
Qty: 180 TABLET | Refills: 0 | Status: SHIPPED | OUTPATIENT
Start: 2019-09-24 | End: 2019-11-23 | Stop reason: SDUPTHER

## 2019-09-24 RX ORDER — INSULIN GLARGINE 100 [IU]/ML
INJECTION, SOLUTION SUBCUTANEOUS
Qty: 10 ML | Refills: 5 | OUTPATIENT
Start: 2019-09-24

## 2019-10-08 ENCOUNTER — TELEPHONE (OUTPATIENT)
Dept: FAMILY MEDICINE CLINIC | Facility: CLINIC | Age: 74
End: 2019-10-08

## 2019-10-08 DIAGNOSIS — F41.9 ANXIETY: ICD-10-CM

## 2019-10-08 RX ORDER — DIAZEPAM 5 MG/1
5 TABLET ORAL EVERY 12 HOURS PRN
Qty: 60 TABLET | Refills: 0 | Status: SHIPPED | OUTPATIENT
Start: 2019-10-08 | End: 2019-12-12 | Stop reason: SDUPTHER

## 2019-10-09 NOTE — TELEPHONE ENCOUNTER
Ready for pickup. Please let her know. Thanks.    Attempted to notify patient,not available at this time.    Still unable to reach patient.    Will await her return call.

## 2019-10-09 NOTE — PROGRESS NOTES
[facility] # [id number]  Reviewed and is consistent.  S 1/2019 reviewed and is consistent.  Patient comfort assessment guide reviewed and updated today.    As part of the patient's treatment plan they are being prescribed a controlled substance/ substances with potential for abuse.  This patient has been made aware of the appropriate use of such medications, including potential risk of somnolence, limited ability to drive and/or work safely, and potential for overdose.  It has also been made clear these medications are for use by the patient only, without concomitant use of alcohol or other substances unless prescribed/advised by medical provider.    Patient has completed prescribing agreement detailing terms of continued prescribing of controlled substances including monitoring PETE reports, urine drug screens, and pill counts.  The patient is aware PETE reports are reviewed on a regular basis and scanned into the chart.    History and physical exam exhibit continued safe and appropriate use of controlled substances.

## 2019-10-14 ENCOUNTER — TELEPHONE (OUTPATIENT)
Dept: FAMILY MEDICINE CLINIC | Facility: CLINIC | Age: 74
End: 2019-10-14

## 2019-10-24 DIAGNOSIS — E03.8 OTHER SPECIFIED HYPOTHYROIDISM: ICD-10-CM

## 2019-10-24 RX ORDER — LEVOTHYROXINE SODIUM 137 UG/1
TABLET ORAL
Qty: 90 TABLET | Refills: 0 | Status: SHIPPED | OUTPATIENT
Start: 2019-10-24 | End: 2019-12-23

## 2019-10-24 RX ORDER — INSULIN GLARGINE 100 [IU]/ML
INJECTION, SOLUTION SUBCUTANEOUS
Qty: 10 ML | Refills: 5 | OUTPATIENT
Start: 2019-10-24

## 2019-10-28 RX ORDER — LOSARTAN POTASSIUM AND HYDROCHLOROTHIAZIDE 12.5; 5 MG/1; MG/1
TABLET ORAL
Qty: 180 TABLET | Refills: 1 | OUTPATIENT
Start: 2019-10-28

## 2019-10-28 RX ORDER — OMEPRAZOLE 20 MG/1
CAPSULE, DELAYED RELEASE ORAL
Qty: 180 CAPSULE | Refills: 1 | OUTPATIENT
Start: 2019-10-28

## 2019-11-17 DIAGNOSIS — E78.5 DYSLIPIDEMIA: ICD-10-CM

## 2019-11-18 RX ORDER — ATORVASTATIN CALCIUM 10 MG/1
TABLET, FILM COATED ORAL
Qty: 90 TABLET | Refills: 1 | OUTPATIENT
Start: 2019-11-18

## 2019-11-19 ENCOUNTER — TELEPHONE (OUTPATIENT)
Dept: FAMILY MEDICINE CLINIC | Facility: CLINIC | Age: 74
End: 2019-11-19

## 2019-11-19 DIAGNOSIS — E78.5 DYSLIPIDEMIA: ICD-10-CM

## 2019-11-19 RX ORDER — ATORVASTATIN CALCIUM 10 MG/1
10 TABLET, FILM COATED ORAL DAILY
Qty: 90 TABLET | Refills: 0 | Status: SHIPPED | OUTPATIENT
Start: 2019-11-19 | End: 2020-02-14

## 2019-11-25 RX ORDER — INSULIN GLARGINE 100 [IU]/ML
INJECTION, SOLUTION SUBCUTANEOUS
Qty: 10 ML | Refills: 5 | Status: SHIPPED | OUTPATIENT
Start: 2019-11-25 | End: 2019-12-23

## 2019-11-25 RX ORDER — METFORMIN HYDROCHLORIDE 500 MG/1
TABLET, EXTENDED RELEASE ORAL
Qty: 180 TABLET | Refills: 0 | Status: SHIPPED | OUTPATIENT
Start: 2019-11-25 | End: 2020-01-02

## 2019-12-02 DIAGNOSIS — E11.3393 TYPE 2 DIABETES MELLITUS WITH BOTH EYES AFFECTED BY MODERATE NONPROLIFERATIVE RETINOPATHY WITHOUT MACULAR EDEMA, WITH LONG-TERM CURRENT USE OF INSULIN (HCC): ICD-10-CM

## 2019-12-02 DIAGNOSIS — Z79.4 TYPE 2 DIABETES MELLITUS WITH BOTH EYES AFFECTED BY MODERATE NONPROLIFERATIVE RETINOPATHY WITHOUT MACULAR EDEMA, WITH LONG-TERM CURRENT USE OF INSULIN (HCC): ICD-10-CM

## 2019-12-02 DIAGNOSIS — M25.551 RIGHT HIP PAIN: ICD-10-CM

## 2019-12-02 RX ORDER — IBUPROFEN 800 MG/1
800 TABLET ORAL EVERY 8 HOURS
Qty: 30 TABLET | Refills: 0 | Status: SHIPPED | OUTPATIENT
Start: 2019-12-02 | End: 2020-01-08

## 2019-12-02 RX ORDER — ASPIRIN 81 MG/1
81 TABLET ORAL DAILY
Qty: 30 TABLET | Refills: 0 | Status: SHIPPED | OUTPATIENT
Start: 2019-12-02 | End: 2020-09-09 | Stop reason: SDUPTHER

## 2019-12-02 RX ORDER — CYCLOBENZAPRINE HCL 10 MG
TABLET ORAL
Qty: 60 TABLET | Refills: 5 | Status: SHIPPED | OUTPATIENT
Start: 2019-12-02 | End: 2020-07-21

## 2019-12-12 ENCOUNTER — OFFICE VISIT (OUTPATIENT)
Dept: FAMILY MEDICINE CLINIC | Facility: CLINIC | Age: 74
End: 2019-12-12

## 2019-12-12 VITALS
TEMPERATURE: 97.5 F | OXYGEN SATURATION: 98 % | DIASTOLIC BLOOD PRESSURE: 71 MMHG | BODY MASS INDEX: 33.8 KG/M2 | SYSTOLIC BLOOD PRESSURE: 123 MMHG | HEIGHT: 64 IN | HEART RATE: 75 BPM | WEIGHT: 198 LBS

## 2019-12-12 DIAGNOSIS — F41.9 ANXIETY: ICD-10-CM

## 2019-12-12 DIAGNOSIS — E11.69 HYPERLIPIDEMIA DUE TO TYPE 2 DIABETES MELLITUS (HCC): ICD-10-CM

## 2019-12-12 DIAGNOSIS — E78.5 HYPERLIPIDEMIA DUE TO TYPE 2 DIABETES MELLITUS (HCC): ICD-10-CM

## 2019-12-12 DIAGNOSIS — E11.59 HYPERTENSION ASSOCIATED WITH DIABETES (HCC): ICD-10-CM

## 2019-12-12 DIAGNOSIS — I15.2 HYPERTENSION ASSOCIATED WITH DIABETES (HCC): ICD-10-CM

## 2019-12-12 DIAGNOSIS — E11.3393 TYPE 2 DIABETES MELLITUS WITH BOTH EYES AFFECTED BY MODERATE NONPROLIFERATIVE RETINOPATHY WITHOUT MACULAR EDEMA, WITH LONG-TERM CURRENT USE OF INSULIN (HCC): ICD-10-CM

## 2019-12-12 DIAGNOSIS — Z79.4 TYPE 2 DIABETES MELLITUS WITH BOTH EYES AFFECTED BY MODERATE NONPROLIFERATIVE RETINOPATHY WITHOUT MACULAR EDEMA, WITH LONG-TERM CURRENT USE OF INSULIN (HCC): ICD-10-CM

## 2019-12-12 DIAGNOSIS — E03.8 OTHER SPECIFIED HYPOTHYROIDISM: ICD-10-CM

## 2019-12-12 DIAGNOSIS — Z00.00 MEDICARE ANNUAL WELLNESS VISIT, SUBSEQUENT: Primary | ICD-10-CM

## 2019-12-12 DIAGNOSIS — E53.8 B12 DEFICIENCY: ICD-10-CM

## 2019-12-12 PROCEDURE — G0439 PPPS, SUBSEQ VISIT: HCPCS | Performed by: FAMILY MEDICINE

## 2019-12-12 PROCEDURE — 82043 UR ALBUMIN QUANTITATIVE: CPT | Performed by: FAMILY MEDICINE

## 2019-12-12 RX ORDER — ALBUTEROL SULFATE 90 UG/1
2 AEROSOL, METERED RESPIRATORY (INHALATION) EVERY 4 HOURS PRN
Qty: 1 INHALER | Refills: 5 | Status: SHIPPED | OUTPATIENT
Start: 2019-12-12 | End: 2020-06-10

## 2019-12-12 RX ORDER — LANOLIN ALCOHOL/MO/W.PET/CERES
1000 CREAM (GRAM) TOPICAL DAILY
Qty: 30 TABLET | Refills: 5 | Status: SHIPPED | OUTPATIENT
Start: 2019-12-12 | End: 2020-09-08

## 2019-12-12 RX ORDER — DIAZEPAM 5 MG/1
5 TABLET ORAL EVERY 12 HOURS PRN
Qty: 60 TABLET | Refills: 0 | Status: SHIPPED | OUTPATIENT
Start: 2019-12-12 | End: 2020-02-14 | Stop reason: SDUPTHER

## 2019-12-13 LAB — ALBUMIN UR-MCNC: 3.2 MG/DL

## 2019-12-23 DIAGNOSIS — E03.8 OTHER SPECIFIED HYPOTHYROIDISM: ICD-10-CM

## 2019-12-23 RX ORDER — LEVOTHYROXINE SODIUM 137 UG/1
TABLET ORAL
Qty: 90 TABLET | Refills: 0 | Status: SHIPPED | OUTPATIENT
Start: 2019-12-23 | End: 2020-01-28

## 2019-12-23 RX ORDER — INSULIN GLARGINE 100 [IU]/ML
INJECTION, SOLUTION SUBCUTANEOUS
Qty: 10 ML | Refills: 5 | Status: SHIPPED | OUTPATIENT
Start: 2019-12-23 | End: 2020-03-12

## 2019-12-23 RX ORDER — LOSARTAN POTASSIUM AND HYDROCHLOROTHIAZIDE 12.5; 5 MG/1; MG/1
TABLET ORAL
Qty: 180 TABLET | Refills: 1 | Status: SHIPPED | OUTPATIENT
Start: 2019-12-23 | End: 2020-02-24

## 2019-12-23 RX ORDER — OMEPRAZOLE 20 MG/1
CAPSULE, DELAYED RELEASE ORAL
Qty: 180 CAPSULE | Refills: 1 | Status: SHIPPED | OUTPATIENT
Start: 2019-12-23 | End: 2020-09-09 | Stop reason: SDUPTHER

## 2019-12-30 NOTE — PROGRESS NOTES
The ABCs of the Annual Wellness Visit  Subsequent Medicare Wellness Visit    Chief Complaint   Patient presents with   • Medicare Wellness-subsequent       Subjective   History of Present Illness:  Bianka Agudelo is a 74 y.o. female who presents for a Subsequent Medicare Wellness Visit.    HEALTH RISK ASSESSMENT    Recent Hospitalizations:  No hospitalization(s) within the last year.    Current Medical Providers:  Patient Care Team:  Eva Elliott MD as PCP - General (Family Medicine)  Eva Elliott MD as PCP - Family Medicine    Smoking Status:  Social History     Tobacco Use   Smoking Status Never Smoker   Smokeless Tobacco Never Used       Alcohol Consumption:  Social History     Substance and Sexual Activity   Alcohol Use No       Depression Screen:   PHQ-2/PHQ-9 Depression Screening 12/12/2019   Little interest or pleasure in doing things 1   Feeling down, depressed, or hopeless 0   Trouble falling or staying asleep, or sleeping too much -   Feeling tired or having little energy -   Poor appetite or overeating -   Feeling bad about yourself - or that you are a failure or have let yourself or your family down -   Trouble concentrating on things, such as reading the newspaper or watching television -   Moving or speaking so slowly that other people could have noticed. Or the opposite - being so fidgety or restless that you have been moving around a lot more than usual -   Thoughts that you would be better off dead, or of hurting yourself in some way -   Total Score 1   If you checked off any problems, how difficult have these problems made it for you to do your work, take care of things at home, or get along with other people? -       Fall Risk Screen:  STEADI Fall Risk Assessment was completed, and patient is at MODERATE risk for falls. Assessment completed on:12/12/2019    Health Habits and Functional and Cognitive Screening:  Functional & Cognitive Status 12/12/2019   Do you have difficulty preparing  food and eating? No   Do you have difficulty bathing yourself, getting dressed or grooming yourself? No   Do you have difficulty using the toilet? No   Do you have difficulty moving around from place to place? No   Do you have trouble with steps or getting out of a bed or a chair? No   Current Diet Well Balanced Diet   Dental Exam Not up to date   Eye Exam Not up to date   Exercise (times per week) 3 times per week   Current Exercise Activities Include None   Do you need help using the phone?  No   Are you deaf or do you have serious difficulty hearing?  No   Do you need help with transportation? No   Do you need help shopping? No   Do you need help preparing meals?  No   Do you need help with housework?  No   Do you need help with laundry? No   Do you need help taking your medications? No   Do you need help managing money? No   Do you ever drive or ride in a car without wearing a seat belt? No   Have you felt unusual stress, anger or loneliness in the last month? No   Who do you live with? Alone   If you need help, do you have trouble finding someone available to you? No   Have you been bothered in the last four weeks by sexual problems? No   Do you have difficulty concentrating, remembering or making decisions? No         Does the patient have evidence of cognitive impairment? No    Asprin use counseling:Taking ASA appropriately as indicated    Age-appropriate Screening Schedule:  Refer to the list below for future screening recommendations based on patient's age, sex and/or medical conditions. Orders for these recommended tests are listed in the plan section. The patient has been provided with a written plan.    Health Maintenance   Topic Date Due   • DIABETIC EYE EXAM  03/07/2017   • ZOSTER VACCINE (2 of 2) 07/06/2017   • PNEUMOCOCCAL VACCINES (65+ LOW/MEDIUM RISK) (2 of 2 - PPSV23) 05/11/2018   • HEMOGLOBIN A1C  10/24/2019   • LIPID PANEL  12/11/2019   • DIABETIC FOOT EXAM  01/29/2020   • COLONOSCOPY   "06/14/2020   • URINE MICROALBUMIN  12/12/2020   • MAMMOGRAM  01/11/2021   • TDAP/TD VACCINES (3 - Td) 05/11/2027   • INFLUENZA VACCINE  Completed          The following portions of the patient's history were reviewed and updated as appropriate: allergies, current medications, past family history, past medical history, past social history, past surgical history and problem list.    Outpatient Medications Prior to Visit   Medication Sig Dispense Refill   • amLODIPine (NORVASC) 5 MG tablet take 1 tablet by mouth once daily 150 tablet 0   • aspirin (ASPIRIN LOW DOSE) 81 MG EC tablet Take 1 tablet by mouth Daily. 30 tablet 0   • atorvastatin (LIPITOR) 10 MG tablet Take 1 tablet by mouth Daily. 90 tablet 0   • BD VEO INSULIN SYRINGE U/F 31G X 15/64\" 0.5 ML misc USE AS DIRECTED FOUR TIMES A  each 5   • clopidogrel (PLAVIX) 75 MG tablet take 1 tablet by mouth once daily 90 tablet 1   • cyclobenzaprine (FLEXERIL) 10 MG tablet take 1 tablet by mouth twice a day 60 tablet 5   • diclofenac (VOLTAREN) 1 % gel gel Apply 4 gm to affected areas below the waist QID PRN pain and 2 gm to affected areas above the waist QID PRN pain 100 g 1   • glucose blood test strip 1 each by Other route Daily. Use as instructed 100 each 5   • ibuprofen (ADVIL,MOTRIN) 800 MG tablet Take 1 tablet by mouth Every 8 (Eight) Hours. 30 tablet 0   • Lancets misc Use as needed daily to check glucose levels 100 each 5   • LEVEMIR 100 UNIT/ML injection Inject 50 Units under the skin into the appropriate area as directed Every Night. 15 mL 5   • metFORMIN ER (GLUCOPHAGE-XR) 500 MG 24 hr tablet take 1 tablet by mouth twice a day with meals 180 tablet 0   • nitroglycerin (NITRO-BID) 6.5 MG CR capsule TAKE 1 CAPSLUE BY MOUTH 3 TIMES A DAY 90 capsule 5   • NOVOLOG MIX 70/30 (70-30) 100 UNIT/ML injection Inject 22 Units under the skin into the appropriate area as directed 2 (Two) Times a Day With Meals. 30 mL 5   • oxybutynin (DITROPAN) 5 MG tablet take 1 " "tablet by mouth twice a day 180 tablet 1   • SITagliptin (JANUVIA) 100 MG tablet Take 1 tablet by mouth Daily. 30 tablet 0   • albuterol (PROVENTIL HFA;VENTOLIN HFA) 108 (90 Base) MCG/ACT inhaler Inhale 2 puffs Every 4 (Four) Hours As Needed for Wheezing. 1 inhaler 5   • BD INSULIN SYRINGE ULTRAFINE 31G X 15/64\" 0.5 ML misc   1   • diazePAM (VALIUM) 5 MG tablet Take 1 tablet by mouth Every 12 (Twelve) Hours As Needed for Anxiety. 60 tablet 0   • levothyroxine (SYNTHROID, LEVOTHROID) 137 MCG tablet take 1 tablet by mouth once daily 90 tablet 0   • losartan-hydrochlorothiazide (HYZAAR) 50-12.5 MG per tablet TAKE 1 TABLET BY MOUTH TWICE A  tablet 1   • omeprazole (priLOSEC) 20 MG capsule take 1 capsule by mouth twice a day 180 capsule 1   • vitamin B-12 (CYANOCOBALAMIN) 1000 MCG tablet Take 1 tablet by mouth Daily. 30 tablet 0   • Dulaglutide 0.75 MG/0.5ML solution pen-injector Inject 0.75 mg under the skin into the appropriate area as directed 1 (One) Time Per Week. 2 mL 5   • LANTUS 100 UNIT/ML injection inject 38 units subcutaneously every evening 10 mL 5     No facility-administered medications prior to visit.        Patient Active Problem List   Diagnosis   • Vitamin D deficiency   • Vitamin B12 deficiency   • Overactive bladder   • Hypothyroidism   • Hypertension   • GERD (gastroesophageal reflux disease)   • Dyslipidemia   • Type 2 diabetes mellitus with both eyes affected by moderate nonproliferative retinopathy without macular edema, with long-term current use of insulin (CMS/[facility])   • Anxiety   • Coronary artery disease involving native coronary artery of native heart with angina pectoris (CMS/[facility])   • Hypercalcemia   • History of laceration of skin       Advanced Care Planning:  Patient has an advance directive - a copy has not been provided. Have asked the patient to send this to us to add to record    Review of Systems   Constitutional: Negative for chills, fatigue and fever.   HENT: Negative for " "congestion, ear pain, facial swelling, rhinorrhea, sinus pressure, sore throat and trouble swallowing.    Eyes: Negative for photophobia, discharge, itching and visual disturbance.   Respiratory: Negative for cough, shortness of breath and wheezing.    Cardiovascular: Negative for chest pain, palpitations and leg swelling.   Gastrointestinal: Negative for abdominal pain, constipation, diarrhea, nausea and vomiting.   Endocrine: Negative for cold intolerance, heat intolerance, polydipsia and polyuria.   Genitourinary: Negative for difficulty urinating, dysuria and hematuria.   Skin: Negative for rash.   Neurological: Negative for dizziness, light-headedness and headaches.   Psychiatric/Behavioral: Negative for agitation and suicidal ideas. The patient is not nervous/anxious.        Compared to one year ago, the patient feels her physical health is the same.  Compared to one year ago, the patient feels her mental health is the same.    Reviewed chart for potential of high risk medication in the elderly: yes  Reviewed chart for potential of harmful drug interactions in the elderly:yes    Objective         Vitals:    12/12/19 1004   BP: 123/71   BP Location: Right arm   Patient Position: Sitting   Pulse: 75   Temp: 97.5 °F (36.4 °C)   TempSrc: Oral   SpO2: 98%   Weight: 89.8 kg (198 lb)   Height: 162.6 cm (64.02\")       Body mass index is 33.97 kg/m².  Discussed the patient's BMI with her. The BMI is above average; BMI management plan is completed.    Physical Exam   Constitutional: She is oriented to person, place, and time. She appears well-developed and well-nourished. She appears distressed.   HENT:   Head: Normocephalic and atraumatic.   Right Ear: External ear normal.   Left Ear: External ear normal.   Nose: Nose normal.   Mouth/Throat: Oropharynx is clear and moist. No oropharyngeal exudate.   Eyes: Pupils are equal, round, and reactive to light. EOM are normal. Right eye exhibits no discharge. Left eye exhibits " no discharge. No scleral icterus.   Neck: Normal range of motion. Neck supple.   Cardiovascular: Normal rate, regular rhythm, normal heart sounds and intact distal pulses. Exam reveals no gallop and no friction rub.   No murmur heard.  Pulmonary/Chest: Effort normal and breath sounds normal. She has no wheezes. She exhibits no tenderness.   Abdominal: Soft. Bowel sounds are normal. There is no tenderness. There is no guarding.   Musculoskeletal: Normal range of motion. She exhibits tenderness. She exhibits no edema or deformity.   Lymphadenopathy:     She has no cervical adenopathy.   Neurological: She is alert and oriented to person, place, and time. She displays normal reflexes. No cranial nerve deficit.   Skin: Skin is warm and dry. Capillary refill takes 2 to 3 seconds. She is not diaphoretic. No erythema. No pallor.   Psychiatric: She has a normal mood and affect. Her behavior is normal.   Nursing note and vitals reviewed.            Assessment/Plan   Medicare Risks and Personalized Health Plan  CMS Preventative Services Quick Reference  Advance Directive Discussion  Breast Cancer/Mammogram Screening  Cardiovascular risk  Depression/Dysphoria  Fall Risk  Glaucoma Risk  Immunizations Discussed/Encouraged (specific immunizations; Influenza )  Inadequate Social Support, Isolation, Loneliness, Lack of Transportation, Financial Difficulties, or Caregiver Stress   Inactivity/Sedentary  Obesity/Overweight   Polypharmacy    The above risks/problems have been discussed with the patient.  Pertinent information has been shared with the patient in the After Visit Summary.  Follow up plans and orders are seen below in the Assessment/Plan Section.    Diagnoses and all orders for this visit:    1. Medicare annual wellness visit, subsequent (Primary)    2. Anxiety  -     albuterol sulfate  (90 Base) MCG/ACT inhaler; Inhale 2 puffs Every 4 (Four) Hours As Needed for Wheezing.  Dispense: 1 inhaler; Refill: 5  -      "diazePAM (VALIUM) 5 MG tablet; Take 1 tablet by mouth Every 12 (Twelve) Hours As Needed for Anxiety.  Dispense: 60 tablet; Refill: 0    3. Type 2 diabetes mellitus with both eyes affected by moderate nonproliferative retinopathy without macular edema, with long-term current use of insulin (CMS/[facility])  -     BD INSULIN SYRINGE ULTRAFINE 31G X 15/64\" 0.5 ML misc; Inject 1 pen under the skin into the appropriate area as directed 2 (Two) Times a Day.  Dispense: 200 each; Refill: 5  -     Hemoglobin A1c; Future  -     Comprehensive Metabolic Panel; Future  -     MicroAlbumin, Urine, Random - Urine, Clean Catch; Future  -     Lipid Panel; Future  -     MicroAlbumin, Urine, Random - Urine, Clean Catch    4. B12 deficiency  -     vitamin B-12 (CYANOCOBALAMIN) 1000 MCG tablet; Take 1 tablet by mouth Daily.  Dispense: 30 tablet; Refill: 5    5. Hyperlipidemia due to type 2 diabetes mellitus (CMS/[facility])  -     Comprehensive Metabolic Panel; Future  -     Lipid Panel; Future    6. Hypertension associated with diabetes (CMS/[facility])  -     Comprehensive Metabolic Panel; Future    7. Other specified hypothyroidism  -     TSH; Future  -     T4, Free; Future      Follow Up:  Return in about 3 months (around 3/12/2020).     An After Visit Summary and PPPS were given to the patient.             "

## 2020-01-02 DIAGNOSIS — E11.3393 TYPE 2 DIABETES MELLITUS WITH BOTH EYES AFFECTED BY MODERATE NONPROLIFERATIVE RETINOPATHY WITHOUT MACULAR EDEMA, WITH LONG-TERM CURRENT USE OF INSULIN (HCC): ICD-10-CM

## 2020-01-02 DIAGNOSIS — Z79.4 TYPE 2 DIABETES MELLITUS WITH BOTH EYES AFFECTED BY MODERATE NONPROLIFERATIVE RETINOPATHY WITHOUT MACULAR EDEMA, WITH LONG-TERM CURRENT USE OF INSULIN (HCC): ICD-10-CM

## 2020-01-02 RX ORDER — SITAGLIPTIN 100 MG/1
TABLET, FILM COATED ORAL
Qty: 30 TABLET | Refills: 2 | Status: SHIPPED | OUTPATIENT
Start: 2020-01-02 | End: 2020-04-06

## 2020-01-02 RX ORDER — METFORMIN HYDROCHLORIDE 500 MG/1
TABLET, EXTENDED RELEASE ORAL
Qty: 180 TABLET | Refills: 0 | Status: SHIPPED | OUTPATIENT
Start: 2020-01-02 | End: 2020-04-06

## 2020-01-02 RX ORDER — AMLODIPINE BESYLATE 5 MG/1
TABLET ORAL
Qty: 150 TABLET | Refills: 0 | Status: SHIPPED | OUTPATIENT
Start: 2020-01-02 | End: 2020-06-12 | Stop reason: SDUPTHER

## 2020-01-08 DIAGNOSIS — M25.551 RIGHT HIP PAIN: ICD-10-CM

## 2020-01-08 RX ORDER — IBUPROFEN 800 MG/1
TABLET ORAL
Qty: 30 TABLET | Refills: 0 | Status: SHIPPED | OUTPATIENT
Start: 2020-01-08 | End: 2020-02-14

## 2020-01-09 ENCOUNTER — OFFICE VISIT (OUTPATIENT)
Dept: FAMILY MEDICINE CLINIC | Facility: CLINIC | Age: 75
End: 2020-01-09

## 2020-01-09 VITALS
DIASTOLIC BLOOD PRESSURE: 62 MMHG | HEART RATE: 93 BPM | OXYGEN SATURATION: 98 % | BODY MASS INDEX: 33.12 KG/M2 | TEMPERATURE: 98.4 F | HEIGHT: 64 IN | WEIGHT: 194 LBS | SYSTOLIC BLOOD PRESSURE: 138 MMHG

## 2020-01-09 DIAGNOSIS — J40 BRONCHITIS: Primary | ICD-10-CM

## 2020-01-09 PROCEDURE — 96372 THER/PROPH/DIAG INJ SC/IM: CPT | Performed by: NURSE PRACTITIONER

## 2020-01-09 PROCEDURE — 99213 OFFICE O/P EST LOW 20 MIN: CPT | Performed by: NURSE PRACTITIONER

## 2020-01-09 RX ORDER — GUAIFENESIN AND CODEINE PHOSPHATE 100; 10 MG/5ML; MG/5ML
10 SOLUTION ORAL 3 TIMES DAILY PRN
Qty: 180 ML | Refills: 0 | Status: SHIPPED | OUTPATIENT
Start: 2020-01-09 | End: 2020-03-30

## 2020-01-09 RX ORDER — CEFTRIAXONE 1 G/1
0.5 INJECTION, POWDER, FOR SOLUTION INTRAMUSCULAR; INTRAVENOUS ONCE
Status: COMPLETED | OUTPATIENT
Start: 2020-01-09 | End: 2020-01-09

## 2020-01-09 RX ORDER — DOXYCYCLINE 100 MG/1
100 CAPSULE ORAL 2 TIMES DAILY
Qty: 20 CAPSULE | Refills: 0 | Status: SHIPPED | OUTPATIENT
Start: 2020-01-09 | End: 2020-03-30

## 2020-01-09 RX ADMIN — CEFTRIAXONE 0.5 G: 1 INJECTION, POWDER, FOR SOLUTION INTRAMUSCULAR; INTRAVENOUS at 11:53

## 2020-01-09 NOTE — PROGRESS NOTES
Subjective   Bianka Agudelo is a 74 y.o. female.     Chief Complaint: Cough and Nasal Congestion    Cough   This is a new problem. The current episode started 1 to 4 weeks ago. The problem has been gradually worsening. The problem occurs every few minutes. The cough is productive of sputum. Associated symptoms include ear congestion, nasal congestion, postnasal drip and a sore throat. Nothing aggravates the symptoms. She has tried nothing for the symptoms.   Patient has been unable to sleep at night due to continuous cough.     Family History   Problem Relation Age of Onset   • Diabetes Mother    • Heart disease Mother    • Stroke Mother    • Cancer Father        Social History     Socioeconomic History   • Marital status:      Spouse name: Not on file   • Number of children: Not on file   • Years of education: Not on file   • Highest education level: Not on file   Tobacco Use   • Smoking status: Never Smoker   • Smokeless tobacco: Never Used   Substance and Sexual Activity   • Alcohol use: No   • Drug use: No   • Sexual activity: Defer       Past Medical History:   Diagnosis Date   • Anxiety    • CAD (coronary artery disease)    • Diabetes mellitus (CMS/HCC)    • Dyslipidemia    • GERD (gastroesophageal reflux disease)    • H/O angina pectoris    • History of bone density study     February 2014   • History of mammogram 07/15/2013   • Hypertension    • Hypothyroidism    • Muscle spasms of both lower extremities    • Overactive bladder    • Vitamin B12 deficiency    • Vitamin D deficiency        Review of Systems   Constitutional: Negative.    HENT: Positive for postnasal drip and sore throat.    Respiratory: Positive for cough.    Cardiovascular: Negative.    Gastrointestinal: Negative.    Musculoskeletal: Negative.    Skin: Negative.    Neurological: Negative.    Psychiatric/Behavioral: Negative.        Objective   Physical Exam   Constitutional: She is oriented to person, place, and time. She appears  "well-developed and well-nourished.   Neck: Normal range of motion. Neck supple.   Cardiovascular: Normal rate, regular rhythm and normal heart sounds.   Pulmonary/Chest: Effort normal and breath sounds normal.   Neurological: She is alert and oriented to person, place, and time.   Skin: Skin is warm and dry.   Psychiatric: She has a normal mood and affect. Her behavior is normal. Judgment and thought content normal.   Nursing note and vitals reviewed.      Procedures    Vitals: Blood pressure 138/62, pulse 93, temperature 98.4 °F (36.9 °C), temperature source Oral, height 162.6 cm (64\"), weight 88 kg (194 lb), SpO2 98 %, not currently breastfeeding.    Allergies:   Allergies   Allergen Reactions   • Nuts         During this visit the following were done:  Labs Reviewed []    Labs Ordered []    Radiology Reports Reviewed []    Radiology Ordered []    PCP Records Reviewed []    Referring Provider Records Reviewed []    ER Records Reviewed []    Hospital Records Reviewed []    History Obtained From Family []    Radiology Images Reviewed []    Other Reviewed []    Records Requested []      Assessment/Plan   Bianka was seen today for cough and nasal congestion.    Diagnoses and all orders for this visit:    Bronchitis  -     cefTRIAXone (ROCEPHIN) injection 0.5 g  -     doxycycline (MONODOX) 100 MG capsule; Take 1 capsule by mouth 2 (Two) Times a Day.  -     guaiFENesin-codeine (GUAIFENESIN AC) 100-10 MG/5ML liquid; Take 10 mL by mouth 3 (Three) Times a Day As Needed for Cough or Congestion.               "

## 2020-01-28 DIAGNOSIS — E03.8 OTHER SPECIFIED HYPOTHYROIDISM: ICD-10-CM

## 2020-01-28 DIAGNOSIS — N32.81 OVERACTIVE BLADDER: ICD-10-CM

## 2020-01-28 RX ORDER — LEVOTHYROXINE SODIUM 137 UG/1
TABLET ORAL
Qty: 90 TABLET | Refills: 0 | Status: SHIPPED | OUTPATIENT
Start: 2020-01-28 | End: 2020-03-09 | Stop reason: SDUPTHER

## 2020-01-28 RX ORDER — OXYBUTYNIN CHLORIDE 5 MG/1
TABLET ORAL
Qty: 180 TABLET | Refills: 1 | Status: SHIPPED | OUTPATIENT
Start: 2020-01-28 | End: 2020-06-12 | Stop reason: SDUPTHER

## 2020-02-14 ENCOUNTER — LAB (OUTPATIENT)
Dept: FAMILY MEDICINE CLINIC | Facility: CLINIC | Age: 75
End: 2020-02-14

## 2020-02-14 ENCOUNTER — TELEPHONE (OUTPATIENT)
Dept: FAMILY MEDICINE CLINIC | Facility: CLINIC | Age: 75
End: 2020-02-14

## 2020-02-14 DIAGNOSIS — E78.5 DYSLIPIDEMIA: ICD-10-CM

## 2020-02-14 DIAGNOSIS — E11.3393 TYPE 2 DIABETES MELLITUS WITH BOTH EYES AFFECTED BY MODERATE NONPROLIFERATIVE RETINOPATHY WITHOUT MACULAR EDEMA, WITH LONG-TERM CURRENT USE OF INSULIN (HCC): ICD-10-CM

## 2020-02-14 DIAGNOSIS — E78.5 HYPERLIPIDEMIA DUE TO TYPE 2 DIABETES MELLITUS (HCC): ICD-10-CM

## 2020-02-14 DIAGNOSIS — M25.551 RIGHT HIP PAIN: ICD-10-CM

## 2020-02-14 DIAGNOSIS — E11.69 HYPERLIPIDEMIA DUE TO TYPE 2 DIABETES MELLITUS (HCC): ICD-10-CM

## 2020-02-14 DIAGNOSIS — E11.59 HYPERTENSION ASSOCIATED WITH DIABETES (HCC): ICD-10-CM

## 2020-02-14 DIAGNOSIS — Z79.4 TYPE 2 DIABETES MELLITUS WITH BOTH EYES AFFECTED BY MODERATE NONPROLIFERATIVE RETINOPATHY WITHOUT MACULAR EDEMA, WITH LONG-TERM CURRENT USE OF INSULIN (HCC): ICD-10-CM

## 2020-02-14 DIAGNOSIS — E03.8 OTHER SPECIFIED HYPOTHYROIDISM: ICD-10-CM

## 2020-02-14 DIAGNOSIS — F41.9 ANXIETY: ICD-10-CM

## 2020-02-14 DIAGNOSIS — I15.2 HYPERTENSION ASSOCIATED WITH DIABETES (HCC): ICD-10-CM

## 2020-02-14 PROCEDURE — 83036 HEMOGLOBIN GLYCOSYLATED A1C: CPT | Performed by: FAMILY MEDICINE

## 2020-02-14 PROCEDURE — 84443 ASSAY THYROID STIM HORMONE: CPT | Performed by: FAMILY MEDICINE

## 2020-02-14 PROCEDURE — 80061 LIPID PANEL: CPT | Performed by: FAMILY MEDICINE

## 2020-02-14 PROCEDURE — 80053 COMPREHEN METABOLIC PANEL: CPT | Performed by: FAMILY MEDICINE

## 2020-02-14 PROCEDURE — 84439 ASSAY OF FREE THYROXINE: CPT | Performed by: FAMILY MEDICINE

## 2020-02-14 RX ORDER — IBUPROFEN 800 MG/1
TABLET ORAL
Qty: 90 TABLET | Refills: 2 | Status: SHIPPED | OUTPATIENT
Start: 2020-02-14 | End: 2020-05-22 | Stop reason: SDUPTHER

## 2020-02-14 RX ORDER — DIAZEPAM 5 MG/1
5 TABLET ORAL EVERY 12 HOURS PRN
Qty: 60 TABLET | Refills: 0 | Status: SHIPPED | OUTPATIENT
Start: 2020-02-14 | End: 2020-03-12 | Stop reason: SDUPTHER

## 2020-02-14 RX ORDER — ATORVASTATIN CALCIUM 10 MG/1
TABLET, FILM COATED ORAL
Qty: 90 TABLET | Refills: 1 | Status: SHIPPED | OUTPATIENT
Start: 2020-02-14 | End: 2020-08-17

## 2020-02-14 NOTE — PROGRESS NOTES
[facility] # [id number]  Reviewed and is consistent.  [facility] 12/2019 reviewed and is consistent.  Patient comfort assessment guide reviewed and updated today.    As part of the patient's treatment plan they are being prescribed a controlled substance/ substances with potential for abuse.  This patient has been made aware of the appropriate use of such medications, including potential risk of somnolence, limited ability to drive and/or work safely, and potential for overdose.  It has also been made clear these medications are for use by the patient only, without concomitant use of alcohol or other substances unless prescribed/advised by medical provider.    Patient has completed prescribing agreement detailing terms of continued prescribing of controlled substances including monitoring PETE reports, urine drug screens, and pill counts.  The patient is aware PETE reports are reviewed on a regular basis and scanned into the chart.    History and physical exam exhibit continued safe and appropriate use of controlled substances.

## 2020-02-15 LAB
ALBUMIN SERPL-MCNC: 4.1 G/DL (ref 3.5–5.2)
ALBUMIN/GLOB SERPL: 1.4 G/DL
ALP SERPL-CCNC: 60 U/L (ref 39–117)
ALT SERPL W P-5'-P-CCNC: 29 U/L (ref 1–33)
ANION GAP SERPL CALCULATED.3IONS-SCNC: 14.2 MMOL/L (ref 5–15)
AST SERPL-CCNC: 25 U/L (ref 1–32)
BILIRUB SERPL-MCNC: 0.7 MG/DL (ref 0.2–1.2)
BUN BLD-MCNC: 20 MG/DL (ref 8–23)
BUN/CREAT SERPL: 31.7 (ref 7–25)
CALCIUM SPEC-SCNC: 9.7 MG/DL (ref 8.6–10.5)
CHLORIDE SERPL-SCNC: 96 MMOL/L (ref 98–107)
CHOLEST SERPL-MCNC: 171 MG/DL (ref 0–200)
CO2 SERPL-SCNC: 25.8 MMOL/L (ref 22–29)
CREAT BLD-MCNC: 0.63 MG/DL (ref 0.57–1)
GFR SERPL CREATININE-BSD FRML MDRD: 92 ML/MIN/1.73
GLOBULIN UR ELPH-MCNC: 2.9 GM/DL
GLUCOSE BLD-MCNC: 207 MG/DL (ref 65–99)
HBA1C MFR BLD: 11.3 % (ref 4.8–5.6)
HDLC SERPL-MCNC: 48 MG/DL (ref 40–60)
LDLC SERPL CALC-MCNC: 79 MG/DL (ref 0–100)
LDLC/HDLC SERPL: 1.64 {RATIO}
POTASSIUM BLD-SCNC: 4 MMOL/L (ref 3.5–5.2)
PROT SERPL-MCNC: 7 G/DL (ref 6–8.5)
SODIUM BLD-SCNC: 136 MMOL/L (ref 136–145)
T4 FREE SERPL-MCNC: 1.13 NG/DL (ref 0.93–1.7)
TRIGL SERPL-MCNC: 222 MG/DL (ref 0–150)
TSH SERPL DL<=0.05 MIU/L-ACNC: 7.32 UIU/ML (ref 0.27–4.2)
VLDLC SERPL-MCNC: 44.4 MG/DL (ref 5–40)

## 2020-02-16 DIAGNOSIS — Z79.4 TYPE 2 DIABETES MELLITUS WITH BOTH EYES AFFECTED BY MODERATE NONPROLIFERATIVE RETINOPATHY WITHOUT MACULAR EDEMA, WITH LONG-TERM CURRENT USE OF INSULIN (HCC): ICD-10-CM

## 2020-02-16 DIAGNOSIS — E11.3393 TYPE 2 DIABETES MELLITUS WITH BOTH EYES AFFECTED BY MODERATE NONPROLIFERATIVE RETINOPATHY WITHOUT MACULAR EDEMA, WITH LONG-TERM CURRENT USE OF INSULIN (HCC): ICD-10-CM

## 2020-02-17 RX ORDER — INSULIN DETEMIR 100 [IU]/ML
INJECTION, SOLUTION SUBCUTANEOUS
Qty: 50 ML | Refills: 0 | Status: SHIPPED | OUTPATIENT
Start: 2020-02-17 | End: 2020-06-16 | Stop reason: SDUPTHER

## 2020-02-24 RX ORDER — LOSARTAN POTASSIUM AND HYDROCHLOROTHIAZIDE 12.5; 5 MG/1; MG/1
TABLET ORAL
Qty: 180 TABLET | Refills: 1 | Status: SHIPPED | OUTPATIENT
Start: 2020-02-24 | End: 2020-10-05 | Stop reason: SDUPTHER

## 2020-02-28 RX ORDER — CLOPIDOGREL BISULFATE 75 MG/1
TABLET ORAL
Qty: 90 TABLET | Refills: 1 | Status: SHIPPED | OUTPATIENT
Start: 2020-02-28 | End: 2020-05-21 | Stop reason: SDUPTHER

## 2020-03-09 ENCOUNTER — TELEPHONE (OUTPATIENT)
Dept: FAMILY MEDICINE CLINIC | Facility: CLINIC | Age: 75
End: 2020-03-09

## 2020-03-09 DIAGNOSIS — E03.8 OTHER SPECIFIED HYPOTHYROIDISM: ICD-10-CM

## 2020-03-09 RX ORDER — LEVOTHYROXINE SODIUM 0.15 MG/1
137 TABLET ORAL DAILY
Qty: 30 TABLET | Refills: 2 | Status: SHIPPED | OUTPATIENT
Start: 2020-03-09 | End: 2020-06-16 | Stop reason: SDUPTHER

## 2020-03-09 NOTE — TELEPHONE ENCOUNTER
----- Message from Eva Elliott MD sent at 3/9/2020  6:07 AM EDT -----  Please call Bianka. Her labs are okay except for her diabetes and her thyroid. Her diabetes has not changed. Is she on her levemir 50 units at night? I'd like to increase that to levemir 54 units at night if she is okay with that. Thyroid is also a little bit low - she should be on 137 mcg orally daily. If she is okay with it, I'd like to increase it to 150 mcg orally daily, #30 with 2 refills - can send to pharmacy. Thanks.      Spoke with patient she verbalized understanding,she reports she has only been injecting 42 units of the Levemir because she had had what she calls a low blood sugar for her of 115 at 115 she felt shaky & tried to eat a spoonful of peanut butter but had problems swallowing it,she reports that is low for her & this has happened a couple of times lately.Increased dose of Levothyroxine sent to the pharmacy,please advise.

## 2020-03-10 NOTE — TELEPHONE ENCOUNTER
Okay. No increase on the levemir then. When is she having her low blood sugars? Is she waking up with them? How are her eating habits? How many meals a day does she eat? When is her biggest meal? Either she must have a lot of highs/lows or she is turning into a really brittle diabetic. I may want to divide up the levemir.

## 2020-03-10 NOTE — TELEPHONE ENCOUNTER
Okay. No increase on the levemir then. When is she having her low blood sugars? Is she waking up with them? How are her eating habits? How many meals a day does she eat? When is her biggest meal? Either she must have a lot of highs/lows or she is turning into a really brittle diabetic. I may want to divide up the levemir.      Left a message to return call.      Spoke with patient she now reports it only happened one time & it was when she had not eaten yet not long after getting up,usually eats 2 meals a day & breakfast is her biggest meal.    Patient called back requesting something for Vaginal Yeast & wants it to be sent to [facility].

## 2020-03-11 RX ORDER — FLUCONAZOLE 150 MG/1
150 TABLET ORAL ONCE
Qty: 1 TABLET | Refills: 0 | Status: SHIPPED | OUTPATIENT
Start: 2020-03-11 | End: 2020-03-30

## 2020-03-11 NOTE — TELEPHONE ENCOUNTER
I sent in diflucan. She is supposed to be seen tomorrow so I will discuss the diabetes more with her then. She changes her dosages all the time so I think it may benefit her to instead split the dosing maybe. She worries a lot about all her meds so I'll discuss with her what her worries are and adjust to that. Thanks.       Patient notified & verbalized understanding.

## 2020-03-11 NOTE — TELEPHONE ENCOUNTER
I sent in diflucan. She is supposed to be seen tomorrow so I will discuss the diabetes more with her then. She changes her dosages all the time so I think it may benefit her to instead split the dosing maybe. She worries a lot about all her meds so I'll discuss with her what her worries are and adjust to that. Thanks.

## 2020-03-12 ENCOUNTER — OFFICE VISIT (OUTPATIENT)
Dept: FAMILY MEDICINE CLINIC | Facility: CLINIC | Age: 75
End: 2020-03-12

## 2020-03-12 VITALS
OXYGEN SATURATION: 98 % | BODY MASS INDEX: 33.12 KG/M2 | TEMPERATURE: 97.8 F | SYSTOLIC BLOOD PRESSURE: 121 MMHG | HEIGHT: 64 IN | DIASTOLIC BLOOD PRESSURE: 78 MMHG | HEART RATE: 76 BPM | WEIGHT: 194 LBS

## 2020-03-12 DIAGNOSIS — F41.9 ANXIETY: ICD-10-CM

## 2020-03-12 DIAGNOSIS — E11.3393 TYPE 2 DIABETES MELLITUS WITH BOTH EYES AFFECTED BY MODERATE NONPROLIFERATIVE RETINOPATHY WITHOUT MACULAR EDEMA, WITH LONG-TERM CURRENT USE OF INSULIN (HCC): ICD-10-CM

## 2020-03-12 DIAGNOSIS — E11.59 HYPERTENSION ASSOCIATED WITH DIABETES (HCC): ICD-10-CM

## 2020-03-12 DIAGNOSIS — E11.69 HYPERLIPIDEMIA DUE TO TYPE 2 DIABETES MELLITUS (HCC): ICD-10-CM

## 2020-03-12 DIAGNOSIS — E03.8 OTHER SPECIFIED HYPOTHYROIDISM: ICD-10-CM

## 2020-03-12 DIAGNOSIS — B37.9 YEAST INFECTION: Primary | ICD-10-CM

## 2020-03-12 DIAGNOSIS — I25.119 CORONARY ARTERY DISEASE INVOLVING NATIVE CORONARY ARTERY OF NATIVE HEART WITH ANGINA PECTORIS (HCC): ICD-10-CM

## 2020-03-12 DIAGNOSIS — E78.5 HYPERLIPIDEMIA DUE TO TYPE 2 DIABETES MELLITUS (HCC): ICD-10-CM

## 2020-03-12 DIAGNOSIS — Z79.4 TYPE 2 DIABETES MELLITUS WITH BOTH EYES AFFECTED BY MODERATE NONPROLIFERATIVE RETINOPATHY WITHOUT MACULAR EDEMA, WITH LONG-TERM CURRENT USE OF INSULIN (HCC): ICD-10-CM

## 2020-03-12 DIAGNOSIS — I15.2 HYPERTENSION ASSOCIATED WITH DIABETES (HCC): ICD-10-CM

## 2020-03-12 PROCEDURE — 99214 OFFICE O/P EST MOD 30 MIN: CPT | Performed by: FAMILY MEDICINE

## 2020-03-12 RX ORDER — INSULIN ASPART 100 [IU]/ML
24 INJECTION, SUSPENSION SUBCUTANEOUS 2 TIMES DAILY WITH MEALS
Qty: 30 ML | Refills: 5 | Status: SHIPPED | OUTPATIENT
Start: 2020-03-12 | End: 2020-10-05 | Stop reason: SDUPTHER

## 2020-03-12 RX ORDER — FLUCONAZOLE 150 MG/1
150 TABLET ORAL WEEKLY
Qty: 2 TABLET | Refills: 0 | Status: SHIPPED | OUTPATIENT
Start: 2020-03-12 | End: 2020-10-05

## 2020-03-12 RX ORDER — DIAZEPAM 5 MG/1
5 TABLET ORAL EVERY 12 HOURS PRN
Qty: 60 TABLET | Refills: 0 | Status: SHIPPED | OUTPATIENT
Start: 2020-03-12 | End: 2020-05-14 | Stop reason: SDUPTHER

## 2020-03-23 ENCOUNTER — TELEPHONE (OUTPATIENT)
Dept: FAMILY MEDICINE CLINIC | Facility: CLINIC | Age: 75
End: 2020-03-23

## 2020-03-23 NOTE — TELEPHONE ENCOUNTER
Poor girl. For this kind of a chemical burn - several ideas. Does she have aloe vera gel at home? That could work. Just put it anywhere it hurts. If she doesn't, wet a towel with cold milk and lie down on the bed and put the towel over her private areas to let it cool down. Can also use plain yogurt. Does she have petroleum jelly? That's the third option. Thanks.

## 2020-03-23 NOTE — TELEPHONE ENCOUNTER
Patient called in and states her daughter had cleaned her toilet with clorox spray and she did not know it was on the toilet and set down on it. She took a shower but reports burning and bleeding from her vaginal area even after cleaning the area. She says the bleeding has almost stopped but she is burning. Please advise?

## 2020-03-26 ENCOUNTER — TELEPHONE (OUTPATIENT)
Dept: FAMILY MEDICINE CLINIC | Facility: CLINIC | Age: 75
End: 2020-03-26

## 2020-03-26 NOTE — TELEPHONE ENCOUNTER
Daughter called reports she is changing to [employer] insurance on 4/01/2020 they told her that her Nitro needs to go ahead & get a PA done for it,she left the following numbers:    ID#[id number]  Plan#[phone]

## 2020-03-27 NOTE — TELEPHONE ENCOUNTER
Tried to PA. Eligibility could not be verified for this patient - patient not found. Please review patient information and re-submit.      Patient will have to wait until plan in active.

## 2020-03-30 NOTE — PROGRESS NOTES
"Bianka Agudelo     VITALS: Blood pressure 121/78, pulse 76, temperature 97.8 °F (36.6 °C), temperature source Oral, height 162.6 cm (64.02\"), weight 88 kg (194 lb), SpO2 98 %, not currently breastfeeding.    Subjective  Chief Complaint:   Chief Complaint   Patient presents with   • Follow-up   • Diabetes        History of Present Illness:  Patient is a 74 y.o.  female with a medical history significant for type 2 diabetes, hypertension, hypothyroidism, and overactive bladder who presents to clinic secondary to medical followup.  She is complaining of a yeast infection today.  She states that she is getting some cottage cheeselike discharge from her vagina.  She denies any odor.  She denies any fever or chills.    Patient has hypertension and is on losartan/hydrochlorothiazide 50/12.5 mg orally twice a day and Norvasc 5 mg orally daily.  She denies any side effects of the medications.  She denies any dizziness, lightheadedness, blurry vision, chest pain, or edema.  She does not take her blood pressures at home.  She tries to follow a low-salt diet.    Patient has type 2 diabetes and is currently on metformin  mg orally twice a day, Januvia 100 mg orally daily, Levemir 50 units every evening, and NovoLog 70/30 20 units twice a day at meals without any side effects.  She denies any changes in vision, polydipsia, polyuria, numbness or tingling, or hypoglycemic or hyperglycemic episodes.  She does follow a diabetic diet and checks her glucose levels regularly.  Blood glucose levels have been elevated.  She does have complications of nephropathy.  No worsening or exacerbation of symptoms.  She denies any neuropathy or retinopathy.  She does take losartan to provide for kidney protection and aspirin to provide for cardiovascular protection.    Patient has hyperlipidemia and is currently on atorvastatin 10 mg orally daily without any side effects.  She denies any muscle weakness, jaundice, or itching.  She tries to " follow a low-cholesterol diet.    Patient has GERD and is currently on omeprazole 20 mg orally twice a day without any side effects.  She denies any metallic tastes and has not been having any increased symptoms during sleep.  She has not had any recent exacerbations.  She denies any nausea, vomiting, burping, hiccuping, or midepigastric pain.    Patient has hypothyroidism and is currently on Synthroid 150 mcg orally daily without any side effects.  She denies any fatigue, dizziness, palpitations, changes in weight, hair, or nails.    Patient has anxiety and is currently on diazepam 5 mg orally twice a day without any side effects.  Patient states that she is sleeping well and can get out of bed daily to accomplish daily activities.  She has some anhedonia.  Mood is stable.  She has no feelings of guilt.  She has good concentration and memory ability.  She has energy.  She is able to make goals.  Appetite is stable.  She denies any auditory or visual hallucinations.  She denies any suicidal or homicidal ideations.      Patient has CAD with angina and is on Plavix 75 mg orally daily, aspirin 81 mg orally daily, and nitro ER 6.5 mg orally 3 times a day for angina.  She reports that she has been doing fine on these medications.  She denies any side effects.  She denies any chest pain.    Patient does have muscle spasms especially in her lower back when she does physical activity such as gardening.  She is currently on Flexeril 10 mg orally twice a day as needed and diclofenac gel along with ibuprofen 600 mg orally 3 times a day.  She denies any side effects of the medications.  Medications allow her enough flexibility and relief from the pain so that she can do activities other than those of daily living.  Movement and ambulation are improved on these medications.  Robaxin was expensive for her.    Patient has an overactive bladder and is currently on oxybutynin 5 mg orally daily without any side effects.  It does  reduce her urinary frequency.    Preventative medicine: We will need to discuss DEXA scan, colonoscopy, and mammogram.    No complaints about any of the medications.    The following portions of the patient's history were reviewed and updated as appropriate: allergies, current medications, past family history, past medical history, past social history, past surgical history and problem list.    Past Medical History  Past Medical History:   Diagnosis Date   • Anxiety    • CAD (coronary artery disease)    • Diabetes mellitus (CMS/HCC)    • Dyslipidemia    • GERD (gastroesophageal reflux disease)    • H/O angina pectoris    • History of bone density study     February 2014   • History of mammogram 07/15/2013   • Hypertension    • Hypothyroidism    • Muscle spasms of both lower extremities    • Overactive bladder    • Vitamin B12 deficiency    • Vitamin D deficiency        Review of Systems   Respiratory: Negative for shortness of breath and wheezing.    Cardiovascular: Negative for chest pain and palpitations.       Surgical History  Past Surgical History:   Procedure Laterality Date   • COLONOSCOPY  01/01/2010   • OOPHORECTOMY      1 removed   • TUBAL ABDOMINAL LIGATION         Family History  Family History   Problem Relation Age of Onset   • Diabetes Mother    • Heart disease Mother    • Stroke Mother    • Cancer Father        Social History  Social History     Socioeconomic History   • Marital status:      Spouse name: Not on file   • Number of children: Not on file   • Years of education: Not on file   • Highest education level: Not on file   Tobacco Use   • Smoking status: Never Smoker   • Smokeless tobacco: Never Used   Substance and Sexual Activity   • Alcohol use: No   • Drug use: No   • Sexual activity: Defer       Objective  Physical Exam    Bianka had a diabetic foot exam performed today.   During the foot exam she had a monofilament test performed.    Neurological Sensory Findings - Altered hot/cold  right ankle/foot discrimination and altered hot/cold left ankle/foot discrimination. Altered sharp/dull right ankle/foot discrimination and altered sharp/dull left ankle/foot discrimination. Right ankle/foot altered proprioception and left ankle/foot altered proprioception.  Vascular Status -  Her right foot exhibits abnormal foot vasculature . Her right foot exhibits no edema. Her left foot exhibits abnormal foot vasculature . Her left foot exhibits no edema.  Skin Integrity  -  Her right foot skin is intact.Her left foot skin is intact..         Gen: Patient in NAD. Pleasant and answers appropriately. A&Ox3.    Skin: Warm and dry with normal turgor. No purpura, rashes, or unusual pigmentation noted. Hair is normal in appearance and distribution.    HEENT: NC/AT. No lesions noted. Conjunctiva clear, sclera nonicteric. PERRL. EOMI without nystagmus or strabismus. Fundi appear benign. No hemorrhages or exudates of eyes. Auditory canals are patent bilaterally without lesions. TMs intact,  nonerythematous, nonbulging without lesions. Nasal mucosa pink, nonerythematous, and nonedematous. Frontal and maxillary sinuses are nontender. O/P nonerythematous and moist without exudate.    Neck: Supple without lymph nodes palpated. FROM.     Lungs: CTA B/L without rales, rhonchi, crackles, or wheezes.    Heart: Distant.  RRR. S1 and S2 normal. No S3 or S4. No MRGT.    Abd: Soft, nontender,nondistended. (+)BSx4 quadrants.     Extrem: No CCE. Radial pulses 2+/4 and equal B/L. FROMx4. No bone, joint, or muscle tenderness noted.    Neuro: No focal motor/sensory deficits.    Procedures    Assessment/Plan  Bianka Agudelo is a 74 y.o. here for medical followup.  Diagnoses and all orders for this visit:    Yeast infection  -     fluconazole (DIFLUCAN) 150 MG tablet; Take 1 tablet by mouth 1 (One) Time Per Week.    Anxiety  -     diazePAM (VALIUM) 5 MG tablet; Take 1 tablet by mouth Every 12 (Twelve) Hours As Needed for Anxiety.  Tyler  #[id number] reviewed and is consistent.  UDS  reviewed and is consistent.  Patient comfort assessment guide reviewed and updated today.    As part of the patient's treatment plan they are being prescribed a controlled substance/ substances with potential for abuse.  This patient has been made aware of the appropriate use of such medications, including potential risk of somnolence, limited ability to drive and/or work safely, and potential for overdose.  It has also been made clear these medications are for use by the patient only, without concomitant use of alcohol or other substances unless prescribed/advised by medical provider.    Patient has completed prescribing agreement detailing terms of continued prescribing of controlled substances including monitoring PETE reports, urine drug screens, and pill counts.  The patient is aware PETE reports are reviewed on a regular basis and scanned into the chart.    History and physical exam exhibit continued safe and appropriate use of controlled substances.    Type 2 diabetes mellitus with both eyes affected by moderate nonproliferative retinopathy without macular edema, with long-term current use of insulin (CMS/[facility])  -     NOVOLOG MIX 70/30 (70-30) 100 UNIT/ML injection; Inject 24 Units under the skin into the appropriate area as directed 2 (Two) Times a Day With Meals.  Increase your 70/30 to 24 twice a day as sugar levels are slightly elevated.  Continue on Levemir 42 units at night along with Januvia 100 mg orally daily and metformin  mg orally twice a day.    Coronary artery disease involving native coronary artery of native heart with angina pectoris (CMS/[facility])  Patient currently on Plavix 75 mg orally daily, aspirin 81 mg orally daily, and nitro ER 6.5 mg orally 3 times a day.    Hyperlipidemia due to type 2 diabetes mellitus (CMS/[facility])  Continue atorvastatin 10 mg orally daily.    Hypertension associated with diabetes (CMS/[facility])  Stable today.  Continue  losartan/hydrochlorothiazide 50/12.5 mg orally twice a day and Norvasc 5 mg orally daily.    Other specified hypothyroidism  Stable.  Continue Synthroid 150 mcg orally daily.    Patient's Body mass index is 33.28 kg/m². BMI is above normal parameters. Recommendations include: exercise counseling and nutrition counseling.     Findings and plans discussed with patient who verbalizes understanding and agreement. Will followup with patient once results are in. Patient to followup at clinic PRN or in three months for further medical followup.    Eva Elliott MD    EMR Dragon/Transcription Disclaimer:  Much of this encounter note is an electronic transcription/translation of spoken language to printed text.  The electronic translation of spoken language may permit erroneous, or at times, nonsensical words or phrases to be inadvertently transcribed.  Although I have reviewed the note for such errors, some may still exist.

## 2020-04-03 DIAGNOSIS — Z79.4 TYPE 2 DIABETES MELLITUS WITH BOTH EYES AFFECTED BY MODERATE NONPROLIFERATIVE RETINOPATHY WITHOUT MACULAR EDEMA, WITH LONG-TERM CURRENT USE OF INSULIN (HCC): ICD-10-CM

## 2020-04-03 DIAGNOSIS — E11.3393 TYPE 2 DIABETES MELLITUS WITH BOTH EYES AFFECTED BY MODERATE NONPROLIFERATIVE RETINOPATHY WITHOUT MACULAR EDEMA, WITH LONG-TERM CURRENT USE OF INSULIN (HCC): ICD-10-CM

## 2020-04-06 RX ORDER — METFORMIN HYDROCHLORIDE 500 MG/1
TABLET, EXTENDED RELEASE ORAL
Qty: 180 TABLET | Refills: 0 | Status: SHIPPED | OUTPATIENT
Start: 2020-04-06 | End: 2020-06-12 | Stop reason: SDUPTHER

## 2020-04-06 RX ORDER — SITAGLIPTIN 100 MG/1
TABLET, FILM COATED ORAL
Qty: 90 TABLET | Refills: 3 | Status: SHIPPED | OUTPATIENT
Start: 2020-04-06 | End: 2021-01-05 | Stop reason: SDUPTHER

## 2020-04-07 ENCOUNTER — TELEPHONE (OUTPATIENT)
Dept: FAMILY MEDICINE CLINIC | Facility: CLINIC | Age: 75
End: 2020-04-07

## 2020-04-07 RX ORDER — NITROGLYCERIN 6.5 MG/1
6.5 CAPSULE ORAL 3 TIMES DAILY
Qty: 90 CAPSULE | Refills: 5 | Status: SHIPPED | OUTPATIENT
Start: 2020-04-07 | End: 2020-04-13 | Stop reason: SDUPTHER

## 2020-04-07 NOTE — TELEPHONE ENCOUNTER
Patient called indicating brand name necessary may not be covered on new insurance. I have re-submitted for refill indicating brand name medically necessary to check for coverage with new insurance.

## 2020-04-09 ENCOUNTER — TELEPHONE (OUTPATIENT)
Dept: FAMILY MEDICINE CLINIC | Facility: CLINIC | Age: 75
End: 2020-04-09

## 2020-04-09 NOTE — TELEPHONE ENCOUNTER
I submitted via Covermymeds and was denied stating the drug must be approved by the FDA and it has not been proven as safe and effective. The medicare rules state its not a covered medication.    Please advise.

## 2020-04-09 NOTE — TELEPHONE ENCOUNTER
She has been on Nitro forever. (Brand name with a capital N).  She has a history of angina that has not been alleviated with imdur or ranexa.  She has tried generic nitro and it did not alleviate the angina.     Thanks.

## 2020-04-09 NOTE — TELEPHONE ENCOUNTER
Bianka called indicating she would soon need a refill on Nitro bid and name brand was medically necessary.  Her insurance had recently changed so I submitted the refill in hopes the new insurance would cover the name brand, but it was denied.  She is adamant that name brand is necessary.  Can you give me justification so I can hopefully get it PAed?  Thanks.

## 2020-04-10 NOTE — TELEPHONE ENCOUNTER
Please call Bianka and let her know this. Let her know that we will try to appeal the denial, but in the meantime - she has a couple of options - I can send in the generic nitro and see how it treats her. We can also try one of the anti-anginal medications that we discussed last year (but then ultimately decided to stick with her current regimen). It is not going to hurt her heart to switch. Actually, these new medications are better at treating chronic chest pain than the nitro. What does she think?      Attempted to contact patient,N/A at this time.

## 2020-04-10 NOTE — TELEPHONE ENCOUNTER
Please call Bianka and let her know this. Let her know that we will try to appeal the denial, but in the meantime - she has a couple of options - I can send in the generic nitro and see how it treats her. We can also try one of the anti-anginal medications that we discussed last year (but then ultimately decided to stick with her current regimen). It is not going to hurt her heart to switch. Actually, these new medications are better at treating chronic chest pain than the nitro. What does she think?

## 2020-04-13 RX ORDER — NITROGLYCERIN 6.5 MG/1
6.5 CAPSULE ORAL 3 TIMES DAILY
Qty: 90 CAPSULE | Refills: 5 | Status: SHIPPED | OUTPATIENT
Start: 2020-04-13 | End: 2020-04-24 | Stop reason: SDUPTHER

## 2020-04-13 NOTE — TELEPHONE ENCOUNTER
Spoke with Bianka and she says she would like to try the generic nitro for now and see how it works.

## 2020-04-23 ENCOUNTER — TELEPHONE (OUTPATIENT)
Dept: FAMILY MEDICINE CLINIC | Facility: CLINIC | Age: 75
End: 2020-04-23

## 2020-04-23 NOTE — TELEPHONE ENCOUNTER
Patient called states she has changed insurances and her nitro-bid is now over $50 a month and she cant afford it she is asking for a generic. She states she takes 1 a day.

## 2020-04-24 RX ORDER — NITROGLYCERIN 6.5 MG/1
6.5 CAPSULE ORAL 3 TIMES DAILY
Qty: 90 CAPSULE | Refills: 5 | Status: SHIPPED | OUTPATIENT
Start: 2020-04-24 | End: 2021-01-05 | Stop reason: SDUPTHER

## 2020-04-24 NOTE — TELEPHONE ENCOUNTER
I have tried calling the Apothecary to confirm this. I have found that a lot of times if the pharmacy itself doesn't get it, they will just say that. I had to hang up after 10 minutes because I have a video visit waiting for me. Please call them and see if it is really discontinued. Or call the patient and see where else she might want to go for a pharmacy and call them and ask. Thanks. Sorry. The PA for name brand did not go through and she does not want to change because she is afraid of her angina.

## 2020-04-24 NOTE — TELEPHONE ENCOUNTER
Sent. Please let her know that [facility] could not order it so [facility] is going to try to order it. Thanks.

## 2020-04-24 NOTE — TELEPHONE ENCOUNTER
They do not have the 2.5mg in stock they can order it but she will run into the same problem insurance will not cover it and they do not make a generic per pharmacy.

## 2020-04-24 NOTE — TELEPHONE ENCOUNTER
Called the apothecary they said they can order generic they said if you will send in a script the can run it to make sure her insurance will cover generic.

## 2020-04-24 NOTE — TELEPHONE ENCOUNTER
Do they have an option for the 2.5 mg pill? If they don't, please call Bianka and let them know that [facility] does not have the nitro 6.5 mg in stock anymore. Where else would she use? Please call and see if they have the 6.5 mg or the 2.5 mg.

## 2020-04-29 ENCOUNTER — TELEPHONE (OUTPATIENT)
Dept: FAMILY MEDICINE CLINIC | Facility: CLINIC | Age: 75
End: 2020-04-29

## 2020-04-29 NOTE — TELEPHONE ENCOUNTER
Can we do the PA again? Not for the brand name, but for the generic? Refer to previous note on why we need to stick to this medication. Thanks.

## 2020-04-29 NOTE — TELEPHONE ENCOUNTER
Bianka called and says the pharmacy told her the nitro isn't covered by her insurance and needs a PA   No wheezing/clear to mild end expiratory wheeze or scattered expiratory wheeze

## 2020-05-14 DIAGNOSIS — F41.9 ANXIETY: ICD-10-CM

## 2020-05-14 RX ORDER — DIAZEPAM 5 MG/1
5 TABLET ORAL EVERY 12 HOURS PRN
Qty: 60 TABLET | Refills: 0 | Status: SHIPPED | OUTPATIENT
Start: 2020-05-14 | End: 2020-06-12 | Stop reason: SDUPTHER

## 2020-05-14 NOTE — TELEPHONE ENCOUNTER
[facility] # [id number]  Reviewed and is consistent.  [facility] 12/2019 reviewed and is consistent.  Patient comfort assessment guide reviewed and updated today.    As part of the patient's treatment plan they are being prescribed a controlled substance/ substances with potential for abuse.  This patient has been made aware of the appropriate use of such medications, including potential risk of somnolence, limited ability to drive and/or work safely, and potential for overdose.  It has also been made clear these medications are for use by the patient only, without concomitant use of alcohol or other substances unless prescribed/advised by medical provider.    Patient has completed prescribing agreement detailing terms of continued prescribing of controlled substances including monitoring PETE reports, urine drug screens, and pill counts.  The patient is aware PETE reports are reviewed on a regular basis and scanned into the chart.    History and physical exam exhibit continued safe and appropriate use of controlled substances.

## 2020-05-14 NOTE — TELEPHONE ENCOUNTER
Bianka called in wanting an update on her nitro? She still can't get this filled without it being really expensive. She wants to know what to do if she can't get it. Please advise.     ALSO REQUESTING REFILLS ON DIAZEPAM, PETE IS ON YOUR DESK.

## 2020-05-15 RX ORDER — ISOSORBIDE MONONITRATE 30 MG/1
30 TABLET, EXTENDED RELEASE ORAL DAILY
Qty: 30 TABLET | Refills: 2 | Status: SHIPPED | OUTPATIENT
Start: 2020-05-15 | End: 2020-08-04 | Stop reason: SDUPTHER

## 2020-05-15 NOTE — TELEPHONE ENCOUNTER
Okay. Please let her know:  1) The imdur that I am prescribing is in the same class as the nitro. It is just newer and actually better.   2) I am giving her a small dose. As a result, it may need to be titrated. What I want her to do is to start taking it with the nitro. Make sure she doesn't have any side effects. One or two days in, take off the noon nitro and take nitro BID with this. After a couple more days, take off the PM nitro and be on nitro daily and imdur daily. A couple more days after that, take the last nitro off. If she has any chest pain, call me. I don't think she will.   3) Imdur sent to pharmacy.

## 2020-05-19 ENCOUNTER — TELEPHONE (OUTPATIENT)
Dept: FAMILY MEDICINE CLINIC | Facility: CLINIC | Age: 75
End: 2020-05-19

## 2020-05-19 NOTE — TELEPHONE ENCOUNTER
What I want her to do is to start taking imdur with the nitro. Make sure she doesn't have any side effects. One or two days in, take off the noon nitro and take nitro BID with this. After a couple more days, take off the PM nitro and be on nitro daily and imdur daily. A couple more days after that, take the last nitro off. If she has any chest pain, call me. I don't think she will. Keep a couple of nitro on hand for emergencies.

## 2020-05-20 NOTE — TELEPHONE ENCOUNTER
Add the imdur. Give it two or three days to get in her system. Take the nitro off. Take nitro PRN if she has chest pain and call me after she takes it.

## 2020-05-21 RX ORDER — CLOPIDOGREL BISULFATE 75 MG/1
75 TABLET ORAL DAILY
Qty: 90 TABLET | Refills: 1 | Status: SHIPPED | OUTPATIENT
Start: 2020-05-21 | End: 2020-10-05 | Stop reason: SDUPTHER

## 2020-05-22 DIAGNOSIS — M25.551 RIGHT HIP PAIN: ICD-10-CM

## 2020-05-22 RX ORDER — IBUPROFEN 800 MG/1
800 TABLET ORAL EVERY 8 HOURS PRN
Qty: 90 TABLET | Refills: 2 | Status: SHIPPED | OUTPATIENT
Start: 2020-05-22 | End: 2020-07-22

## 2020-05-26 ENCOUNTER — TELEPHONE (OUTPATIENT)
Dept: FAMILY MEDICINE CLINIC | Facility: CLINIC | Age: 75
End: 2020-05-26

## 2020-05-26 NOTE — TELEPHONE ENCOUNTER
Patient called indicating she has been noticing her hair falling out more since taking the higher dose of thyroid medicine.  Please advise.

## 2020-05-26 NOTE — TELEPHONE ENCOUNTER
Patient called indicating she was confused about Imdur dose.  She will take with the Nitro for the next two days and will take by itself on Friday. She is aware she is to notify us with chest pains and/or concerns.

## 2020-05-26 NOTE — TELEPHONE ENCOUNTER
Might be too much for her. Can she hang in there until her 6/12/2020 appointment? Does she want to get labs earlier? Thanks.

## 2020-06-10 DIAGNOSIS — F41.9 ANXIETY: ICD-10-CM

## 2020-06-10 RX ORDER — ALBUTEROL SULFATE 90 UG/1
AEROSOL, METERED RESPIRATORY (INHALATION)
Qty: 90 G | Refills: 1 | Status: SHIPPED | OUTPATIENT
Start: 2020-06-10 | End: 2021-01-05 | Stop reason: SDUPTHER

## 2020-06-12 ENCOUNTER — OFFICE VISIT (OUTPATIENT)
Dept: FAMILY MEDICINE CLINIC | Facility: CLINIC | Age: 75
End: 2020-06-12

## 2020-06-12 VITALS
OXYGEN SATURATION: 96 % | TEMPERATURE: 97.9 F | SYSTOLIC BLOOD PRESSURE: 124 MMHG | DIASTOLIC BLOOD PRESSURE: 80 MMHG | HEIGHT: 64 IN | HEART RATE: 87 BPM | WEIGHT: 194.6 LBS | BODY MASS INDEX: 33.22 KG/M2

## 2020-06-12 DIAGNOSIS — E11.59 HYPERTENSION ASSOCIATED WITH DIABETES (HCC): ICD-10-CM

## 2020-06-12 DIAGNOSIS — F41.9 ANXIETY: ICD-10-CM

## 2020-06-12 DIAGNOSIS — R07.89 MIDSTERNAL CHEST PAIN: Primary | ICD-10-CM

## 2020-06-12 DIAGNOSIS — Z79.4 TYPE 2 DIABETES MELLITUS WITH BOTH EYES AFFECTED BY MODERATE NONPROLIFERATIVE RETINOPATHY WITHOUT MACULAR EDEMA, WITH LONG-TERM CURRENT USE OF INSULIN (HCC): ICD-10-CM

## 2020-06-12 DIAGNOSIS — E78.5 HYPERLIPIDEMIA DUE TO TYPE 2 DIABETES MELLITUS (HCC): ICD-10-CM

## 2020-06-12 DIAGNOSIS — N32.81 OVERACTIVE BLADDER: ICD-10-CM

## 2020-06-12 DIAGNOSIS — E03.8 OTHER SPECIFIED HYPOTHYROIDISM: ICD-10-CM

## 2020-06-12 DIAGNOSIS — E11.3393 TYPE 2 DIABETES MELLITUS WITH BOTH EYES AFFECTED BY MODERATE NONPROLIFERATIVE RETINOPATHY WITHOUT MACULAR EDEMA, WITH LONG-TERM CURRENT USE OF INSULIN (HCC): ICD-10-CM

## 2020-06-12 DIAGNOSIS — E11.69 HYPERLIPIDEMIA DUE TO TYPE 2 DIABETES MELLITUS (HCC): ICD-10-CM

## 2020-06-12 DIAGNOSIS — I15.2 HYPERTENSION ASSOCIATED WITH DIABETES (HCC): ICD-10-CM

## 2020-06-12 PROCEDURE — 80053 COMPREHEN METABOLIC PANEL: CPT | Performed by: FAMILY MEDICINE

## 2020-06-12 PROCEDURE — 99214 OFFICE O/P EST MOD 30 MIN: CPT | Performed by: FAMILY MEDICINE

## 2020-06-12 PROCEDURE — 93000 ELECTROCARDIOGRAM COMPLETE: CPT | Performed by: FAMILY MEDICINE

## 2020-06-12 PROCEDURE — 84443 ASSAY THYROID STIM HORMONE: CPT | Performed by: FAMILY MEDICINE

## 2020-06-12 PROCEDURE — 83036 HEMOGLOBIN GLYCOSYLATED A1C: CPT | Performed by: FAMILY MEDICINE

## 2020-06-12 PROCEDURE — 82607 VITAMIN B-12: CPT | Performed by: FAMILY MEDICINE

## 2020-06-12 PROCEDURE — 84439 ASSAY OF FREE THYROXINE: CPT | Performed by: FAMILY MEDICINE

## 2020-06-12 PROCEDURE — 86677 HELICOBACTER PYLORI ANTIBODY: CPT | Performed by: FAMILY MEDICINE

## 2020-06-12 RX ORDER — DIAZEPAM 5 MG/1
5 TABLET ORAL EVERY 12 HOURS PRN
Qty: 60 TABLET | Refills: 0 | Status: SHIPPED | OUTPATIENT
Start: 2020-06-12 | End: 2020-08-14 | Stop reason: SDUPTHER

## 2020-06-12 RX ORDER — OXYBUTYNIN CHLORIDE 5 MG/1
5 TABLET ORAL 2 TIMES DAILY
Qty: 180 TABLET | Refills: 1 | Status: SHIPPED | OUTPATIENT
Start: 2020-06-12 | End: 2021-01-05 | Stop reason: SDUPTHER

## 2020-06-12 RX ORDER — AMLODIPINE BESYLATE 5 MG/1
5 TABLET ORAL DAILY
Qty: 90 TABLET | Refills: 1 | Status: SHIPPED | OUTPATIENT
Start: 2020-06-12 | End: 2020-09-21

## 2020-06-12 RX ORDER — METFORMIN HYDROCHLORIDE 500 MG/1
500 TABLET, EXTENDED RELEASE ORAL 2 TIMES DAILY WITH MEALS
Qty: 180 TABLET | Refills: 1 | Status: SHIPPED | OUTPATIENT
Start: 2020-06-12 | End: 2021-01-05 | Stop reason: SDUPTHER

## 2020-06-13 LAB
ALBUMIN SERPL-MCNC: 3.8 G/DL (ref 3.5–5.2)
ALBUMIN/GLOB SERPL: 1.3 G/DL
ALP SERPL-CCNC: 56 U/L (ref 39–117)
ALT SERPL W P-5'-P-CCNC: 34 U/L (ref 1–33)
ANION GAP SERPL CALCULATED.3IONS-SCNC: 11.8 MMOL/L (ref 5–15)
AST SERPL-CCNC: 27 U/L (ref 1–32)
BILIRUB SERPL-MCNC: 0.6 MG/DL (ref 0.2–1.2)
BUN BLD-MCNC: 13 MG/DL (ref 8–23)
BUN/CREAT SERPL: 17.3 (ref 7–25)
CALCIUM SPEC-SCNC: 9.9 MG/DL (ref 8.6–10.5)
CHLORIDE SERPL-SCNC: 100 MMOL/L (ref 98–107)
CO2 SERPL-SCNC: 25.2 MMOL/L (ref 22–29)
CREAT BLD-MCNC: 0.75 MG/DL (ref 0.57–1)
GFR SERPL CREATININE-BSD FRML MDRD: 76 ML/MIN/1.73
GLOBULIN UR ELPH-MCNC: 3 GM/DL
GLUCOSE BLD-MCNC: 265 MG/DL (ref 65–99)
HBA1C MFR BLD: 11.7 % (ref 4.8–5.6)
POTASSIUM BLD-SCNC: 4.1 MMOL/L (ref 3.5–5.2)
PROT SERPL-MCNC: 6.8 G/DL (ref 6–8.5)
SODIUM BLD-SCNC: 137 MMOL/L (ref 136–145)
T4 FREE SERPL-MCNC: 1.21 NG/DL (ref 0.93–1.7)
TSH SERPL DL<=0.05 MIU/L-ACNC: 2.11 UIU/ML (ref 0.27–4.2)
VIT B12 BLD-MCNC: 1035 PG/ML (ref 211–946)

## 2020-06-15 LAB — H PYLORI IGM SER-ACNC: <9 UNITS (ref 0–8.9)

## 2020-06-16 DIAGNOSIS — E11.3393 TYPE 2 DIABETES MELLITUS WITH BOTH EYES AFFECTED BY MODERATE NONPROLIFERATIVE RETINOPATHY WITHOUT MACULAR EDEMA, WITH LONG-TERM CURRENT USE OF INSULIN (HCC): ICD-10-CM

## 2020-06-16 DIAGNOSIS — Z79.4 TYPE 2 DIABETES MELLITUS WITH BOTH EYES AFFECTED BY MODERATE NONPROLIFERATIVE RETINOPATHY WITHOUT MACULAR EDEMA, WITH LONG-TERM CURRENT USE OF INSULIN (HCC): ICD-10-CM

## 2020-06-16 DIAGNOSIS — E03.8 OTHER SPECIFIED HYPOTHYROIDISM: ICD-10-CM

## 2020-06-16 RX ORDER — INSULIN DETEMIR 100 [IU]/ML
46 INJECTION, SOLUTION SUBCUTANEOUS NIGHTLY
Qty: 50 ML | Refills: 0
Start: 2020-06-16 | End: 2020-09-08

## 2020-06-16 RX ORDER — LEVOTHYROXINE SODIUM 0.15 MG/1
137 TABLET ORAL DAILY
Qty: 30 TABLET | Refills: 2 | Status: SHIPPED | OUTPATIENT
Start: 2020-06-16 | End: 2020-09-15

## 2020-06-23 ENCOUNTER — HOSPITAL ENCOUNTER (EMERGENCY)
Facility: HOSPITAL | Age: 75
Discharge: HOME | End: 2020-06-23
Attending: EMERGENCY MEDICINE | Admitting: EMERGENCY MEDICINE

## 2020-06-23 ENCOUNTER — APPOINTMENT (OUTPATIENT)
Dept: CT IMAGING | Facility: HOSPITAL | Age: 75
End: 2020-06-23

## 2020-06-23 ENCOUNTER — TELEPHONE (OUTPATIENT)
Dept: FAMILY MEDICINE CLINIC | Facility: CLINIC | Age: 75
End: 2020-06-23

## 2020-06-23 ENCOUNTER — APPOINTMENT (OUTPATIENT)
Dept: GENERAL RADIOLOGY | Facility: HOSPITAL | Age: 75
End: 2020-06-23

## 2020-06-23 VITALS
TEMPERATURE: 97.8 F | OXYGEN SATURATION: 98 % | DIASTOLIC BLOOD PRESSURE: 67 MMHG | BODY MASS INDEX: 33.66 KG/M2 | WEIGHT: 190 LBS | HEIGHT: 63 IN | HEART RATE: 77 BPM | SYSTOLIC BLOOD PRESSURE: 126 MMHG | RESPIRATION RATE: 17 BRPM

## 2020-06-23 DIAGNOSIS — R53.1 WEAKNESS: Primary | ICD-10-CM

## 2020-06-23 DIAGNOSIS — R07.9 CHEST PAIN, UNSPECIFIED TYPE: ICD-10-CM

## 2020-06-23 LAB
ALBUMIN SERPL-MCNC: 4.11 G/DL (ref 3.5–5.2)
ALBUMIN/GLOB SERPL: 1.4 G/DL
ALP SERPL-CCNC: 64 U/L (ref 39–117)
ALT SERPL W P-5'-P-CCNC: 32 U/L (ref 1–33)
ANION GAP SERPL CALCULATED.3IONS-SCNC: 15.4 MMOL/L (ref 5–15)
AST SERPL-CCNC: 30 U/L (ref 1–32)
BACTERIA UR QL AUTO: ABNORMAL /HPF
BASOPHILS # BLD AUTO: 0.05 10*3/MM3 (ref 0–0.2)
BASOPHILS NFR BLD AUTO: 0.8 % (ref 0–1.5)
BILIRUB SERPL-MCNC: 0.9 MG/DL (ref 0.2–1.2)
BILIRUB UR QL STRIP: NEGATIVE
BUN BLD-MCNC: 17 MG/DL (ref 8–23)
BUN/CREAT SERPL: 26.2 (ref 7–25)
CALCIUM SPEC-SCNC: 9.8 MG/DL (ref 8.6–10.5)
CHLORIDE SERPL-SCNC: 99 MMOL/L (ref 98–107)
CLARITY UR: CLEAR
CO2 SERPL-SCNC: 22.6 MMOL/L (ref 22–29)
COLOR UR: YELLOW
CREAT BLD-MCNC: 0.65 MG/DL (ref 0.57–1)
DEPRECATED RDW RBC AUTO: 43.6 FL (ref 37–54)
EOSINOPHIL # BLD AUTO: 0.23 10*3/MM3 (ref 0–0.4)
EOSINOPHIL NFR BLD AUTO: 3.5 % (ref 0.3–6.2)
ERYTHROCYTE [DISTWIDTH] IN BLOOD BY AUTOMATED COUNT: 13.3 % (ref 12.3–15.4)
FERRITIN SERPL-MCNC: 33.37 NG/ML (ref 13–150)
GFR SERPL CREATININE-BSD FRML MDRD: 89 ML/MIN/1.73
GLOBULIN UR ELPH-MCNC: 3 GM/DL
GLUCOSE BLD-MCNC: 304 MG/DL (ref 65–99)
GLUCOSE UR STRIP-MCNC: ABNORMAL MG/DL
HCT VFR BLD AUTO: 44.7 % (ref 34–46.6)
HGB BLD-MCNC: 14.3 G/DL (ref 12–15.9)
HGB UR QL STRIP.AUTO: NEGATIVE
HOLD SPECIMEN: NORMAL
HOLD SPECIMEN: NORMAL
HYALINE CASTS UR QL AUTO: ABNORMAL /LPF
IMM GRANULOCYTES # BLD AUTO: 0.04 10*3/MM3 (ref 0–0.05)
IMM GRANULOCYTES NFR BLD AUTO: 0.6 % (ref 0–0.5)
KETONES UR QL STRIP: NEGATIVE
LDH SERPL-CCNC: 180 U/L (ref 135–214)
LEUKOCYTE ESTERASE UR QL STRIP.AUTO: ABNORMAL
LYMPHOCYTES # BLD AUTO: 2.54 10*3/MM3 (ref 0.7–3.1)
LYMPHOCYTES NFR BLD AUTO: 38.4 % (ref 19.6–45.3)
MCH RBC QN AUTO: 28.8 PG (ref 26.6–33)
MCHC RBC AUTO-ENTMCNC: 32 G/DL (ref 31.5–35.7)
MCV RBC AUTO: 90.1 FL (ref 79–97)
MONOCYTES # BLD AUTO: 0.53 10*3/MM3 (ref 0.1–0.9)
MONOCYTES NFR BLD AUTO: 8 % (ref 5–12)
NEUTROPHILS # BLD AUTO: 3.23 10*3/MM3 (ref 1.7–7)
NEUTROPHILS NFR BLD AUTO: 48.7 % (ref 42.7–76)
NITRITE UR QL STRIP: NEGATIVE
NRBC BLD AUTO-RTO: 0 /100 WBC (ref 0–0.2)
PH UR STRIP.AUTO: <=5 [PH] (ref 5–8)
PLATELET # BLD AUTO: 215 10*3/MM3 (ref 140–450)
PMV BLD AUTO: 12.3 FL (ref 6–12)
POTASSIUM BLD-SCNC: 4 MMOL/L (ref 3.5–5.2)
PROT SERPL-MCNC: 7.1 G/DL (ref 6–8.5)
PROT UR QL STRIP: NEGATIVE
RBC # BLD AUTO: 4.96 10*6/MM3 (ref 3.77–5.28)
RBC # UR: ABNORMAL /HPF
REF LAB TEST METHOD: ABNORMAL
SODIUM BLD-SCNC: 137 MMOL/L (ref 136–145)
SP GR UR STRIP: 1.01 (ref 1–1.03)
SQUAMOUS #/AREA URNS HPF: ABNORMAL /HPF
TROPONIN T SERPL-MCNC: <0.01 NG/ML (ref 0–0.03)
TROPONIN T SERPL-MCNC: <0.01 NG/ML (ref 0–0.03)
UROBILINOGEN UR QL STRIP: ABNORMAL
WBC NRBC COR # BLD: 6.62 10*3/MM3 (ref 3.4–10.8)
WBC UR QL AUTO: ABNORMAL /HPF
WHOLE BLOOD HOLD SPECIMEN: NORMAL
WHOLE BLOOD HOLD SPECIMEN: NORMAL

## 2020-06-23 PROCEDURE — 71045 X-RAY EXAM CHEST 1 VIEW: CPT

## 2020-06-23 PROCEDURE — 84484 ASSAY OF TROPONIN QUANT: CPT | Performed by: PHYSICIAN ASSISTANT

## 2020-06-23 PROCEDURE — 99283 EMERGENCY DEPT VISIT LOW MDM: CPT

## 2020-06-23 PROCEDURE — 80053 COMPREHEN METABOLIC PANEL: CPT | Performed by: PHYSICIAN ASSISTANT

## 2020-06-23 PROCEDURE — 93005 ELECTROCARDIOGRAM TRACING: CPT | Performed by: EMERGENCY MEDICINE

## 2020-06-23 PROCEDURE — 71045 X-RAY EXAM CHEST 1 VIEW: CPT | Performed by: RADIOLOGY

## 2020-06-23 PROCEDURE — 85025 COMPLETE CBC W/AUTO DIFF WBC: CPT | Performed by: PHYSICIAN ASSISTANT

## 2020-06-23 PROCEDURE — 82728 ASSAY OF FERRITIN: CPT | Performed by: PHYSICIAN ASSISTANT

## 2020-06-23 PROCEDURE — 70450 CT HEAD/BRAIN W/O DYE: CPT | Performed by: RADIOLOGY

## 2020-06-23 PROCEDURE — 83615 LACTATE (LD) (LDH) ENZYME: CPT | Performed by: PHYSICIAN ASSISTANT

## 2020-06-23 PROCEDURE — 81001 URINALYSIS AUTO W/SCOPE: CPT | Performed by: PHYSICIAN ASSISTANT

## 2020-06-23 PROCEDURE — 70450 CT HEAD/BRAIN W/O DYE: CPT

## 2020-06-23 PROCEDURE — 93010 ELECTROCARDIOGRAM REPORT: CPT | Performed by: INTERNAL MEDICINE

## 2020-06-23 PROCEDURE — 84145 PROCALCITONIN (PCT): CPT | Performed by: PHYSICIAN ASSISTANT

## 2020-06-23 RX ORDER — SODIUM CHLORIDE 0.9 % (FLUSH) 0.9 %
10 SYRINGE (ML) INJECTION AS NEEDED
Status: DISCONTINUED | OUTPATIENT
Start: 2020-06-23 | End: 2020-06-23 | Stop reason: HOSPADM

## 2020-06-23 RX ORDER — ASPIRIN 81 MG/1
324 TABLET, CHEWABLE ORAL ONCE
Status: DISCONTINUED | OUTPATIENT
Start: 2020-06-23 | End: 2020-06-23 | Stop reason: HOSPADM

## 2020-06-23 NOTE — TELEPHONE ENCOUNTER
Patient called reports she is having some numbness in the side of her face,in her left hand & in her left thigh,reports she is overly tired & has a heaviness in the center of her chest,spoke with Dr. Elliott & instructed to go to er now,she verbalized understanding.

## 2020-06-23 NOTE — ED PROVIDER NOTES
Subjective   74-year-old female with past medical history of anxiety, coronary artery disease with no stents, diabetes, dyslipidemia, GERD, hypertension, hypothyroidism, and TIA a few years back presents to the emergency room with left sided arm and leg weakness.  She states when she woke up at 9:00 this morning that is when she noticed the numbness.  She also reports chest pain which is pressure-like in nature.  She denies tightness, squeezing, shortness of breath, fever, chills, travel, loss of taste, loss of smell, or sick contacts to her knowledge.  She denies any aggravating or alleviating factors despite aspirin.      History provided by:  Patient   used: No        Review of Systems   Constitutional: Positive for fatigue. Negative for diaphoresis and fever.   HENT: Negative.    Respiratory: Negative.  Negative for cough and shortness of breath.    Cardiovascular: Negative.  Negative for chest pain.   Gastrointestinal: Negative.  Negative for abdominal pain.   Endocrine: Negative.    Genitourinary: Negative.  Negative for dysuria.   Skin: Negative.    Neurological: Positive for weakness and numbness.        (+) left arm/leg numbness   Psychiatric/Behavioral: Negative.    All other systems reviewed and are negative.      Past Medical History:   Diagnosis Date   • Anxiety    • CAD (coronary artery disease)    • Diabetes mellitus (CMS/[facility])    • Dyslipidemia    • GERD (gastroesophageal reflux disease)    • H/O angina pectoris    • History of bone density study     February 2014   • History of mammogram 07/15/2013   • Hypertension    • Hypothyroidism    • Muscle spasms of both lower extremities    • Overactive bladder    • Vitamin B12 deficiency    • Vitamin D deficiency        Allergies   Allergen Reactions   • Nuts        Past Surgical History:   Procedure Laterality Date   • COLONOSCOPY  01/01/2010   • OOPHORECTOMY      1 removed   • TUBAL ABDOMINAL LIGATION         Family History   Problem  Relation Age of Onset   • Diabetes Mother    • Heart disease Mother    • Stroke Mother    • Cancer Father    • Pancreatic cancer Sister    • Diabetes Sister    • Heart attack Brother        Social History     Socioeconomic History   • Marital status:      Spouse name: Not on file   • Number of children: Not on file   • Years of education: Not on file   • Highest education level: Not on file   Tobacco Use   • Smoking status: Never Smoker   • Smokeless tobacco: Never Used   Substance and Sexual Activity   • Alcohol use: No   • Drug use: No   • Sexual activity: Defer           Objective   Physical Exam   Constitutional: She is oriented to person, place, and time. She appears well-developed and well-nourished. No distress.   HENT:   Head: Normocephalic and atraumatic.   Right Ear: External ear normal.   Left Ear: External ear normal.   Nose: Nose normal.   Eyes: Pupils are equal, round, and reactive to light. Conjunctivae and EOM are normal.   Neck: Normal range of motion. Neck supple. No JVD present. No tracheal deviation present.   Cardiovascular: Normal rate, regular rhythm and normal heart sounds.   No murmur heard.  Pulmonary/Chest: Effort normal and breath sounds normal. No respiratory distress. She has no wheezes.   Abdominal: Soft. Bowel sounds are normal. There is no tenderness.   Musculoskeletal: Normal range of motion. She exhibits no edema or deformity.   Neurological: She is alert and oriented to person, place, and time. She has normal strength. She is not disoriented. No cranial nerve deficit or sensory deficit. She displays a negative Romberg sign. GCS eye subscore is 4. GCS verbal subscore is 5. GCS motor subscore is 6.   NIH 0   Skin: Skin is warm and dry. No rash noted. She is not diaphoretic. No erythema. No pallor.   Psychiatric: She has a normal mood and affect. Her behavior is normal. Thought content normal.   Nursing note and vitals reviewed.      Procedures           ED Course  ED Course  as of Jun 23 1820   Tue Jun 23, 2020   1550 IMPRESSION:   No evidence of active or acute cardiopulmonary disease on today's chest radiograph.    This report was finalized on 6/23/2020 3:44 PM by Dr. Raoul Webb MD.    XR Chest 1 View [TK]   1550 IMPRESSION:   No acute intracranial pathology. Nothing is seen on this exam to specifically account for the patient's symptoms.    This report was finalized on 6/23/2020 2:47 PM by Dr. Raoul Webb MD.    CT Head Without Contrast [TK]   1753 Discussed pt with Dr. Cee, bekah patient can be discharged home with outpatient follow up. Will send with outpatient stress test order.    [TK]      ED Course User Index  [TK] Marianne Aguero PA-C                                         HEART Score (for prediction of 6-week risk of major adverse cardiac event) reviewed and/or performed as part of the patient evaluation and treatment planning process.  The result associated with this review/performance is: 4    NIHSS (NIH Stroke Scale/Score) reviewed and/or performed as part of the patient evaluation and treatment planning process.  The result associated with this review/performance is: 0       MDM  Number of Diagnoses or Management Options  Chest pain, unspecified type: new and requires workup  Weakness: new and requires workup     Amount and/or Complexity of Data Reviewed  Clinical lab tests: reviewed and ordered  Tests in the radiology section of CPT®: reviewed and ordered  Tests in the medicine section of CPT®: reviewed and ordered  Discuss the patient with other providers: yes ([facility])    Risk of Complications, Morbidity, and/or Mortality  Presenting problems: moderate  Diagnostic procedures: moderate  Management options: moderate    Patient Progress  Patient progress: stable      Final diagnoses:   Weakness   Chest pain, unspecified type            Marianne Aguero PA-C  06/23/20 9620

## 2020-06-23 NOTE — ED NOTES
I faxed the patient's outpatient stress test to our scheduling department, made a copy of it, and placed both on patient's chart.     Bubba Oneill  06/23/20 9204

## 2020-06-24 ENCOUNTER — EPISODE CHANGES (OUTPATIENT)
Dept: CASE MANAGEMENT | Facility: OTHER | Age: 75
End: 2020-06-24

## 2020-06-24 ENCOUNTER — TRANSCRIBE ORDERS (OUTPATIENT)
Dept: ADMINISTRATIVE | Facility: HOSPITAL | Age: 75
End: 2020-06-24

## 2020-06-24 DIAGNOSIS — R07.9 CHEST PAIN, UNSPECIFIED TYPE: Primary | ICD-10-CM

## 2020-06-24 LAB — PROCALCITONIN SERPL-MCNC: 0.07 NG/ML (ref 0.1–0.25)

## 2020-06-25 ENCOUNTER — PATIENT OUTREACH (OUTPATIENT)
Dept: CASE MANAGEMENT | Facility: OTHER | Age: 75
End: 2020-06-25

## 2020-06-25 NOTE — OUTREACH NOTE
Patient Outreach Note    ([id number] 06/23/20)  (UTRX1 06/24/20)    RN-ACM outreach to patient.  Patient had ED visit at [facility]23/[facility].  Patient presented with c/o left sided arm and leg weakness. Notes indicate patient also reported chest pain.  Patient was evaluated and discharged to home to follow with PCP.  No medication changes note.  AVS reviewed with patient.  Patient denied further chest pain and stated she still feels weak sometimes but not like the episode that prompted the ED visit.  Patient reported blood glucose levels averaging 180-190.  Patient stated she believed the elevated levels were due to non-adherence to the recommended diet. Recent A1C noted as 11.7. Education provided.  Patient has not scheduled ED f/u with PCP and stated preference to wait until after her stress test which is scheduled for July 02, 2020.  Patient voiced intent and ability to schedule the appointment herself, closer to time when she knows her daughter's schedule.  Patient is dependent on daughter for transportation.    Patient does have questions related to dosing of Synthroid.  Epic Med list reflects Synthroid 150 mcg and patient confirmed this is the dosage given to her by the pharmacy.  Patient recalls talking with PCP about reducing the dosage but is unsure if she was to continue with 150 mcg or a lower dose was to be sent to pharmacy.  This note routed to PCP Clinical Pool with request for outreach to patient for clarification.      Lurdes Henry RN  Ambulatory     6/25/2020, 16:04

## 2020-06-26 ENCOUNTER — TELEPHONE (OUTPATIENT)
Dept: FAMILY MEDICINE CLINIC | Facility: CLINIC | Age: 75
End: 2020-06-26

## 2020-06-29 NOTE — PROGRESS NOTES
"Bianka Agudelo     VITALS: Blood pressure 124/80, pulse 87, temperature 97.9 °F (36.6 °C), height 162.6 cm (64.02\"), weight 88.3 kg (194 lb 9.6 oz), SpO2 96 %, not currently breastfeeding.    Subjective  Chief Complaint:   Chief Complaint   Patient presents with   • Diabetes     follow up         History of Present Illness:  Patient is a 74 y.o.  female with a medical history significant for type 2 diabetes, hypertension, hypothyroidism, and overactive bladder Who presents to clinic secondary to medical followup.  Patient recently had to switch from nitro 3 times a day to Imdur to prevent angina secondary to her insurance not covering the nitro anymore.  She reports that she may be feeling intermittent chest pain, but that could also be her anxiety.  He states that the chest pain is a dull ache over her midsternal area.  She denies any sweats, chills, nausea, vomiting, or numbness and tingling over her left upper extremity.  Patient also does not think that her thyroid medication is working as well as it used to and may want to switch to the brand name.  She declines a mammogram, colonoscopy, and DEXA scan today.    Patient has chronic conditions including anxiety, overactive bladder, type 2 diabetes with retinopathy, hyperlipidemia, and hypertension.  These chronic conditions are stable and unchanged.  Medications associated with her chronic conditions are not giving her any side effects.    No complaints about any of the medications.    The following portions of the patient's history were reviewed and updated as appropriate: allergies, current medications, past family history, past medical history, past social history, past surgical history and problem list.    Past Medical History  Past Medical History:   Diagnosis Date   • Anxiety    • CAD (coronary artery disease)    • Diabetes mellitus (CMS/[facility])    • Dyslipidemia    • GERD (gastroesophageal reflux disease)    • H/O angina pectoris    • History of bone density " study     February 2014   • History of mammogram 07/15/2013   • Hypertension    • Hypothyroidism    • Muscle spasms of both lower extremities    • Overactive bladder    • Vitamin B12 deficiency    • Vitamin D deficiency        Review of Systems   Respiratory: Negative for shortness of breath and wheezing.    Cardiovascular: Positive for chest pain. Negative for palpitations.       Surgical History  Past Surgical History:   Procedure Laterality Date   • COLONOSCOPY  01/01/2010   • OOPHORECTOMY      1 removed   • TUBAL ABDOMINAL LIGATION         Family History  Family History   Problem Relation Age of Onset   • Diabetes Mother    • Heart disease Mother    • Stroke Mother    • Cancer Father    • Pancreatic cancer Sister    • Diabetes Sister    • Heart attack Brother        Social History  Social History     Socioeconomic History   • Marital status:      Spouse name: Not on file   • Number of children: Not on file   • Years of education: Not on file   • Highest education level: Not on file   Tobacco Use   • Smoking status: Never Smoker   • Smokeless tobacco: Never Used   Substance and Sexual Activity   • Alcohol use: No   • Drug use: No   • Sexual activity: Defer       Objective  Physical Exam    Gen: Patient in NAD. Pleasant and answers appropriately. A&Ox3.  Anxious.    Skin: Warm and dry with normal turgor. No purpura, rashes, or unusual pigmentation noted. Hair is normal in appearance and distribution.    HEENT: NC/AT. No lesions noted. Conjunctiva clear, sclera nonicteric. PERRL. EOMI without nystagmus or strabismus. Fundi appear benign. No hemorrhages or exudates of eyes. Auditory canals are patent bilaterally without lesions. TMs intact,  nonerythematous, nonbulging without lesions. Nasal mucosa pink, nonerythematous, and nonedematous. Frontal and maxillary sinuses are nontender. O/P nonerythematous and moist without exudate.    Neck: Supple without lymph nodes palpated. FROM.     Lungs: CTA B/L without  rales, rhonchi, crackles, or wheezes.    Heart: RRR. S1 and S2 normal. No S3 or S4. No MRGT.    Abd: Soft, nontender,nondistended. (+)BSx4 quadrants.     Extrem: No CCE. Radial pulses 2+/4 and equal B/L. FROMx4.     Neuro: No focal motor/sensory deficits.      ECG 12 Lead  Date/Time: 6/12/2020 12:32 PM  Performed by: Eva Elliott MD  Authorized by: Eva Elliott MD   Comparison: not compared with previous ECG   Rhythm: sinus rhythm  Rate: normal  ST Segments: ST segments normal  T Waves: T waves normal  QRS axis: left    Clinical impression: non-specific ECG  Comments: NSR without any ST or T acute changes.  Marked left axis deviation giving the pattern consistent with pulmonary disease.  Old septal myocardial infarction            Assessment/Plan  Bianka Agudelo is a 74 y.o. here for medical followup.  Diagnoses and all orders for this visit:    Type 2 diabetes mellitus with both eyes affected by moderate nonproliferative retinopathy without macular edema, with long-term current use of insulin (CMS/[facility])  -     metFORMIN ER (GLUCOPHAGE-XR) 500 MG 24 hr tablet; Take 1 tablet by mouth 2 (Two) Times a Day With Meals.  -     Hemoglobin A1c; Future  -     Comprehensive Metabolic Panel; Future  -     Hemoglobin A1c  -     Comprehensive Metabolic Panel  We will check labs today.  Patient is currently on Levemir 42 units at night, NovoLog 70/30 24 units twice a day, Januvia 100 mg orally daily, and metformin  mg orally twice a day.    Anxiety  -     diazePAM (VALIUM) 5 MG tablet; Take 1 tablet by mouth Every 12 (Twelve) Hours As Needed for Anxiety.  -     Comprehensive Metabolic Panel; Future  -     Vitamin B12; Future  -     Comprehensive Metabolic Panel  -     Vitamin B12  [facility] #[id number] reviewed and is consistent.  UDS  reviewed and is consistent.  Patient comfort assessment guide reviewed and updated today.    As part of the patient's treatment plan they are being prescribed a controlled substance/  substances with potential for abuse.  This patient has been made aware of the appropriate use of such medications, including potential risk of somnolence, limited ability to drive and/or work safely, and potential for overdose.  It has also been made clear these medications are for use by the patient only, without concomitant use of alcohol or other substances unless prescribed/advised by medical provider.    Patient has completed prescribing agreement detailing terms of continued prescribing of controlled substances including monitoring PETE reports, urine drug screens, and pill counts.  The patient is aware PETE reports are reviewed on a regular basis and scanned into the chart.    History and physical exam exhibit continued safe and appropriate use of controlled substances.    Overactive bladder  -     oxybutynin (DITROPAN) 5 MG tablet; Take 1 tablet by mouth 2 (Two) Times a Day.  -     Comprehensive Metabolic Panel; Future  -     Comprehensive Metabolic Panel  Refilled Atrovent 5 mg orally twice a day.    Hyperlipidemia due to type 2 diabetes mellitus (CMS/HCC)  -     Comprehensive Metabolic Panel; Future  -     Comprehensive Metabolic Panel  Stable.  Patient continuing atorvastatin 10 mg orally daily.  Check liver enzymes today.    Hypertension associated with diabetes (CMS/HCC)  -     amLODIPine (NORVASC) 5 MG tablet; Take 1 tablet by mouth Daily.  -     Comprehensive Metabolic Panel; Future  -     Comprehensive Metabolic Panel  Blood pressures are stable today.  Continue losartan/hydrochlorothiazide 50/12.5 mg orally twice a day and Norvasc 5 mg orally daily.    Other specified hypothyroidism  -     Comprehensive Metabolic Panel; Future  -     TSH; Future  -     T4, Free; Future  -     Comprehensive Metabolic Panel  -     TSH  -     T4, Free  We will check thyroid panel.  Continue Synthroid 150 mcg orally daily.  Patient may want to switch to brand name.    Midsternal chest pain  -     ECG 12 Lead  -      Comprehensive Metabolic Panel; Future  -     H. Pylori IgM, Blood; Future  -     Comprehensive Metabolic Panel  -     H. Pylori IgM, Blood  May be secondary to anxiety from switching to Imdur from nitro.  EKG okay.  Will check H. Pylori.  Continue to monitor closely.  Patient to call if symptoms worsen.    Other orders  -     SCANNED EKG      Patient's Body mass index is 33.39 kg/m². BMI is above normal parameters. Recommendations include: exercise counseling and nutrition counseling.     Findings and plans discussed with patient who verbalizes understanding and agreement. Will followup with patient once results are in. Patient to followup at clinic PRN or in three months for further medical followup.    Eva Elliott MD    EMR Dragon/Transcription Disclaimer:  Much of this encounter note is an electronic transcription/translation of spoken language to printed text.  The electronic translation of spoken language may permit erroneous, or at times, nonsensical words or phrases to be inadvertently transcribed.  Although I have reviewed the note for such errors, some may still exist.

## 2020-07-02 ENCOUNTER — HOSPITAL ENCOUNTER (OUTPATIENT)
Dept: NUCLEAR MEDICINE | Facility: HOSPITAL | Age: 75
Discharge: HOME | End: 2020-07-02

## 2020-07-02 ENCOUNTER — HOSPITAL ENCOUNTER (OUTPATIENT)
Dept: CARDIOLOGY | Facility: HOSPITAL | Age: 75
Discharge: HOME | End: 2020-07-02

## 2020-07-02 DIAGNOSIS — R07.9 CHEST PAIN, UNSPECIFIED TYPE: ICD-10-CM

## 2020-07-02 LAB
BH CV NUCLEAR PRIOR STUDY: 3
BH CV STRESS BP STAGE 1: NORMAL
BH CV STRESS BP STAGE 2: NORMAL
BH CV STRESS COMMENTS STAGE 1: NORMAL
BH CV STRESS COMMENTS STAGE 2: NORMAL
BH CV STRESS DOSE REGADENOSON STAGE 1: 0.4
BH CV STRESS DURATION MIN STAGE 1: 0
BH CV STRESS DURATION MIN STAGE 2: 4
BH CV STRESS DURATION SEC STAGE 1: 10
BH CV STRESS DURATION SEC STAGE 2: 0
BH CV STRESS HR STAGE 1: 90
BH CV STRESS HR STAGE 2: 89
BH CV STRESS PROTOCOL 1: NORMAL
BH CV STRESS RECOVERY BP: NORMAL MMHG
BH CV STRESS RECOVERY HR: 78 BPM
BH CV STRESS STAGE 1: 1
BH CV STRESS STAGE 2: 2
LV EF NUC BP: 60 %
MAXIMAL PREDICTED HEART RATE: 146 BPM
PERCENT MAX PREDICTED HR: 61.64 %
STRESS BASELINE BP: NORMAL MMHG
STRESS BASELINE HR: 74 BPM
STRESS PERCENT HR: 73 %
STRESS POST PEAK BP: NORMAL MMHG
STRESS POST PEAK HR: 90 BPM
STRESS TARGET HR: 124 BPM

## 2020-07-02 PROCEDURE — A9500 TC99M SESTAMIBI: HCPCS | Performed by: EMERGENCY MEDICINE

## 2020-07-02 PROCEDURE — 0 TECHNETIUM SESTAMIBI: Performed by: EMERGENCY MEDICINE

## 2020-07-02 PROCEDURE — 78452 HT MUSCLE IMAGE SPECT MULT: CPT

## 2020-07-02 PROCEDURE — 78452 HT MUSCLE IMAGE SPECT MULT: CPT | Performed by: INTERNAL MEDICINE

## 2020-07-02 PROCEDURE — 25010000002 REGADENOSON 0.4 MG/5ML SOLUTION: Performed by: EMERGENCY MEDICINE

## 2020-07-02 PROCEDURE — 93017 CV STRESS TEST TRACING ONLY: CPT

## 2020-07-02 PROCEDURE — 93018 CV STRESS TEST I&R ONLY: CPT | Performed by: INTERNAL MEDICINE

## 2020-07-02 RX ADMIN — TECHNETIUM TC 99M SESTAMIBI 1 DOSE: 1 INJECTION INTRAVENOUS at 07:50

## 2020-07-02 RX ADMIN — TECHNETIUM TC 99M SESTAMIBI 1 DOSE: 1 INJECTION INTRAVENOUS at 10:10

## 2020-07-02 RX ADMIN — REGADENOSON 0.4 MG: 0.08 INJECTION, SOLUTION INTRAVENOUS at 10:10

## 2020-07-06 ENCOUNTER — TELEPHONE (OUTPATIENT)
Dept: FAMILY MEDICINE CLINIC | Facility: CLINIC | Age: 75
End: 2020-07-06

## 2020-07-06 NOTE — TELEPHONE ENCOUNTER
----- Message from Hafsa Tijerina sent at 7/6/2020  9:03 AM EDT -----  Left message on machine for patient to return call to clinic        Patient notified.

## 2020-07-21 RX ORDER — CYCLOBENZAPRINE HCL 10 MG
TABLET ORAL
Qty: 60 TABLET | Refills: 5 | Status: SHIPPED | OUTPATIENT
Start: 2020-07-21 | End: 2021-01-05 | Stop reason: SDUPTHER

## 2020-07-22 DIAGNOSIS — M25.551 RIGHT HIP PAIN: ICD-10-CM

## 2020-07-22 RX ORDER — IBUPROFEN 800 MG/1
TABLET ORAL
Qty: 90 TABLET | Refills: 2 | Status: SHIPPED | OUTPATIENT
Start: 2020-07-22 | End: 2020-11-23

## 2020-07-23 ENCOUNTER — TELEPHONE (OUTPATIENT)
Dept: FAMILY MEDICINE CLINIC | Facility: CLINIC | Age: 75
End: 2020-07-23

## 2020-07-24 NOTE — TELEPHONE ENCOUNTER
Pa submitted through cover my meds waiting on response.    PA approved and message left for the pharmacy.

## 2020-07-24 NOTE — TELEPHONE ENCOUNTER
Try to PA. Dx: Lower back pain with sciatica. Has failed robaxin and baclofen previously. Thanks. This is not a new med for her.

## 2020-08-04 RX ORDER — ISOSORBIDE MONONITRATE 30 MG/1
30 TABLET, EXTENDED RELEASE ORAL DAILY
Qty: 30 TABLET | Refills: 2 | Status: SHIPPED | OUTPATIENT
Start: 2020-08-04 | End: 2020-10-04

## 2020-08-14 ENCOUNTER — TELEPHONE (OUTPATIENT)
Dept: FAMILY MEDICINE CLINIC | Facility: CLINIC | Age: 75
End: 2020-08-14

## 2020-08-14 DIAGNOSIS — F41.9 ANXIETY: ICD-10-CM

## 2020-08-14 RX ORDER — DIAZEPAM 5 MG/1
5 TABLET ORAL EVERY 12 HOURS PRN
Qty: 60 TABLET | Refills: 0 | Status: SHIPPED | OUTPATIENT
Start: 2020-08-14 | End: 2020-10-05 | Stop reason: SDUPTHER

## 2020-08-14 NOTE — PROGRESS NOTES
[facility] # [id number]  Reviewed and is consistent.  [facility] 12/2019 reviewed and is consistent.  Patient comfort assessment guide reviewed and updated today.    As part of the patient's treatment plan they are being prescribed a controlled substance/ substances with potential for abuse.  This patient has been made aware of the appropriate use of such medications, including potential risk of somnolence, limited ability to drive and/or work safely, and potential for overdose.  It has also been made clear these medications are for use by the patient only, without concomitant use of alcohol or other substances unless prescribed/advised by medical provider.    Patient has completed prescribing agreement detailing terms of continued prescribing of controlled substances including monitoring PETE reports, urine drug screens, and pill counts.  The patient is aware PETE reports are reviewed on a regular basis and scanned into the chart.    History and physical exam exhibit continued safe and appropriate use of controlled substances.

## 2020-08-17 DIAGNOSIS — E78.5 DYSLIPIDEMIA: ICD-10-CM

## 2020-08-17 RX ORDER — ATORVASTATIN CALCIUM 10 MG/1
TABLET, FILM COATED ORAL
Qty: 90 TABLET | Refills: 0 | Status: SHIPPED | OUTPATIENT
Start: 2020-08-17 | End: 2020-10-05 | Stop reason: SDUPTHER

## 2020-09-08 DIAGNOSIS — E53.8 B12 DEFICIENCY: ICD-10-CM

## 2020-09-08 DIAGNOSIS — E11.3393 TYPE 2 DIABETES MELLITUS WITH BOTH EYES AFFECTED BY MODERATE NONPROLIFERATIVE RETINOPATHY WITHOUT MACULAR EDEMA, WITH LONG-TERM CURRENT USE OF INSULIN (HCC): ICD-10-CM

## 2020-09-08 DIAGNOSIS — Z79.4 TYPE 2 DIABETES MELLITUS WITH BOTH EYES AFFECTED BY MODERATE NONPROLIFERATIVE RETINOPATHY WITHOUT MACULAR EDEMA, WITH LONG-TERM CURRENT USE OF INSULIN (HCC): ICD-10-CM

## 2020-09-08 RX ORDER — LANOLIN ALCOHOL/MO/W.PET/CERES
CREAM (GRAM) TOPICAL
Qty: 90 TABLET | Refills: 0 | Status: SHIPPED | OUTPATIENT
Start: 2020-09-08 | End: 2021-01-05 | Stop reason: SDUPTHER

## 2020-09-08 RX ORDER — INSULIN DETEMIR 100 [IU]/ML
INJECTION, SOLUTION SUBCUTANEOUS
Qty: 40 ML | Refills: 2 | Status: SHIPPED | OUTPATIENT
Start: 2020-09-08 | End: 2021-06-15

## 2020-09-09 DIAGNOSIS — Z79.4 TYPE 2 DIABETES MELLITUS WITH BOTH EYES AFFECTED BY MODERATE NONPROLIFERATIVE RETINOPATHY WITHOUT MACULAR EDEMA, WITH LONG-TERM CURRENT USE OF INSULIN (HCC): ICD-10-CM

## 2020-09-09 DIAGNOSIS — E11.3393 TYPE 2 DIABETES MELLITUS WITH BOTH EYES AFFECTED BY MODERATE NONPROLIFERATIVE RETINOPATHY WITHOUT MACULAR EDEMA, WITH LONG-TERM CURRENT USE OF INSULIN (HCC): ICD-10-CM

## 2020-09-09 RX ORDER — OMEPRAZOLE 20 MG/1
20 CAPSULE, DELAYED RELEASE ORAL 2 TIMES DAILY
Qty: 180 CAPSULE | Refills: 1 | Status: SHIPPED | OUTPATIENT
Start: 2020-09-09 | End: 2021-01-05 | Stop reason: SDUPTHER

## 2020-09-09 RX ORDER — ASPIRIN 81 MG/1
81 TABLET ORAL DAILY
Qty: 90 TABLET | Refills: 1 | Status: SHIPPED | OUTPATIENT
Start: 2020-09-09 | End: 2021-01-05 | Stop reason: SDUPTHER

## 2020-09-11 ENCOUNTER — EPISODE CHANGES (OUTPATIENT)
Dept: CASE MANAGEMENT | Facility: OTHER | Age: 75
End: 2020-09-11

## 2020-09-15 ENCOUNTER — CLINICAL SUPPORT (OUTPATIENT)
Dept: FAMILY MEDICINE CLINIC | Facility: CLINIC | Age: 75
End: 2020-09-15

## 2020-09-15 DIAGNOSIS — Z23 IMMUNIZATION DUE: Primary | ICD-10-CM

## 2020-09-15 DIAGNOSIS — E03.8 OTHER SPECIFIED HYPOTHYROIDISM: ICD-10-CM

## 2020-09-15 PROCEDURE — 90670 PCV13 VACCINE IM: CPT | Performed by: FAMILY MEDICINE

## 2020-09-15 PROCEDURE — G0008 ADMIN INFLUENZA VIRUS VAC: HCPCS | Performed by: FAMILY MEDICINE

## 2020-09-15 PROCEDURE — G0009 ADMIN PNEUMOCOCCAL VACCINE: HCPCS | Performed by: FAMILY MEDICINE

## 2020-09-15 PROCEDURE — 90694 VACC AIIV4 NO PRSRV 0.5ML IM: CPT | Performed by: FAMILY MEDICINE

## 2020-09-15 RX ORDER — LEVOTHYROXINE SODIUM 0.15 MG/1
137 TABLET ORAL DAILY
Qty: 30 TABLET | Refills: 2 | Status: SHIPPED | OUTPATIENT
Start: 2020-09-15 | End: 2020-12-16

## 2020-09-19 DIAGNOSIS — E11.59 HYPERTENSION ASSOCIATED WITH DIABETES (HCC): ICD-10-CM

## 2020-09-19 DIAGNOSIS — I15.2 HYPERTENSION ASSOCIATED WITH DIABETES (HCC): ICD-10-CM

## 2020-09-21 RX ORDER — AMLODIPINE BESYLATE 5 MG/1
5 TABLET ORAL DAILY
Qty: 90 TABLET | Refills: 1 | Status: SHIPPED | OUTPATIENT
Start: 2020-09-21 | End: 2021-01-05 | Stop reason: SDUPTHER

## 2020-10-04 RX ORDER — ISOSORBIDE MONONITRATE 30 MG/1
30 TABLET, EXTENDED RELEASE ORAL DAILY
Qty: 30 TABLET | Refills: 2 | Status: SHIPPED | OUTPATIENT
Start: 2020-10-04 | End: 2021-01-05 | Stop reason: SDUPTHER

## 2020-10-05 ENCOUNTER — OFFICE VISIT (OUTPATIENT)
Dept: FAMILY MEDICINE CLINIC | Facility: CLINIC | Age: 75
End: 2020-10-05

## 2020-10-05 VITALS
BODY MASS INDEX: 34.27 KG/M2 | SYSTOLIC BLOOD PRESSURE: 134 MMHG | DIASTOLIC BLOOD PRESSURE: 72 MMHG | HEIGHT: 63 IN | HEART RATE: 81 BPM | TEMPERATURE: 96.9 F | RESPIRATION RATE: 16 BRPM | WEIGHT: 193.4 LBS | OXYGEN SATURATION: 96 %

## 2020-10-05 DIAGNOSIS — Z79.4 TYPE 2 DIABETES MELLITUS WITH BOTH EYES AFFECTED BY MODERATE NONPROLIFERATIVE RETINOPATHY WITHOUT MACULAR EDEMA, WITH LONG-TERM CURRENT USE OF INSULIN (HCC): ICD-10-CM

## 2020-10-05 DIAGNOSIS — E11.69 HYPERLIPIDEMIA DUE TO TYPE 2 DIABETES MELLITUS (HCC): ICD-10-CM

## 2020-10-05 DIAGNOSIS — E11.3393 TYPE 2 DIABETES MELLITUS WITH BOTH EYES AFFECTED BY MODERATE NONPROLIFERATIVE RETINOPATHY WITHOUT MACULAR EDEMA, WITH LONG-TERM CURRENT USE OF INSULIN (HCC): ICD-10-CM

## 2020-10-05 DIAGNOSIS — E11.59 HYPERTENSION ASSOCIATED WITH DIABETES (HCC): Primary | ICD-10-CM

## 2020-10-05 DIAGNOSIS — E03.8 OTHER SPECIFIED HYPOTHYROIDISM: ICD-10-CM

## 2020-10-05 DIAGNOSIS — M54.41 CHRONIC LOW BACK PAIN WITH RIGHT-SIDED SCIATICA, UNSPECIFIED BACK PAIN LATERALITY: ICD-10-CM

## 2020-10-05 DIAGNOSIS — G89.29 CHRONIC LOW BACK PAIN WITH RIGHT-SIDED SCIATICA, UNSPECIFIED BACK PAIN LATERALITY: ICD-10-CM

## 2020-10-05 DIAGNOSIS — I25.119 CORONARY ARTERY DISEASE INVOLVING NATIVE CORONARY ARTERY OF NATIVE HEART WITH ANGINA PECTORIS (HCC): ICD-10-CM

## 2020-10-05 DIAGNOSIS — E78.5 HYPERLIPIDEMIA DUE TO TYPE 2 DIABETES MELLITUS (HCC): ICD-10-CM

## 2020-10-05 DIAGNOSIS — I15.2 HYPERTENSION ASSOCIATED WITH DIABETES (HCC): Primary | ICD-10-CM

## 2020-10-05 DIAGNOSIS — F41.9 ANXIETY: ICD-10-CM

## 2020-10-05 DIAGNOSIS — M25.551 RIGHT HIP PAIN: ICD-10-CM

## 2020-10-05 PROCEDURE — 99214 OFFICE O/P EST MOD 30 MIN: CPT | Performed by: FAMILY MEDICINE

## 2020-10-05 RX ORDER — CLOPIDOGREL BISULFATE 75 MG/1
75 TABLET ORAL DAILY
Qty: 90 TABLET | Refills: 1 | Status: SHIPPED | OUTPATIENT
Start: 2020-10-05 | End: 2021-01-05 | Stop reason: SDUPTHER

## 2020-10-05 RX ORDER — LOSARTAN POTASSIUM AND HYDROCHLOROTHIAZIDE 12.5; 5 MG/1; MG/1
1 TABLET ORAL 2 TIMES DAILY
Qty: 180 TABLET | Refills: 1 | Status: SHIPPED | OUTPATIENT
Start: 2020-10-05 | End: 2021-01-05 | Stop reason: SDUPTHER

## 2020-10-05 RX ORDER — DIAZEPAM 5 MG/1
5 TABLET ORAL EVERY 12 HOURS PRN
Qty: 60 TABLET | Refills: 0 | Status: SHIPPED | OUTPATIENT
Start: 2020-10-05 | End: 2020-11-11 | Stop reason: SDUPTHER

## 2020-10-05 RX ORDER — LOSARTAN POTASSIUM 50 MG/1
50 TABLET ORAL 2 TIMES DAILY
COMMUNITY
End: 2021-01-05 | Stop reason: SDUPTHER

## 2020-10-05 RX ORDER — ATORVASTATIN CALCIUM 10 MG/1
10 TABLET, FILM COATED ORAL DAILY
Qty: 90 TABLET | Refills: 1 | Status: SHIPPED | OUTPATIENT
Start: 2020-10-05 | End: 2021-01-05 | Stop reason: SDUPTHER

## 2020-10-05 RX ORDER — INSULIN ASPART 100 [IU]/ML
24 INJECTION, SUSPENSION SUBCUTANEOUS 2 TIMES DAILY WITH MEALS
Qty: 30 ML | Refills: 5 | Status: SHIPPED | OUTPATIENT
Start: 2020-10-05 | End: 2021-01-05 | Stop reason: SDUPTHER

## 2020-10-05 NOTE — PATIENT INSTRUCTIONS
Your NEW prescription bottle should say - losartan/HCTZ 50/12.5 mg orally twice a day.It is the combo pill with an extra water pill.     Throw away the one that says losartan potassium 50 mg orally twice a day. This is the single pill.     We will call you with lab results.  If you have more palpitations when you are sleeping, call me. It may not have anything to do with the thyroid or the BP pill.

## 2020-10-06 LAB
ALBUMIN SERPL-MCNC: 4.5 G/DL (ref 3.5–5.2)
ALBUMIN/GLOB SERPL: 2 G/DL
ALP SERPL-CCNC: 73 U/L (ref 39–117)
ALT SERPL-CCNC: 35 U/L (ref 1–33)
AST SERPL-CCNC: 31 U/L (ref 1–32)
BASOPHILS # BLD AUTO: 0.03 10*3/MM3 (ref 0–0.2)
BASOPHILS NFR BLD AUTO: 0.4 % (ref 0–1.5)
BILIRUB SERPL-MCNC: 0.7 MG/DL (ref 0–1.2)
BUN SERPL-MCNC: 13 MG/DL (ref 8–23)
BUN/CREAT SERPL: 20 (ref 7–25)
CALCIUM SERPL-MCNC: 10 MG/DL (ref 8.6–10.5)
CHLORIDE SERPL-SCNC: 99 MMOL/L (ref 98–107)
CO2 SERPL-SCNC: 28.1 MMOL/L (ref 22–29)
CREAT SERPL-MCNC: 0.65 MG/DL (ref 0.57–1)
EOSINOPHIL # BLD AUTO: 0.22 10*3/MM3 (ref 0–0.4)
EOSINOPHIL NFR BLD AUTO: 3.2 % (ref 0.3–6.2)
ERYTHROCYTE [DISTWIDTH] IN BLOOD BY AUTOMATED COUNT: 12.9 % (ref 12.3–15.4)
GLOBULIN SER CALC-MCNC: 2.2 GM/DL
GLUCOSE SERPL-MCNC: 287 MG/DL (ref 65–99)
HBA1C MFR BLD: 9.2 % (ref 4.8–5.6)
HCT VFR BLD AUTO: 42.5 % (ref 34–46.6)
HGB BLD-MCNC: 14 G/DL (ref 12–15.9)
IMM GRANULOCYTES # BLD AUTO: 0.03 10*3/MM3 (ref 0–0.05)
IMM GRANULOCYTES NFR BLD AUTO: 0.4 % (ref 0–0.5)
LYMPHOCYTES # BLD AUTO: 2.64 10*3/MM3 (ref 0.7–3.1)
LYMPHOCYTES NFR BLD AUTO: 38.9 % (ref 19.6–45.3)
MCH RBC QN AUTO: 29.2 PG (ref 26.6–33)
MCHC RBC AUTO-ENTMCNC: 32.9 G/DL (ref 31.5–35.7)
MCV RBC AUTO: 88.5 FL (ref 79–97)
MONOCYTES # BLD AUTO: 0.52 10*3/MM3 (ref 0.1–0.9)
MONOCYTES NFR BLD AUTO: 7.7 % (ref 5–12)
NEUTROPHILS # BLD AUTO: 3.35 10*3/MM3 (ref 1.7–7)
NEUTROPHILS NFR BLD AUTO: 49.4 % (ref 42.7–76)
NRBC BLD AUTO-RTO: 0 /100 WBC (ref 0–0.2)
PLATELET # BLD AUTO: 242 10*3/MM3 (ref 140–450)
POTASSIUM SERPL-SCNC: 4.3 MMOL/L (ref 3.5–5.2)
PROT SERPL-MCNC: 6.7 G/DL (ref 6–8.5)
RBC # BLD AUTO: 4.8 10*6/MM3 (ref 3.77–5.28)
SODIUM SERPL-SCNC: 137 MMOL/L (ref 136–145)
T4 FREE SERPL-MCNC: 1.35 NG/DL (ref 0.93–1.7)
TSH SERPL DL<=0.005 MIU/L-ACNC: 3.28 UIU/ML (ref 0.27–4.2)
WBC # BLD AUTO: 6.79 10*3/MM3 (ref 3.4–10.8)

## 2020-10-19 ENCOUNTER — TELEPHONE (OUTPATIENT)
Dept: FAMILY MEDICINE CLINIC | Facility: CLINIC | Age: 75
End: 2020-10-19

## 2020-10-19 PROBLEM — E78.5 HYPERLIPIDEMIA DUE TO TYPE 2 DIABETES MELLITUS: Status: ACTIVE | Noted: 2020-10-19

## 2020-10-19 PROBLEM — E11.69 HYPERLIPIDEMIA DUE TO TYPE 2 DIABETES MELLITUS: Status: ACTIVE | Noted: 2020-10-19

## 2020-10-19 NOTE — PROGRESS NOTES
"Bianka Agudelo     VITALS: Blood pressure 134/72, pulse 81, temperature 96.9 °F (36.1 °C), temperature source Temporal, resp. rate 16, height 160 cm (62.99\"), weight 87.7 kg (193 lb 6.4 oz), SpO2 96 %, not currently breastfeeding.    Subjective  Chief Complaint:   Chief Complaint   Patient presents with   • Hyperlipidemia        History of Present Illness:  Patient is a 74 y.o.  female with medical conditions significant for type 2 diabetes, hypertension, hypothyroidism, and overactive bladder who presents to clinic secondary to medical followup.  No new or acute concerns.  Patient is doing okay.  However, she has noticed recently that her blood pressures have been elevated.  Patient is currently on losartan 50 mg orally twice a day and amlodipine 5 mg orally daily.  She denies any side effects.  She denies any dizziness, lightheadedness, shortness of breath, chest pain, or edema.  Review of her chart shows that she used to be on losartan/hydrochlorothiazide 50/12.5 mg orally twice a day instead of simply just the losartan.  Patient has been keeping an eye on the chest pain secondary to her switching from nitro 3 times a day to Imdur.  She has not noticed any new chest pain.  She declines mammogram, colonoscopy, and DEXA scan today.    Patient has chronic conditions including anxiety, right hip pain, lower back pain, type 2 diabetes, CAD, hyperlipidemia, and hypothyroidism.  These chronic conditions are stable and unchanged.  Medications associated with these chronic conditions are not giving her any side effects.    No complaints about any of the medications.    The following portions of the patient's history were reviewed and updated as appropriate: allergies, current medications, past family history, past medical history, past social history, past surgical history and problem list.    Past Medical History  Past Medical History:   Diagnosis Date   • Anxiety    • CAD (coronary artery disease)    • Diabetes mellitus " (CMS/[facility])    • Dyslipidemia    • GERD (gastroesophageal reflux disease)    • H/O angina pectoris    • History of bone density study     February 2014   • History of mammogram 07/15/2013   • Hypertension    • Hypothyroidism    • Muscle spasms of both lower extremities    • Overactive bladder    • Vitamin B12 deficiency    • Vitamin D deficiency        Review of Systems   Respiratory: Negative for shortness of breath and wheezing.    Cardiovascular: Negative for chest pain and palpitations.       Surgical History  Past Surgical History:   Procedure Laterality Date   • CARDIAC CATHETERIZATION     • COLONOSCOPY  01/01/2010   • OOPHORECTOMY      1 removed   • TUBAL ABDOMINAL LIGATION         Family History  Family History   Problem Relation Age of Onset   • Diabetes Mother    • Heart disease Mother    • Stroke Mother    • Cancer Father    • Pancreatic cancer Sister    • Diabetes Sister    • Heart attack Brother        Social History  Social History     Socioeconomic History   • Marital status:      Spouse name: Not on file   • Number of children: Not on file   • Years of education: Not on file   • Highest education level: Not on file   Tobacco Use   • Smoking status: Never Smoker   • Smokeless tobacco: Never Used   Substance and Sexual Activity   • Alcohol use: No   • Drug use: No   • Sexual activity: Defer       Objective  Physical Exam    Gen: Patient in NAD. Pleasant and answers appropriately. A&Ox3.    Skin: Warm and dry with normal turgor. No purpura, rashes, or unusual pigmentation noted. Hair is normal in appearance and distribution.    HEENT: NC/AT. No lesions noted. Conjunctiva clear, sclera nonicteric. PERRL. EOMI without nystagmus or strabismus. Fundi appear benign. No hemorrhages or exudates of eyes. Auditory canals are patent bilaterally without lesions. TMs intact,  nonerythematous, nonbulging without lesions. Nasal mucosa pink, nonerythematous, and nonedematous. Frontal and maxillary sinuses are  nontender. O/P nonerythematous and moist without exudate.    Neck: Supple without lymph nodes palpated. FROM.     Lungs: Coarse B/L without rales, rhonchi, crackles, or wheezes.    Heart: RRR. S1 and S2 normal. No S3 or S4. No MRGT.    Abd: Soft, nontender,nondistended. (+)BSx4 quadrants.     Extrem: No CC.  Trace edema in bilateral lower extremities.  Radial pulses 2+/4 and equal B/L. FROMx4.     Back: Decreased range of motion.    Neuro: No focal motor/sensory deficits.    Procedures    Assessment/Plan  Bianka Agudelo is a 74 y.o. here for medical followup.  Diagnoses and all orders for this visit:    1. Hypertension associated with diabetes (CMS/HCC) (Primary)  -     losartan-hydrochlorothiazide (HYZAAR) 50-12.5 MG per tablet; Take 1 tablet by mouth 2 (two) times a day.  Dispense: 180 tablet; Refill: 1  -     Comprehensive Metabolic Panel  -     CBC & Differential  Patient to discontinue losartan 50 mg orally twice a day and will take losartan/hydrochlorothiazide 50/12.5 mg orally twice a day instead.  We will continue amlodipine 5 mg orally daily.  Continue to monitor closely.    2. Anxiety  -     diazePAM (VALIUM) 5 MG tablet; Take 1 tablet by mouth Every 12 (Twelve) Hours As Needed for Anxiety.  Dispense: 60 tablet; Refill: 0  -     Comprehensive Metabolic Panel  -     CBC & Differential  Tyler #[id number] reviewed and is consistent.  UDS reviewed and is consistent.  Patient comfort assessment guide reviewed and updated today.    As part of the patient's treatment plan they are being prescribed a controlled substance/ substances with potential for abuse.  This patient has been made aware of the appropriate use of such medications, including potential risk of somnolence, limited ability to drive and/or work safely, and potential for overdose.  It has also been made clear these medications are for use by the patient only, without concomitant use of alcohol or other substances unless prescribed/advised by medical  provider.    Patient has completed prescribing agreement detailing terms of continued prescribing of controlled substances including monitoring PETE reports, urine drug screens, and pill counts.  The patient is aware PETE reports are reviewed on a regular basis and scanned into the chart.    History and physical exam exhibit continued safe and appropriate use of controlled substances.    3. Right hip pain  -     diclofenac (VOLTAREN) 1 % gel gel; Apply 4 gm to affected areas below the waist QID PRN pain and 2 gm to affected areas above the waist QID PRN pain  Dispense: 100 g; Refill: 1  -     Comprehensive Metabolic Panel  -     CBC & Differential  Doing well with home exercises, ibuprofen 800 mg orally 3 times a day, Voltaren gel, and Flexeril 10 mg orally twice a day.    4. Chronic low back pain with right-sided sciatica, unspecified back pain laterality  -     diclofenac (VOLTAREN) 1 % gel gel; Apply 4 gm to affected areas below the waist QID PRN pain and 2 gm to affected areas above the waist QID PRN pain  Dispense: 100 g; Refill: 1  -     Comprehensive Metabolic Panel  -     CBC & Differential  Doing well with home exercises, ibuprofen 800 mg orally 3 times a day, Voltaren gel, Flexeril 10 mg orally twice a day.    5. Type 2 diabetes mellitus with both eyes affected by moderate nonproliferative retinopathy without macular edema, with long-term current use of insulin (CMS/[facility])  -     NovoLOG Mix 70/30 (70-30) 100 UNIT/ML injection; Inject 24 Units under the skin into the appropriate area as directed 2 (Two) Times a Day With Meals.  Dispense: 30 mL; Refill: 5  -     Comprehensive Metabolic Panel  -     Hemoglobin A1c  -     CBC & Differential  Most likely uncontrolled.  Patient currently on Levemir 42 units at night, NovoLog 70/30 24 units twice a day, Januvia 100 mg orally daily, and Metformin  mg orally twice a day.    6. Coronary artery disease involving native coronary artery of native heart with  angina pectoris (CMS/HCC)  -     clopidogrel (PLAVIX) 75 MG tablet; Take 1 tablet by mouth Daily.  Dispense: 90 tablet; Refill: 1  -     Comprehensive Metabolic Panel  -     CBC & Differential  Continue Plavix 75 mg orally daily and Imdur 30 mg orally daily.    7. Hyperlipidemia due to type 2 diabetes mellitus (CMS/HCC)  -     atorvastatin (LIPITOR) 10 MG tablet; Take 1 tablet by mouth Daily.  Dispense: 90 tablet; Refill: 1  -     Comprehensive Metabolic Panel  -     CBC & Differential  Refilled Lipitor 10 mg orally daily.  Check liver enzymes today.    8. Other specified hypothyroidism  -     TSH  -     T4, Free  Check thyroid panel today.  Patient currently on Synthroid 150 mcg orally daily.      Patient's Body mass index is 34.27 kg/m². BMI is above normal parameters. Recommendations include: exercise counseling and nutrition counseling.     Findings and plans discussed with patient who verbalizes understanding and agreement. Will followup with patient once results are in. Patient to followup at clinic PRN or in three months for further medical followup.    Eva Elliott MD    EMR Dragon/Transcription Disclaimer:  Much of this encounter note is an electronic transcription/translation of spoken language to printed text.  The electronic translation of spoken language may permit erroneous, or at times, nonsensical words or phrases to be inadvertently transcribed.  Although I have reviewed the note for such errors, some may still exist.

## 2020-10-19 NOTE — TELEPHONE ENCOUNTER
----- Message from Eva Elliott MD sent at 10/18/2020 11:29 PM EDT -----  Please call Bianka. She's been working hard. Labs are okay; her A1C went from an 11.7 to a 9.2. I need to her to keep working at it. She's doing great. No changes.    Patient notified.

## 2020-11-11 DIAGNOSIS — F41.9 ANXIETY: ICD-10-CM

## 2020-11-13 RX ORDER — DIAZEPAM 5 MG/1
5 TABLET ORAL EVERY 12 HOURS PRN
Qty: 60 TABLET | Refills: 0 | Status: SHIPPED | OUTPATIENT
Start: 2020-11-13 | End: 2021-01-05 | Stop reason: SDUPTHER

## 2020-11-13 NOTE — TELEPHONE ENCOUNTER
Pete # in epic  Reviewed and is consistent.  UDS 10/2020 reviewed and is consistent.  Patient comfort assessment guide reviewed and updated today.    As part of the patient's treatment plan they are being prescribed a controlled substance/ substances with potential for abuse.  This patient has been made aware of the appropriate use of such medications, including potential risk of somnolence, limited ability to drive and/or work safely, and potential for overdose.  It has also been made clear these medications are for use by the patient only, without concomitant use of alcohol or other substances unless prescribed/advised by medical provider.    Patient has completed prescribing agreement detailing terms of continued prescribing of controlled substances including monitoring PETE reports, urine drug screens, and pill counts.  The patient is aware PETE reports are reviewed on a regular basis and scanned into the chart.    History and physical exam exhibit continued safe and appropriate use of controlled substances.

## 2020-11-18 DIAGNOSIS — M25.551 RIGHT HIP PAIN: ICD-10-CM

## 2020-11-23 RX ORDER — IBUPROFEN 800 MG/1
TABLET ORAL
Qty: 90 TABLET | Refills: 2 | Status: SHIPPED | OUTPATIENT
Start: 2020-11-23 | End: 2021-01-05 | Stop reason: SDUPTHER

## 2020-12-08 ENCOUNTER — PATIENT OUTREACH (OUTPATIENT)
Dept: CASE MANAGEMENT | Facility: OTHER | Age: 75
End: 2020-12-08

## 2020-12-08 NOTE — OUTREACH NOTE
Care Plan Note      Responses   Lifestyle Goals  Routine follow-up with doctor(s), Medication management, Other (See Comment), Lower blood sugar, Eat a healthy diet, Exercise a number of times per week, Routine eye exam, Routine foot care [lower A1C]   Self Management  Medication Adherence, Educational materials provided, Home Glucose Monitoring, Get flu/pneumonia shot, Other (See Comment) [follow diabetic diet, continue walking for exercise]   Annual Wellness Visit:   Patient Has Completed   Care Gaps Addressed  Colon Cancer Screening, Diabetic A1C, Diabetic Eye Exam, Flu Shot, Pneumonia Vaccine, Mammogram, Other (See Comment) [shingles vaccine]   Colon Cancer Screening Type  Refused   HbA1c Status  Up to Date-outside defined limits   Diabetic Eye Exam Status  Up to Date   Diabetic Eye Exam Completion at [facility] or Other  Other   Other Location  [facility]   Flu Shot Status  Up to Date   Mammogram Status  Up to Date   Pneumonia Vaccine Status  Up to Date   Care Gap Comments  shingles vaccine- pt will complete   Specific Disease Process Teaching  Diabetes   Other Patient Education/Resources   24/7 [facility] Nurse Call Line, Nutrition/Diet   24/7 Nurse Call Line Education Method  Send Materials   Nutrition/Diet Education Method  Send Materials   Does patient have depression diagnosis?  No   Advanced Directives:  Send Materials   Ed Visits past 12 months:  1   Hospitalizations past 12 months  None   Medication Adherence  Medications understood   Goal Progress  Making Progress Toward Goal(s)   Readiness Scale  9   Confidence Scale  9   Health Literacy  Good        The main concerns and/or symptoms the patient would like to address are: RN-ACM outreach call made to pt to discuss diabetes management, able to reach pt on 2nd attempt. Pt denies any symptoms at this time. Pt states she would like assistance with obtaining feminine pads for bladder leakage.     Education/instruction provided by Care  Coordinator: reviewed with pt: disease education regarding DM; BS monitoring- pt states she checks occasionally d/t effects on her fingers, discussed other BS monitoring device options- pt not interested; pt states sugars were a little high from the holiday but have improved; discussed diet and exercise, educational materials provided, pt states she is walking for exercise; med adherence including novolog, levemir, metformin, januvia- pt reports to be compliant; statin use- pt taking atorvastatin; resources for feminine pads; covid precautions; medical appointments; RN-ACM contact information; [facility] 24 hour nurse line number; [facility] Care Coordination Services number; health maintenance gaps; AD- materials provided; MyChart- pt not interested; SDOH- pt denies food, medication, or transportation insecurities. Pt UTD on AWV, diabetic eye exam, urine microalbumin. Pt has next routine PCP appt scheduled for 1/5/21. No questions or concerns per pt at this time. Advised pt to call with any needs. Pt appreciative of the call. Pt agrees to follow up outreach in 3-4 weeks, scheduled.     Follow Up Outreach Due: 3-4 weeks    Bee Arteaga RN  Ambulatory     12/8/2020, 12:59 EST

## 2020-12-08 NOTE — OUTREACH NOTE
Care Coordination Assessment    Documented/Reviewed By: Bee Arteaga RN Date/time: 12/8/2020 12:35 PM   Assessment completed with: patient  Enrolled in care management program: Yes  Living arrangement: alone  Support system: children, family  Type of residence: private residence  Home care services: No  Equipment used at home: walker, cane (Comment: glucometer)  Communication device: Yes  Bed or wheelchair confined: No  Inadequate nutrition: No  Medication adherence problem: No  History of fall(s) in last 6 months: Yes  Difficulty keeping appointments: No  Family aware of the patient's advance care planning wishes: No  Chronic pain: Yes  Location of chronic pain: hip, back

## 2020-12-15 DIAGNOSIS — E03.8 OTHER SPECIFIED HYPOTHYROIDISM: ICD-10-CM

## 2020-12-16 RX ORDER — SYRING-NEEDL,DISP,INSUL,0.3 ML 31GX15/64"
SYRINGE, EMPTY DISPOSABLE MISCELLANEOUS
Qty: 200 EACH | Refills: 5 | Status: SHIPPED | OUTPATIENT
Start: 2020-12-16 | End: 2022-02-03 | Stop reason: SDUPTHER

## 2020-12-16 RX ORDER — LEVOTHYROXINE SODIUM 0.15 MG/1
137 TABLET ORAL DAILY
Qty: 30 TABLET | Refills: 2 | Status: SHIPPED | OUTPATIENT
Start: 2020-12-16 | End: 2021-01-05 | Stop reason: SDUPTHER

## 2021-01-05 ENCOUNTER — OFFICE VISIT (OUTPATIENT)
Dept: FAMILY MEDICINE CLINIC | Facility: CLINIC | Age: 76
End: 2021-01-05

## 2021-01-05 VITALS
DIASTOLIC BLOOD PRESSURE: 68 MMHG | OXYGEN SATURATION: 97 % | BODY MASS INDEX: 33.49 KG/M2 | SYSTOLIC BLOOD PRESSURE: 130 MMHG | TEMPERATURE: 96.9 F | WEIGHT: 189 LBS | HEART RATE: 79 BPM | HEIGHT: 63 IN

## 2021-01-05 DIAGNOSIS — Z00.00 ENCOUNTER FOR SUBSEQUENT ANNUAL WELLNESS VISIT IN MEDICARE PATIENT: Primary | ICD-10-CM

## 2021-01-05 DIAGNOSIS — E03.8 OTHER SPECIFIED HYPOTHYROIDISM: ICD-10-CM

## 2021-01-05 DIAGNOSIS — N32.81 OVERACTIVE BLADDER: ICD-10-CM

## 2021-01-05 DIAGNOSIS — E11.69 HYPERLIPIDEMIA DUE TO TYPE 2 DIABETES MELLITUS (HCC): ICD-10-CM

## 2021-01-05 DIAGNOSIS — E11.3393 TYPE 2 DIABETES MELLITUS WITH BOTH EYES AFFECTED BY MODERATE NONPROLIFERATIVE RETINOPATHY WITHOUT MACULAR EDEMA, WITH LONG-TERM CURRENT USE OF INSULIN (HCC): ICD-10-CM

## 2021-01-05 DIAGNOSIS — E78.5 HYPERLIPIDEMIA DUE TO TYPE 2 DIABETES MELLITUS (HCC): ICD-10-CM

## 2021-01-05 DIAGNOSIS — M25.551 RIGHT HIP PAIN: ICD-10-CM

## 2021-01-05 DIAGNOSIS — Z79.4 TYPE 2 DIABETES MELLITUS WITH BOTH EYES AFFECTED BY MODERATE NONPROLIFERATIVE RETINOPATHY WITHOUT MACULAR EDEMA, WITH LONG-TERM CURRENT USE OF INSULIN (HCC): ICD-10-CM

## 2021-01-05 DIAGNOSIS — I15.2 HYPERTENSION ASSOCIATED WITH DIABETES (HCC): ICD-10-CM

## 2021-01-05 DIAGNOSIS — K21.9 GASTROESOPHAGEAL REFLUX DISEASE WITHOUT ESOPHAGITIS: ICD-10-CM

## 2021-01-05 DIAGNOSIS — F41.9 ANXIETY: ICD-10-CM

## 2021-01-05 DIAGNOSIS — E11.59 HYPERTENSION ASSOCIATED WITH DIABETES (HCC): ICD-10-CM

## 2021-01-05 DIAGNOSIS — E53.8 B12 DEFICIENCY: ICD-10-CM

## 2021-01-05 DIAGNOSIS — I25.119 CORONARY ARTERY DISEASE INVOLVING NATIVE CORONARY ARTERY OF NATIVE HEART WITH ANGINA PECTORIS (HCC): ICD-10-CM

## 2021-01-05 LAB — GLUCOSE BLDC GLUCOMTR-MCNC: 194 MG/DL (ref 70–130)

## 2021-01-05 PROCEDURE — 1159F MED LIST DOCD IN RCRD: CPT | Performed by: FAMILY MEDICINE

## 2021-01-05 PROCEDURE — 1170F FXNL STATUS ASSESSED: CPT | Performed by: FAMILY MEDICINE

## 2021-01-05 PROCEDURE — 96160 PT-FOCUSED HLTH RISK ASSMT: CPT | Performed by: FAMILY MEDICINE

## 2021-01-05 PROCEDURE — 82962 GLUCOSE BLOOD TEST: CPT | Performed by: FAMILY MEDICINE

## 2021-01-05 PROCEDURE — G0439 PPPS, SUBSEQ VISIT: HCPCS | Performed by: FAMILY MEDICINE

## 2021-01-05 RX ORDER — ASPIRIN 81 MG/1
81 TABLET ORAL DAILY
Qty: 90 TABLET | Refills: 1 | Status: SHIPPED | OUTPATIENT
Start: 2021-01-05 | End: 2021-06-17

## 2021-01-05 RX ORDER — ISOSORBIDE MONONITRATE 30 MG/1
30 TABLET, EXTENDED RELEASE ORAL DAILY
Qty: 90 TABLET | Refills: 1 | Status: SHIPPED | OUTPATIENT
Start: 2021-01-05 | End: 2021-06-04

## 2021-01-05 RX ORDER — NITROGLYCERIN 6.5 MG/1
6.5 CAPSULE ORAL DAILY
Qty: 90 CAPSULE | Refills: 1 | Status: SHIPPED | OUTPATIENT
Start: 2021-01-05 | End: 2022-02-14

## 2021-01-05 RX ORDER — IBUPROFEN 800 MG/1
800 TABLET ORAL EVERY 8 HOURS PRN
Qty: 90 TABLET | Refills: 2 | Status: SHIPPED | OUTPATIENT
Start: 2021-01-05 | End: 2021-05-20

## 2021-01-05 RX ORDER — LANOLIN ALCOHOL/MO/W.PET/CERES
1000 CREAM (GRAM) TOPICAL DAILY
Qty: 90 TABLET | Refills: 1 | Status: SHIPPED | OUTPATIENT
Start: 2021-01-05 | End: 2021-12-07 | Stop reason: SDUPTHER

## 2021-01-05 RX ORDER — LOSARTAN POTASSIUM AND HYDROCHLOROTHIAZIDE 12.5; 5 MG/1; MG/1
1 TABLET ORAL 2 TIMES DAILY
Qty: 180 TABLET | Refills: 1 | Status: SHIPPED | OUTPATIENT
Start: 2021-01-05 | End: 2022-04-05 | Stop reason: SDUPTHER

## 2021-01-05 RX ORDER — ATORVASTATIN CALCIUM 10 MG/1
10 TABLET, FILM COATED ORAL DAILY
Qty: 90 TABLET | Refills: 1 | Status: SHIPPED | OUTPATIENT
Start: 2021-01-05 | End: 2021-05-14

## 2021-01-05 RX ORDER — DIAZEPAM 5 MG/1
5 TABLET ORAL EVERY 12 HOURS PRN
Qty: 60 TABLET | Refills: 0 | Status: SHIPPED | OUTPATIENT
Start: 2021-01-05 | End: 2021-02-23 | Stop reason: SDUPTHER

## 2021-01-05 RX ORDER — LOSARTAN POTASSIUM 50 MG/1
50 TABLET ORAL 2 TIMES DAILY
Qty: 180 TABLET | Refills: 1 | Status: SHIPPED | OUTPATIENT
Start: 2021-01-05 | End: 2022-04-08 | Stop reason: SDUPTHER

## 2021-01-05 RX ORDER — CYCLOBENZAPRINE HCL 10 MG
10 TABLET ORAL 2 TIMES DAILY PRN
Qty: 60 TABLET | Refills: 5 | Status: SHIPPED | OUTPATIENT
Start: 2021-01-05 | End: 2021-09-03

## 2021-01-05 RX ORDER — OMEPRAZOLE 20 MG/1
20 CAPSULE, DELAYED RELEASE ORAL 2 TIMES DAILY
Qty: 180 CAPSULE | Refills: 1 | Status: SHIPPED | OUTPATIENT
Start: 2021-01-05 | End: 2021-03-26

## 2021-01-05 RX ORDER — METFORMIN HYDROCHLORIDE 500 MG/1
500 TABLET, EXTENDED RELEASE ORAL 2 TIMES DAILY WITH MEALS
Qty: 180 TABLET | Refills: 1 | Status: SHIPPED | OUTPATIENT
Start: 2021-01-05 | End: 2021-07-30

## 2021-01-05 RX ORDER — LEVOTHYROXINE SODIUM 0.15 MG/1
137 TABLET ORAL DAILY
Qty: 30 TABLET | Refills: 2 | Status: SHIPPED | OUTPATIENT
Start: 2021-01-05 | End: 2021-03-14

## 2021-01-05 RX ORDER — OXYBUTYNIN CHLORIDE 5 MG/1
5 TABLET ORAL 2 TIMES DAILY
Qty: 180 TABLET | Refills: 1 | Status: SHIPPED | OUTPATIENT
Start: 2021-01-05 | End: 2021-09-07 | Stop reason: SDUPTHER

## 2021-01-05 RX ORDER — CLOPIDOGREL BISULFATE 75 MG/1
75 TABLET ORAL DAILY
Qty: 90 TABLET | Refills: 1 | Status: SHIPPED | OUTPATIENT
Start: 2021-01-05 | End: 2021-09-03

## 2021-01-05 RX ORDER — LANCETS 30 GAUGE
EACH MISCELLANEOUS
Qty: 100 EACH | Refills: 5 | Status: SHIPPED | OUTPATIENT
Start: 2021-01-05 | End: 2021-02-26 | Stop reason: SDUPTHER

## 2021-01-05 RX ORDER — AMLODIPINE BESYLATE 5 MG/1
5 TABLET ORAL DAILY
Qty: 90 TABLET | Refills: 1 | Status: SHIPPED | OUTPATIENT
Start: 2021-01-05 | End: 2021-06-22

## 2021-01-05 RX ORDER — INSULIN ASPART 100 [IU]/ML
24 INJECTION, SUSPENSION SUBCUTANEOUS 2 TIMES DAILY WITH MEALS
Qty: 45 ML | Refills: 1 | Status: SHIPPED | OUTPATIENT
Start: 2021-01-05 | End: 2021-05-14

## 2021-01-05 RX ORDER — ALBUTEROL SULFATE 90 UG/1
2 AEROSOL, METERED RESPIRATORY (INHALATION) EVERY 4 HOURS PRN
Qty: 54 G | Refills: 1 | Status: SHIPPED | OUTPATIENT
Start: 2021-01-05 | End: 2021-06-08

## 2021-01-05 NOTE — PATIENT INSTRUCTIONS
Prenatal vitamins  Biotin (also known as Vitamin B7)    Will call you with test results - can decide then if you want to switch to Brand name synthroid.

## 2021-01-06 ENCOUNTER — TELEPHONE (OUTPATIENT)
Dept: FAMILY MEDICINE CLINIC | Facility: CLINIC | Age: 76
End: 2021-01-06

## 2021-01-06 LAB
ALBUMIN SERPL-MCNC: 4 G/DL (ref 3.5–5.2)
ALBUMIN/GLOB SERPL: 1.7 G/DL
ALP SERPL-CCNC: 70 U/L (ref 39–117)
ALT SERPL-CCNC: 41 U/L (ref 1–33)
AST SERPL-CCNC: 32 U/L (ref 1–32)
BASOPHILS # BLD AUTO: 0.02 10*3/MM3 (ref 0–0.2)
BASOPHILS NFR BLD AUTO: 0.5 % (ref 0–1.5)
BILIRUB SERPL-MCNC: 0.6 MG/DL (ref 0–1.2)
BUN SERPL-MCNC: 13 MG/DL (ref 8–23)
BUN/CREAT SERPL: 23.6 (ref 7–25)
CALCIUM SERPL-MCNC: 9.6 MG/DL (ref 8.6–10.5)
CHLORIDE SERPL-SCNC: 103 MMOL/L (ref 98–107)
CO2 SERPL-SCNC: 28 MMOL/L (ref 22–29)
CREAT SERPL-MCNC: 0.55 MG/DL (ref 0.57–1)
EOSINOPHIL # BLD AUTO: 0.22 10*3/MM3 (ref 0–0.4)
EOSINOPHIL NFR BLD AUTO: 5 % (ref 0.3–6.2)
ERYTHROCYTE [DISTWIDTH] IN BLOOD BY AUTOMATED COUNT: 13.1 % (ref 12.3–15.4)
GLOBULIN SER CALC-MCNC: 2.4 GM/DL
GLUCOSE SERPL-MCNC: 189 MG/DL (ref 65–99)
HBA1C MFR BLD: 10.8 % (ref 4.8–5.6)
HCT VFR BLD AUTO: 41.2 % (ref 34–46.6)
HGB BLD-MCNC: 13.9 G/DL (ref 12–15.9)
IMM GRANULOCYTES # BLD AUTO: 0.02 10*3/MM3 (ref 0–0.05)
IMM GRANULOCYTES NFR BLD AUTO: 0.5 % (ref 0–0.5)
LYMPHOCYTES # BLD AUTO: 2.2 10*3/MM3 (ref 0.7–3.1)
LYMPHOCYTES NFR BLD AUTO: 50 % (ref 19.6–45.3)
MCH RBC QN AUTO: 29.5 PG (ref 26.6–33)
MCHC RBC AUTO-ENTMCNC: 33.7 G/DL (ref 31.5–35.7)
MCV RBC AUTO: 87.5 FL (ref 79–97)
MICROALBUMIN UR-MCNC: 47.4 UG/ML
MONOCYTES # BLD AUTO: 0.49 10*3/MM3 (ref 0.1–0.9)
MONOCYTES NFR BLD AUTO: 11.1 % (ref 5–12)
NEUTROPHILS # BLD AUTO: 1.45 10*3/MM3 (ref 1.7–7)
NEUTROPHILS NFR BLD AUTO: 32.9 % (ref 42.7–76)
NRBC BLD AUTO-RTO: 0 /100 WBC (ref 0–0.2)
PLATELET # BLD AUTO: 201 10*3/MM3 (ref 140–450)
POTASSIUM SERPL-SCNC: 3.9 MMOL/L (ref 3.5–5.2)
PROT SERPL-MCNC: 6.4 G/DL (ref 6–8.5)
RBC # BLD AUTO: 4.71 10*6/MM3 (ref 3.77–5.28)
SODIUM SERPL-SCNC: 140 MMOL/L (ref 136–145)
T4 FREE SERPL-MCNC: 1.5 NG/DL (ref 0.93–1.7)
TSH SERPL DL<=0.005 MIU/L-ACNC: 1.28 UIU/ML (ref 0.27–4.2)
WBC # BLD AUTO: 4.4 10*3/MM3 (ref 3.4–10.8)

## 2021-01-06 NOTE — PROGRESS NOTES
The ABCs of the Annual Wellness Visit  Subsequent Medicare Wellness Visit    Chief Complaint   Patient presents with   • Medicare Wellness-subsequent       Subjective   History of Present Illness:  Bianka Agudelo is a 75 y.o. female who presents for a Subsequent Medicare Wellness Visit.    HEALTH RISK ASSESSMENT    Recent Hospitalizations:  No hospitalization(s) within the last year.    Current Medical Providers:  Patient Care Team:  Eva Elliott MD as PCP - General (Family Medicine)  Eva Elliott MD as PCP - Family Medicine  Bee Atreaga RN as Ambulatory  (Population Health)    Smoking Status:  Social History     Tobacco Use   Smoking Status Never Smoker   Smokeless Tobacco Never Used       Alcohol Consumption:  Social History     Substance and Sexual Activity   Alcohol Use No       Depression Screen:   PHQ-2/PHQ-9 Depression Screening 1/5/2021   Little interest or pleasure in doing things 0   Feeling down, depressed, or hopeless 0   Trouble falling or staying asleep, or sleeping too much -   Feeling tired or having little energy -   Poor appetite or overeating -   Feeling bad about yourself - or that you are a failure or have let yourself or your family down -   Trouble concentrating on things, such as reading the newspaper or watching television -   Moving or speaking so slowly that other people could have noticed. Or the opposite - being so fidgety or restless that you have been moving around a lot more than usual -   Thoughts that you would be better off dead, or of hurting yourself in some way -   Total Score 0   If you checked off any problems, how difficult have these problems made it for you to do your work, take care of things at home, or get along with other people? -       Fall Risk Screen:  STEADI Fall Risk Assessment was completed, and patient is at MODERATE risk for falls. Assessment completed on:1/5/2021    Health Habits and Functional and Cognitive Screening:  Functional &  Cognitive Status 1/5/2021   Do you have difficulty preparing food and eating? No   Do you have difficulty bathing yourself, getting dressed or grooming yourself? No   Do you have difficulty using the toilet? No   Do you have difficulty moving around from place to place? Yes   Do you have trouble with steps or getting out of a bed or a chair? Yes   Current Diet Well Balanced Diet   Dental Exam Up to date   Eye Exam Up to date   Exercise (times per week) 0 times per week   Current Exercise Activities Include Housecleaning   Do you need help using the phone?  No   Are you deaf or do you have serious difficulty hearing?  Yes   Do you need help with transportation? Yes   Do you need help shopping? Yes   Do you need help preparing meals?  No   Do you need help with housework?  Yes   Do you need help with laundry? No   Do you need help taking your medications? No   Do you need help managing money? Yes   Do you ever drive or ride in a car without wearing a seat belt? No   Have you felt unusual stress, anger or loneliness in the last month? Yes   Who do you live with? Alone   If you need help, do you have trouble finding someone available to you? No   Have you been bothered in the last four weeks by sexual problems? No   Do you have difficulty concentrating, remembering or making decisions? No         Does the patient have evidence of cognitive impairment? No    Asprin use counseling:Taking ASA appropriately as indicated    Age-appropriate Screening Schedule:  Refer to the list below for future screening recommendations based on patient's age, sex and/or medical conditions. Orders for these recommended tests are listed in the plan section. The patient has been provided with a written plan.    Health Maintenance   Topic Date Due   • DIABETIC EYE EXAM  03/07/2017   • ZOSTER VACCINE (2 of 2) 07/06/2017   • COLONOSCOPY  06/14/2020   • URINE MICROALBUMIN  12/12/2020   • MAMMOGRAM  01/11/2021   • LIPID PANEL  02/14/2021   •  "DIABETIC FOOT EXAM  03/31/2021   • HEMOGLOBIN A1C  04/05/2021   • TDAP/TD VACCINES (3 - Td) 05/11/2027   • INFLUENZA VACCINE  Completed          The following portions of the patient's history were reviewed and updated as appropriate: allergies, current medications, past family history, past medical history, past social history, past surgical history and problem list.    Outpatient Medications Prior to Visit   Medication Sig Dispense Refill   • BD INSULIN SYRINGE ULTRAFINE 31G X 15/64\" 0.5 ML misc Inject 1 pen under the skin into the appropriate area as directed 2 (Two) Times a Day. 200 each 5   • BD Veo Insulin Syringe U/F 31G X 15/64\" 0.5 ML misc USE 1 SYR TO INJECT MED SUBCUTANEOUSLY TWICE A  each 5   • diclofenac (VOLTAREN) 1 % gel gel Apply 4 gm to affected areas below the waist QID PRN pain and 2 gm to affected areas above the waist QID PRN pain 100 g 1   • LEVEMIR 100 UNIT/ML injection INJECT 50 UNITS UNDER THE SKIN EVERY NIGHT AS DIRECTED (Patient taking differently: 42 Units Every Night.) 40 mL 2   • ALBUTEROL SULFATE  (90 Base) MCG/ACT inhaler INHALE 2 PUFFS BY MOUTH EVERY 4 HOURS AS NEEDED 90 g 1   • amLODIPine (NORVASC) 5 MG tablet TAKE 1 TABLET BY MOUTH DAILY 90 tablet 1   • aspirin (aspirin) 81 MG EC tablet Take 1 tablet by mouth Daily. 90 tablet 1   • atorvastatin (LIPITOR) 10 MG tablet Take 1 tablet by mouth Daily. 90 tablet 1   • clopidogrel (PLAVIX) 75 MG tablet Take 1 tablet by mouth Daily. 90 tablet 1   • cyclobenzaprine (FLEXERIL) 10 MG tablet TAKE 1 TABLET BY MOUTH TWICE A DAY IF NEEDED FOR MUSCLE SPASM 60 tablet 5   • diazePAM (VALIUM) 5 MG tablet Take 1 tablet by mouth Every 12 (Twelve) Hours As Needed for Anxiety. 60 tablet 0   • glucose blood test strip 1 each by Other route Daily. Use as instructed 100 each 5   • ibuprofen (ADVIL,MOTRIN) 800 MG tablet TAKE 1 TABLET BY MOUTH EVERY 8 HOURS AS NEEDED FOR PAIN 90 tablet 2   • isosorbide mononitrate (IMDUR) 30 MG 24 hr tablet TAKE " 1 TABLET BY MOUTH DAILY 30 tablet 2   • JANUVIA 100 MG tablet TAKE 1 TABLET BY MOUTH EVERY DAY 90 tablet 3   • Lancets misc Use as needed daily to check glucose levels 100 each 5   • levothyroxine (SYNTHROID, LEVOTHROID) 150 MCG tablet TAKE 1 TABLET BY MOUTH DAILY 30 tablet 2   • losartan (COZAAR) 50 MG tablet Take 50 mg by mouth 2 (Two) Times a Day.     • losartan-hydrochlorothiazide (HYZAAR) 50-12.5 MG per tablet Take 1 tablet by mouth 2 (two) times a day. 180 tablet 1   • metFORMIN ER (GLUCOPHAGE-XR) 500 MG 24 hr tablet Take 1 tablet by mouth 2 (Two) Times a Day With Meals. 180 tablet 1   • nitroglycerin (NITRO-BID) 6.5 MG CR capsule Take 1 capsule by mouth 3 (Three) Times a Day. (Patient taking differently: Take 6.5 mg by mouth Daily.) 90 capsule 5   • NovoLOG Mix 70/30 (70-30) 100 UNIT/ML injection Inject 24 Units under the skin into the appropriate area as directed 2 (Two) Times a Day With Meals. 30 mL 5   • omeprazole (priLOSEC) 20 MG capsule Take 1 capsule by mouth 2 (Two) Times a Day. 180 capsule 1   • oxybutynin (DITROPAN) 5 MG tablet Take 1 tablet by mouth 2 (Two) Times a Day. 180 tablet 1   • vitamin B-12 (CYANOCOBALAMIN) 1000 MCG tablet TAKE 1 TABLET BY MOUTH ONCE DAILY 90 tablet 0     No facility-administered medications prior to visit.        Patient Active Problem List   Diagnosis   • Vitamin D deficiency   • Vitamin B12 deficiency   • Overactive bladder   • Hypothyroidism   • Hypertension associated with diabetes (CMS/[facility])   • GERD (gastroesophageal reflux disease)   • Dyslipidemia   • Type 2 diabetes mellitus with both eyes affected by moderate nonproliferative retinopathy without macular edema, with long-term current use of insulin (CMS/[facility])   • Anxiety   • Coronary artery disease involving native coronary artery of native heart with angina pectoris (CMS/[facility])   • Hypercalcemia   • History of laceration of skin   • Hyperlipidemia due to type 2 diabetes mellitus (CMS/[facility])       Advanced Care  "Planning:  ACP discussion was held with the patient during this visit. Patient does not have an advance directive, information provided.    Review of Systems   Constitutional: Positive for fatigue. Negative for chills and fever.   HENT: Negative for congestion and rhinorrhea.    Eyes: Negative for discharge and itching.   Respiratory: Negative for shortness of breath and wheezing.    Cardiovascular: Negative for chest pain and palpitations.   Gastrointestinal: Negative for abdominal pain, constipation, diarrhea, nausea and vomiting.   Endocrine: Negative for cold intolerance and heat intolerance.   Genitourinary: Negative for dysuria and hematuria.   Musculoskeletal: Positive for arthralgias and myalgias.   Skin: Negative for rash and wound.   Neurological: Negative for weakness and numbness.   Psychiatric/Behavioral: Negative for suicidal ideas. The patient is not nervous/anxious.        Compared to one year ago, the patient feels her physical health is better.  Compared to one year ago, the patient feels her mental health is the same.    Reviewed chart for potential of high risk medication in the elderly: yes  Reviewed chart for potential of harmful drug interactions in the elderly:yes    Objective         Vitals:    01/05/21 1010   BP: 130/68   BP Location: Left arm   Patient Position: Sitting   Pulse: 79   Temp: 96.9 °F (36.1 °C)   TempSrc: Temporal   SpO2: 97%   Weight: 85.7 kg (189 lb)   Height: 160 cm (63\")       Body mass index is 33.48 kg/m².  Discussed the patient's BMI with her. The BMI is above average; BMI management plan is completed.    Physical Exam  Vitals signs and nursing note reviewed.   Constitutional:       General: She is not in acute distress.     Appearance: Normal appearance. She is not ill-appearing or toxic-appearing.   HENT:      Head: Normocephalic and atraumatic.      Right Ear: Tympanic membrane, ear canal and external ear normal.      Left Ear: Tympanic membrane, ear canal and " external ear normal.      Nose: Nose normal. No congestion or rhinorrhea.      Mouth/Throat:      Mouth: Mucous membranes are moist.      Pharynx: Oropharynx is clear. No oropharyngeal exudate or posterior oropharyngeal erythema.   Eyes:      General: No scleral icterus.     Extraocular Movements: Extraocular movements intact.      Conjunctiva/sclera: Conjunctivae normal.      Pupils: Pupils are equal, round, and reactive to light.   Neck:      Musculoskeletal: Normal range of motion. No muscular tenderness.   Cardiovascular:      Rate and Rhythm: Normal rate and regular rhythm.      Pulses: Normal pulses.      Heart sounds: Normal heart sounds. No murmur. No friction rub. No gallop.    Pulmonary:      Breath sounds: Normal breath sounds. No stridor. No wheezing or rales.   Abdominal:      General: Abdomen is flat. Bowel sounds are normal.      Palpations: Abdomen is soft. There is no mass.      Tenderness: There is no abdominal tenderness. There is no guarding.   Musculoskeletal: Normal range of motion.      Right lower leg: Edema present.      Left lower leg: Edema present.   Lymphadenopathy:      Cervical: No cervical adenopathy.   Skin:     General: Skin is warm and dry.      Capillary Refill: Capillary refill takes 2 to 3 seconds.      Findings: No rash.   Neurological:      General: No focal deficit present.      Mental Status: She is alert.      Cranial Nerves: No cranial nerve deficit.      Sensory: No sensory deficit.      Motor: No weakness.      Gait: Gait normal.   Psychiatric:         Mood and Affect: Mood normal.         Behavior: Behavior normal.               Assessment/Plan   Medicare Risks and Personalized Health Plan  CMS Preventative Services Quick Reference  Advance Directive Discussion  Breast Cancer/Mammogram Screening  Cardiovascular risk  Depression/Dysphoria  Glaucoma Risk  Immunizations Discussed/Encouraged (specific immunizations; Influenza )  Inactivity/Sedentary  Obesity/Overweight    Polypharmacy    The above risks/problems have been discussed with the patient.  Pertinent information has been shared with the patient in the After Visit Summary.  Follow up plans and orders are seen below in the Assessment/Plan Section.    Diagnoses and all orders for this visit:    1. Encounter for subsequent annual wellness visit in Medicare patient (Primary)    2. B12 deficiency  -     vitamin B-12 (CYANOCOBALAMIN) 1000 MCG tablet; Take 1 tablet by mouth Daily.  Dispense: 90 tablet; Refill: 1  -     CBC Auto Differential; Future  -     Comprehensive Metabolic Panel; Future  -     MicroAlbumin, Urine, Random - Urine, Clean Catch  -     Hemoglobin A1c  -     T4, Free  -     TSH  -     Comprehensive Metabolic Panel  -     CBC & Differential    3. Anxiety  -     diazePAM (VALIUM) 5 MG tablet; Take 1 tablet by mouth Every 12 (Twelve) Hours As Needed for Anxiety.  Dispense: 60 tablet; Refill: 0  -     albuterol sulfate  (90 Base) MCG/ACT inhaler; Inhale 2 puffs Every 4 (Four) Hours As Needed (Inhale 2 puffs by mouth every 4 hours as needed).  Dispense: 54 g; Refill: 1  -     CBC Auto Differential; Future  -     Comprehensive Metabolic Panel; Future  -     MicroAlbumin, Urine, Random - Urine, Clean Catch  -     Hemoglobin A1c  -     T4, Free  -     TSH  -     Comprehensive Metabolic Panel  -     CBC & Differential    4. Hypertension associated with diabetes (CMS/[facility])  -     amLODIPine (NORVASC) 5 MG tablet; Take 1 tablet by mouth Daily.  Dispense: 90 tablet; Refill: 1  -     losartan (COZAAR) 50 MG tablet; Take 1 tablet by mouth 2 (Two) Times a Day.  Dispense: 180 tablet; Refill: 1  -     losartan-hydrochlorothiazide (HYZAAR) 50-12.5 MG per tablet; Take 1 tablet by mouth 2 (two) times a day.  Dispense: 180 tablet; Refill: 1  -     CBC Auto Differential; Future  -     Comprehensive Metabolic Panel; Future  -     MicroAlbumin, Urine, Random - Urine, Clean Catch  -     Hemoglobin A1c  -     T4, Free  -     TSH  -      Comprehensive Metabolic Panel  -     CBC & Differential    5. Type 2 diabetes mellitus with both eyes affected by moderate nonproliferative retinopathy without macular edema, with long-term current use of insulin (CMS/[facility])  -     aspirin (aspirin) 81 MG EC tablet; Take 1 tablet by mouth Daily.  Dispense: 90 tablet; Refill: 1  -     glucose blood test strip; 1 each by Other route Daily. Use as instructed  Dispense: 100 each; Refill: 5  -     SITagliptin (Januvia) 100 MG tablet; Take 1 tablet by mouth Daily.  Dispense: 90 tablet; Refill: 1  -     Lancets misc; Use as needed daily to check glucose levels  Dispense: 100 each; Refill: 5  -     metFORMIN ER (GLUCOPHAGE-XR) 500 MG 24 hr tablet; Take 1 tablet by mouth 2 (Two) Times a Day With Meals.  Dispense: 180 tablet; Refill: 1  -     NovoLOG Mix 70/30 (70-30) 100 UNIT/ML injection; Inject 24 Units under the skin into the appropriate area as directed 2 (Two) Times a Day With Meals.  Dispense: 45 mL; Refill: 1  -     POCT Glucose  -     CBC Auto Differential; Future  -     Comprehensive Metabolic Panel; Future  -     Hemoglobin A1c; Future  -     MicroAlbumin, Urine, Random - Urine, Clean Catch; Future  -     MicroAlbumin, Urine, Random - Urine, Clean Catch  -     Hemoglobin A1c  -     T4, Free  -     TSH  -     Comprehensive Metabolic Panel  -     CBC & Differential    6. Hyperlipidemia due to type 2 diabetes mellitus (CMS/[facility])  -     atorvastatin (LIPITOR) 10 MG tablet; Take 1 tablet by mouth Daily.  Dispense: 90 tablet; Refill: 1  -     CBC Auto Differential; Future  -     Comprehensive Metabolic Panel; Future  -     MicroAlbumin, Urine, Random - Urine, Clean Catch  -     Hemoglobin A1c  -     T4, Free  -     TSH  -     Comprehensive Metabolic Panel  -     CBC & Differential    7. Coronary artery disease involving native coronary artery of native heart with angina pectoris (CMS/[facility])  -     clopidogrel (PLAVIX) 75 MG tablet; Take 1 tablet by mouth Daily.  Dispense:  90 tablet; Refill: 1  -     isosorbide mononitrate (IMDUR) 30 MG 24 hr tablet; Take 1 tablet by mouth Daily.  Dispense: 90 tablet; Refill: 1  -     nitroglycerin (NITRO-BID) 6.5 MG CR capsule; Take 1 capsule by mouth Daily.  Dispense: 90 capsule; Refill: 1  -     CBC Auto Differential; Future  -     Comprehensive Metabolic Panel; Future  -     MicroAlbumin, Urine, Random - Urine, Clean Catch  -     Hemoglobin A1c  -     T4, Free  -     TSH  -     Comprehensive Metabolic Panel  -     CBC & Differential    8. Right hip pain  -     cyclobenzaprine (FLEXERIL) 10 MG tablet; Take 1 tablet by mouth 2 (Two) Times a Day As Needed for Muscle Spasms.  Dispense: 60 tablet; Refill: 5  -     ibuprofen (ADVIL,MOTRIN) 800 MG tablet; Take 1 tablet by mouth Every 8 (Eight) Hours As Needed (TAKE 1 TABLET BY MOUTH EVERY 8 HOURS AS NEEDED FOR PAIN). for pain  Dispense: 90 tablet; Refill: 2  -     CBC Auto Differential; Future  -     Comprehensive Metabolic Panel; Future  -     MicroAlbumin, Urine, Random - Urine, Clean Catch  -     Hemoglobin A1c  -     T4, Free  -     TSH  -     Comprehensive Metabolic Panel  -     CBC & Differential    9. Other specified hypothyroidism  -     levothyroxine (SYNTHROID, LEVOTHROID) 150 MCG tablet; Take 1 tablet by mouth Daily.  Dispense: 30 tablet; Refill: 2  -     CBC Auto Differential; Future  -     Comprehensive Metabolic Panel; Future  -     TSH; Future  -     T4, Free; Future  -     MicroAlbumin, Urine, Random - Urine, Clean Catch  -     Hemoglobin A1c  -     T4, Free  -     TSH  -     Comprehensive Metabolic Panel  -     CBC & Differential    10. Overactive bladder  -     oxybutynin (DITROPAN) 5 MG tablet; Take 1 tablet by mouth 2 (Two) Times a Day.  Dispense: 180 tablet; Refill: 1  -     CBC Auto Differential; Future  -     Comprehensive Metabolic Panel; Future  -     MicroAlbumin, Urine, Random - Urine, Clean Catch  -     Hemoglobin A1c  -     T4, Free  -     TSH  -     Comprehensive Metabolic  Panel  -     CBC & Differential    11. Gastroesophageal reflux disease without esophagitis  -     omeprazole (priLOSEC) 20 MG capsule; Take 1 capsule by mouth 2 (Two) Times a Day.  Dispense: 180 capsule; Refill: 1  -     CBC Auto Differential; Future  -     Comprehensive Metabolic Panel; Future  -     MicroAlbumin, Urine, Random - Urine, Clean Catch  -     Hemoglobin A1c  -     T4, Free  -     TSH  -     Comprehensive Metabolic Panel  -     CBC & Differential      Follow Up:  Return in about 3 months (around 4/5/2021).     An After Visit Summary and PPPS were given to the patient.

## 2021-01-11 NOTE — TELEPHONE ENCOUNTER
Please PA.  Dx: Angina at rest.  Has tried and failed imdur and ranexa. Has been on nitro for years.

## 2021-01-13 ENCOUNTER — OFFICE VISIT (OUTPATIENT)
Dept: FAMILY MEDICINE CLINIC | Facility: CLINIC | Age: 76
End: 2021-01-13

## 2021-01-13 VITALS
HEIGHT: 63 IN | TEMPERATURE: 98.4 F | DIASTOLIC BLOOD PRESSURE: 83 MMHG | BODY MASS INDEX: 33.38 KG/M2 | OXYGEN SATURATION: 94 % | WEIGHT: 188.4 LBS | SYSTOLIC BLOOD PRESSURE: 163 MMHG | HEART RATE: 93 BPM

## 2021-01-13 DIAGNOSIS — R05.9 COUGH: Primary | ICD-10-CM

## 2021-01-13 PROCEDURE — U0004 COV-19 TEST NON-CDC HGH THRU: HCPCS | Performed by: FAMILY MEDICINE

## 2021-01-13 PROCEDURE — 99213 OFFICE O/P EST LOW 20 MIN: CPT | Performed by: FAMILY MEDICINE

## 2021-01-13 RX ORDER — AZITHROMYCIN 250 MG/1
TABLET, FILM COATED ORAL
Qty: 6 TABLET | Refills: 0 | Status: SHIPPED | OUTPATIENT
Start: 2021-01-13 | End: 2021-04-06

## 2021-01-13 NOTE — PROGRESS NOTES
Subjective   Bianka Agudelo is a 75 y.o. female.   Pt presents today with CC of Cough    Cough, loss of smell 3 days.       History of Present Illness   Patient is a 75-year-old female with past medical history of poorly controlled diabetes with an A1c of greater than 10, coronary artery disease, hypertension, and hyperlipidemia here today complaining of cough.  She has no known exposure to COVID-19 though admits that her 20-year-old granddaughter is often in and out of the house.  She reports that her cough has been going on for 3 days.           The following portions of the patient's history were reviewed and updated as appropriate: allergies, current medications, past family history, past medical history, past social history, past surgical history and problem list.    Review of Systems   Constitutional: Negative for chills, fever and unexpected weight loss.   HENT: Negative for congestion and sore throat.    Eyes: Negative for blurred vision and visual disturbance.   Respiratory: Negative for cough and wheezing.    Cardiovascular: Negative for chest pain and palpitations.   Gastrointestinal: Negative for abdominal pain and diarrhea.   Endocrine: Negative for cold intolerance and heat intolerance.   Genitourinary: Negative for dysuria.   Musculoskeletal: Negative for arthralgias and neck stiffness.   Neurological: Negative for dizziness, seizures and syncope.   Psychiatric/Behavioral: Negative for self-injury, suicidal ideas and depressed mood.       Objective   Physical Exam  Vitals signs and nursing note reviewed.   Constitutional:       Appearance: She is well-developed.   HENT:      Head: Normocephalic and atraumatic.      Right Ear: External ear normal.      Left Ear: External ear normal.      Nose: Nose normal.   Eyes:      Conjunctiva/sclera: Conjunctivae normal.      Pupils: Pupils are equal, round, and reactive to light.   Neck:      Musculoskeletal: Normal range of motion and neck supple.    Cardiovascular:      Rate and Rhythm: Normal rate and regular rhythm.      Heart sounds: Normal heart sounds.   Pulmonary:      Effort: Pulmonary effort is normal.      Breath sounds: Normal breath sounds.   Abdominal:      General: Bowel sounds are normal.      Palpations: Abdomen is soft.   Skin:     General: Skin is warm and dry.   Neurological:      Mental Status: She is alert and oriented to person, place, and time.   Psychiatric:         Behavior: Behavior normal.           Assessment/Plan   Diagnoses and all orders for this visit:    1. Cough (Primary)  -     COVID-19 PCR, [facility], NP SWAB IN [facility]SP[facility] MEDIA 24-30 HR TAT - Swab, Nasopharynx; Future  -     COVID-19 PCR, [facility], NP SWAB IN [facility] MEDIA 24-30 HR TAT - Swab, Nasopharynx    Other orders  -     azithromycin (Zithromax Z-Paco) 250 MG tablet; Take 2 tablets the first day, then 1 tablet daily for 4 days.  Dispense: 6 tablet; Refill: 0    We discussed options.  We will screen her for COVID-19 and will start azithromycin.  Choice to start antibiotic is based on her poorly controlled A1c.  The risks and benefits to this medication were discussed and she was agreeable to proceed.  She is to call the office if she has any concerns.                Patient's Body mass index is 33.37 kg/m². BMI is above normal parameters. Recommendations include: exercise counseling and nutrition counseling.

## 2021-01-14 LAB — SARS-COV-2 RNA RESP QL NAA+PROBE: DETECTED

## 2021-01-15 ENCOUNTER — TELEPHONE (OUTPATIENT)
Dept: FAMILY MEDICINE CLINIC | Facility: CLINIC | Age: 76
End: 2021-01-15

## 2021-01-15 NOTE — TELEPHONE ENCOUNTER
----- Message from Carlos William, DO sent at 1/14/2021  6:52 PM EST -----  I called and spoke with her.  I told her that she was positive for COVID-19.  She states that she actually feels better than yesterday.  She is still laying around and wanting to sleep more than normal, but otherwise she is doing well.  She denies shortness of breath or any concerns.  I recommended that if she has any concerns at all, call the office and we will see what we can do.  If she becomes acutely short of breath, she should either call the office to consider outpatient treatment, or go to the emergency room.  She is agreeable to the plan.

## 2021-01-15 NOTE — TELEPHONE ENCOUNTER
Continue Z-Paco.  In my medical opinion, it is reasonable to continue ibuprofen at your current dose despite your COVID-19 diagnosis.    Patient notified & verbalized understanding.

## 2021-01-15 NOTE — TELEPHONE ENCOUNTER
Caller: Bianka Agudelo    Relationship to patient: Self    Best call back number: [phone] OR [phone]       Concerns or Questions if Applicable: PATIENT WOULD LIKE TO KNOW IF IT IS SAFE TO TAKE IBUPROFEN SINCE SHE TESTED POSITIVE FOR COVID?  IF NOT SAFE TO TAKE IBUPROFEN WHAT ELSE COULD SHE TAKE TO HELP WITH PAIN.     ALSO WOULD LIKE TO KNOW IF SHE SHOULD CONTINUE TO TAKE ZPAC - STATED SHE FEELS LIKE IT IS HELPING

## 2021-01-15 NOTE — TELEPHONE ENCOUNTER
Continue Z-Paco.  In my medical opinion, it is reasonable to continue ibuprofen at your current dose despite your COVID-19 diagnosis.

## 2021-01-18 ENCOUNTER — TELEPHONE (OUTPATIENT)
Dept: FAMILY MEDICINE CLINIC | Facility: CLINIC | Age: 76
End: 2021-01-18

## 2021-01-18 NOTE — TELEPHONE ENCOUNTER
----- Message from Eva Elliott MD sent at 1/17/2021 10:48 PM EST -----  Please call Bianka. How is she feeling? (just a COVID check in). Please also let her know that her A1C has gone up  Again from 9.2 to 10.8. I know she can keep this down!! Is she on the levemir 50 or the novolog 70/30 24 units BID? Thanks.      Spoke with patient,she reports she is better just very weak ,she reports her insulins as Levemir 42 units @[facility] & Novolog mix 70/30 as 22 units bid.

## 2021-01-19 ENCOUNTER — TELEPHONE (OUTPATIENT)
Dept: FAMILY MEDICINE CLINIC | Facility: CLINIC | Age: 76
End: 2021-01-19

## 2021-01-19 NOTE — TELEPHONE ENCOUNTER
"PA for nitro denied for the following reason : \"[employer] follows Medicare rules. The Medicare rule in the Prescription Drug Benefit Manual (Chapter 6, Section 10.1 & 10.9) says for coverage under Medicare Part D, the drug must be approved by the U.S. Food and Drug Administration (FDA). The FDA has not approved the requested drug as safe and effective, and per Medicare rules is not covered.\"    Insurance will fax more denial information as this was from covermymeds.com   "

## 2021-01-19 NOTE — TELEPHONE ENCOUNTER
PT CALLED AND STATED THAT SHE HAS COVID AND WOULD LIKE TO KNOW IF SHE CAN TAKE MUCINEX AND HAVE ANOTHER ZPAK,, PT HAS COUGH AND SNEEZING.    PLEASE ADVISE.  CALL BACK:[phone]      [facility] #[id number] - [id number] - [address] AT [facility] OF [address]

## 2021-01-21 RX ORDER — GUAIFENESIN 600 MG/1
1200 TABLET, EXTENDED RELEASE ORAL 2 TIMES DAILY
Qty: 120 TABLET | Refills: 5 | Status: SHIPPED | OUTPATIENT
Start: 2021-01-21 | End: 2021-04-06

## 2021-01-21 RX ORDER — BENZONATATE 100 MG/1
100 CAPSULE ORAL 3 TIMES DAILY PRN
Qty: 90 CAPSULE | Refills: 0 | Status: SHIPPED | OUTPATIENT
Start: 2021-01-21 | End: 2021-04-06

## 2021-01-21 NOTE — TELEPHONE ENCOUNTER
She does not need another zpack, but I would be happy to send in mucinex. How about tessalon perles? Also, is she coughing herself awake at night? Shortness of breath? How are her glucose levels?

## 2021-01-21 NOTE — TELEPHONE ENCOUNTER
The coughing is somewhat better, denies shortness of breath and she hasn't been checking her glucose as she hasn't been feeling well.  She will call back tomorrow with some readings.  She would also like you to call in Mucinex and Tessalon Pearles.

## 2021-01-22 ENCOUNTER — TELEPHONE (OUTPATIENT)
Dept: FAMILY MEDICINE CLINIC | Facility: CLINIC | Age: 76
End: 2021-01-22

## 2021-01-22 NOTE — TELEPHONE ENCOUNTER
Bianka called to report her blood sugar last night was 425 but this am fasting was 121,she reports she ate a large orange & some cookies yesterday,is starting to feel better & will get back on her diet ,has been so sick she just ate whatever she could.

## 2021-01-22 NOTE — TELEPHONE ENCOUNTER
Mucinex and tessalon called in. How are her glucose levels? If she hasn't taken them, she needs to. Please let her know that elevated diabetes is one of the big risk factors to getting hospitalized. Those numbers NEED to come down.    Please remind her to be on vitamin C, VitaminD, zinc, and a baby aspirin. She also needs to be sleeping on her stomach. Thanks.    Patient states she called earlier and reported her blood sugar last night was 425 but this am fasting was 121,she reports she ate a large orange & some cookies yesterday,is starting to feel better & will get back on her diet ,has been so sick she just ate whatever she could (see telephone message from Ni) She is taking the vitamins and sleeping on her stomach. She says she is feeling somewhat better this evening and her coughing has improved.

## 2021-01-22 NOTE — TELEPHONE ENCOUNTER
Mucinex and tessalon called in. How are her glucose levels? If she hasn't taken them, she needs to. Please let her know that elevated diabetes is one of the big risk factors to getting hospitalized. Those numbers NEED to come down.    Please remind her to be on vitamin C, VitaminD, zinc, and a baby aspirin. She also needs to be sleeping on her stomach. Thanks.

## 2021-01-25 NOTE — TELEPHONE ENCOUNTER
Please call Bianka. The nitro has been denied by the insurance company despite appeals. Has she tried the imdur ever? How is she feeling (covid positive)?    Left a message to return call.    Spoke with patient she reports she is on Imdur & doing fine with it does not need nitro,is feeling great finally!

## 2021-01-25 NOTE — TELEPHONE ENCOUNTER
Please call Bianka. The nitro has been denied by the insurance company despite appeals. Has she tried the imdur ever? How is she feeling (covid positive)?

## 2021-02-23 DIAGNOSIS — F41.9 ANXIETY: ICD-10-CM

## 2021-02-23 NOTE — TELEPHONE ENCOUNTER
Caller: Bianka Agudelo    Relationship: Self    Best call back number: [phone]    Medication needed:   Requested Prescriptions     Pending Prescriptions Disp Refills   • diazePAM (VALIUM) 5 MG tablet 60 tablet 0     Sig: Take 1 tablet by mouth Every 12 (Twelve) Hours As Needed for Anxiety.       When do you need the refill by: 02/23/21    What details did the patient provide when requesting the medication: patient also need her one touch test sent in as well      Does the patient have less than a 3 day supply:  [x] Yes  [] No    What is the patient's preferred pharmacy: [id number] - [place], [place] - [address] AT [facility] [address] - [phone]  - [phone] FX

## 2021-02-24 RX ORDER — DIAZEPAM 5 MG/1
5 TABLET ORAL EVERY 12 HOURS PRN
Qty: 60 TABLET | Refills: 0 | Status: SHIPPED | OUTPATIENT
Start: 2021-02-24 | End: 2021-04-06 | Stop reason: SDUPTHER

## 2021-02-26 DIAGNOSIS — Z79.4 TYPE 2 DIABETES MELLITUS WITH BOTH EYES AFFECTED BY MODERATE NONPROLIFERATIVE RETINOPATHY WITHOUT MACULAR EDEMA, WITH LONG-TERM CURRENT USE OF INSULIN (HCC): ICD-10-CM

## 2021-02-26 DIAGNOSIS — E11.3393 TYPE 2 DIABETES MELLITUS WITH BOTH EYES AFFECTED BY MODERATE NONPROLIFERATIVE RETINOPATHY WITHOUT MACULAR EDEMA, WITH LONG-TERM CURRENT USE OF INSULIN (HCC): ICD-10-CM

## 2021-03-01 NOTE — TELEPHONE ENCOUNTER
Was this a fax request? A phone call? Does this go to mail order or to [employer]? I need some clarification.    It is an E-Scribe request from [employer].

## 2021-03-01 NOTE — TELEPHONE ENCOUNTER
Was this a fax request? A phone call? [facility] I need some clarification.

## 2021-03-09 RX ORDER — LANCETS 30 GAUGE
EACH MISCELLANEOUS
Qty: 300 EACH | Refills: 3 | Status: SHIPPED | OUTPATIENT
Start: 2021-03-09 | End: 2022-04-04

## 2021-03-09 RX ORDER — BLOOD-GLUCOSE METER
1 KIT MISCELLANEOUS AS NEEDED
Qty: 1 EACH | Refills: 0 | Status: SHIPPED | OUTPATIENT
Start: 2021-03-09 | End: 2022-10-13 | Stop reason: ALTCHOICE

## 2021-03-12 DIAGNOSIS — E03.8 OTHER SPECIFIED HYPOTHYROIDISM: ICD-10-CM

## 2021-03-14 RX ORDER — LEVOTHYROXINE SODIUM 0.15 MG/1
137 TABLET ORAL DAILY
Qty: 30 TABLET | Refills: 2 | Status: SHIPPED | OUTPATIENT
Start: 2021-03-14 | End: 2021-06-08

## 2021-03-26 DIAGNOSIS — K21.9 GASTROESOPHAGEAL REFLUX DISEASE WITHOUT ESOPHAGITIS: ICD-10-CM

## 2021-03-26 RX ORDER — OMEPRAZOLE 20 MG/1
CAPSULE, DELAYED RELEASE ORAL
Qty: 180 CAPSULE | Refills: 1 | Status: SHIPPED | OUTPATIENT
Start: 2021-03-26 | End: 2022-02-25 | Stop reason: SDUPTHER

## 2021-03-29 ENCOUNTER — TELEPHONE (OUTPATIENT)
Dept: FAMILY MEDICINE CLINIC | Facility: CLINIC | Age: 76
End: 2021-03-29

## 2021-03-29 NOTE — TELEPHONE ENCOUNTER
Caller: Bianka Agudelo    Relationship to patient: Self    Best call back number: [phone]    Chief complaint: PRE-OP APPOINTMENT     Type of visit: OFFICE VISIT      Requested date: ASAP     Additional notes:PATIENT IS NEEDING TO HAVE EYE SURGERY AND NEEDS A PRE-OP APPOINTMENT ASAP SO THAT IT CAN BE SCHEDULED.

## 2021-04-06 ENCOUNTER — OFFICE VISIT (OUTPATIENT)
Dept: FAMILY MEDICINE CLINIC | Facility: CLINIC | Age: 76
End: 2021-04-06

## 2021-04-06 VITALS
SYSTOLIC BLOOD PRESSURE: 120 MMHG | HEART RATE: 86 BPM | HEIGHT: 63 IN | TEMPERATURE: 97.3 F | OXYGEN SATURATION: 98 % | WEIGHT: 184.4 LBS | BODY MASS INDEX: 32.67 KG/M2 | DIASTOLIC BLOOD PRESSURE: 70 MMHG

## 2021-04-06 DIAGNOSIS — F41.9 ANXIETY: ICD-10-CM

## 2021-04-06 DIAGNOSIS — I15.2 HYPERTENSION ASSOCIATED WITH DIABETES (HCC): ICD-10-CM

## 2021-04-06 DIAGNOSIS — E78.5 HYPERLIPIDEMIA DUE TO TYPE 2 DIABETES MELLITUS (HCC): ICD-10-CM

## 2021-04-06 DIAGNOSIS — Z79.4 TYPE 2 DIABETES MELLITUS WITH BOTH EYES AFFECTED BY MODERATE NONPROLIFERATIVE RETINOPATHY WITHOUT MACULAR EDEMA, WITH LONG-TERM CURRENT USE OF INSULIN (HCC): Primary | ICD-10-CM

## 2021-04-06 DIAGNOSIS — E11.3393 TYPE 2 DIABETES MELLITUS WITH BOTH EYES AFFECTED BY MODERATE NONPROLIFERATIVE RETINOPATHY WITHOUT MACULAR EDEMA, WITH LONG-TERM CURRENT USE OF INSULIN (HCC): Primary | ICD-10-CM

## 2021-04-06 DIAGNOSIS — E11.69 HYPERLIPIDEMIA DUE TO TYPE 2 DIABETES MELLITUS (HCC): ICD-10-CM

## 2021-04-06 DIAGNOSIS — E03.8 OTHER SPECIFIED HYPOTHYROIDISM: ICD-10-CM

## 2021-04-06 DIAGNOSIS — E11.59 HYPERTENSION ASSOCIATED WITH DIABETES (HCC): ICD-10-CM

## 2021-04-06 PROCEDURE — 99214 OFFICE O/P EST MOD 30 MIN: CPT | Performed by: FAMILY MEDICINE

## 2021-04-06 RX ORDER — DIAZEPAM 5 MG/1
5 TABLET ORAL EVERY 12 HOURS PRN
Qty: 60 TABLET | Refills: 0 | Status: SHIPPED | OUTPATIENT
Start: 2021-04-06 | End: 2021-05-25 | Stop reason: SDUPTHER

## 2021-04-06 NOTE — PATIENT INSTRUCTIONS
I need you to check what you are taking:  Losartan 50 mg twice a day  OR   Losartan/HCTZ 50/12.5 mg twice a day.     Call me and let me know.

## 2021-04-06 NOTE — PROGRESS NOTES
"Bianka Agudelo     VITALS: Blood pressure 120/70, pulse 86, temperature 97.3 °F (36.3 °C), height 160 cm (62.99\"), weight 83.6 kg (184 lb 6.4 oz), SpO2 98 %, not currently breastfeeding.    Subjective  Chief Complaint  Abnormal ECG and Diabetes (Glucose 150 one day ago. )    Subjective          History of Present Illness:  Patient is a 75 y.o.  female with medical conditions significant for ype 2 diabetes mellitus, hypertension, hyperlipidemia, hypothyroidism, and anxiety who presents to clinic secondary to medical followup.     Bianka reports that she is having a vitrectomy procedure on her right eye performed by Dr. Fletcher, in order to remove the floaters from her eye. She believes that her severe coughing while she had COVID-19 may have attributed to her development of the floaters. She requires COVID-19 testing prior to this procedure and is requesting to have this ordered today. When asked about advance directives, she states her preference would be for all emergency life-saving measures to be used, including blood transfusion.     In regard to her heart palpitations, she reports she still has these every so often. They seem to occur more when she drinks coffee.     Additionally, she inquires whether she lost any weight since our last visit. She states that she is trying to bring her blood sugars down and has been adjusting her diet to do so.      She is currently unsure if she is taking the losartan-HCTZ combination pill. She does endorse taking amlodipine daily.     No complaints about any of the medications.    The following portions of the patient's history were reviewed and updated as appropriate: allergies, current medications, past family history, past medical history, past social history, past surgical history and problem list.    Past Medical History  Past Medical History:   Diagnosis Date   • Anxiety    • CAD (coronary artery disease)    • Diabetes mellitus (CMS/[facility])    • Dyslipidemia    • GERD " "(gastroesophageal reflux disease)    • H/O angina pectoris    • History of bone density study     February 2014   • History of mammogram 07/15/2013   • Hypertension    • Hypothyroidism    • Muscle spasms of both lower extremities    • Overactive bladder    • Vitamin B12 deficiency    • Vitamin D deficiency        Surgical History  Past Surgical History:   Procedure Laterality Date   • CARDIAC CATHETERIZATION     • COLONOSCOPY  01/01/2010   • OOPHORECTOMY      1 removed   • TUBAL ABDOMINAL LIGATION         Family History  Family History   Problem Relation Age of Onset   • Diabetes Mother    • Heart disease Mother    • Stroke Mother    • Cancer Father    • Pancreatic cancer Sister    • Diabetes Sister    • Heart attack Brother        Social History  Social History     Socioeconomic History   • Marital status:      Spouse name: Not on file   • Number of children: Not on file   • Years of education: Not on file   • Highest education level: Not on file   Tobacco Use   • Smoking status: Never Smoker   • Smokeless tobacco: Never Used   Substance and Sexual Activity   • Alcohol use: No   • Drug use: No   • Sexual activity: Defer       Objective   Vital Signs:   /70 (BP Location: Right arm, Patient Position: Sitting, Cuff Size: Adult)   Pulse 86   Temp 97.3 °F (36.3 °C)   Ht 160 cm (62.99\")   Wt 83.6 kg (184 lb 6.4 oz)   SpO2 98%   BMI 32.67 kg/m²     Physical Exam     Gen: Patient in NAD. Pleasant and answers appropriately. A&Ox3.    Skin: Warm and dry with normal turgor. No purpura, rashes, or unusual pigmentation noted. Hair is normal in appearance and distribution.    HEENT: NC/AT. No lesions noted. Conjunctiva clear, sclera nonicteric. PERRL. EOMI without nystagmus or strabismus.  No hemorrhages or exudates of eyes. Auditory canals are patent bilaterally without lesions. TMs intact,  nonerythematous, nonbulging without lesions. Nasal mucosa pink, nonerythematous, and nonedematous. Frontal and " maxillary sinuses are nontender. O/P nonerythematous and moist without exudate.    Neck: Supple without lymph nodes palpated. FROM.     Lungs: CTA B/L without rales, rhonchi, crackles, or wheezes.    Heart: RRR. S1 and S2 normal. No S3 or S4. No MRGT.    Abd: Soft, nontender,nondistended. (+)BSx4 quadrants.     Extrem: No CCE. Radial pulses 2+/4 and equal B/L. FROMx4. No bone, joint, or muscle tenderness noted.    Neuro: No focal motor/sensory deficits.    Procedures         Assessment and Plan    Bianka Agudelo is a 75 y.o. here for medical followup.    Problem List Items Addressed This Visit        Cardiac and Vasculature    Hypertension associated with diabetes (CMS/HCC)    Relevant Orders    Comprehensive Metabolic Panel    CBC Auto Differential    CBC & Differential    Comprehensive Metabolic Panel    T4, Free    TSH    Hemoglobin A1c  Her blood pressure today appears to be well-controlled. The patient is unsure if she is taking the losartan-HCTZ combination pill. She will check her medications and call me with the answer.      Hyperlipidemia due to type 2 diabetes mellitus (CMS/HCC)    Relevant Orders    Comprehensive Metabolic Panel    CBC Auto Differential    CBC & Differential    Comprehensive Metabolic Panel    T4, Free    TSH    Hemoglobin A1c    Currently on Lipitor 10 mg orally daily.       Endocrine and Metabolic    Hypothyroidism    Relevant Orders    TSH    T4, Free    Comprehensive Metabolic Panel    CBC Auto Differential    CBC & Differential    Comprehensive Metabolic Panel    T4, Free    TSH    Currently on Synthroid 150 mcg orally daily.  Will check labs today.    Type 2 diabetes mellitus with both eyes affected by moderate nonproliferative retinopathy without macular edema, with long-term current use of insulin (CMS/HCC) - Primary    Relevant Orders    Hemoglobin A1c    Comprehensive Metabolic Panel    CBC Auto Differential    CBC & Differential    Comprehensive Metabolic Panel    T4, Free    TSH     Hemoglobin A1c    Check labs today.  Continue Metformin ER 90 mg orally twice a day, Levemir 42 units nightly, NovoLog 70/30 24 units twice a day, and Januvia 100 mg orally daily.         Mental Health    Anxiety    Relevant Medications    diazePAM (VALIUM) 5 MG tablet    Other Relevant Orders    CBC & Differential    Comprehensive Metabolic Panel    T4, Free    TSH    Hemoglobin A1c    Pete # in epic  Reviewed and is consistent.  UDS  reviewed and is consistent.  Patient comfort assessment guide reviewed and updated today.    As part of the patient's treatment plan they are being prescribed a controlled substance/ substances with potential for abuse.  This patient has been made aware of the appropriate use of such medications, including potential risk of somnolence, limited ability to drive and/or work safely, and potential for overdose.  It has also been made clear these medications are for use by the patient only, without concomitant use of alcohol or other substances unless prescribed/advised by medical provider.    Patient has completed prescribing agreement detailing terms of continued prescribing of controlled substances including monitoring PETE reports, urine drug screens, and pill counts.  The patient is aware PETE reports are reviewed on a regular basis and scanned into the chart.    History and physical exam exhibit continued safe and appropriate use of controlled substances.       Preoperative exam              The patient is cleared for surgery. I will call Dr. Fletcehr, as well as put in an order for COVID-19                       testing that she can have performed prior to the procedure. We also reviewed her advance directives              today and she states she would like all emergency life-saving measures to be used, including blood              transfusion, if something were to occur during the procedure.          Patient's Body mass index is 32.67 kg/m². BMI is above normal  parameters. Recommendations include: exercise counseling and nutrition counseling.                 Follow Up   Return in about 3 months (around 7/6/2021), or LABS.  Findings and plans discussed with patient who verbalizes understanding and agreement. Will followup with patient once results are in. Patient was given instructions and counseling regarding her condition or for health maintenance advice. Please see specific information pulled into the AVS if appropriate.       Scribed for Eva Elliott MD by Teresa Anderson.  04/06/21   15:48 EDT    I have personally performed the services described in this document as scribed by the above individual, and it is both accurate and complete.  Eva Elliott MD  4/26/2021  06:00 EDT

## 2021-04-07 ENCOUNTER — TELEPHONE (OUTPATIENT)
Dept: FAMILY MEDICINE CLINIC | Facility: CLINIC | Age: 76
End: 2021-04-07

## 2021-04-07 LAB
ALBUMIN SERPL-MCNC: 4.3 G/DL (ref 3.5–5.2)
ALBUMIN/GLOB SERPL: 1.8 G/DL
ALP SERPL-CCNC: 65 U/L (ref 39–117)
ALT SERPL-CCNC: 30 U/L (ref 1–33)
AST SERPL-CCNC: 26 U/L (ref 1–32)
BASOPHILS # BLD AUTO: 0.04 10*3/MM3 (ref 0–0.2)
BASOPHILS NFR BLD AUTO: 0.6 % (ref 0–1.5)
BILIRUB SERPL-MCNC: 0.6 MG/DL (ref 0–1.2)
BUN SERPL-MCNC: 13 MG/DL (ref 8–23)
BUN/CREAT SERPL: 19.7 (ref 7–25)
CALCIUM SERPL-MCNC: 9.5 MG/DL (ref 8.6–10.5)
CHLORIDE SERPL-SCNC: 102 MMOL/L (ref 98–107)
CO2 SERPL-SCNC: 25.5 MMOL/L (ref 22–29)
CREAT SERPL-MCNC: 0.66 MG/DL (ref 0.57–1)
EOSINOPHIL # BLD AUTO: 0.2 10*3/MM3 (ref 0–0.4)
EOSINOPHIL NFR BLD AUTO: 2.9 % (ref 0.3–6.2)
ERYTHROCYTE [DISTWIDTH] IN BLOOD BY AUTOMATED COUNT: 14 % (ref 12.3–15.4)
GLOBULIN SER CALC-MCNC: 2.4 GM/DL
GLUCOSE SERPL-MCNC: 149 MG/DL (ref 65–99)
HBA1C MFR BLD: 9 % (ref 4.8–5.6)
HCT VFR BLD AUTO: 45 % (ref 34–46.6)
HGB BLD-MCNC: 14.8 G/DL (ref 12–15.9)
IMM GRANULOCYTES # BLD AUTO: 0.02 10*3/MM3 (ref 0–0.05)
IMM GRANULOCYTES NFR BLD AUTO: 0.3 % (ref 0–0.5)
LYMPHOCYTES # BLD AUTO: 3.16 10*3/MM3 (ref 0.7–3.1)
LYMPHOCYTES NFR BLD AUTO: 45.2 % (ref 19.6–45.3)
MCH RBC QN AUTO: 29.6 PG (ref 26.6–33)
MCHC RBC AUTO-ENTMCNC: 32.9 G/DL (ref 31.5–35.7)
MCV RBC AUTO: 90 FL (ref 79–97)
MONOCYTES # BLD AUTO: 0.47 10*3/MM3 (ref 0.1–0.9)
MONOCYTES NFR BLD AUTO: 6.7 % (ref 5–12)
NEUTROPHILS # BLD AUTO: 3.1 10*3/MM3 (ref 1.7–7)
NEUTROPHILS NFR BLD AUTO: 44.3 % (ref 42.7–76)
NRBC BLD AUTO-RTO: 0 /100 WBC (ref 0–0.2)
PLATELET # BLD AUTO: 246 10*3/MM3 (ref 140–450)
POTASSIUM SERPL-SCNC: 4 MMOL/L (ref 3.5–5.2)
PROT SERPL-MCNC: 6.7 G/DL (ref 6–8.5)
RBC # BLD AUTO: 5 10*6/MM3 (ref 3.77–5.28)
SODIUM SERPL-SCNC: 139 MMOL/L (ref 136–145)
T4 FREE SERPL-MCNC: 1.45 NG/DL (ref 0.93–1.7)
TSH SERPL DL<=0.005 MIU/L-ACNC: 0.65 UIU/ML (ref 0.27–4.2)
WBC # BLD AUTO: 6.99 10*3/MM3 (ref 3.4–10.8)

## 2021-04-07 NOTE — TELEPHONE ENCOUNTER
Patient called reports she was supposed to check her med's when she got home,she reports she is taking Amlodipine 5 mg @ night  time & Hyzaar 50-12.5 mg in the am,has been taking this daily not Bid.

## 2021-04-14 ENCOUNTER — TELEPHONE (OUTPATIENT)
Dept: FAMILY MEDICINE CLINIC | Facility: CLINIC | Age: 76
End: 2021-04-14

## 2021-04-29 ENCOUNTER — TRANSCRIBE ORDERS (OUTPATIENT)
Dept: LAB | Facility: HOSPITAL | Age: 76
End: 2021-04-29

## 2021-04-29 DIAGNOSIS — Z01.818 OTHER SPECIFIED PRE-OPERATIVE EXAMINATION: Primary | ICD-10-CM

## 2021-05-03 ENCOUNTER — LAB (OUTPATIENT)
Dept: LAB | Facility: HOSPITAL | Age: 76
End: 2021-05-03

## 2021-05-03 DIAGNOSIS — Z01.818 OTHER SPECIFIED PRE-OPERATIVE EXAMINATION: ICD-10-CM

## 2021-05-03 PROCEDURE — C9803 HOPD COVID-19 SPEC COLLECT: HCPCS

## 2021-05-03 PROCEDURE — U0004 COV-19 TEST NON-CDC HGH THRU: HCPCS

## 2021-05-04 ENCOUNTER — TELEPHONE (OUTPATIENT)
Dept: FAMILY MEDICINE CLINIC | Facility: CLINIC | Age: 76
End: 2021-05-04

## 2021-05-04 LAB — SARS-COV-2 RNA NOSE QL NAA+PROBE: NOT DETECTED

## 2021-05-04 NOTE — TELEPHONE ENCOUNTER
Patient called & wants to know what blood pressure medication she should take in the morning before scheduled surgery,she usually takes her Amlodipine at hs & her Hyzaar in the am.

## 2021-05-04 NOTE — TELEPHONE ENCOUNTER
Take both with a sip of water. Was she told otherwise by the surgeon?    No they told her to ask you,they told her to take only one ,which one is she to take?    Patient returned call I instructed her that her provider felt it was ok for her to take both with a sip of water & she verbalized understanding.

## 2021-05-11 DIAGNOSIS — E11.3393 TYPE 2 DIABETES MELLITUS WITH BOTH EYES AFFECTED BY MODERATE NONPROLIFERATIVE RETINOPATHY WITHOUT MACULAR EDEMA, WITH LONG-TERM CURRENT USE OF INSULIN (HCC): ICD-10-CM

## 2021-05-11 DIAGNOSIS — Z79.4 TYPE 2 DIABETES MELLITUS WITH BOTH EYES AFFECTED BY MODERATE NONPROLIFERATIVE RETINOPATHY WITHOUT MACULAR EDEMA, WITH LONG-TERM CURRENT USE OF INSULIN (HCC): ICD-10-CM

## 2021-05-13 DIAGNOSIS — E11.69 HYPERLIPIDEMIA DUE TO TYPE 2 DIABETES MELLITUS (HCC): ICD-10-CM

## 2021-05-13 DIAGNOSIS — E78.5 HYPERLIPIDEMIA DUE TO TYPE 2 DIABETES MELLITUS (HCC): ICD-10-CM

## 2021-05-14 RX ORDER — INSULIN ASPART 100 [IU]/ML
INJECTION, SUSPENSION SUBCUTANEOUS
Qty: 45 ML | Refills: 1 | Status: SHIPPED | OUTPATIENT
Start: 2021-05-14 | End: 2022-02-07

## 2021-05-14 RX ORDER — ATORVASTATIN CALCIUM 10 MG/1
10 TABLET, FILM COATED ORAL DAILY
Qty: 90 TABLET | Refills: 1 | Status: SHIPPED | OUTPATIENT
Start: 2021-05-14 | End: 2022-05-16 | Stop reason: SDUPTHER

## 2021-05-17 DIAGNOSIS — M25.551 RIGHT HIP PAIN: ICD-10-CM

## 2021-05-20 RX ORDER — IBUPROFEN 800 MG/1
TABLET ORAL
Qty: 90 TABLET | Refills: 2 | Status: SHIPPED | OUTPATIENT
Start: 2021-05-20 | End: 2021-09-03

## 2021-05-25 DIAGNOSIS — F41.9 ANXIETY: ICD-10-CM

## 2021-05-25 NOTE — TELEPHONE ENCOUNTER
Caller: Bianka Agudelo    Relationship: Self    Best call back number: [phone]    Medication needed:   Requested Prescriptions     Pending Prescriptions Disp Refills   • diazePAM (VALIUM) 5 MG tablet 60 tablet 0     Sig: Take 1 tablet by mouth Every 12 (Twelve) Hours As Needed for Anxiety.       When do you need the refill by: WOULD LIKE TO  TODAY @ 6PM    What additional details did the patient provide when requesting the medication:     Does the patient have less than a 3 day supply:  [x] Yes  [] No    What is the patient's preferred pharmacy: [facility][id number] - [place], [place] - [address] AT [facility] OF [address] - [phone]  - [phone] FX

## 2021-05-26 RX ORDER — DIAZEPAM 5 MG/1
5 TABLET ORAL EVERY 12 HOURS PRN
Qty: 60 TABLET | Refills: 0 | Status: SHIPPED | OUTPATIENT
Start: 2021-05-26 | End: 2021-07-06 | Stop reason: SDUPTHER

## 2021-05-30 DIAGNOSIS — I25.119 CORONARY ARTERY DISEASE INVOLVING NATIVE CORONARY ARTERY OF NATIVE HEART WITH ANGINA PECTORIS (HCC): ICD-10-CM

## 2021-06-04 RX ORDER — ISOSORBIDE MONONITRATE 30 MG/1
30 TABLET, EXTENDED RELEASE ORAL DAILY
Qty: 90 TABLET | Refills: 1 | Status: SHIPPED | OUTPATIENT
Start: 2021-06-04 | End: 2022-02-14 | Stop reason: SDUPTHER

## 2021-06-07 DIAGNOSIS — E03.8 OTHER SPECIFIED HYPOTHYROIDISM: ICD-10-CM

## 2021-06-08 DIAGNOSIS — F41.9 ANXIETY: ICD-10-CM

## 2021-06-08 RX ORDER — LEVOTHYROXINE SODIUM 0.15 MG/1
137 TABLET ORAL DAILY
Qty: 30 TABLET | Refills: 2 | Status: SHIPPED | OUTPATIENT
Start: 2021-06-08 | End: 2021-09-13

## 2021-06-08 RX ORDER — ALBUTEROL SULFATE 90 UG/1
AEROSOL, METERED RESPIRATORY (INHALATION)
Qty: 90 G | Refills: 5 | Status: SHIPPED | OUTPATIENT
Start: 2021-06-08 | End: 2022-07-28

## 2021-06-13 DIAGNOSIS — E11.3393 TYPE 2 DIABETES MELLITUS WITH BOTH EYES AFFECTED BY MODERATE NONPROLIFERATIVE RETINOPATHY WITHOUT MACULAR EDEMA, WITH LONG-TERM CURRENT USE OF INSULIN (HCC): ICD-10-CM

## 2021-06-13 DIAGNOSIS — Z79.4 TYPE 2 DIABETES MELLITUS WITH BOTH EYES AFFECTED BY MODERATE NONPROLIFERATIVE RETINOPATHY WITHOUT MACULAR EDEMA, WITH LONG-TERM CURRENT USE OF INSULIN (HCC): ICD-10-CM

## 2021-06-15 RX ORDER — INSULIN DETEMIR 100 [IU]/ML
INJECTION, SOLUTION SUBCUTANEOUS
Qty: 45 ML | Refills: 1 | Status: SHIPPED | OUTPATIENT
Start: 2021-06-15 | End: 2022-02-07

## 2021-06-16 DIAGNOSIS — E11.3393 TYPE 2 DIABETES MELLITUS WITH BOTH EYES AFFECTED BY MODERATE NONPROLIFERATIVE RETINOPATHY WITHOUT MACULAR EDEMA, WITH LONG-TERM CURRENT USE OF INSULIN (HCC): ICD-10-CM

## 2021-06-16 DIAGNOSIS — Z79.4 TYPE 2 DIABETES MELLITUS WITH BOTH EYES AFFECTED BY MODERATE NONPROLIFERATIVE RETINOPATHY WITHOUT MACULAR EDEMA, WITH LONG-TERM CURRENT USE OF INSULIN (HCC): ICD-10-CM

## 2021-06-17 RX ORDER — ASPIRIN 81 MG/1
TABLET, COATED ORAL
Qty: 90 TABLET | Refills: 1 | Status: SHIPPED | OUTPATIENT
Start: 2021-06-17 | End: 2022-02-14

## 2021-06-20 DIAGNOSIS — I15.2 HYPERTENSION ASSOCIATED WITH DIABETES (HCC): ICD-10-CM

## 2021-06-20 DIAGNOSIS — E11.59 HYPERTENSION ASSOCIATED WITH DIABETES (HCC): ICD-10-CM

## 2021-06-22 RX ORDER — AMLODIPINE BESYLATE 5 MG/1
5 TABLET ORAL DAILY
Qty: 90 TABLET | Refills: 1 | Status: SHIPPED | OUTPATIENT
Start: 2021-06-22 | End: 2022-06-14 | Stop reason: SDUPTHER

## 2021-07-06 DIAGNOSIS — F41.9 ANXIETY: ICD-10-CM
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
## 2021-07-07 RX ORDER — DIAZEPAM 5 MG/1
5 TABLET ORAL EVERY 12 HOURS PRN
Qty: 60 TABLET | Refills: 0 | Status: SHIPPED | OUTPATIENT
Start: 2021-07-07 | End: 2021-08-19 | Stop reason: SDUPTHER

## 2021-07-07 NOTE — TELEPHONE ENCOUNTER
Pete # in epic  Reviewed and is consistent.  S 10/2020 reviewed and is consistent.  Patient comfort assessment guide reviewed and updated today.    As part of the patient's treatment plan they are being prescribed a controlled substance/ substances with potential for abuse.  This patient has been made aware of the appropriate use of such medications, including potential risk of somnolence, limited ability to drive and/or work safely, and potential for overdose.  It has also been made clear these medications are for use by the patient only, without concomitant use of alcohol or other substances unless prescribed/advised by medical provider.    Patient has completed prescribing agreement detailing terms of continued prescribing of controlled substances including monitoring PETE reports, urine drug screens, and pill counts.  The patient is aware PETE reports are reviewed on a regular basis and scanned into the chart.    History and physical exam exhibit continued safe and appropriate use of controlled substances.    Left a detailed message.

## 2021-07-30 DIAGNOSIS — E11.3393 TYPE 2 DIABETES MELLITUS WITH BOTH EYES AFFECTED BY MODERATE NONPROLIFERATIVE RETINOPATHY WITHOUT MACULAR EDEMA, WITH LONG-TERM CURRENT USE OF INSULIN (HCC): ICD-10-CM

## 2021-07-30 DIAGNOSIS — Z79.4 TYPE 2 DIABETES MELLITUS WITH BOTH EYES AFFECTED BY MODERATE NONPROLIFERATIVE RETINOPATHY WITHOUT MACULAR EDEMA, WITH LONG-TERM CURRENT USE OF INSULIN (HCC): ICD-10-CM

## 2021-07-30 RX ORDER — METFORMIN HYDROCHLORIDE 500 MG/1
TABLET, EXTENDED RELEASE ORAL
Qty: 180 TABLET | Refills: 0 | Status: SHIPPED | OUTPATIENT
Start: 2021-07-30 | End: 2021-10-28

## 2021-08-18 ENCOUNTER — OFFICE VISIT (OUTPATIENT)
Dept: FAMILY MEDICINE CLINIC | Facility: CLINIC | Age: 76
End: 2021-08-18

## 2021-08-18 VITALS
SYSTOLIC BLOOD PRESSURE: 126 MMHG | DIASTOLIC BLOOD PRESSURE: 68 MMHG | OXYGEN SATURATION: 96 % | HEART RATE: 74 BPM | TEMPERATURE: 97.1 F | BODY MASS INDEX: 33.77 KG/M2 | HEIGHT: 63 IN | WEIGHT: 190.6 LBS

## 2021-08-18 DIAGNOSIS — E11.59 HYPERTENSION ASSOCIATED WITH DIABETES (HCC): ICD-10-CM

## 2021-08-18 DIAGNOSIS — E11.69 HYPERLIPIDEMIA DUE TO TYPE 2 DIABETES MELLITUS (HCC): ICD-10-CM

## 2021-08-18 DIAGNOSIS — I15.2 HYPERTENSION ASSOCIATED WITH DIABETES (HCC): ICD-10-CM

## 2021-08-18 DIAGNOSIS — E03.8 OTHER SPECIFIED HYPOTHYROIDISM: ICD-10-CM

## 2021-08-18 DIAGNOSIS — E11.3393 TYPE 2 DIABETES MELLITUS WITH BOTH EYES AFFECTED BY MODERATE NONPROLIFERATIVE RETINOPATHY WITHOUT MACULAR EDEMA, WITH LONG-TERM CURRENT USE OF INSULIN (HCC): Primary | ICD-10-CM

## 2021-08-18 DIAGNOSIS — E78.5 HYPERLIPIDEMIA DUE TO TYPE 2 DIABETES MELLITUS (HCC): ICD-10-CM

## 2021-08-18 DIAGNOSIS — Z79.4 TYPE 2 DIABETES MELLITUS WITH BOTH EYES AFFECTED BY MODERATE NONPROLIFERATIVE RETINOPATHY WITHOUT MACULAR EDEMA, WITH LONG-TERM CURRENT USE OF INSULIN (HCC): Primary | ICD-10-CM

## 2021-08-18 DIAGNOSIS — F41.9 ANXIETY: ICD-10-CM

## 2021-08-18 PROCEDURE — 99214 OFFICE O/P EST MOD 30 MIN: CPT | Performed by: FAMILY MEDICINE

## 2021-08-19 LAB
ALBUMIN SERPL-MCNC: 4.2 G/DL (ref 3.5–5.2)
ALBUMIN/GLOB SERPL: 1.8 G/DL
ALP SERPL-CCNC: 64 U/L (ref 39–117)
ALT SERPL-CCNC: 28 U/L (ref 1–33)
AST SERPL-CCNC: 24 U/L (ref 1–32)
BILIRUB SERPL-MCNC: 0.5 MG/DL (ref 0–1.2)
BUN SERPL-MCNC: 13 MG/DL (ref 8–23)
BUN/CREAT SERPL: 16.5 (ref 7–25)
CALCIUM SERPL-MCNC: 9.6 MG/DL (ref 8.6–10.5)
CHLORIDE SERPL-SCNC: 100 MMOL/L (ref 98–107)
CO2 SERPL-SCNC: 26.6 MMOL/L (ref 22–29)
CREAT SERPL-MCNC: 0.79 MG/DL (ref 0.57–1)
GLOBULIN SER CALC-MCNC: 2.4 GM/DL
GLUCOSE SERPL-MCNC: 307 MG/DL (ref 65–99)
HBA1C MFR BLD: 9.5 % (ref 4.8–5.6)
POTASSIUM SERPL-SCNC: 4.6 MMOL/L (ref 3.5–5.2)
PROT SERPL-MCNC: 6.6 G/DL (ref 6–8.5)
SODIUM SERPL-SCNC: 138 MMOL/L (ref 136–145)
T4 FREE SERPL-MCNC: 1.46 NG/DL (ref 0.93–1.7)
TSH SERPL DL<=0.005 MIU/L-ACNC: 1.7 UIU/ML (ref 0.27–4.2)

## 2021-08-19 RX ORDER — DIAZEPAM 5 MG/1
5 TABLET ORAL EVERY 12 HOURS PRN
Qty: 60 TABLET | Refills: 0 | Status: SHIPPED | OUTPATIENT
Start: 2021-08-19 | End: 2021-09-30 | Stop reason: SDUPTHER

## 2021-08-29 DIAGNOSIS — M25.551 RIGHT HIP PAIN: ICD-10-CM

## 2021-08-29 DIAGNOSIS — I25.119 CORONARY ARTERY DISEASE INVOLVING NATIVE CORONARY ARTERY OF NATIVE HEART WITH ANGINA PECTORIS (HCC): ICD-10-CM

## 2021-09-03 RX ORDER — CLOPIDOGREL BISULFATE 75 MG/1
75 TABLET ORAL DAILY
Qty: 90 TABLET | Refills: 1 | Status: SHIPPED | OUTPATIENT
Start: 2021-09-03 | End: 2022-02-14

## 2021-09-03 RX ORDER — CYCLOBENZAPRINE HCL 10 MG
TABLET ORAL
Qty: 60 TABLET | Refills: 5 | Status: SHIPPED | OUTPATIENT
Start: 2021-09-03 | End: 2022-02-25

## 2021-09-03 RX ORDER — IBUPROFEN 800 MG/1
TABLET ORAL
Qty: 90 TABLET | Refills: 2 | Status: SHIPPED | OUTPATIENT
Start: 2021-09-03 | End: 2021-11-29

## 2021-09-06 ENCOUNTER — TELEPHONE (OUTPATIENT)
Dept: FAMILY MEDICINE CLINIC | Facility: CLINIC | Age: 76
End: 2021-09-06

## 2021-09-06 NOTE — TELEPHONE ENCOUNTER
----- Message from Eva Elliott MD sent at 9/5/2021 10:47 PM EDT -----  Please call Bianka. Labs okay, but diabetes is up. She should be on 42 units Levemir. Can she increase to 44 units? Thanks.        No answer at this time.    Attempted to contact patient,no answer at either number.      No answer.      Still unable to reach patient,letter mailed with the above information.

## 2021-09-07 ENCOUNTER — TELEPHONE (OUTPATIENT)
Dept: FAMILY MEDICINE CLINIC | Facility: CLINIC | Age: 76
End: 2021-09-07

## 2021-09-07 DIAGNOSIS — E11.3393 TYPE 2 DIABETES MELLITUS WITH BOTH EYES AFFECTED BY MODERATE NONPROLIFERATIVE RETINOPATHY WITHOUT MACULAR EDEMA, WITH LONG-TERM CURRENT USE OF INSULIN (HCC): ICD-10-CM

## 2021-09-07 DIAGNOSIS — Z79.4 TYPE 2 DIABETES MELLITUS WITH BOTH EYES AFFECTED BY MODERATE NONPROLIFERATIVE RETINOPATHY WITHOUT MACULAR EDEMA, WITH LONG-TERM CURRENT USE OF INSULIN (HCC): ICD-10-CM

## 2021-09-07 DIAGNOSIS — N32.81 OVERACTIVE BLADDER: ICD-10-CM

## 2021-09-07 RX ORDER — OXYBUTYNIN CHLORIDE 5 MG/1
5 TABLET ORAL 2 TIMES DAILY
Qty: 180 TABLET | Refills: 1 | Status: SHIPPED | OUTPATIENT
Start: 2021-09-07 | End: 2021-12-07 | Stop reason: SDUPTHER

## 2021-09-07 NOTE — TELEPHONE ENCOUNTER
"Caller: Bianka Agudelo ANNIE    Relationship: Self    Best call back number: [phone] (M)    Medication needed:   oxybutynin (DITROPAN) 5 MG tablet  5 mg, 2 Times Daily 1 ordered         Summary: Take 1 tablet by mouth 2 (Two) Times a Day., Starting Tue 1/5/2021, Normal   Dose, Route, Frequency: 5 mg, Oral, 2 Times Daily        BD Veo Insulin Syringe U/F 31G X 15/64\" 0.5 ML misc   5 ordered         Summary: USE 1 SYR TO INJECT MED SUBCUTANEOUSLY TWICE A DAY, Normal          When do you need the refill by: TODAY    What additional details did the patient provide when requesting the medication: PATIENT STATES THAT SHE IS OUT COMPLETELY    Does the patient have less than a 3 day supply:  [x] Yes  [] No    What is the patient's preferred pharmacy: [id number] - [facility] [place] - [address] AT [facility] [address] - [phone]  - [phone] FX       PATIENT HAS BEEN ADVISED THAT THIS REQUEST HAS BEEN MARKED AS A HIGH PRIORITY, PLEASE ALLOW 48 HOURS FOR OUR CLINICAL TEAM TO FOLLOW UP ON THIS REQUEST.       Refilled.    "

## 2021-09-07 NOTE — TELEPHONE ENCOUNTER
"Caller: Magnus Bianka ANNIE    Relationship: Self    Best call back number: [phone] (M)    Medication needed:   oxybutynin (DITROPAN) 5 MG tablet  5 mg, 2 Times Daily 1 ordered         Summary: Take 1 tablet by mouth 2 (Two) Times a Day., Starting Tue 1/5/2021, Normal   Dose, Route, Frequency: 5 mg, Oral, 2 Times Daily        BD Veo Insulin Syringe U/F 31G X 15/64\" 0.5 ML misc   5 ordered         Summary: USE 1 SYR TO INJECT MED SUBCUTANEOUSLY TWICE A DAY, Normal          When do you need the refill by: TODAY    What additional details did the patient provide when requesting the medication: PATIENT STATES THAT SHE IS OUT COMPLETELY    Does the patient have less than a 3 day supply:  [x] Yes  [] No    What is the patient's preferred pharmacy: [id number] - [place] - [address] AT [facility] OF [address] - [phone]  - [phone] FX       PATIENT HAS BEEN ADVISED THAT THIS REQUEST HAS BEEN MARKED AS A HIGH PRIORITY, PLEASE ALLOW 48 HOURS FOR OUR CLINICAL TEAM TO FOLLOW UP ON THIS REQUEST.       "

## 2021-09-13 DIAGNOSIS — E03.8 OTHER SPECIFIED HYPOTHYROIDISM: ICD-10-CM

## 2021-09-13 RX ORDER — LEVOTHYROXINE SODIUM 0.15 MG/1
137 TABLET ORAL DAILY
Qty: 30 TABLET | Refills: 2 | Status: SHIPPED | OUTPATIENT
Start: 2021-09-13 | End: 2021-12-09

## 2021-09-28 DIAGNOSIS — Z79.4 TYPE 2 DIABETES MELLITUS WITH BOTH EYES AFFECTED BY MODERATE NONPROLIFERATIVE RETINOPATHY WITHOUT MACULAR EDEMA, WITH LONG-TERM CURRENT USE OF INSULIN (HCC): ICD-10-CM

## 2021-09-28 DIAGNOSIS — E11.3393 TYPE 2 DIABETES MELLITUS WITH BOTH EYES AFFECTED BY MODERATE NONPROLIFERATIVE RETINOPATHY WITHOUT MACULAR EDEMA, WITH LONG-TERM CURRENT USE OF INSULIN (HCC): ICD-10-CM

## 2021-09-28 RX ORDER — SITAGLIPTIN 100 MG/1
TABLET, FILM COATED ORAL
Qty: 90 TABLET | Refills: 1 | Status: SHIPPED | OUTPATIENT
Start: 2021-09-28 | End: 2022-03-29

## 2021-09-30 ENCOUNTER — TELEPHONE (OUTPATIENT)
Dept: FAMILY MEDICINE CLINIC | Facility: CLINIC | Age: 76
End: 2021-09-30

## 2021-09-30 DIAGNOSIS — F41.9 ANXIETY: ICD-10-CM

## 2021-09-30 RX ORDER — DIAZEPAM 5 MG/1
5 TABLET ORAL EVERY 12 HOURS PRN
Qty: 60 TABLET | Refills: 0 | Status: SHIPPED | OUTPATIENT
Start: 2021-09-30 | End: 2021-11-09 | Stop reason: SDUPTHER

## 2021-09-30 NOTE — PROGRESS NOTES
Pete # in epic  Reviewed and is consistent.  UDS 8/2021 reviewed and is consistent.  Patient comfort assessment guide reviewed and updated today.    As part of the patient's treatment plan they are being prescribed a controlled substance/ substances with potential for abuse.  This patient has been made aware of the appropriate use of such medications, including potential risk of somnolence, limited ability to drive and/or work safely, and potential for overdose.  It has also been made clear these medications are for use by the patient only, without concomitant use of alcohol or other substances unless prescribed/advised by medical provider.    Patient has completed prescribing agreement detailing terms of continued prescribing of controlled substances including monitoring PETE reports, urine drug screens, and pill counts.  The patient is aware PETE reports are reviewed on a regular basis and scanned into the chart.    History and physical exam exhibit continued safe and appropriate use of controlled substances.

## 2021-10-06 DIAGNOSIS — E11.59 HYPERTENSION ASSOCIATED WITH DIABETES (HCC): ICD-10-CM

## 2021-10-06 DIAGNOSIS — I15.2 HYPERTENSION ASSOCIATED WITH DIABETES (HCC): ICD-10-CM

## 2021-10-13 NOTE — TELEPHONE ENCOUNTER
Please call Bianka. Can she check her bottles? Is she taking losartan or losartan/HCTZ? And whichever she is taking, is she taking it daily or BID? Does she need a refill for it? Thanks.

## 2021-10-13 NOTE — TELEPHONE ENCOUNTER
Please call Bianka. Can she check her bottles? Is she taking losartan or losartan/HCTZ? And whichever she is taking, is she taking it daily or BID? Does she need a refill for it? Thanks.      Left a message to return call.      Spoke with patient she reports she takes the Losartan with HCTZ daily,does not need a refill on it.

## 2021-10-14 RX ORDER — LOSARTAN POTASSIUM 50 MG/1
TABLET ORAL
Qty: 180 TABLET | Refills: 1 | OUTPATIENT
Start: 2021-10-14

## 2021-10-19 ENCOUNTER — FLU SHOT (OUTPATIENT)
Dept: FAMILY MEDICINE CLINIC | Facility: CLINIC | Age: 76
End: 2021-10-19

## 2021-10-19 DIAGNOSIS — Z23 IMMUNIZATION DUE: Primary | ICD-10-CM

## 2021-10-19 PROCEDURE — G0008 ADMIN INFLUENZA VIRUS VAC: HCPCS | Performed by: FAMILY MEDICINE

## 2021-10-19 PROCEDURE — 90662 IIV NO PRSV INCREASED AG IM: CPT | Performed by: FAMILY MEDICINE

## 2021-10-28 DIAGNOSIS — E11.3393 TYPE 2 DIABETES MELLITUS WITH BOTH EYES AFFECTED BY MODERATE NONPROLIFERATIVE RETINOPATHY WITHOUT MACULAR EDEMA, WITH LONG-TERM CURRENT USE OF INSULIN (HCC): ICD-10-CM

## 2021-10-28 DIAGNOSIS — Z79.4 TYPE 2 DIABETES MELLITUS WITH BOTH EYES AFFECTED BY MODERATE NONPROLIFERATIVE RETINOPATHY WITHOUT MACULAR EDEMA, WITH LONG-TERM CURRENT USE OF INSULIN (HCC): ICD-10-CM

## 2021-10-28 RX ORDER — METFORMIN HYDROCHLORIDE 500 MG/1
TABLET, EXTENDED RELEASE ORAL
Qty: 180 TABLET | Refills: 1 | Status: SHIPPED | OUTPATIENT
Start: 2021-10-28 | End: 2022-04-26

## 2021-11-01 ENCOUNTER — TRANSCRIBE ORDERS (OUTPATIENT)
Dept: ADMINISTRATIVE | Facility: HOSPITAL | Age: 76
End: 2021-11-01

## 2021-11-01 DIAGNOSIS — Z01.818 PRE-OPERATIVE CLEARANCE: Primary | ICD-10-CM

## 2021-11-02 ENCOUNTER — LAB (OUTPATIENT)
Dept: LAB | Facility: HOSPITAL | Age: 76
End: 2021-11-02

## 2021-11-02 DIAGNOSIS — Z01.818 PRE-OPERATIVE CLEARANCE: ICD-10-CM

## 2021-11-02 LAB — SARS-COV-2 RNA PNL SPEC NAA+PROBE: NOT DETECTED

## 2021-11-02 PROCEDURE — C9803 HOPD COVID-19 SPEC COLLECT: HCPCS

## 2021-11-02 PROCEDURE — U0004 COV-19 TEST NON-CDC HGH THRU: HCPCS | Performed by: STUDENT IN AN ORGANIZED HEALTH CARE EDUCATION/TRAINING PROGRAM

## 2021-11-09 DIAGNOSIS — Z79.4 TYPE 2 DIABETES MELLITUS WITH BOTH EYES AFFECTED BY MODERATE NONPROLIFERATIVE RETINOPATHY WITHOUT MACULAR EDEMA, WITH LONG-TERM CURRENT USE OF INSULIN (HCC): ICD-10-CM

## 2021-11-09 DIAGNOSIS — E11.3393 TYPE 2 DIABETES MELLITUS WITH BOTH EYES AFFECTED BY MODERATE NONPROLIFERATIVE RETINOPATHY WITHOUT MACULAR EDEMA, WITH LONG-TERM CURRENT USE OF INSULIN (HCC): ICD-10-CM

## 2021-11-09 DIAGNOSIS — F41.9 ANXIETY: ICD-10-CM

## 2021-11-09 NOTE — TELEPHONE ENCOUNTER
Caller: Bianka Agudelo    Relationship: Self    Best call back number: [phone]    Requested Prescriptions:   Requested Prescriptions     Pending Prescriptions Disp Refills   • diazePAM (VALIUM) 5 MG tablet 60 tablet 0     Sig: Take 1 tablet by mouth Every 12 (Twelve) Hours As Needed for Anxiety.        Pharmacy where request should be sent: [facility] #[id number] - [facility] [place] - [address] AT [facility] OF [address] - [phone]  - [phone] FX     Additional details provided by patient: OUT OF MEDICATION    Does the patient have less than a 3 day supply:  [x] Yes  [] No    Roxy Mar MA   11/09/21 12:47 EST

## 2021-11-10 RX ORDER — DIAZEPAM 5 MG/1
5 TABLET ORAL EVERY 12 HOURS PRN
Qty: 60 TABLET | Refills: 0 | Status: SHIPPED | OUTPATIENT
Start: 2021-11-10 | End: 2022-01-11 | Stop reason: SDUPTHER

## 2021-11-10 NOTE — TELEPHONE ENCOUNTER
Pete # in epic  Reviewed and is consistent.  UDS 8/2021 reviewed and is consistent.  Patient comfort assessment guide reviewed and updated today.    As part of the patient's treatment plan they are being prescribed a controlled substance/ substances with potential for abuse.  This patient has been made aware of the appropriate use of such medications, including potential risk of somnolence, limited ability to drive and/or work safely, and potential for overdose.  It has also been made clear these medications are for use by the patient only, without concomitant use of alcohol or other substances unless prescribed/advised by medical provider.    Patient has completed prescribing agreement detailing terms of continued prescribing of controlled substances including monitoring PETE reports, urine drug screens, and pill counts.  The patient is aware PETE reports are reviewed on a regular basis and scanned into the chart.    History and physical exam exhibit continued safe and appropriate use of controlled substances.    Patient notified.

## 2021-11-27 DIAGNOSIS — M25.551 RIGHT HIP PAIN: ICD-10-CM

## 2021-11-29 RX ORDER — IBUPROFEN 800 MG/1
TABLET ORAL
Qty: 90 TABLET | Refills: 2 | Status: SHIPPED | OUTPATIENT
Start: 2021-11-29 | End: 2022-02-25

## 2021-12-07 ENCOUNTER — OFFICE VISIT (OUTPATIENT)
Dept: FAMILY MEDICINE CLINIC | Facility: CLINIC | Age: 76
End: 2021-12-07

## 2021-12-07 VITALS
TEMPERATURE: 99.3 F | DIASTOLIC BLOOD PRESSURE: 56 MMHG | SYSTOLIC BLOOD PRESSURE: 124 MMHG | WEIGHT: 184 LBS | BODY MASS INDEX: 33.86 KG/M2 | OXYGEN SATURATION: 98 % | HEART RATE: 69 BPM | HEIGHT: 62 IN

## 2021-12-07 DIAGNOSIS — I15.2 HYPERTENSION ASSOCIATED WITH DIABETES (HCC): ICD-10-CM

## 2021-12-07 DIAGNOSIS — Z79.4 TYPE 2 DIABETES MELLITUS WITH BOTH EYES AFFECTED BY MODERATE NONPROLIFERATIVE RETINOPATHY WITHOUT MACULAR EDEMA, WITH LONG-TERM CURRENT USE OF INSULIN (HCC): Primary | ICD-10-CM

## 2021-12-07 DIAGNOSIS — E53.8 B12 DEFICIENCY: ICD-10-CM

## 2021-12-07 DIAGNOSIS — N32.81 OVERACTIVE BLADDER: ICD-10-CM

## 2021-12-07 DIAGNOSIS — E11.3393 TYPE 2 DIABETES MELLITUS WITH BOTH EYES AFFECTED BY MODERATE NONPROLIFERATIVE RETINOPATHY WITHOUT MACULAR EDEMA, WITH LONG-TERM CURRENT USE OF INSULIN (HCC): Primary | ICD-10-CM

## 2021-12-07 DIAGNOSIS — E55.9 VITAMIN D DEFICIENCY: ICD-10-CM

## 2021-12-07 DIAGNOSIS — E03.8 OTHER SPECIFIED HYPOTHYROIDISM: ICD-10-CM

## 2021-12-07 DIAGNOSIS — B37.9 YEAST INFECTION: ICD-10-CM

## 2021-12-07 DIAGNOSIS — E11.59 HYPERTENSION ASSOCIATED WITH DIABETES (HCC): ICD-10-CM

## 2021-12-07 PROCEDURE — 99214 OFFICE O/P EST MOD 30 MIN: CPT | Performed by: FAMILY MEDICINE

## 2021-12-07 RX ORDER — LANOLIN ALCOHOL/MO/W.PET/CERES
1000 CREAM (GRAM) TOPICAL DAILY
Qty: 90 TABLET | Refills: 1 | Status: SHIPPED | OUTPATIENT
Start: 2021-12-07 | End: 2022-01-11 | Stop reason: SDUPTHER

## 2021-12-07 RX ORDER — FLUCONAZOLE 150 MG/1
150 TABLET ORAL ONCE
Qty: 1 TABLET | Refills: 0 | Status: SHIPPED | OUTPATIENT
Start: 2021-12-07 | End: 2021-12-07

## 2021-12-07 RX ORDER — OXYBUTYNIN CHLORIDE 5 MG/1
5 TABLET ORAL 2 TIMES DAILY
Qty: 180 TABLET | Refills: 1 | Status: SHIPPED | OUTPATIENT
Start: 2021-12-07 | End: 2022-08-24

## 2021-12-07 NOTE — PROGRESS NOTES
"   Bianka Agudelo     VITALS: Blood pressure 124/56, pulse 69, temperature 99.3 °F (37.4 °C), height 157.5 cm (62\"), weight 83.5 kg (184 lb), SpO2 98 %, not currently breastfeeding.    Subjective  Chief Complaint  Follow-up and Diabetes    Subjective          History of Present Illness:  Patient is a 75 y.o.  female with medical conditions significant for 2 diabetes, hyperlipidemia, gastroesophageal reflux disease, and coronary artery disease who presents to clinic secondary to medical followup. The patient states that she has not been checking her blood sugar at home because they keep sending her the wrong test strips. She reports that her blood sugar has been fluctuating secondary to having eye surgery in 10/2021. The patient said that she has been cutting down on all of her food; however, her blood sugar dropped to 47 mg/dL during the midday. She said she eats peanut butter and drinks tea and brings it back up. The patient reports that she takes 24 units of insulin in the morning and 22 units at night and 44 units of the long acting. She states that she takes the metformin and Januvia in the morning.     The patient reports she had a massive hemorrhage behind her right eye and had surgery by Dr. Fletcher in 10/2021. She reports her left eye is not great but she can see. The patient denies dizziness when using both eyes.     She received her influenza vaccine at her last visit here. The patient said she does not want the COVID-19 vaccine. She requested medication for a yeast infection which she thinks is improving but still there.     No complaints about any of the medications.    The following portions of the patient's history were reviewed and updated as appropriate: allergies, current medications, past family history, past medical history, past social history, past surgical history and problem list.    Past Medical History  Past Medical History:   Diagnosis Date   • Anxiety    • CAD (coronary artery disease)  " "  • Diabetes mellitus (HCC)    • Dyslipidemia    • GERD (gastroesophageal reflux disease)    • H/O angina pectoris    • History of bone density study     February 2014   • History of mammogram 07/15/2013   • Hypertension    • Hypothyroidism    • Muscle spasms of both lower extremities    • Overactive bladder    • Vitamin B12 deficiency    • Vitamin D deficiency        Surgical History  Past Surgical History:   Procedure Laterality Date   • CARDIAC CATHETERIZATION     • COLONOSCOPY  01/01/2010   • EYE SURGERY  10/04/2021    right eye, bleeding behind the eye    • OOPHORECTOMY      1 removed   • TUBAL ABDOMINAL LIGATION         Family History  Family History   Problem Relation Age of Onset   • Diabetes Mother    • Heart disease Mother    • Stroke Mother    • Cancer Father    • Pancreatic cancer Sister    • Diabetes Sister    • Heart attack Brother        Social History  Social History     Socioeconomic History   • Marital status:    Tobacco Use   • Smoking status: Never Smoker   • Smokeless tobacco: Never Used   Substance and Sexual Activity   • Alcohol use: No   • Drug use: No   • Sexual activity: Defer       Objective   Vital Signs:   /56 (BP Location: Right arm, Patient Position: Sitting, Cuff Size: Adult)   Pulse 69   Temp 99.3 °F (37.4 °C)   Ht 157.5 cm (62\")   Wt 83.5 kg (184 lb)   SpO2 98%   BMI 33.65 kg/m²     Physical Exam     Gen: Patient in NAD. Pleasant and answers appropriately. A&Ox3.    Skin: Warm and dry with normal turgor. No purpura, rashes, or unusual pigmentation noted. Hair is normal in appearance and distribution.    HEENT: NC/AT. No lesions noted. Conjunctiva clear, sclera nonicteric. PERRL. EOMI without nystagmus or strabismus. Fundi appear benign. No hemorrhages or exudates of eyes. Auditory canals are patent bilaterally without lesions. TMs intact,  nonerythematous, nonbulging without lesions. Nasal mucosa pink, nonerythematous, and nonedematous. Frontal and maxillary " sinuses are nontender. O/P erythematous and moist without exudate.    Neck: Supple without lymph nodes palpated. FROM.     Lungs: CTA B/L without rales, rhonchi, crackles, or wheezes.    Heart: RRR. S1 and S2 normal. No S3 or S4. No MRGT.    Abd: Soft, nontender,nondistended. (+)BSx4 quadrants.     Extrem: No CCE. Radial pulses 2+/4 and equal B/L. FROMx4.     Neuro: No focal motor/sensory deficits.    Procedures           Assessment and Plan    Bianka Agudelo is a 75 y.o. here for medical followup.    Problem List Items Addressed This Visit        Cardiac and Vasculature    Hypertension associated with diabetes (HCC)    Relevant Orders    Vitamin D 25 Hydroxy    Vitamin B12    T4, Free    TSH    Hemoglobin A1c    Comprehensive Metabolic Panel       Endocrine and Metabolic    Vitamin D deficiency    Relevant Orders    Vitamin D 25 Hydroxy    Vitamin B12    T4, Free    TSH    Hemoglobin A1c    Comprehensive Metabolic Panel    Hypothyroidism    Relevant Orders    Vitamin D 25 Hydroxy    Vitamin B12    T4, Free    TSH    Hemoglobin A1c    Comprehensive Metabolic Panel    Type 2 diabetes mellitus with both eyes affected by moderate nonproliferative retinopathy without macular edema, with long-term current use of insulin (HCC) - Primary    Relevant Orders    Vitamin D 25 Hydroxy    Vitamin B12    T4, Free    TSH    Hemoglobin A1c    Comprehensive Metabolic Panel  She is advised to take Januvia during the day and check her blood sugar at night. If it is still high, she can take the metformin. If it is okay, she can skip the morning dose and see what it is. If it is okay, she can take the evening dose.           Genitourinary and Reproductive     Overactive bladder    Relevant Medications    oxybutynin (DITROPAN) 5 MG tablet      Other Visit Diagnoses     B12 deficiency        Relevant Medications    vitamin B-12 (CYANOCOBALAMIN) 1000 MCG tablet    Other Relevant Orders    Vitamin D 25 Hydroxy    Vitamin B12    T4, Free     TSH    Hemoglobin A1c    Comprehensive Metabolic Panel       Yeast infection  - A prescription for fluconazole was sent to the patient's pharmacy.      Patient's Body mass index is 33.65 kg/m². indicating that she is obese (BMI >30). Obesity-related health conditions include the following: hypertension and diabetes mellitus. Obesity is unchanged. BMI is is above average; BMI management plan is completed. We discussed portion control and increasing exercise..         Follow Up   Return in about 3 months (around 3/7/2022), or (ROR Dr. Fletcher - Oct - eye hemorrhage).  Findings and plans discussed with patient who verbalizes understanding and agreement. Will followup with patient once results are in. Patient was given instructions and counseling regarding her condition or for health maintenance advice. Please see specific information pulled into the AVS if appropriate.       Transcribed from ambient dictation for Eva Elliott MD by Rossy Franklin.  12/07/21   19:03 EST    Patient verbalized consent to the visit recording.  I have personally performed the services described in this document as transcribed by the above individual, and it is both accurate and complete.  Eva Elliott MD  12/27/2021  06:43 EST

## 2021-12-08 LAB
25(OH)D3+25(OH)D2 SERPL-MCNC: 14.2 NG/ML (ref 30–100)
ALBUMIN SERPL-MCNC: 4 G/DL (ref 3.5–5.2)
ALBUMIN/GLOB SERPL: 1.7 G/DL
ALP SERPL-CCNC: 62 U/L (ref 39–117)
ALT SERPL-CCNC: 26 U/L (ref 1–33)
AST SERPL-CCNC: 17 U/L (ref 1–32)
BILIRUB SERPL-MCNC: 0.5 MG/DL (ref 0–1.2)
BUN SERPL-MCNC: 14 MG/DL (ref 8–23)
BUN/CREAT SERPL: 15.2 (ref 7–25)
CALCIUM SERPL-MCNC: 10 MG/DL (ref 8.6–10.5)
CHLORIDE SERPL-SCNC: 102 MMOL/L (ref 98–107)
CO2 SERPL-SCNC: 27.3 MMOL/L (ref 22–29)
CREAT SERPL-MCNC: 0.92 MG/DL (ref 0.57–1)
GLOBULIN SER CALC-MCNC: 2.3 GM/DL
GLUCOSE SERPL-MCNC: 252 MG/DL (ref 65–99)
HBA1C MFR BLD: 9.1 % (ref 4.8–5.6)
POTASSIUM SERPL-SCNC: 4.2 MMOL/L (ref 3.5–5.2)
PROT SERPL-MCNC: 6.3 G/DL (ref 6–8.5)
SODIUM SERPL-SCNC: 135 MMOL/L (ref 136–145)
T4 FREE SERPL-MCNC: 1.31 NG/DL (ref 0.93–1.7)
TSH SERPL DL<=0.005 MIU/L-ACNC: 0.58 UIU/ML (ref 0.27–4.2)
VIT B12 SERPL-MCNC: 1066 PG/ML (ref 211–946)

## 2021-12-09 DIAGNOSIS — E03.8 OTHER SPECIFIED HYPOTHYROIDISM: ICD-10-CM

## 2021-12-09 RX ORDER — LEVOTHYROXINE SODIUM 0.15 MG/1
137 TABLET ORAL DAILY
Qty: 30 TABLET | Refills: 2 | Status: SHIPPED | OUTPATIENT
Start: 2021-12-09 | End: 2022-03-08

## 2022-01-11 DIAGNOSIS — F41.9 ANXIETY: ICD-10-CM

## 2022-01-11 DIAGNOSIS — E53.8 B12 DEFICIENCY: ICD-10-CM

## 2022-01-11 NOTE — TELEPHONE ENCOUNTER
Caller: Agudelo Bianka L    Relationship: Self    Best call back number: [phone]    Requested Prescriptions:   Requested Prescriptions     Pending Prescriptions Disp Refills   • diazePAM (VALIUM) 5 MG tablet 60 tablet 0     Sig: Take 1 tablet by mouth Every 12 (Twelve) Hours As Needed for Anxiety.   • vitamin B-12 (CYANOCOBALAMIN) 1000 MCG tablet 90 tablet 1     Sig: Take 1 tablet by mouth Daily.        Pharmacy where request should be sent: [id number] - [facility] - [address] AT [facility] [address] - [phone]  - [phone] FX     Additional details provided by patient: NONE    Does the patient have less than a 3 day supply:  [] Yes  [x] No    Christy Tavarez, PCT   01/11/22 14:58 EST

## 2022-01-12 RX ORDER — DIAZEPAM 5 MG/1
5 TABLET ORAL EVERY 12 HOURS PRN
Qty: 60 TABLET | Refills: 0 | Status: SHIPPED | OUTPATIENT
Start: 2022-01-12 | End: 2022-03-03 | Stop reason: SDUPTHER

## 2022-01-12 RX ORDER — LANOLIN ALCOHOL/MO/W.PET/CERES
1000 CREAM (GRAM) TOPICAL DAILY
Qty: 90 TABLET | Refills: 1 | Status: SHIPPED | OUTPATIENT
Start: 2022-01-12

## 2022-01-31 DIAGNOSIS — Z79.4 TYPE 2 DIABETES MELLITUS WITH BOTH EYES AFFECTED BY MODERATE NONPROLIFERATIVE RETINOPATHY WITHOUT MACULAR EDEMA, WITH LONG-TERM CURRENT USE OF INSULIN: ICD-10-CM

## 2022-01-31 DIAGNOSIS — E11.3393 TYPE 2 DIABETES MELLITUS WITH BOTH EYES AFFECTED BY MODERATE NONPROLIFERATIVE RETINOPATHY WITHOUT MACULAR EDEMA, WITH LONG-TERM CURRENT USE OF INSULIN: ICD-10-CM

## 2022-02-03 RX ORDER — SYRING-NEEDL,DISP,INSUL,0.3 ML 31GX15/64"
SYRINGE, EMPTY DISPOSABLE MISCELLANEOUS
Qty: 200 EACH | Refills: 5 | Status: SHIPPED | OUTPATIENT
Start: 2022-02-03 | End: 2022-07-08

## 2022-02-03 NOTE — TELEPHONE ENCOUNTER
"     Caller: Bianka Agudelo    Relationship: Self    Best call back number: [phone]  Requested Prescriptions:   Requested Prescriptions     Pending Prescriptions Disp Refills   • Insulin Syringe-Needle U-100 (BD Veo Insulin Syringe U/F) 31G X 15/64\" 0.5 ML misc 200 each 5   vitamin B-12 (CYANOCOBALAMIN) 1000 MCG tablet     Pharmacy where request should be sent:        [id number] - [place] - [address] AT [facility] OF [address] - [phone] PH - [phone] FX        Additional details provided by patient: HAS TO BE PREAUTHORIZED    Does the patient have less than a 3 day supply:  [x] Yes  [] No    Dat Segura Rep   02/03/22 13:11 EST         "

## 2022-02-07 RX ORDER — INSULIN ASPART 100 [IU]/ML
INJECTION, SUSPENSION SUBCUTANEOUS
Qty: 40 ML | Refills: 1 | Status: SHIPPED | OUTPATIENT
Start: 2022-02-07 | End: 2022-08-11

## 2022-02-07 RX ORDER — INSULIN DETEMIR 100 [IU]/ML
INJECTION, SOLUTION SUBCUTANEOUS
Qty: 50 ML | Refills: 1 | Status: SHIPPED | OUTPATIENT
Start: 2022-02-07 | End: 2022-05-02 | Stop reason: SDUPTHER

## 2022-02-11 DIAGNOSIS — E11.3393 TYPE 2 DIABETES MELLITUS WITH BOTH EYES AFFECTED BY MODERATE NONPROLIFERATIVE RETINOPATHY WITHOUT MACULAR EDEMA, WITH LONG-TERM CURRENT USE OF INSULIN: ICD-10-CM

## 2022-02-11 DIAGNOSIS — Z79.4 TYPE 2 DIABETES MELLITUS WITH BOTH EYES AFFECTED BY MODERATE NONPROLIFERATIVE RETINOPATHY WITHOUT MACULAR EDEMA, WITH LONG-TERM CURRENT USE OF INSULIN: ICD-10-CM

## 2022-02-11 DIAGNOSIS — I25.119 CORONARY ARTERY DISEASE INVOLVING NATIVE CORONARY ARTERY OF NATIVE HEART WITH ANGINA PECTORIS: ICD-10-CM

## 2022-02-14 ENCOUNTER — TELEPHONE (OUTPATIENT)
Dept: FAMILY MEDICINE CLINIC | Facility: CLINIC | Age: 77
End: 2022-02-14

## 2022-02-14 DIAGNOSIS — I25.119 CORONARY ARTERY DISEASE INVOLVING NATIVE CORONARY ARTERY OF NATIVE HEART WITH ANGINA PECTORIS: ICD-10-CM

## 2022-02-14 RX ORDER — ISOSORBIDE MONONITRATE 30 MG/1
30 TABLET, EXTENDED RELEASE ORAL DAILY
Qty: 90 TABLET | Refills: 1 | Status: SHIPPED | OUTPATIENT
Start: 2022-02-14 | End: 2022-05-16 | Stop reason: SDUPTHER

## 2022-02-14 RX ORDER — ISOSORBIDE MONONITRATE 30 MG/1
30 TABLET, EXTENDED RELEASE ORAL DAILY
Qty: 90 TABLET | Refills: 0 | Status: SHIPPED | OUTPATIENT
Start: 2022-02-14 | End: 2022-03-23 | Stop reason: SDUPTHER

## 2022-02-14 RX ORDER — CLOPIDOGREL BISULFATE 75 MG/1
75 TABLET ORAL DAILY
Qty: 90 TABLET | Refills: 1 | Status: SHIPPED | OUTPATIENT
Start: 2022-02-14 | End: 2022-08-16

## 2022-02-14 RX ORDER — NITROGLYCERIN 6.5 MG/1
CAPSULE ORAL
Qty: 90 CAPSULE | Refills: 1 | Status: SHIPPED | OUTPATIENT
Start: 2022-02-14 | End: 2022-07-08

## 2022-02-14 RX ORDER — ASPIRIN 81 MG/1
TABLET, COATED ORAL
Qty: 90 TABLET | Refills: 1 | Status: SHIPPED | OUTPATIENT
Start: 2022-02-14 | End: 2022-05-26

## 2022-02-14 NOTE — TELEPHONE ENCOUNTER
Caller: Bianka Agudelo    Relationship: Self    Best call back number: [phone]    Who are you requesting to speak with (clinical staff, provider,  specific staff member): CLINICAL STAFF    What was the call regarding: PATIENT REQUESTED A CALL BACK AND WOULD NOT PROVIDE ANY FURTHER INFORMATION ABOUT HER REQUEST.    Do you require a callback: YES

## 2022-02-14 NOTE — TELEPHONE ENCOUNTER
Caller: Bianka Agudelo    Relationship: Self    Best call back number: [phone]    Who are you requesting to speak with (clinical staff, provider,  specific staff member): CLINICAL STAFF    What was the call regarding: PATIENT REQUESTED A CALL BACK AND WOULD NOT PROVIDE ANY FURTHER INFORMATION ABOUT HER REQUEST.    Do you require a callback: YES      Spoke with patient she is requesting a refill on her Imdur,sent per orders.

## 2022-02-25 ENCOUNTER — TELEPHONE (OUTPATIENT)
Dept: FAMILY MEDICINE CLINIC | Facility: CLINIC | Age: 77
End: 2022-02-25

## 2022-02-25 DIAGNOSIS — M25.551 RIGHT HIP PAIN: ICD-10-CM

## 2022-02-25 DIAGNOSIS — K21.9 GASTROESOPHAGEAL REFLUX DISEASE WITHOUT ESOPHAGITIS: ICD-10-CM

## 2022-02-25 RX ORDER — OMEPRAZOLE 20 MG/1
20 CAPSULE, DELAYED RELEASE ORAL 2 TIMES DAILY
Qty: 60 CAPSULE | Refills: 3 | Status: SHIPPED | OUTPATIENT
Start: 2022-02-25 | End: 2022-09-06

## 2022-02-25 RX ORDER — IBUPROFEN 800 MG/1
TABLET ORAL
Qty: 90 TABLET | Refills: 2 | Status: SHIPPED | OUTPATIENT
Start: 2022-02-25 | End: 2022-05-26

## 2022-02-25 RX ORDER — CYCLOBENZAPRINE HCL 10 MG
TABLET ORAL
Qty: 60 TABLET | Refills: 5 | Status: SHIPPED | OUTPATIENT
Start: 2022-02-25 | End: 2022-09-24

## 2022-03-03 DIAGNOSIS — F41.9 ANXIETY: ICD-10-CM

## 2022-03-03 NOTE — TELEPHONE ENCOUNTER
Caller: Bianka Agudelo    Relationship: Self    Best call back number: [phone]    Requested Prescriptions:   Requested Prescriptions     Pending Prescriptions Disp Refills   • diazePAM (VALIUM) 5 MG tablet 60 tablet 0     Sig: Take 1 tablet by mouth Every 12 (Twelve) Hours As Needed for Anxiety.        Pharmacy where request should be sent:  [facility] [phone]    Additional details provided by patient: PATIENT IS OUT OF MEDICATION    Does the patient have less than a 3 day supply:  [x] Yes  [] No    Dat Corbett Rep   03/03/22 13:07 EST

## 2022-03-04 RX ORDER — DIAZEPAM 5 MG/1
5 TABLET ORAL EVERY 12 HOURS PRN
Qty: 60 TABLET | Refills: 0 | Status: SHIPPED | OUTPATIENT
Start: 2022-03-04 | End: 2022-04-07 | Stop reason: SDUPTHER

## 2022-03-04 NOTE — TELEPHONE ENCOUNTER
Pete # in epic  Reviewed and is consistent.  UDS 8/2021 reviewed and is consistent.  Patient comfort assessment guide reviewed and updated today.    As part of the patient's treatment plan they are being prescribed a controlled substance/ substances with potential for abuse.  This patient has been made aware of the appropriate use of such medications, including potential risk of somnolence, limited ability to drive and/or work safely, and potential for overdose.  It has also been made clear these medications are for use by the patient only, without concomitant use of alcohol or other substances unless prescribed/advised by medical provider.    Patient has completed prescribing agreement detailing terms of continued prescribing of controlled substances including monitoring PETE reports, urine drug screens, and pill counts.  The patient is aware PETE reports are reviewed on a regular basis and scanned into the chart.    History and physical exam exhibit continued safe and appropriate use of controlled substances.    Left a message to return call.

## 2022-03-08 DIAGNOSIS — E03.8 OTHER SPECIFIED HYPOTHYROIDISM: ICD-10-CM

## 2022-03-08 RX ORDER — LEVOTHYROXINE SODIUM 0.15 MG/1
137 TABLET ORAL DAILY
Qty: 30 TABLET | Refills: 2 | Status: SHIPPED | OUTPATIENT
Start: 2022-03-08 | End: 2022-06-06

## 2022-03-22 ENCOUNTER — TELEPHONE (OUTPATIENT)
Dept: FAMILY MEDICINE CLINIC | Facility: CLINIC | Age: 77
End: 2022-03-22

## 2022-03-22 NOTE — TELEPHONE ENCOUNTER
Caller: Bianka Agudelo    Relationship: Self    Best call back number: [phone]  [phone] ([phone])    What medication are you requesting: ZPACK AND SOMETHING FOR YEAST INFECTION.    What are your current symptoms: COUGH, SNEEZING, RUNNY NOSE. BURNING  URINATION.    How long have you been experiencing symptoms: FEW DAYS    Have you had these symptoms before:    [x] Yes  [] No    Have you been treated for these symptoms before:   [x] Yes  [] No    If a prescription is needed, what is your preferred pharmacy and phone number: [id number] - [place] - [address] AT [facility] OF [address] - [phone]  - [phone] FX     Additional notes:

## 2022-03-22 NOTE — TELEPHONE ENCOUNTER
She needs to be seen! There are a lot of diagnoses possible; a lot of antibiotic combos possible. Please have her schedule an acute.

## 2022-03-22 NOTE — TELEPHONE ENCOUNTER
She needs to be seen! There are a lot of diagnoses possible; a lot of antibiotic combos possible. Please have her schedule an acute.    Left a message to return call.    Spoke with patient & scheduled an appointment for tomorrow.

## 2022-03-23 ENCOUNTER — OFFICE VISIT (OUTPATIENT)
Dept: FAMILY MEDICINE CLINIC | Facility: CLINIC | Age: 77
End: 2022-03-23

## 2022-03-23 VITALS
TEMPERATURE: 97.1 F | OXYGEN SATURATION: 98 % | HEIGHT: 62 IN | BODY MASS INDEX: 34.78 KG/M2 | RESPIRATION RATE: 16 BRPM | SYSTOLIC BLOOD PRESSURE: 106 MMHG | WEIGHT: 189 LBS | DIASTOLIC BLOOD PRESSURE: 54 MMHG | HEART RATE: 76 BPM

## 2022-03-23 DIAGNOSIS — J06.9 VIRAL URI WITH COUGH: Primary | ICD-10-CM

## 2022-03-23 DIAGNOSIS — N89.8 VAGINA ITCHING: ICD-10-CM

## 2022-03-23 DIAGNOSIS — R30.0 DYSURIA: ICD-10-CM

## 2022-03-23 LAB
BILIRUB BLD-MCNC: NEGATIVE MG/DL
CLARITY, POC: ABNORMAL
COLOR UR: YELLOW
EXPIRATION DATE: ABNORMAL
GLUCOSE UR STRIP-MCNC: ABNORMAL MG/DL
KETONES UR QL: NEGATIVE
LEUKOCYTE EST, POC: ABNORMAL
Lab: ABNORMAL
NITRITE UR-MCNC: NEGATIVE MG/ML
PH UR: 6 [PH] (ref 5–8)
PROT UR STRIP-MCNC: ABNORMAL MG/DL
RBC # UR STRIP: NEGATIVE /UL
SP GR UR: 1.01 (ref 1–1.03)
UROBILINOGEN UR QL: NORMAL

## 2022-03-23 PROCEDURE — 81003 URINALYSIS AUTO W/O SCOPE: CPT | Performed by: NURSE PRACTITIONER

## 2022-03-23 PROCEDURE — 99214 OFFICE O/P EST MOD 30 MIN: CPT | Performed by: NURSE PRACTITIONER

## 2022-03-23 RX ORDER — FLUTICASONE PROPIONATE 50 MCG
2 SPRAY, SUSPENSION (ML) NASAL DAILY
Qty: 15.8 ML | Refills: 0 | Status: SHIPPED | OUTPATIENT
Start: 2022-03-23 | End: 2022-07-08

## 2022-03-23 RX ORDER — FLUCONAZOLE 150 MG/1
150 TABLET ORAL DAILY
Qty: 2 TABLET | Refills: 0 | Status: SHIPPED | OUTPATIENT
Start: 2022-03-23 | End: 2022-03-25

## 2022-03-23 RX ORDER — GUAIFENESIN AND DEXTROMETHORPHAN HYDROBROMIDE 600; 30 MG/1; MG/1
1 TABLET, EXTENDED RELEASE ORAL 2 TIMES DAILY PRN
Qty: 20 TABLET | Refills: 0 | Status: SHIPPED | OUTPATIENT
Start: 2022-03-23 | End: 2022-04-02

## 2022-03-23 NOTE — PROGRESS NOTES
Chief Complaint  Sinus Problem, Vaginitis, and Scratchy Throat    Subjective          Bianka Agudelo is a 76 y.o. female who presents today to [facility] for initial evaluation     HPI:   History of Present Illness    Presents to clinic for complaint of sinus problems, scratchy throat, hoarse voice, and cough x 3 days. Has had headache but denies sinus pain or pressure.     Also c/o yeast infection as has been having burning with urination for a couple weeks. Denies discharge but does have vaginal itching. Does have history of diabetes.       The following portions of the patient's history were reviewed and updated as appropriate: allergies, current medications, past family history, past medical history, past social history, past surgical history and problem list.    Objective     Problem List:  Patient Active Problem List   Diagnosis   • Vitamin D deficiency   • Vitamin B12 deficiency   • Overactive bladder   • Hypothyroidism   • Hypertension associated with diabetes (Formerly [facility])   • GERD (gastroesophageal reflux disease)   • Dyslipidemia   • Type 2 diabetes mellitus with both eyes affected by moderate nonproliferative retinopathy without macular edema, with long-term current use of insulin (Formerly [facility])   • Anxiety   • Coronary artery disease involving native coronary artery of native heart with angina pectoris (Formerly [facility])   • Hypercalcemia   • History of laceration of skin   • Hyperlipidemia due to type 2 diabetes mellitus (Former[facility])       Allergy:   Allergies   Allergen Reactions   • Nuts Anaphylaxis        Discontinued Medications:  Medications Discontinued During This Encounter   Medication Reason   • isosorbide mononitrate (IMDUR) 30 MG 24 hr tablet Duplicate order       Current Medications:   Current Outpatient Medications   Medication Sig Dispense Refill   • albuterol sulfate  (90 Base) MCG/ACT inhaler INHALE 2 PUFFS BY MOUTH EVERY 4 HOURS AS NEEDED 90 g 5   • amLODIPine (NORVASC) 5 MG tablet  "TAKE 1 TABLET BY MOUTH DAILY 90 tablet 1   • Aspirin Low Dose 81 MG EC tablet TAKE 1 TABLET BY MOUTH DAILY 90 tablet 1   • atorvastatin (LIPITOR) 10 MG tablet TAKE 1 TABLET BY MOUTH DAILY 90 tablet 1   • clopidogrel (PLAVIX) 75 MG tablet TAKE 1 TABLET BY MOUTH DAILY 90 tablet 1   • cyclobenzaprine (FLEXERIL) 10 MG tablet TAKE 1 TABLET BY MOUTH TWICE DAILY AS NEEDED FOR MUSCLE SPASMS 60 tablet 5   • diazePAM (VALIUM) 5 MG tablet Take 1 tablet by mouth Every 12 (Twelve) Hours As Needed for Anxiety. 60 tablet 0   • diclofenac (VOLTAREN) 1 % gel gel Apply 4 gm to affected areas below the waist QID PRN pain and 2 gm to affected areas above the waist QID PRN pain 100 g 1   • glucose blood test strip 1 each by Other route Daily. 300 each 3   • glucose monitor monitoring kit 1 each As Needed (Use as needed to check sugar daily). 1 each 0   • ibuprofen (ADVIL,MOTRIN) 800 MG tablet TAKE 1 TABLET BY MOUTH EVERY 8 HOURS AS NEEDED FOR PAIN 90 tablet 2   • Insulin Syringe-Needle U-100 (BD Insulin Syringe Ultrafine) 31G X 15/64\" 0.5 ML misc Inject 1 pen under the skin into the appropriate area as directed 2 (Two) Times a Day. 200 each 5   • Insulin Syringe-Needle U-100 (BD Veo Insulin Syringe U/F) 31G X 15/64\" 0.5 ML misc For twice weekly injections 200 each 5   • isosorbide mononitrate (IMDUR) 30 MG 24 hr tablet TAKE 1 TABLET BY MOUTH DAILY 90 tablet 1   • Januvia 100 MG tablet TAKE 1 TABLET BY MOUTH DAILY 90 tablet 1   • Lancets misc Use as needed daily to check glucose levels 300 each 3   • Levemir 100 UNIT/ML injection ADMINISTER 50 UNITS UNDER THE SKIN EVERY NIGHT AS DIRECTED (Patient taking differently: Inject 52 Units under the skin into the appropriate area as directed Every Night.) 50 mL 1   • levothyroxine (SYNTHROID, LEVOTHROID) 150 MCG tablet TAKE 1 TABLET BY MOUTH DAILY 30 tablet 2   • losartan (COZAAR) 50 MG tablet Take 1 tablet by mouth 2 (Two) Times a Day. (Patient taking differently: Take 50 mg by mouth Daily.) " 180 tablet 1   • losartan-hydrochlorothiazide (HYZAAR) 50-12.5 MG per tablet Take 1 tablet by mouth 2 (two) times a day. 180 tablet 1   • metFORMIN ER (GLUCOPHAGE-XR) 500 MG 24 hr tablet TAKE 1 TABLET BY MOUTH TWICE DAILY WITH MEALS 180 tablet 1   • Nitro-Time 6.5 MG CR capsule TAKE ONE CAPSULE BY MOUTH DAILY 90 capsule 1   • NovoLOG Mix 70/30 (70-30) 100 UNIT/ML injection INJECT 24 UNITS UNDER THE SKIN TWICE DAILY AS DIRECTED WITH MEALS 40 mL 1   • omeprazole (priLOSEC) 20 MG capsule Take 1 capsule by mouth 2 (Two) Times a Day for 30 days. 60 capsule 3   • oxybutynin (DITROPAN) 5 MG tablet Take 1 tablet by mouth 2 (Two) Times a Day. 180 tablet 1   • vitamin B-12 (CYANOCOBALAMIN) 1000 MCG tablet Take 1 tablet by mouth Daily. 90 tablet 1   • fluconazole (DIFLUCAN) 150 MG tablet Take 1 tablet by mouth Daily for 2 doses. May take the second dose after 3 days if still symptomatic 2 tablet 0   • fluticasone (Flonase) 50 MCG/ACT nasal spray 2 sprays into the nostril(s) as directed by provider Daily for 30 days. 15.8 mL 0   • guaifenesin-dextromethorphan (MUCINEX DM)  MG tablet sustained-release 12 hour tablet Take 1 tablet by mouth 2 (Two) Times a Day As Needed (congestion, cough) for up to 10 days. 20 tablet 0     No current facility-administered medications for this visit.       Past Medical History:  Past Medical History:   Diagnosis Date   • Anxiety    • CAD (coronary artery disease)    • Diabetes mellitus (HCC)    • Dyslipidemia    • GERD (gastroesophageal reflux disease)    • H/O angina pectoris    • History of bone density study     February 2014   • History of mammogram 07/15/2013   • Hypertension    • Hypothyroidism    • Muscle spasms of both lower extremities    • Overactive bladder    • Vitamin B12 deficiency    • Vitamin D deficiency        Past Surgical History:  Past Surgical History:   Procedure Laterality Date   • CARDIAC CATHETERIZATION     • COLONOSCOPY  01/01/2010   • EYE SURGERY  10/04/2021     right eye, bleeding behind the eye    • OOPHORECTOMY      1 removed   • TUBAL ABDOMINAL LIGATION         Social History:  Social History     Socioeconomic History   • Marital status:    Tobacco Use   • Smoking status: Never Smoker   • Smokeless tobacco: Never Used   Vaping Use   • Vaping Use: Never used   Substance and Sexual Activity   • Alcohol use: No   • Drug use: No   • Sexual activity: Defer       Family History:  Family History   Problem Relation Age of Onset   • Diabetes Mother    • Heart disease Mother    • Stroke Mother    • Cancer Father    • Pancreatic cancer Sister    • Diabetes Sister    • Heart attack Brother        Review of Systems:  Review of Systems   Constitutional: Negative for chills and fever.   HENT: Positive for sore throat.    Respiratory: Negative for shortness of breath and wheezing.    Gastrointestinal: Negative for abdominal pain, nausea and vomiting.   Genitourinary: Positive for frequency and urgency. Negative for flank pain.   Musculoskeletal: Negative for arthralgias.       Physical Exam:  Physical Exam  Vitals and nursing note reviewed.   Constitutional:       General: She is not in acute distress.     Appearance: Normal appearance. She is not ill-appearing or toxic-appearing.   HENT:      Head: Normocephalic.      Right Ear: Ear canal and external ear normal. A middle ear effusion is present.      Left Ear: Ear canal and external ear normal. A middle ear effusion is present.      Nose: Congestion and rhinorrhea present.      Right Sinus: No maxillary sinus tenderness or frontal sinus tenderness.      Left Sinus: No maxillary sinus tenderness or frontal sinus tenderness.      Mouth/Throat:      Lips: Pink.      Mouth: Mucous membranes are moist.      Pharynx: Oropharynx is clear. No pharyngeal swelling or posterior oropharyngeal erythema.      Comments: +PND  Eyes:      Conjunctiva/sclera: Conjunctivae normal.   Cardiovascular:      Rate and Rhythm: Normal rate and regular  "rhythm.      Heart sounds: Normal heart sounds.   Pulmonary:      Effort: Pulmonary effort is normal. No respiratory distress.      Breath sounds: Normal breath sounds.   Abdominal:      General: Bowel sounds are normal. There is no distension.      Palpations: Abdomen is soft.      Tenderness: There is no abdominal tenderness. There is no right CVA tenderness or left CVA tenderness.   Genitourinary:     Comments: No suprapubic tenderness   Lymphadenopathy:      Cervical: No cervical adenopathy.   Skin:     General: Skin is warm and dry.      Coloration: Skin is not pale.   Neurological:      Mental Status: She is alert and oriented to person, place, and time.   Psychiatric:         Mood and Affect: Mood normal.         Behavior: Behavior normal.         Thought Content: Thought content normal.         Judgment: Judgment normal.         Vital Signs:   /54 (BP Location: Left arm, Patient Position: Sitting, Cuff Size: Large Adult)   Pulse 76   Temp 97.1 °F (36.2 °C) (Temporal)   Resp 16   Ht 157.5 cm (62\")   Wt 85.7 kg (189 lb)   SpO2 98%   BMI 34.57 kg/m²             Brief Urine Lab Results  (Last result in the past 365 days)      Color   Clarity   Blood   Leuk Est   Nitrite   Protein   CREAT   Urine HCG        03/23/22 0959 Yellow   Hazy   Negative   Trace   Negative   Trace                     Assessment and Plan   Diagnoses and all orders for this visit:    1. Viral URI with cough (Primary)  -     guaifenesin-dextromethorphan (MUCINEX DM)  MG tablet sustained-release 12 hour tablet; Take 1 tablet by mouth 2 (Two) Times a Day As Needed (congestion, cough) for up to 10 days.  Dispense: 20 tablet; Refill: 0        -     fluticasone (Flonase) 50 MCG/ACT nasal spray; 2 sprays into the nostril(s) as directed by provider Daily for 30 days.  Dispense: 15.8 mL; Refill: 0  Regimen as above.  Cough and deep breathe.  Increase humidification and hydration.       2. Vagina itching  -     POCT urinalysis " dipstick, automated  -     Urine Culture - Urine, Urine, Clean Catch; Future  -     fluconazole (DIFLUCAN) 150 MG tablet; Take 1 tablet by mouth Daily for 2 doses. May take the second dose after 3 days if still symptomatic  Dispense: 2 tablet; Refill: 0  Treating empirically to cover for vaginal yeast infection.  Follow-up if symptoms do not resolve with this regimen or you develop new symptoms.    3. Dysuria  -     Urine Culture - Urine, Urine, Clean Catch; Future  Urine culture ordered.  Push water intake.      Refuses any further testing including Covid testing.    Discussed possible differential diagnoses, testing, treatment, recommended non-pharmacological interventions, risks, warning signs to monitor for that would indicate need for follow-up in clinic or ER. If no improvement with these regimens or you have new or worsening symptoms follow-up. Patient verbalizes understanding and agreement with plan of care. Denies further needs or concerns.     I spent 20 minutes caring for patient on this date of service. This time includes time spent by me in the following activities: preparing for the visit, reviewing tests, obtaining and/or reviewing a separately obtained history, performing a medically appropriate examination and/or evaluation, counseling and educating the patient/family/caregiver, ordering medications, tests, or procedures and documenting information in the medical record    Meds ordered during this visit:  New Medications Ordered This Visit   Medications   • guaifenesin-dextromethorphan (MUCINEX DM)  MG tablet sustained-release 12 hour tablet     Sig: Take 1 tablet by mouth 2 (Two) Times a Day As Needed (congestion, cough) for up to 10 days.     Dispense:  20 tablet     Refill:  0   • fluticasone (Flonase) 50 MCG/ACT nasal spray     Si sprays into the nostril(s) as directed by provider Daily for 30 days.     Dispense:  15.8 mL     Refill:  0   • fluconazole (DIFLUCAN) 150 MG tablet     Sig:  Take 1 tablet by mouth Daily for 2 doses. May take the second dose after 3 days if still symptomatic     Dispense:  2 tablet     Refill:  0       Patient Instructions:  Patient instructions given for the following visit diagnosis:    ICD-10-CM ICD-9-CM   1. Viral URI with cough  J06.9 465.9   2. Vagina itching  N89.8 698.1   3. Dysuria  R30.0 788.1       Follow Up   Return if symptoms worsen or fail to improve.        This document has been electronically signed by SARAH Dunlap  March 23, 2022 10:07 EDT    Patient was given instructions and counseling regarding her condition or for health maintenance advice. Please see specific information pulled into the AVS if appropriate.     Part of this note may be an electronic transcription/translation of spoken language to printed text using the Dragon Dictation System.

## 2022-03-26 LAB
BACTERIA UR CULT: ABNORMAL
BACTERIA UR CULT: ABNORMAL
OTHER ANTIBIOTIC SUSC ISLT: ABNORMAL

## 2022-03-27 DIAGNOSIS — Z79.4 TYPE 2 DIABETES MELLITUS WITH BOTH EYES AFFECTED BY MODERATE NONPROLIFERATIVE RETINOPATHY WITHOUT MACULAR EDEMA, WITH LONG-TERM CURRENT USE OF INSULIN: ICD-10-CM

## 2022-03-27 DIAGNOSIS — E11.3393 TYPE 2 DIABETES MELLITUS WITH BOTH EYES AFFECTED BY MODERATE NONPROLIFERATIVE RETINOPATHY WITHOUT MACULAR EDEMA, WITH LONG-TERM CURRENT USE OF INSULIN: ICD-10-CM

## 2022-03-29 ENCOUNTER — TELEPHONE (OUTPATIENT)
Dept: FAMILY MEDICINE CLINIC | Facility: CLINIC | Age: 77
End: 2022-03-29

## 2022-03-29 RX ORDER — SITAGLIPTIN 100 MG/1
TABLET, FILM COATED ORAL
Qty: 90 TABLET | Refills: 1 | Status: SHIPPED | OUTPATIENT
Start: 2022-03-29 | End: 2022-09-24

## 2022-03-29 RX ORDER — SULFAMETHOXAZOLE AND TRIMETHOPRIM 800; 160 MG/1; MG/1
1 TABLET ORAL 2 TIMES DAILY
Qty: 14 TABLET | Refills: 0 | Status: SHIPPED | OUTPATIENT
Start: 2022-03-29 | End: 2022-04-05

## 2022-03-29 NOTE — TELEPHONE ENCOUNTER
----- Message from SARAH Dunlap sent at 3/29/2022  7:57 AM EDT -----  Please call the patient regarding her abnormal result.    Positive for UTI.  Bactrim sent to pharmacy.      Left a message to return call.    Daughter returned call & verbalized understanding.

## 2022-04-01 DIAGNOSIS — E11.3393 TYPE 2 DIABETES MELLITUS WITH BOTH EYES AFFECTED BY MODERATE NONPROLIFERATIVE RETINOPATHY WITHOUT MACULAR EDEMA, WITH LONG-TERM CURRENT USE OF INSULIN: ICD-10-CM

## 2022-04-01 DIAGNOSIS — Z79.4 TYPE 2 DIABETES MELLITUS WITH BOTH EYES AFFECTED BY MODERATE NONPROLIFERATIVE RETINOPATHY WITHOUT MACULAR EDEMA, WITH LONG-TERM CURRENT USE OF INSULIN: ICD-10-CM

## 2022-04-04 DIAGNOSIS — I15.2 HYPERTENSION ASSOCIATED WITH DIABETES: ICD-10-CM

## 2022-04-04 DIAGNOSIS — E11.59 HYPERTENSION ASSOCIATED WITH DIABETES: ICD-10-CM

## 2022-04-04 RX ORDER — LANCETS
EACH MISCELLANEOUS
Qty: 100 EACH | Refills: 1 | Status: SHIPPED | OUTPATIENT
Start: 2022-04-04 | End: 2022-10-13

## 2022-04-04 NOTE — TELEPHONE ENCOUNTER
Caller: Bianka Agudelo    Relationship: Self    Best call back number: [phone]    Requested Prescriptions:   Requested Prescriptions     Pending Prescriptions Disp Refills   • losartan (COZAAR) 50 MG tablet 180 tablet 1     Sig: Take 1 tablet by mouth 2 (Two) Times a Day.        Pharmacy where request should be sent: [id number] - [place], [place] - [address] AT [facility] OF [address] - [phone]  - [phone] FX     Additional details provided by patient: PATIENT OUT OF MEDICATION.  APPT 4/7/22    Does the patient have less than a 3 day supply:  [x] Yes  [] No    Geovanna Laughlin   04/04/22 11:52 EDT

## 2022-04-05 DIAGNOSIS — E11.59 HYPERTENSION ASSOCIATED WITH DIABETES: ICD-10-CM

## 2022-04-05 DIAGNOSIS — I15.2 HYPERTENSION ASSOCIATED WITH DIABETES: ICD-10-CM

## 2022-04-05 RX ORDER — LOSARTAN POTASSIUM AND HYDROCHLOROTHIAZIDE 12.5; 5 MG/1; MG/1
1 TABLET ORAL DAILY
Qty: 180 TABLET | Refills: 2 | Status: SHIPPED | OUTPATIENT
Start: 2022-04-05 | End: 2022-10-13 | Stop reason: SDUPTHER

## 2022-04-06 RX ORDER — LOSARTAN POTASSIUM 50 MG/1
50 TABLET ORAL 2 TIMES DAILY
Qty: 180 TABLET | Refills: 1 | Status: CANCELLED | OUTPATIENT
Start: 2022-04-06

## 2022-04-06 NOTE — TELEPHONE ENCOUNTER
Please verify dosage. She should only be taking this once a day as she also has losartan/HCTZ.     Attempted to contact,not available at this time.      Still not available.    Bianka returned call once daily is correct.

## 2022-04-07 ENCOUNTER — OFFICE VISIT (OUTPATIENT)
Dept: FAMILY MEDICINE CLINIC | Facility: CLINIC | Age: 77
End: 2022-04-07

## 2022-04-07 VITALS
HEIGHT: 62 IN | SYSTOLIC BLOOD PRESSURE: 116 MMHG | OXYGEN SATURATION: 97 % | DIASTOLIC BLOOD PRESSURE: 74 MMHG | BODY MASS INDEX: 35.37 KG/M2 | WEIGHT: 192.2 LBS | TEMPERATURE: 97.3 F | HEART RATE: 74 BPM

## 2022-04-07 DIAGNOSIS — Z13.6 ENCOUNTER FOR LIPID SCREENING FOR CARDIOVASCULAR DISEASE: ICD-10-CM

## 2022-04-07 DIAGNOSIS — Z13.220 ENCOUNTER FOR LIPID SCREENING FOR CARDIOVASCULAR DISEASE: ICD-10-CM

## 2022-04-07 DIAGNOSIS — E11.59 HYPERTENSION ASSOCIATED WITH DIABETES: ICD-10-CM

## 2022-04-07 DIAGNOSIS — K21.9 GASTROESOPHAGEAL REFLUX DISEASE WITHOUT ESOPHAGITIS: ICD-10-CM

## 2022-04-07 DIAGNOSIS — E11.40 TYPE 2 DIABETES MELLITUS WITH DIABETIC NEUROPATHY, UNSPECIFIED WHETHER LONG TERM INSULIN USE: Primary | ICD-10-CM

## 2022-04-07 DIAGNOSIS — E03.8 OTHER SPECIFIED HYPOTHYROIDISM: ICD-10-CM

## 2022-04-07 DIAGNOSIS — I15.2 HYPERTENSION ASSOCIATED WITH DIABETES: ICD-10-CM

## 2022-04-07 DIAGNOSIS — F41.9 ANXIETY: ICD-10-CM

## 2022-04-07 LAB — GLUCOSE BLDC GLUCOMTR-MCNC: 290 MG/DL (ref 70–130)

## 2022-04-07 PROCEDURE — 99214 OFFICE O/P EST MOD 30 MIN: CPT | Performed by: FAMILY MEDICINE

## 2022-04-07 PROCEDURE — 82962 GLUCOSE BLOOD TEST: CPT | Performed by: FAMILY MEDICINE

## 2022-04-07 RX ORDER — DIAZEPAM 5 MG/1
5 TABLET ORAL EVERY 12 HOURS PRN
Qty: 60 TABLET | Refills: 0 | Status: SHIPPED | OUTPATIENT
Start: 2022-04-07 | End: 2022-05-16 | Stop reason: SDUPTHER

## 2022-04-07 NOTE — PROGRESS NOTES
"Bianka Agudelo     VITALS: Blood pressure 116/74, pulse 74, temperature 97.3 °F (36.3 °C), height 157.5 cm (62\"), weight 87.2 kg (192 lb 3.2 oz), SpO2 97 %, not currently breastfeeding.    Subjective  Chief Complaint  Diabetes    Subjective          History of Present Illness:  Patient is a 76 y.o.  female with medical conditions significant for type 2 diabetes, hyperlipidemia, GERD, and hypertriglyceridemia who presents to clinic secondary to medical followup.     The patient states that she has had a problem with her blood glucose levels. Since she had COVID-19, it has been difficult to keep her blood glucose under control. She reports that her blood glucose levels have been elevated since her last visit in 03/2021. She states that she tries to not eat too much, but if she does, her blood glucose levels will spike.    The patient will have lab work.     No complaints about any of the medications.    The following portions of the patient's history were reviewed and updated as appropriate: allergies, current medications, past family history, past medical history, past social history, past surgical history and problem list.    Past Medical History  Past Medical History:   Diagnosis Date   • Anxiety    • CAD (coronary artery disease)    • Diabetes mellitus (HCC)    • Dyslipidemia    • GERD (gastroesophageal reflux disease)    • H/O angina pectoris    • History of bone density study     February 2014   • History of mammogram 07/15/2013   • Hypertension    • Hypothyroidism    • Muscle spasms of both lower extremities    • Overactive bladder    • Vitamin B12 deficiency    • Vitamin D deficiency        Surgical History  Past Surgical History:   Procedure Laterality Date   • CARDIAC CATHETERIZATION     • COLONOSCOPY  01/01/2010   • EYE SURGERY  10/04/2021    right eye, bleeding behind the eye    • OOPHORECTOMY      1 removed   • TUBAL ABDOMINAL LIGATION         Family History  Family History   Problem Relation Age of Onset " "  • Diabetes Mother    • Heart disease Mother    • Stroke Mother    • Cancer Father    • Pancreatic cancer Sister    • Diabetes Sister    • Heart attack Brother        Social History  Social History     Socioeconomic History   • Marital status:    Tobacco Use   • Smoking status: Never Smoker   • Smokeless tobacco: Never Used   Vaping Use   • Vaping Use: Never used   Substance and Sexual Activity   • Alcohol use: No   • Drug use: No   • Sexual activity: Defer       Objective   Vital Signs:   /74 (BP Location: Right arm, Patient Position: Sitting, Cuff Size: Adult)   Pulse 74   Temp 97.3 °F (36.3 °C)   Ht 157.5 cm (62\")   Wt 87.2 kg (192 lb 3.2 oz)   SpO2 97%   BMI 35.15 kg/m²     Physical Exam  Cardiovascular:      Pulses:           Dorsalis pedis pulses are 2+ on the right side and 2+ on the left side.        Posterior tibial pulses are 1+ on the right side and 1+ on the left side.   Feet:      Right foot:      Protective Sensation: 5 sites tested. 4 sites sensed.      Skin integrity: Callus and dry skin present.      Toenail Condition: Right toenails are normal.      Left foot:      Protective Sensation: 5 sites tested. 4 sites sensed.      Skin integrity: Callus and dry skin present.      Toenail Condition: Left toenails are normal.      Comments: Diabetic Foot Exam Performed and Monofilament Test Performed           Gen: Patient in NAD. Pleasant and answers appropriately. A&Ox3.    Skin: Warm and dry with normal turgor. No purpura, rashes, or unusual pigmentation noted. Hair is normal in appearance and distribution.    HEENT: NC/AT. No lesions noted. Conjunctiva clear, sclera nonicteric. PERRL. EOMI without nystagmus or strabismus. Fundi appear benign. No hemorrhages or exudates of eyes. Auditory canals are patent bilaterally without lesions. TMs intact,  nonerythematous, nonbulging without lesions. Nasal mucosa pink, nonerythematous, and nonedematous. Frontal and maxillary sinuses are " nontender. O/P nonerythematous and moist without exudate.    Neck: Supple without lymph nodes palpated. FROM.     Lungs: CTA B/L without rales, rhonchi, crackles, or wheezes.    Heart: RRR. S1 and S2 normal. No S3 or S4. No MRGT.    Abd: Soft, slightly tender diffusely,nondistended. (+)BSx4 quadrants. No HSM.    Extrem: No CCE. Radial pulses 2+/4 and equal B/L. FROMx4. Joint tenderness.    Neuro: No focal motor/sensory deficits.    Procedures       Assessment and Plan    Bianka Agudelo is a 76 y.o. here for medical followup.    Problem List Items Addressed This Visit        Cardiac and Vasculature    Hypertension associated with diabetes (HCC)    Relevant Orders    Hemoglobin A1c    Lipid Panel    T4, Free    TSH    Comprehensive Metabolic Panel       Endocrine and Metabolic    Hypothyroidism    Relevant Orders    Hemoglobin A1c    Lipid Panel    T4, Free    TSH    Comprehensive Metabolic Panel       Gastrointestinal Abdominal     GERD (gastroesophageal reflux disease)    Relevant Orders    Hemoglobin A1c    Lipid Panel    T4, Free    TSH    Comprehensive Metabolic Panel       Mental Health    Anxiety    Relevant Medications    diazePAM (VALIUM) 5 MG tablet    Other Relevant Orders    Hemoglobin A1c    Lipid Panel    T4, Free    TSH    Comprehensive Metabolic Panel      Other Visit Diagnoses     Type 2 diabetes mellitus with diabetic neuropathy, unspecified whether long term insulin use (HCC)    -  Primary    Relevant Orders    POCT Glucose (Completed)    MicroAlbumin, Urine, Random - Urine, Clean Catch    Hemoglobin A1c    Lipid Panel    T4, Free    TSH    Comprehensive Metabolic Panel    Encounter for lipid screening for cardiovascular disease        Relevant Orders    Hemoglobin A1c    Lipid Panel    T4, Free    TSH    Comprehensive Metabolic Panel            Patient's Body mass index is 35.15 kg/m². indicating that she is morbidly obese (BMI > 40 or > 35 with obesity - related health condition). Obesity-related  health conditions include the following: diabetes mellitus. Obesity is unchanged. BMI is is above average; BMI management plan is completed. We discussed portion control and increasing exercise..                    Follow Up   Return in about 3 months (around 7/7/2022), or LABS.  Findings and plans discussed with patient who verbalizes understanding and agreement. Will followup with patient once results are in. Patient was given instructions and counseling regarding her condition or for health maintenance advice. Please see specific information pulled into the AVS if appropriate.       Transcribed from ambient dictation for Eav Elliott MD by Brooke Price.  04/07/22   14:59 EDT    Patient verbalized consent to the visit recording.  I have personally performed the services described in this document as transcribed by the above individual, and it is both accurate and complete.  Eva Elliott MD  4/25/2022  06:34 EDT

## 2022-04-08 LAB
ALBUMIN SERPL-MCNC: 3.9 G/DL (ref 3.5–5.2)
ALBUMIN/GLOB SERPL: 1.6 G/DL
ALP SERPL-CCNC: 79 U/L (ref 39–117)
ALT SERPL-CCNC: 22 U/L (ref 1–33)
AST SERPL-CCNC: 17 U/L (ref 1–32)
BILIRUB SERPL-MCNC: 0.4 MG/DL (ref 0–1.2)
BUN SERPL-MCNC: 16 MG/DL (ref 8–23)
BUN/CREAT SERPL: 20.8 (ref 7–25)
CALCIUM SERPL-MCNC: 9.6 MG/DL (ref 8.6–10.5)
CHLORIDE SERPL-SCNC: 100 MMOL/L (ref 98–107)
CHOLEST SERPL-MCNC: 160 MG/DL (ref 0–200)
CO2 SERPL-SCNC: 25.4 MMOL/L (ref 22–29)
CREAT SERPL-MCNC: 0.77 MG/DL (ref 0.57–1)
EGFRCR SERPLBLD CKD-EPI 2021: 80.1 ML/MIN/1.73
GLOBULIN SER CALC-MCNC: 2.4 GM/DL
GLUCOSE SERPL-MCNC: 284 MG/DL (ref 65–99)
HBA1C MFR BLD: 10.5 % (ref 4.8–5.6)
HDLC SERPL-MCNC: 48 MG/DL (ref 40–60)
IMP & REVIEW OF LAB RESULTS: NORMAL
LDLC SERPL CALC-MCNC: 74 MG/DL (ref 0–100)
POTASSIUM SERPL-SCNC: 4.5 MMOL/L (ref 3.5–5.2)
PROT SERPL-MCNC: 6.3 G/DL (ref 6–8.5)
SODIUM SERPL-SCNC: 137 MMOL/L (ref 136–145)
T4 FREE SERPL-MCNC: 1.27 NG/DL (ref 0.93–1.7)
TRIGL SERPL-MCNC: 230 MG/DL (ref 0–150)
TSH SERPL DL<=0.005 MIU/L-ACNC: 1.19 UIU/ML (ref 0.27–4.2)
VLDLC SERPL CALC-MCNC: 38 MG/DL (ref 5–40)

## 2022-04-08 RX ORDER — LOSARTAN POTASSIUM 50 MG/1
50 TABLET ORAL DAILY
Qty: 90 TABLET | Refills: 1 | Status: SHIPPED | OUTPATIENT
Start: 2022-04-08 | End: 2022-10-13 | Stop reason: SDUPTHER

## 2022-04-14 DIAGNOSIS — M25.551 RIGHT HIP PAIN: ICD-10-CM

## 2022-04-14 DIAGNOSIS — M54.41 CHRONIC LOW BACK PAIN WITH RIGHT-SIDED SCIATICA, UNSPECIFIED BACK PAIN LATERALITY: ICD-10-CM

## 2022-04-14 DIAGNOSIS — G89.29 CHRONIC LOW BACK PAIN WITH RIGHT-SIDED SCIATICA, UNSPECIFIED BACK PAIN LATERALITY: ICD-10-CM

## 2022-04-25 ENCOUNTER — TELEPHONE (OUTPATIENT)
Dept: FAMILY MEDICINE CLINIC | Facility: CLINIC | Age: 77
End: 2022-04-25

## 2022-04-25 DIAGNOSIS — Z79.4 TYPE 2 DIABETES MELLITUS WITH BOTH EYES AFFECTED BY MODERATE NONPROLIFERATIVE RETINOPATHY WITHOUT MACULAR EDEMA, WITH LONG-TERM CURRENT USE OF INSULIN: ICD-10-CM

## 2022-04-25 DIAGNOSIS — E11.3393 TYPE 2 DIABETES MELLITUS WITH BOTH EYES AFFECTED BY MODERATE NONPROLIFERATIVE RETINOPATHY WITHOUT MACULAR EDEMA, WITH LONG-TERM CURRENT USE OF INSULIN: ICD-10-CM

## 2022-04-26 DIAGNOSIS — Z79.4 TYPE 2 DIABETES MELLITUS WITH BOTH EYES AFFECTED BY MODERATE NONPROLIFERATIVE RETINOPATHY WITHOUT MACULAR EDEMA, WITH LONG-TERM CURRENT USE OF INSULIN: ICD-10-CM

## 2022-04-26 DIAGNOSIS — E11.3393 TYPE 2 DIABETES MELLITUS WITH BOTH EYES AFFECTED BY MODERATE NONPROLIFERATIVE RETINOPATHY WITHOUT MACULAR EDEMA, WITH LONG-TERM CURRENT USE OF INSULIN: ICD-10-CM

## 2022-04-26 RX ORDER — METFORMIN HYDROCHLORIDE 500 MG/1
TABLET, EXTENDED RELEASE ORAL
Qty: 180 TABLET | Refills: 1 | Status: SHIPPED | OUTPATIENT
Start: 2022-04-26 | End: 2022-10-13 | Stop reason: SDUPTHER

## 2022-05-02 RX ORDER — INSULIN DETEMIR 100 [IU]/ML
46 INJECTION, SOLUTION SUBCUTANEOUS NIGHTLY
Qty: 50 ML | Refills: 1
Start: 2022-05-02 | End: 2022-09-06

## 2022-05-16 DIAGNOSIS — E11.69 HYPERLIPIDEMIA DUE TO TYPE 2 DIABETES MELLITUS: ICD-10-CM

## 2022-05-16 DIAGNOSIS — E78.5 HYPERLIPIDEMIA DUE TO TYPE 2 DIABETES MELLITUS: ICD-10-CM

## 2022-05-16 DIAGNOSIS — I25.119 CORONARY ARTERY DISEASE INVOLVING NATIVE CORONARY ARTERY OF NATIVE HEART WITH ANGINA PECTORIS: ICD-10-CM

## 2022-05-16 DIAGNOSIS — F41.9 ANXIETY: ICD-10-CM

## 2022-05-16 NOTE — TELEPHONE ENCOUNTER
Caller: Magnus Bianka ANNIE    Relationship: Self    Best call back number:     Requested Prescriptions:   Requested Prescriptions     Pending Prescriptions Disp Refills   • atorvastatin (LIPITOR) 10 MG tablet 90 tablet 1     Sig: Take 1 tablet by mouth Daily.   • diazePAM (VALIUM) 5 MG tablet 60 tablet 0     Sig: Take 1 tablet by mouth Every 12 (Twelve) Hours As Needed for Anxiety.   • isosorbide mononitrate (IMDUR) 30 MG 24 hr tablet 90 tablet 1     Sig: Take 1 tablet by mouth Daily.        Pharmacy where request should be sent[id number] - [place] - [address] AT [facility] [address] - [phone]  - [phone] FX     Additional details provided by patient: PATIENT ADVISED SHE WAS OUT OF MEDICATION    Does the patient have less than a 3 day supply:  [x] Yes  [] No    Dat Lamb Rep   05/16/22 10:56 EDT

## 2022-05-18 ENCOUNTER — TELEPHONE (OUTPATIENT)
Dept: FAMILY MEDICINE CLINIC | Facility: CLINIC | Age: 77
End: 2022-05-18

## 2022-05-18 DIAGNOSIS — E78.5 HYPERLIPIDEMIA DUE TO TYPE 2 DIABETES MELLITUS: ICD-10-CM

## 2022-05-18 DIAGNOSIS — I25.119 CORONARY ARTERY DISEASE INVOLVING NATIVE CORONARY ARTERY OF NATIVE HEART WITH ANGINA PECTORIS: ICD-10-CM

## 2022-05-18 DIAGNOSIS — E11.69 HYPERLIPIDEMIA DUE TO TYPE 2 DIABETES MELLITUS: ICD-10-CM

## 2022-05-18 NOTE — TELEPHONE ENCOUNTER
"Caller: [id number] - [place] - [address] AT [facility] [address] - [phone] PH - [phone] FX    Relationship: Pharmacy    Best call back number: [phone]    What medications are you currently taking:   Current Outpatient Medications on File Prior to Visit   Medication Sig Dispense Refill   • Accu-Chek FastClix Lancets misc USE TO TEST BLOOD SUGAR ONCE DAILY 100 each 1   • albuterol sulfate  (90 Base) MCG/ACT inhaler INHALE 2 PUFFS BY MOUTH EVERY 4 HOURS AS NEEDED 90 g 5   • amLODIPine (NORVASC) 5 MG tablet TAKE 1 TABLET BY MOUTH DAILY 90 tablet 1   • Aspirin Low Dose 81 MG EC tablet TAKE 1 TABLET BY MOUTH DAILY 90 tablet 1   • atorvastatin (LIPITOR) 10 MG tablet TAKE 1 TABLET BY MOUTH DAILY 90 tablet 1   • clopidogrel (PLAVIX) 75 MG tablet TAKE 1 TABLET BY MOUTH DAILY 90 tablet 1   • cyclobenzaprine (FLEXERIL) 10 MG tablet TAKE 1 TABLET BY MOUTH TWICE DAILY AS NEEDED FOR MUSCLE SPASMS 60 tablet 5   • diazePAM (VALIUM) 5 MG tablet Take 1 tablet by mouth Every 12 (Twelve) Hours As Needed for Anxiety. 60 tablet 0   • Diclofenac Sodium (VOLTAREN) 1 % gel gel APPLY 4 GRAMS TO THE AFFECTED AREA BELOW THE WAIST AND 2 GRAMS TO THE AFFECTED AREA ABOVE THE WAIST FOUR TIMES DAILY AS NEEDED FOR PAIN 100 g 5   • fluticasone (Flonase) 50 MCG/ACT nasal spray 2 sprays into the nostril(s) as directed by provider Daily for 30 days. 15.8 mL 0   • glucose blood test strip 1 each by Other route Daily. 300 each 3   • glucose monitor monitoring kit 1 each As Needed (Use as needed to check sugar daily). 1 each 0   • ibuprofen (ADVIL,MOTRIN) 800 MG tablet TAKE 1 TABLET BY MOUTH EVERY 8 HOURS AS NEEDED FOR PAIN 90 tablet 2   • insulin detemir (Levemir) 100 UNIT/ML injection Inject 46 Units under the skin into the appropriate area as directed Every Night. 50 mL 1   • Insulin Syringe-Needle U-100 (BD Insulin Syringe Ultrafine) 31G X 15/64\" 0.5 ML misc Inject 1 pen under the skin " "into the appropriate area as directed 2 (Two) Times a Day. 200 each 5   • Insulin Syringe-Needle U-100 (BD Veo Insulin Syringe U/F) 31G X 15/64\" 0.5 ML misc For twice weekly injections 200 each 5   • isosorbide mononitrate (IMDUR) 30 MG 24 hr tablet TAKE 1 TABLET BY MOUTH DAILY 90 tablet 1   • Januvia 100 MG tablet TAKE 1 TABLET BY MOUTH DAILY 90 tablet 1   • levothyroxine (SYNTHROID, LEVOTHROID) 150 MCG tablet TAKE 1 TABLET BY MOUTH DAILY 30 tablet 2   • losartan (COZAAR) 50 MG tablet Take 1 tablet by mouth Daily. 90 tablet 1   • losartan-hydrochlorothiazide (HYZAAR) 50-12.5 MG per tablet Take 1 tablet by mouth Daily. 180 tablet 2   • metFORMIN ER (GLUCOPHAGE-XR) 500 MG 24 hr tablet TAKE 1 TABLET BY MOUTH TWICE DAILY WITH MEALS 180 tablet 1   • Nitro-Time 6.5 MG CR capsule TAKE ONE CAPSULE BY MOUTH DAILY 90 capsule 1   • NovoLOG Mix 70/30 (70-30) 100 UNIT/ML injection INJECT 24 UNITS UNDER THE SKIN TWICE DAILY AS DIRECTED WITH MEALS 40 mL 1   • oxybutynin (DITROPAN) 5 MG tablet Take 1 tablet by mouth 2 (Two) Times a Day. 180 tablet 1   • vitamin B-12 (CYANOCOBALAMIN) 1000 MCG tablet Take 1 tablet by mouth Daily. 90 tablet 1     No current facility-administered medications on file prior to visit.          When did you start taking these medications:     Which medication are you concerned about:   atorvastatin (LIPITOR) 10 MG tablet  10 mg, Daily 1 order         Who prescribed you this medication: DR WILLIAMSON    What are your concerns: PATIENT ADVISED THE PHARMACY SAID SHE HAD NO REFILLS ON THIS MEDICATION AND IS OUT OF MEDICATION    PLEASE CALL TO ADVISE          "

## 2022-05-19 RX ORDER — ISOSORBIDE MONONITRATE 30 MG/1
30 TABLET, EXTENDED RELEASE ORAL DAILY
Qty: 90 TABLET | Refills: 1 | Status: SHIPPED | OUTPATIENT
Start: 2022-05-19 | End: 2022-10-13 | Stop reason: SDUPTHER

## 2022-05-19 RX ORDER — DIAZEPAM 5 MG/1
5 TABLET ORAL EVERY 12 HOURS PRN
Qty: 60 TABLET | Refills: 0 | Status: SHIPPED | OUTPATIENT
Start: 2022-05-19 | End: 2022-07-08 | Stop reason: SDUPTHER

## 2022-05-19 RX ORDER — ATORVASTATIN CALCIUM 10 MG/1
10 TABLET, FILM COATED ORAL DAILY
Qty: 90 TABLET | Refills: 1 | Status: SHIPPED | OUTPATIENT
Start: 2022-05-19 | End: 2022-10-13 | Stop reason: SDUPTHER

## 2022-05-26 DIAGNOSIS — Z79.4 TYPE 2 DIABETES MELLITUS WITH BOTH EYES AFFECTED BY MODERATE NONPROLIFERATIVE RETINOPATHY WITHOUT MACULAR EDEMA, WITH LONG-TERM CURRENT USE OF INSULIN: ICD-10-CM

## 2022-05-26 DIAGNOSIS — M25.551 RIGHT HIP PAIN: ICD-10-CM

## 2022-05-26 DIAGNOSIS — E11.3393 TYPE 2 DIABETES MELLITUS WITH BOTH EYES AFFECTED BY MODERATE NONPROLIFERATIVE RETINOPATHY WITHOUT MACULAR EDEMA, WITH LONG-TERM CURRENT USE OF INSULIN: ICD-10-CM

## 2022-05-26 RX ORDER — IBUPROFEN 800 MG/1
TABLET ORAL
Qty: 90 TABLET | Refills: 2 | Status: SHIPPED | OUTPATIENT
Start: 2022-05-26 | End: 2022-08-24

## 2022-05-26 RX ORDER — ASPIRIN 81 MG/1
TABLET, COATED ORAL
Qty: 90 TABLET | Refills: 1 | Status: SHIPPED | OUTPATIENT
Start: 2022-05-26 | End: 2022-11-27

## 2022-06-04 DIAGNOSIS — E03.8 OTHER SPECIFIED HYPOTHYROIDISM: ICD-10-CM

## 2022-06-06 RX ORDER — LEVOTHYROXINE SODIUM 0.15 MG/1
137 TABLET ORAL DAILY
Qty: 30 TABLET | Refills: 2 | Status: SHIPPED | OUTPATIENT
Start: 2022-06-06 | End: 2022-09-06

## 2022-06-14 DIAGNOSIS — I15.2 HYPERTENSION ASSOCIATED WITH DIABETES: ICD-10-CM

## 2022-06-14 DIAGNOSIS — E11.59 HYPERTENSION ASSOCIATED WITH DIABETES: ICD-10-CM

## 2022-06-14 NOTE — TELEPHONE ENCOUNTER
Caller: Bianka Agudelo    Relationship: Self    Best call back number:[phone]     Requested Prescriptions:   Requested Prescriptions     Pending Prescriptions Disp Refills   • amLODIPine (NORVASC) 5 MG tablet 90 tablet 1     Sig: Take 1 tablet by mouth Daily.        Pharmacy where request should be sent: [facility] #[id number] - [place] - [address] AT [facility] [address] - [phone] PH - [phone] FX     Additional details provided by patient:   Does the patient have less than a 3 day supply:  [x] Yes  [] No    Dat VILLANUEVA Rep   06/14/22 12:35 EDT

## 2022-06-16 RX ORDER — AMLODIPINE BESYLATE 5 MG/1
5 TABLET ORAL DAILY
Qty: 90 TABLET | Refills: 1 | Status: SHIPPED | OUTPATIENT
Start: 2022-06-16 | End: 2022-09-24

## 2022-06-16 NOTE — TELEPHONE ENCOUNTER
Caller: Bianka Agudelo    Relationship: Self    Best call back number: [phone]      What was the call regarding: PATIENT STATES THAT SHE NEEDS THIS MEDICATION SENT IN TODAY.    Do you require a callback: YES

## 2022-07-08 ENCOUNTER — OFFICE VISIT (OUTPATIENT)
Dept: FAMILY MEDICINE CLINIC | Facility: CLINIC | Age: 77
End: 2022-07-08

## 2022-07-08 VITALS
SYSTOLIC BLOOD PRESSURE: 118 MMHG | HEART RATE: 99 BPM | WEIGHT: 185 LBS | DIASTOLIC BLOOD PRESSURE: 58 MMHG | HEIGHT: 62 IN | OXYGEN SATURATION: 96 % | BODY MASS INDEX: 34.04 KG/M2 | TEMPERATURE: 97.1 F

## 2022-07-08 DIAGNOSIS — F41.9 ANXIETY: ICD-10-CM

## 2022-07-08 DIAGNOSIS — G89.29 CHRONIC LOW BACK PAIN WITH RIGHT-SIDED SCIATICA, UNSPECIFIED BACK PAIN LATERALITY: ICD-10-CM

## 2022-07-08 DIAGNOSIS — E11.3393 TYPE 2 DIABETES MELLITUS WITH BOTH EYES AFFECTED BY MODERATE NONPROLIFERATIVE RETINOPATHY WITHOUT MACULAR EDEMA, WITH LONG-TERM CURRENT USE OF INSULIN: Primary | ICD-10-CM

## 2022-07-08 DIAGNOSIS — Z79.4 TYPE 2 DIABETES MELLITUS WITH BOTH EYES AFFECTED BY MODERATE NONPROLIFERATIVE RETINOPATHY WITHOUT MACULAR EDEMA, WITH LONG-TERM CURRENT USE OF INSULIN: Primary | ICD-10-CM

## 2022-07-08 DIAGNOSIS — M54.41 CHRONIC LOW BACK PAIN WITH RIGHT-SIDED SCIATICA, UNSPECIFIED BACK PAIN LATERALITY: ICD-10-CM

## 2022-07-08 DIAGNOSIS — M25.551 RIGHT HIP PAIN: ICD-10-CM

## 2022-07-08 DIAGNOSIS — E03.8 OTHER SPECIFIED HYPOTHYROIDISM: ICD-10-CM

## 2022-07-08 LAB — GLUCOSE BLDC GLUCOMTR-MCNC: 269 MG/DL (ref 70–130)

## 2022-07-08 PROCEDURE — 99214 OFFICE O/P EST MOD 30 MIN: CPT | Performed by: FAMILY MEDICINE

## 2022-07-08 PROCEDURE — 82962 GLUCOSE BLOOD TEST: CPT | Performed by: FAMILY MEDICINE

## 2022-07-08 RX ORDER — DIAZEPAM 5 MG/1
5 TABLET ORAL EVERY 12 HOURS PRN
Qty: 60 TABLET | Refills: 0 | Status: SHIPPED | OUTPATIENT
Start: 2022-07-08 | End: 2022-09-26 | Stop reason: SDUPTHER

## 2022-07-08 NOTE — PROGRESS NOTES
"Bianka Agudelo     VITALS: Blood pressure 118/58, pulse 99, temperature 97.1 °F (36.2 °C), height 157.5 cm (62\"), weight 83.9 kg (185 lb), SpO2 96 %, not currently breastfeeding.    Subjective  Chief Complaint  Diabetes    Subjective          History of Present Illness:  Patient is a 76 y.o.  female with medical conditions significant for type 2 diabetes, hypertension, and CAD who presents to clinic secondary to medical followup.     The patient states that he is doing well. She denies any chest pain. She reports she was taking Imdur.    She reports her blood glucose levels have been normal, but since she had COVID-19, she can not get it under control. She is taking 22 units of Novolog 70/30. She is taking Victoza. She reports she experienced a low blood glucose level approximately as few months ago. She reports she experiences cravings. She is experiencing eye issues. She reports she would like to increase her insulin. She has been experiencing dysphagia for a long time. She experiences constant dry mouth.     She needs a refill on Valium and Voltaren gel.     No complaints about any of the medications.    The following portions of the patient's history were reviewed and updated as appropriate: allergies, current medications, past family history, past medical history, past social history, past surgical history and problem list.    Past Medical History  Past Medical History:   Diagnosis Date   • Anxiety    • CAD (coronary artery disease)    • Diabetes mellitus (HCC)    • Dyslipidemia    • GERD (gastroesophageal reflux disease)    • H/O angina pectoris    • History of bone density study     February 2014   • History of mammogram 07/15/2013   • Hypertension    • Hypothyroidism    • Muscle spasms of both lower extremities    • Overactive bladder    • Vitamin B12 deficiency    • Vitamin D deficiency        Surgical History  Past Surgical History:   Procedure Laterality Date   • CARDIAC CATHETERIZATION     • COLONOSCOPY  " "01/01/2010   • EYE SURGERY  10/04/2021    right eye, bleeding behind the eye    • OOPHORECTOMY      1 removed   • TUBAL ABDOMINAL LIGATION         Family History  Family History   Problem Relation Age of Onset   • Diabetes Mother    • Heart disease Mother    • Stroke Mother    • Cancer Father    • Pancreatic cancer Sister    • Diabetes Sister    • Heart attack Brother        Social History  Social History     Socioeconomic History   • Marital status:    Tobacco Use   • Smoking status: Never Smoker   • Smokeless tobacco: Never Used   Vaping Use   • Vaping Use: Never used   Substance and Sexual Activity   • Alcohol use: No   • Drug use: No   • Sexual activity: Defer       Objective   Vital Signs:   /58 (BP Location: Right arm, Patient Position: Sitting, Cuff Size: Adult)   Pulse 99   Temp 97.1 °F (36.2 °C)   Ht 157.5 cm (62\")   Wt 83.9 kg (185 lb)   SpO2 96%   BMI 33.84 kg/m²     Physical Exam     Gen: Patient in NAD. Pleasant and answers appropriately. A&Ox3.    Skin: Warm and dry with normal turgor. No purpura, rashes, or unusual pigmentation noted. Hair is normal in appearance and distribution.    HEENT: NC/AT. No lesions noted. Conjunctiva clear, sclera nonicteric. PERRL. EOMI without nystagmus or strabismus. Fundi appear benign. No hemorrhages or exudates of eyes. Auditory canals are patent bilaterally without lesions. TMs intact,  nonerythematous, nonbulging without lesions. Nasal mucosa erythematous, and nonedematous. Frontal and maxillary sinuses are nontender. O/P erythematous and moist without exudate.    Neck: Supple without lymph nodes palpated. FROM.     Lungs: Slightly decreased B/L without rales, rhonchi, crackles, or wheezes.    Heart: RRR. S1 and S2 normal. No S3 or S4. No MRGT.    Abd: Soft, nontender,nondistended. (+)BSx4 quadrants.     Extrem: No CC.  Trace edema bilateral lower extremities.  Radial pulses 2+/4 and equal B/L.  Decreased range of motion of right hip.    Back: " Decreased range of motion.    Neuro: No focal motor/sensory deficits.    Procedures       Assessment and Plan    Bianka Agudelo is a 76 y.o. here for medical followup.    Problem List Items Addressed This Visit        Endocrine and Metabolic    Hypothyroidism    Relevant Orders    T4, Free    TSH    Type 2 diabetes mellitus with both eyes affected by moderate nonproliferative retinopathy without macular edema, with long-term current use of insulin (HCC) - Primary    Relevant Orders    POCT Glucose (Completed)    Comprehensive Metabolic Panel    Hemoglobin A1c       Mental Health    Anxiety    Relevant Medications    diazePAM (VALIUM) 5 MG tablet    Other Relevant Orders    Comprehensive Metabolic Panel      Other Visit Diagnoses     Right hip pain        Relevant Medications    Diclofenac Sodium (VOLTAREN) 1 % gel gel    Other Relevant Orders    Comprehensive Metabolic Panel    Chronic low back pain with right-sided sciatica, unspecified back pain laterality        Relevant Medications    Diclofenac Sodium (VOLTAREN) 1 % gel gel    Other Relevant Orders    Comprehensive Metabolic Panel        1. Type 2 diabetes.  I will obtain lab work.   I will increase the patient's insulin as requested     2. Medications  I will refill the patient's medications as requested.     BMI is >= 30 and <35. (Class 1 Obesity). The following options were offered after discussion;: exercise counseling/recommendations and nutrition counseling/recommendations         Follow Up   Return in about 3 months (around 10/8/2022), or LABS.  Findings and plans discussed with patient who verbalizes understanding and agreement. Will followup with patient once results are in. Patient was given instructions and counseling regarding her condition or for health maintenance advice. Please see specific information pulled into the AVS if appropriate.       Transcribed from ambient dictation for Eva Elliott MD by CARMINE HOUSER.  07/08/22   15:13  EDT    Patient verbalized consent to the visit recording.  I have personally performed the services described in this document as transcribed by the above individual, and it is both accurate and complete.  Eva Elliott MD  7/25/2022  06:50 EDT

## 2022-07-09 LAB
ALBUMIN SERPL-MCNC: 4.3 G/DL (ref 3.5–5.2)
ALBUMIN/GLOB SERPL: 2 G/DL
ALP SERPL-CCNC: 66 U/L (ref 39–117)
ALT SERPL-CCNC: 31 U/L (ref 1–33)
AST SERPL-CCNC: 24 U/L (ref 1–32)
BILIRUB SERPL-MCNC: 0.9 MG/DL (ref 0–1.2)
BUN SERPL-MCNC: 12 MG/DL (ref 8–23)
BUN/CREAT SERPL: 15.8 (ref 7–25)
CALCIUM SERPL-MCNC: 9.8 MG/DL (ref 8.6–10.5)
CHLORIDE SERPL-SCNC: 100 MMOL/L (ref 98–107)
CO2 SERPL-SCNC: 23.6 MMOL/L (ref 22–29)
CREAT SERPL-MCNC: 0.76 MG/DL (ref 0.57–1)
EGFRCR SERPLBLD CKD-EPI 2021: 81.3 ML/MIN/1.73
GLOBULIN SER CALC-MCNC: 2.1 GM/DL
GLUCOSE SERPL-MCNC: 144 MG/DL (ref 65–99)
HBA1C MFR BLD: 10.8 % (ref 4.8–5.6)
POTASSIUM SERPL-SCNC: 4.2 MMOL/L (ref 3.5–5.2)
PROT SERPL-MCNC: 6.4 G/DL (ref 6–8.5)
SODIUM SERPL-SCNC: 136 MMOL/L (ref 136–145)
T4 FREE SERPL-MCNC: 1.49 NG/DL (ref 0.93–1.7)
TSH SERPL DL<=0.005 MIU/L-ACNC: 0.49 UIU/ML (ref 0.27–4.2)

## 2022-07-26 DIAGNOSIS — F41.9 ANXIETY: ICD-10-CM

## 2022-07-28 RX ORDER — ALBUTEROL SULFATE 90 UG/1
AEROSOL, METERED RESPIRATORY (INHALATION)
Qty: 90 G | Refills: 5 | Status: SHIPPED | OUTPATIENT
Start: 2022-07-28 | End: 2022-10-13 | Stop reason: SDUPTHER

## 2022-08-11 DIAGNOSIS — Z79.4 TYPE 2 DIABETES MELLITUS WITH BOTH EYES AFFECTED BY MODERATE NONPROLIFERATIVE RETINOPATHY WITHOUT MACULAR EDEMA, WITH LONG-TERM CURRENT USE OF INSULIN: ICD-10-CM

## 2022-08-11 DIAGNOSIS — E11.3393 TYPE 2 DIABETES MELLITUS WITH BOTH EYES AFFECTED BY MODERATE NONPROLIFERATIVE RETINOPATHY WITHOUT MACULAR EDEMA, WITH LONG-TERM CURRENT USE OF INSULIN: ICD-10-CM

## 2022-08-11 RX ORDER — INSULIN ASPART 100 [IU]/ML
INJECTION, SUSPENSION SUBCUTANEOUS
Qty: 40 ML | Refills: 1 | Status: SHIPPED | OUTPATIENT
Start: 2022-08-11 | End: 2022-10-13 | Stop reason: SDUPTHER

## 2022-08-11 NOTE — TELEPHONE ENCOUNTER
Caller: Bianka Agudelo    Relationship: Self    Best call back number: [phone]    Requested Prescriptions:   Requested Prescriptions     Pending Prescriptions Disp Refills   • NovoLOG Mix 70/30 (70-30) 100 UNIT/ML injection [Pharmacy Med Name: NOVOLOG MIX 70/30 BOBBY [id number](BLUE)] 40 mL 1     Sig: INJECT 24 UNITS UNDER THE SKIN TWICE DAILY AS DIRECTED WITH MEALS        Pharmacy where request should be sent: [facility] #[id number] - [place] - [address] AT [facility] [address] - [phone]  - [phone] FX     Additional details provided by patient: PATIENT THINKS THAT SHE WILL BE OUT BEFORE THE WEEKEND     Does the patient have less than a 3 day supply:  [x] Yes  [] No    Dat Wright Rep   08/11/22 09:52 EDT

## 2022-08-16 DIAGNOSIS — I25.119 CORONARY ARTERY DISEASE INVOLVING NATIVE CORONARY ARTERY OF NATIVE HEART WITH ANGINA PECTORIS: ICD-10-CM

## 2022-08-16 RX ORDER — CLOPIDOGREL BISULFATE 75 MG/1
75 TABLET ORAL DAILY
Qty: 90 TABLET | Refills: 1 | Status: SHIPPED | OUTPATIENT
Start: 2022-08-16 | End: 2022-10-13 | Stop reason: SDUPTHER

## 2022-08-24 DIAGNOSIS — M25.551 RIGHT HIP PAIN: ICD-10-CM

## 2022-08-24 DIAGNOSIS — N32.81 OVERACTIVE BLADDER: ICD-10-CM

## 2022-08-24 RX ORDER — OXYBUTYNIN CHLORIDE 5 MG/1
TABLET ORAL
Qty: 180 TABLET | Refills: 1 | Status: SHIPPED | OUTPATIENT
Start: 2022-08-24 | End: 2022-10-13 | Stop reason: SDUPTHER

## 2022-08-24 RX ORDER — IBUPROFEN 800 MG/1
TABLET ORAL
Qty: 90 TABLET | Refills: 2 | Status: SHIPPED | OUTPATIENT
Start: 2022-08-24 | End: 2022-11-27

## 2022-09-06 DIAGNOSIS — K21.9 GASTROESOPHAGEAL REFLUX DISEASE WITHOUT ESOPHAGITIS: ICD-10-CM

## 2022-09-06 DIAGNOSIS — Z79.4 TYPE 2 DIABETES MELLITUS WITH BOTH EYES AFFECTED BY MODERATE NONPROLIFERATIVE RETINOPATHY WITHOUT MACULAR EDEMA, WITH LONG-TERM CURRENT USE OF INSULIN: ICD-10-CM

## 2022-09-06 DIAGNOSIS — E03.8 OTHER SPECIFIED HYPOTHYROIDISM: ICD-10-CM

## 2022-09-06 DIAGNOSIS — E11.3393 TYPE 2 DIABETES MELLITUS WITH BOTH EYES AFFECTED BY MODERATE NONPROLIFERATIVE RETINOPATHY WITHOUT MACULAR EDEMA, WITH LONG-TERM CURRENT USE OF INSULIN: ICD-10-CM

## 2022-09-06 RX ORDER — LEVOTHYROXINE SODIUM 0.15 MG/1
137 TABLET ORAL DAILY
Qty: 30 TABLET | Refills: 2 | Status: SHIPPED | OUTPATIENT
Start: 2022-09-06 | End: 2022-10-13 | Stop reason: SDUPTHER

## 2022-09-06 RX ORDER — OMEPRAZOLE 20 MG/1
CAPSULE, DELAYED RELEASE ORAL
Qty: 60 CAPSULE | Refills: 3 | Status: SHIPPED | OUTPATIENT
Start: 2022-09-06 | End: 2022-10-13 | Stop reason: SDUPTHER

## 2022-09-06 RX ORDER — INSULIN DETEMIR 100 [IU]/ML
INJECTION, SOLUTION SUBCUTANEOUS
Qty: 50 ML | Refills: 1 | Status: SHIPPED | OUTPATIENT
Start: 2022-09-06 | End: 2022-10-13 | Stop reason: SDUPTHER

## 2022-09-20 ENCOUNTER — TELEPHONE (OUTPATIENT)
Dept: FAMILY MEDICINE CLINIC | Facility: CLINIC | Age: 77
End: 2022-09-20

## 2022-09-23 DIAGNOSIS — Z79.4 TYPE 2 DIABETES MELLITUS WITH BOTH EYES AFFECTED BY MODERATE NONPROLIFERATIVE RETINOPATHY WITHOUT MACULAR EDEMA, WITH LONG-TERM CURRENT USE OF INSULIN: ICD-10-CM

## 2022-09-23 DIAGNOSIS — M25.551 RIGHT HIP PAIN: ICD-10-CM

## 2022-09-23 DIAGNOSIS — E11.59 HYPERTENSION ASSOCIATED WITH DIABETES: ICD-10-CM

## 2022-09-23 DIAGNOSIS — E11.3393 TYPE 2 DIABETES MELLITUS WITH BOTH EYES AFFECTED BY MODERATE NONPROLIFERATIVE RETINOPATHY WITHOUT MACULAR EDEMA, WITH LONG-TERM CURRENT USE OF INSULIN: ICD-10-CM

## 2022-09-23 DIAGNOSIS — I15.2 HYPERTENSION ASSOCIATED WITH DIABETES: ICD-10-CM

## 2022-09-24 RX ORDER — SITAGLIPTIN 100 MG/1
TABLET, FILM COATED ORAL
Qty: 90 TABLET | Refills: 1 | Status: SHIPPED | OUTPATIENT
Start: 2022-09-24 | End: 2022-10-13 | Stop reason: SDUPTHER

## 2022-09-24 RX ORDER — CYCLOBENZAPRINE HCL 10 MG
TABLET ORAL
Qty: 60 TABLET | Refills: 5 | Status: SHIPPED | OUTPATIENT
Start: 2022-09-24 | End: 2022-10-13 | Stop reason: SDUPTHER

## 2022-09-24 RX ORDER — AMLODIPINE BESYLATE 5 MG/1
5 TABLET ORAL DAILY
Qty: 90 TABLET | Refills: 1 | Status: SHIPPED | OUTPATIENT
Start: 2022-09-24 | End: 2022-10-13 | Stop reason: SDUPTHER

## 2022-09-26 DIAGNOSIS — F41.9 ANXIETY: ICD-10-CM

## 2022-09-26 RX ORDER — DIAZEPAM 5 MG/1
5 TABLET ORAL EVERY 12 HOURS PRN
Qty: 60 TABLET | Refills: 0 | Status: SHIPPED | OUTPATIENT
Start: 2022-09-26 | End: 2022-12-06 | Stop reason: SDUPTHER

## 2022-09-26 NOTE — PROGRESS NOTES
Pete # in epic  Reviewed and is consistent.  UDS 7/2022 reviewed and is consistent.  Patient comfort assessment guide reviewed and updated today.    As part of the patient's treatment plan they are being prescribed a controlled substance/ substances with potential for abuse.  This patient has been made aware of the appropriate use of such medications, including potential risk of somnolence, limited ability to drive and/or work safely, and potential for overdose.  It has also been made clear these medications are for use by the patient only, without concomitant use of alcohol or other substances unless prescribed/advised by medical provider.    Patient has completed prescribing agreement detailing terms of continued prescribing of controlled substances including monitoring PETE reports, urine drug screens, and pill counts.  The patient is aware PETE reports are reviewed on a regular basis and scanned into the chart.    History and physical exam exhibit continued safe and appropriate use of controlled substances.

## 2022-10-13 ENCOUNTER — OFFICE VISIT (OUTPATIENT)
Dept: FAMILY MEDICINE CLINIC | Facility: CLINIC | Age: 77
End: 2022-10-13

## 2022-10-13 VITALS
TEMPERATURE: 97.7 F | WEIGHT: 189.2 LBS | DIASTOLIC BLOOD PRESSURE: 68 MMHG | HEIGHT: 62 IN | BODY MASS INDEX: 34.82 KG/M2 | HEART RATE: 86 BPM | SYSTOLIC BLOOD PRESSURE: 134 MMHG | OXYGEN SATURATION: 94 %

## 2022-10-13 DIAGNOSIS — Z23 NEED FOR INFLUENZA VACCINATION: ICD-10-CM

## 2022-10-13 DIAGNOSIS — Z79.4 TYPE 2 DIABETES MELLITUS WITH BOTH EYES AFFECTED BY MODERATE NONPROLIFERATIVE RETINOPATHY WITHOUT MACULAR EDEMA, WITH LONG-TERM CURRENT USE OF INSULIN: ICD-10-CM

## 2022-10-13 DIAGNOSIS — E11.69 HYPERLIPIDEMIA DUE TO TYPE 2 DIABETES MELLITUS: ICD-10-CM

## 2022-10-13 DIAGNOSIS — M25.551 RIGHT HIP PAIN: ICD-10-CM

## 2022-10-13 DIAGNOSIS — E78.5 HYPERLIPIDEMIA DUE TO TYPE 2 DIABETES MELLITUS: ICD-10-CM

## 2022-10-13 DIAGNOSIS — N32.81 OVERACTIVE BLADDER: ICD-10-CM

## 2022-10-13 DIAGNOSIS — E11.59 HYPERTENSION ASSOCIATED WITH DIABETES: ICD-10-CM

## 2022-10-13 DIAGNOSIS — F41.9 ANXIETY: ICD-10-CM

## 2022-10-13 DIAGNOSIS — I25.119 CORONARY ARTERY DISEASE INVOLVING NATIVE CORONARY ARTERY OF NATIVE HEART WITH ANGINA PECTORIS: ICD-10-CM

## 2022-10-13 DIAGNOSIS — E11.3393 TYPE 2 DIABETES MELLITUS WITH BOTH EYES AFFECTED BY MODERATE NONPROLIFERATIVE RETINOPATHY WITHOUT MACULAR EDEMA, WITH LONG-TERM CURRENT USE OF INSULIN: ICD-10-CM

## 2022-10-13 DIAGNOSIS — I15.2 HYPERTENSION ASSOCIATED WITH DIABETES: ICD-10-CM

## 2022-10-13 DIAGNOSIS — E03.8 OTHER SPECIFIED HYPOTHYROIDISM: ICD-10-CM

## 2022-10-13 DIAGNOSIS — R01.1 SYSTOLIC MURMUR: ICD-10-CM

## 2022-10-13 DIAGNOSIS — K21.9 GASTROESOPHAGEAL REFLUX DISEASE WITHOUT ESOPHAGITIS: ICD-10-CM

## 2022-10-13 DIAGNOSIS — M62.838 NIGHT MUSCLE SPASMS: Primary | ICD-10-CM

## 2022-10-13 DIAGNOSIS — N39.0 FREQUENT UTI: ICD-10-CM

## 2022-10-13 PROCEDURE — G0008 ADMIN INFLUENZA VIRUS VAC: HCPCS | Performed by: FAMILY MEDICINE

## 2022-10-13 PROCEDURE — 99214 OFFICE O/P EST MOD 30 MIN: CPT | Performed by: FAMILY MEDICINE

## 2022-10-13 PROCEDURE — 90662 IIV NO PRSV INCREASED AG IM: CPT | Performed by: FAMILY MEDICINE

## 2022-10-13 RX ORDER — ISOSORBIDE MONONITRATE 30 MG/1
30 TABLET, EXTENDED RELEASE ORAL DAILY
Qty: 90 TABLET | Refills: 0 | Status: SHIPPED | OUTPATIENT
Start: 2022-10-13 | End: 2023-02-13

## 2022-10-13 RX ORDER — METFORMIN HYDROCHLORIDE 500 MG/1
500 TABLET, EXTENDED RELEASE ORAL 2 TIMES DAILY WITH MEALS
Qty: 180 TABLET | Refills: 0 | Status: SHIPPED | OUTPATIENT
Start: 2022-10-13 | End: 2023-01-30

## 2022-10-13 RX ORDER — LOSARTAN POTASSIUM AND HYDROCHLOROTHIAZIDE 12.5; 5 MG/1; MG/1
1 TABLET ORAL DAILY
Qty: 180 TABLET | Refills: 0 | Status: SHIPPED | OUTPATIENT
Start: 2022-10-13 | End: 2023-02-21 | Stop reason: SDUPTHER

## 2022-10-13 RX ORDER — LOSARTAN POTASSIUM 50 MG/1
50 TABLET ORAL DAILY
Qty: 90 TABLET | Refills: 0 | Status: SHIPPED | OUTPATIENT
Start: 2022-10-13 | End: 2023-02-21 | Stop reason: SDUPTHER

## 2022-10-13 RX ORDER — INSULIN ASPART 100 [IU]/ML
24 INJECTION, SUSPENSION SUBCUTANEOUS 2 TIMES DAILY WITH MEALS
Qty: 40 ML | Refills: 1 | Status: SHIPPED | OUTPATIENT
Start: 2022-10-13

## 2022-10-13 RX ORDER — BLOOD-GLUCOSE METER
1 KIT MISCELLANEOUS ONCE
Qty: 1 EACH | Refills: 0 | Status: SHIPPED | OUTPATIENT
Start: 2022-10-13 | End: 2022-10-13

## 2022-10-13 RX ORDER — OXYBUTYNIN CHLORIDE 5 MG/1
5 TABLET ORAL 2 TIMES DAILY
Qty: 180 TABLET | Refills: 0 | Status: SHIPPED | OUTPATIENT
Start: 2022-10-13 | End: 2023-02-21 | Stop reason: SDUPTHER

## 2022-10-13 RX ORDER — LEVOTHYROXINE SODIUM 0.15 MG/1
137 TABLET ORAL DAILY
Qty: 90 TABLET | Refills: 0 | Status: SHIPPED | OUTPATIENT
Start: 2022-10-13 | End: 2023-02-21 | Stop reason: SDUPTHER

## 2022-10-13 RX ORDER — AMLODIPINE BESYLATE 5 MG/1
5 TABLET ORAL DAILY
Qty: 90 TABLET | Refills: 0 | Status: SHIPPED | OUTPATIENT
Start: 2022-10-13 | End: 2023-02-21 | Stop reason: SDUPTHER

## 2022-10-13 RX ORDER — INSULIN DETEMIR 100 [IU]/ML
50 INJECTION, SOLUTION SUBCUTANEOUS NIGHTLY
Qty: 50 ML | Refills: 0 | Status: SHIPPED | OUTPATIENT
Start: 2022-10-13 | End: 2023-03-15 | Stop reason: SDUPTHER

## 2022-10-13 RX ORDER — CYCLOBENZAPRINE HCL 10 MG
10 TABLET ORAL 2 TIMES DAILY PRN
Qty: 60 TABLET | Refills: 0 | Status: SHIPPED | OUTPATIENT
Start: 2022-10-13 | End: 2023-01-09 | Stop reason: SDUPTHER

## 2022-10-13 RX ORDER — CLOPIDOGREL BISULFATE 75 MG/1
75 TABLET ORAL DAILY
Qty: 90 TABLET | Refills: 0 | Status: SHIPPED | OUTPATIENT
Start: 2022-10-13 | End: 2023-02-21 | Stop reason: SDUPTHER

## 2022-10-13 RX ORDER — ALBUTEROL SULFATE 90 UG/1
2 AEROSOL, METERED RESPIRATORY (INHALATION) EVERY 4 HOURS PRN
Qty: 90 G | Refills: 0 | Status: SHIPPED | OUTPATIENT
Start: 2022-10-13

## 2022-10-13 RX ORDER — ATORVASTATIN CALCIUM 10 MG/1
10 TABLET, FILM COATED ORAL DAILY
Qty: 90 TABLET | Refills: 0 | Status: SHIPPED | OUTPATIENT
Start: 2022-10-13 | End: 2023-02-10

## 2022-10-13 RX ORDER — OMEPRAZOLE 20 MG/1
20 CAPSULE, DELAYED RELEASE ORAL 2 TIMES DAILY
Qty: 180 CAPSULE | Refills: 0 | Status: SHIPPED | OUTPATIENT
Start: 2022-10-13 | End: 2023-02-21 | Stop reason: SDUPTHER

## 2022-10-13 NOTE — PROGRESS NOTES
Subjective   Bianka Agudelo is a 76 y.o. female.   Pt presents today with CC of Diabetes      History of Present Illness   History of Present Illness  1.  Patient is a medically complicated 76-year-old female here to follow-up on hyperglycemia.  She is a type II diabetic.  Her treatment is complicated by coronary artery disease, hyperlipidemia, frequent urinary tract infections, chronic pain, and she has not followed up with cardiology recently.  She states that her blood sugars have not been any better on her current regimen.  She takes metformin 500 mg 24-hour tablet twice a day, Januvia 100 mg daily, and takes Levemir 45 units nightly, she was prescribed 50, but has only been taking 45.  And then a NovoLog 70/30 mix 24 units twice a day.  #2 a primary concern today is leg cramps and general muscle spasms, particularly at night.  She has been taking Flexeril 10 mg twice a day with partial benefit.  #3 she has anxiety for which she takes Valium  #4 she has hypertension associated with diabetes and coronary artery disease.  She takes amlodipine 5 mg, losartan 100 mg daily, and hydrochlorothiazide 12.5 mg daily.  #5 she has coronary artery disease for which she takes Plavix and Imdur.  Murmur noted on exam today.  #6 she has overactive bladder for which she takes oxybutynin.  #7 she has hypothyroidism.  She takes levothyroxine.  She is agreeable to have her levels rechecked today.  #8 she would like a flu shot today.           The following portions of the patient's history were reviewed and updated as appropriate: allergies, current medications, past family history, past medical history, past social history, past surgical history and problem list.    Review of Systems   Constitutional: Negative for chills, fever and unexpected weight loss.   HENT: Negative for congestion and sore throat.    Eyes: Negative for blurred vision and visual disturbance.   Respiratory: Negative for cough and wheezing.    Cardiovascular:  Negative for chest pain and palpitations.   Gastrointestinal: Negative for abdominal pain and diarrhea.   Endocrine: Negative for cold intolerance and heat intolerance.   Genitourinary: Negative for dysuria.   Musculoskeletal: Negative for arthralgias and neck stiffness.   Neurological: Negative for dizziness, seizures and syncope.   Psychiatric/Behavioral: Negative for self-injury, suicidal ideas and depressed mood.       Objective   Physical Exam  Vitals and nursing note reviewed.   Constitutional:       Appearance: She is well-developed.   HENT:      Head: Normocephalic and atraumatic.      Right Ear: External ear normal.      Left Ear: External ear normal.      Nose: Nose normal.   Eyes:      Conjunctiva/sclera: Conjunctivae normal.      Pupils: Pupils are equal, round, and reactive to light.   Cardiovascular:      Rate and Rhythm: Normal rate and regular rhythm.      Heart sounds: Normal heart sounds.      Comments: Harsh systolic ejection murmur, 2 out of 6  Pulmonary:      Effort: Pulmonary effort is normal.      Breath sounds: Normal breath sounds. No rales.   Abdominal:      General: Bowel sounds are normal.      Palpations: Abdomen is soft.   Musculoskeletal:      Cervical back: Normal range of motion and neck supple.      Comments: Trace edema in her legs   Skin:     General: Skin is warm and dry.   Neurological:      Mental Status: She is alert and oriented to person, place, and time.   Psychiatric:         Behavior: Behavior normal.           Assessment & Plan   Diagnoses and all orders for this visit:    1. Night muscle spasms (Primary)  -     cyclobenzaprine (FLEXERIL) 10 MG tablet; Take 1 tablet by mouth 2 (Two) Times a Day As Needed for Muscle Spasms.  Dispense: 60 tablet; Refill: 0  Flexeril is a reasonable option for muscle spasms, because of her heart murmur and history of heart disease, I want a get an updated echocardiogram to ensure that Flexeril and other muscle relaxers are a reasonable  option for her.  2. Anxiety  -     albuterol sulfate  (90 Base) MCG/ACT inhaler; Inhale 2 puffs Every 4 (Four) Hours As Needed for Wheezing.  Dispense: 90 g; Refill: 0    3. Hypertension associated with diabetes ([facility])  -     amLODIPine (NORVASC) 5 MG tablet; Take 1 tablet by mouth Daily.  Dispense: 90 tablet; Refill: 0  -     losartan-hydrochlorothiazide (HYZAAR) 50-12.5 MG per tablet; Take 1 tablet by mouth Daily.  Dispense: 180 tablet; Refill: 0  -     losartan (COZAAR) 50 MG tablet; Take 1 tablet by mouth Daily.  Dispense: 90 tablet; Refill: 0    4. Hyperlipidemia due to type 2 diabetes mellitus ([facility])  -     atorvastatin (LIPITOR) 10 MG tablet; Take 1 tablet by mouth Daily.  Dispense: 90 tablet; Refill: 0    5. Coronary artery disease involving native coronary artery of native heart with angina pectoris ([facility])  -     clopidogrel (PLAVIX) 75 MG tablet; Take 1 tablet by mouth Daily.  Dispense: 90 tablet; Refill: 0  -     isosorbide mononitrate (IMDUR) 30 MG 24 hr tablet; Take 1 tablet by mouth Daily.  Dispense: 90 tablet; Refill: 0    6. Overactive bladder  -     oxybutynin (DITROPAN) 5 MG tablet; Take 1 tablet by mouth 2 (Two) Times a Day.  Dispense: 180 tablet; Refill: 0    7. Gastroesophageal reflux disease without esophagitis  -     omeprazole (priLOSEC) 20 MG capsule; Take 1 capsule by mouth 2 (Two) Times a Day.  Dispense: 180 capsule; Refill: 0    8. Type 2 diabetes mellitus with both eyes affected by moderate nonproliferative retinopathy without macular edema, with long-term current use of insulin ([facility])  -     metFORMIN ER (GLUCOPHAGE-XR) 500 MG 24 hr tablet; Take 1 tablet by mouth 2 (Two) Times a Day With Meals.  Dispense: 180 tablet; Refill: 0  -     SITagliptin (Januvia) 100 MG tablet; Take 1 tablet by mouth Daily.  Dispense: 90 tablet; Refill: 0  -     Comprehensive metabolic panel  -     Lipid panel  -     Hemoglobin A1c  -     CBC w AUTO Differential  -     glucose monitoring kit (FREESTYLE)  monitoring kit; 1 each 1 (One) Time for 1 dose.  Dispense: 1 each; Refill: 0  -     glucose blood test strip; 1 each by Other route Daily.  Dispense: 300 each; Refill: 3  -     insulin detemir (Levemir) 100 UNIT/ML injection; Inject 50 Units under the skin into the appropriate area as directed Every Night.  Dispense: 50 mL; Refill: 0  -     NovoLOG Mix 70/30 (70-30) 100 UNIT/ML injection; Inject 24 Units under the skin into the appropriate area as directed 2 (Two) Times a Day With Meals.  Dispense: 40 mL; Refill: 1  Daughter admits that patient's main problem is her diet.  We discussed other potential medication options.  She reports that she has tried glipizide and failed, Jardiance/Farxiga are not an option because of frequent urinary tract infections.  She was not taking her Levemir at 50 units as prescribed, she was only taking 45.  Increased it up to 50.  9. Other specified hypothyroidism  -     levothyroxine (SYNTHROID, LEVOTHROID) 150 MCG tablet; Take 1 tablet by mouth Daily.  Dispense: 90 tablet; Refill: 0  -     TSH    10. Right hip pain  -     cyclobenzaprine (FLEXERIL) 10 MG tablet; Take 1 tablet by mouth 2 (Two) Times a Day As Needed for Muscle Spasms.  Dispense: 60 tablet; Refill: 0    11. Frequent UTI  This complicates her treatment as Jardiance and Farxiga are not great options.   12. Need for influenza vaccination  Flu shot given today.  13. Systolic murmur  -     Adult Transthoracic Echo Complete W/ Cont if Necessary Per Protocol; Future  We will rule out valvular heart disease and CHF.                BMI is >= 30 and <35. (Class 1 Obesity). The following options were offered after discussion;: exercise counseling/recommendations and nutrition counseling/recommendations

## 2022-10-14 LAB
ALBUMIN SERPL-MCNC: 4.3 G/DL (ref 3.5–5.2)
ALBUMIN/GLOB SERPL: 2.4 G/DL
ALP SERPL-CCNC: 61 U/L (ref 39–117)
ALT SERPL-CCNC: 31 U/L (ref 1–33)
AST SERPL-CCNC: 26 U/L (ref 1–32)
BASOPHILS # BLD AUTO: 0.03 10*3/MM3 (ref 0–0.2)
BASOPHILS NFR BLD AUTO: 0.4 % (ref 0–1.5)
BILIRUB SERPL-MCNC: 0.5 MG/DL (ref 0–1.2)
BUN SERPL-MCNC: 15 MG/DL (ref 8–23)
BUN/CREAT SERPL: 24.2 (ref 7–25)
CALCIUM SERPL-MCNC: 9.8 MG/DL (ref 8.6–10.5)
CHLORIDE SERPL-SCNC: 98 MMOL/L (ref 98–107)
CHOLEST SERPL-MCNC: 151 MG/DL (ref 0–200)
CO2 SERPL-SCNC: 29 MMOL/L (ref 22–29)
CREAT SERPL-MCNC: 0.62 MG/DL (ref 0.57–1)
EGFRCR SERPLBLD CKD-EPI 2021: 92.4 ML/MIN/1.73
EOSINOPHIL # BLD AUTO: 0.18 10*3/MM3 (ref 0–0.4)
EOSINOPHIL NFR BLD AUTO: 2.6 % (ref 0.3–6.2)
ERYTHROCYTE [DISTWIDTH] IN BLOOD BY AUTOMATED COUNT: 12.6 % (ref 12.3–15.4)
GLOBULIN SER CALC-MCNC: 1.8 GM/DL
GLUCOSE SERPL-MCNC: 231 MG/DL (ref 65–99)
HBA1C MFR BLD: 10.8 % (ref 4.8–5.6)
HCT VFR BLD AUTO: 39.9 % (ref 34–46.6)
HDLC SERPL-MCNC: 53 MG/DL (ref 40–60)
HGB BLD-MCNC: 13.2 G/DL (ref 12–15.9)
IMM GRANULOCYTES # BLD AUTO: 0.02 10*3/MM3 (ref 0–0.05)
IMM GRANULOCYTES NFR BLD AUTO: 0.3 % (ref 0–0.5)
IMP & REVIEW OF LAB RESULTS: NORMAL
LDLC SERPL CALC-MCNC: 72 MG/DL (ref 0–100)
LYMPHOCYTES # BLD AUTO: 2.4 10*3/MM3 (ref 0.7–3.1)
LYMPHOCYTES NFR BLD AUTO: 35.2 % (ref 19.6–45.3)
MCH RBC QN AUTO: 29.3 PG (ref 26.6–33)
MCHC RBC AUTO-ENTMCNC: 33.1 G/DL (ref 31.5–35.7)
MCV RBC AUTO: 88.7 FL (ref 79–97)
MONOCYTES # BLD AUTO: 0.53 10*3/MM3 (ref 0.1–0.9)
MONOCYTES NFR BLD AUTO: 7.8 % (ref 5–12)
NEUTROPHILS # BLD AUTO: 3.65 10*3/MM3 (ref 1.7–7)
NEUTROPHILS NFR BLD AUTO: 53.7 % (ref 42.7–76)
NRBC BLD AUTO-RTO: 0 /100 WBC (ref 0–0.2)
PLATELET # BLD AUTO: 212 10*3/MM3 (ref 140–450)
POTASSIUM SERPL-SCNC: 4.2 MMOL/L (ref 3.5–5.2)
PROT SERPL-MCNC: 6.1 G/DL (ref 6–8.5)
RBC # BLD AUTO: 4.5 10*6/MM3 (ref 3.77–5.28)
SODIUM SERPL-SCNC: 136 MMOL/L (ref 136–145)
TRIGL SERPL-MCNC: 153 MG/DL (ref 0–150)
TSH SERPL DL<=0.005 MIU/L-ACNC: 1.01 UIU/ML (ref 0.27–4.2)
VLDLC SERPL CALC-MCNC: 26 MG/DL (ref 5–40)
WBC # BLD AUTO: 6.81 10*3/MM3 (ref 3.4–10.8)

## 2022-10-19 ENCOUNTER — TELEPHONE (OUTPATIENT)
Dept: FAMILY MEDICINE CLINIC | Facility: CLINIC | Age: 77
End: 2022-10-19

## 2022-10-19 NOTE — TELEPHONE ENCOUNTER
----- Message from Carlos William DO sent at 10/19/2022  2:21 PM EDT -----  I reviewed your blood work.  Your hemoglobin A1c is unchanged.  It is the worst it has been at 10.8.  Recommend follow-up with Dr. Elliott to discuss your diabetes treatment.  If you are taking your insulin and other medications as prescribed, then your dosing will need to be adjusted.      Spoke with Daughter & a FU was scheduled.

## 2022-10-21 ENCOUNTER — OFFICE VISIT (OUTPATIENT)
Dept: FAMILY MEDICINE CLINIC | Facility: CLINIC | Age: 77
End: 2022-10-21

## 2022-10-21 VITALS
TEMPERATURE: 97.5 F | WEIGHT: 187.2 LBS | DIASTOLIC BLOOD PRESSURE: 70 MMHG | HEART RATE: 80 BPM | HEIGHT: 62 IN | OXYGEN SATURATION: 98 % | BODY MASS INDEX: 34.45 KG/M2 | SYSTOLIC BLOOD PRESSURE: 138 MMHG

## 2022-10-21 DIAGNOSIS — E11.649 TYPE 2 DIABETES MELLITUS WITH HYPOGLYCEMIA WITHOUT COMA, WITHOUT LONG-TERM CURRENT USE OF INSULIN: Primary | ICD-10-CM

## 2022-10-21 DIAGNOSIS — H43.12 VITREOUS HEMORRHAGE, LEFT EYE: ICD-10-CM

## 2022-10-21 DIAGNOSIS — I73.89 OTHER SPECIFIED PERIPHERAL VASCULAR DISEASES: ICD-10-CM

## 2022-10-21 LAB — GLUCOSE BLDC GLUCOMTR-MCNC: 140 MG/DL (ref 70–130)

## 2022-10-21 PROCEDURE — 99214 OFFICE O/P EST MOD 30 MIN: CPT | Performed by: FAMILY MEDICINE

## 2022-10-21 PROCEDURE — 82962 GLUCOSE BLOOD TEST: CPT | Performed by: FAMILY MEDICINE

## 2022-10-21 RX ORDER — BLOOD-GLUCOSE METER
1 KIT MISCELLANEOUS AS NEEDED
Qty: 1 EACH | Refills: 0 | Status: SHIPPED | OUTPATIENT
Start: 2022-10-21 | End: 2022-11-08 | Stop reason: SDUPTHER

## 2022-10-21 RX ORDER — LANCETS 30 GAUGE
1 EACH MISCELLANEOUS 4 TIMES DAILY
Qty: 120 EACH | Refills: 11 | Status: SHIPPED | OUTPATIENT
Start: 2022-10-21 | End: 2022-11-08 | Stop reason: SDUPTHER

## 2022-10-28 ENCOUNTER — HOSPITAL ENCOUNTER (OUTPATIENT)
Dept: CARDIOLOGY | Facility: HOSPITAL | Age: 77
Discharge: HOME | End: 2022-10-28
Admitting: FAMILY MEDICINE

## 2022-10-28 DIAGNOSIS — R01.1 SYSTOLIC MURMUR: ICD-10-CM

## 2022-10-28 LAB
BH CV ECHO MEAS - ACS: 1.6 CM
BH CV ECHO MEAS - AO MAX PG: 13.7 MMHG
BH CV ECHO MEAS - AO MEAN PG: 9 MMHG
BH CV ECHO MEAS - AO ROOT DIAM: 3 CM
BH CV ECHO MEAS - AO V2 MAX: 185 CM/SEC
BH CV ECHO MEAS - AO V2 VTI: 41.4 CM
BH CV ECHO MEAS - EDV(CUBED): 103.8 ML
BH CV ECHO MEAS - EDV(MOD-SP4): 40.7 ML
BH CV ECHO MEAS - EF(MOD-SP4): 63.4 %
BH CV ECHO MEAS - ESV(CUBED): 32.8 ML
BH CV ECHO MEAS - ESV(MOD-SP4): 14.9 ML
BH CV ECHO MEAS - FS: 31.9 %
BH CV ECHO MEAS - IVS/LVPW: 0.92 CM
BH CV ECHO MEAS - IVSD: 1.2 CM
BH CV ECHO MEAS - LA DIMENSION: 3.2 CM
BH CV ECHO MEAS - LAT PEAK E' VEL: 7.2 CM/SEC
BH CV ECHO MEAS - LV DIASTOLIC VOL/BSA (35-75): 21.9 CM2
BH CV ECHO MEAS - LV MASS(C)D: 224.8 GRAMS
BH CV ECHO MEAS - LV SYSTOLIC VOL/BSA (12-30): 8 CM2
BH CV ECHO MEAS - LVIDD: 4.7 CM
BH CV ECHO MEAS - LVIDS: 3.2 CM
BH CV ECHO MEAS - LVOT AREA: 3.1 CM2
BH CV ECHO MEAS - LVOT DIAM: 2 CM
BH CV ECHO MEAS - LVPWD: 1.3 CM
BH CV ECHO MEAS - MED PEAK E' VEL: 6.5 CM/SEC
BH CV ECHO MEAS - MV A MAX VEL: 110 CM/SEC
BH CV ECHO MEAS - MV E MAX VEL: 88.8 CM/SEC
BH CV ECHO MEAS - MV E/A: 0.81
BH CV ECHO MEAS - PA ACC TIME: 0.08 SEC
BH CV ECHO MEAS - PA PR(ACCEL): 44.4 MMHG
BH CV ECHO MEAS - SI(MOD-SP4): 13.9 ML/M2
BH CV ECHO MEAS - SV(MOD-SP4): 25.8 ML
BH CV ECHO MEAS - TAPSE (>1.6): 2.5 CM
BH CV ECHO MEASUREMENTS AVERAGE E/E' RATIO: 12.96
LEFT ATRIUM VOLUME INDEX: 19.4 ML/M2
MAXIMAL PREDICTED HEART RATE: 144 BPM
STRESS TARGET HR: 122 BPM

## 2022-10-28 PROCEDURE — 93306 TTE W/DOPPLER COMPLETE: CPT

## 2022-10-30 ENCOUNTER — HOSPITAL ENCOUNTER (EMERGENCY)
Facility: HOSPITAL | Age: 77
Discharge: HOME | End: 2022-10-31
Attending: STUDENT IN AN ORGANIZED HEALTH CARE EDUCATION/TRAINING PROGRAM | Admitting: STUDENT IN AN ORGANIZED HEALTH CARE EDUCATION/TRAINING PROGRAM

## 2022-10-30 ENCOUNTER — APPOINTMENT (OUTPATIENT)
Dept: GENERAL RADIOLOGY | Facility: HOSPITAL | Age: 77
End: 2022-10-30

## 2022-10-30 DIAGNOSIS — R07.9 CHEST PAIN, UNSPECIFIED TYPE: Primary | ICD-10-CM

## 2022-10-30 DIAGNOSIS — I20.8 STABLE ANGINA: ICD-10-CM

## 2022-10-30 LAB
A-A DO2: 36.8 MMHG (ref 0–300)
ALBUMIN SERPL-MCNC: 3.81 G/DL (ref 3.5–5.2)
ALBUMIN/GLOB SERPL: 1.5 G/DL
ALP SERPL-CCNC: 64 U/L (ref 39–117)
ALT SERPL W P-5'-P-CCNC: 26 U/L (ref 1–33)
ANION GAP SERPL CALCULATED.3IONS-SCNC: 13.7 MMOL/L (ref 5–15)
ARTERIAL PATENCY WRIST A: ABNORMAL
AST SERPL-CCNC: 24 U/L (ref 1–32)
ATMOSPHERIC PRESS: 726 MMHG
BASE EXCESS BLDA CALC-SCNC: 1.3 MMOL/L (ref 0–2)
BASOPHILS # BLD AUTO: 0.03 10*3/MM3 (ref 0–0.2)
BASOPHILS NFR BLD AUTO: 0.4 % (ref 0–1.5)
BDY SITE: ABNORMAL
BILIRUB SERPL-MCNC: 0.6 MG/DL (ref 0–1.2)
BODY TEMPERATURE: 0 C
BUN SERPL-MCNC: 14 MG/DL (ref 8–23)
BUN/CREAT SERPL: 16.1 (ref 7–25)
CALCIUM SPEC-SCNC: 9.3 MG/DL (ref 8.6–10.5)
CHLORIDE SERPL-SCNC: 104 MMOL/L (ref 98–107)
CO2 BLDA-SCNC: 26.1 MMOL/L (ref 22–33)
CO2 SERPL-SCNC: 23.3 MMOL/L (ref 22–29)
COHGB MFR BLD: 1 % (ref 0–5)
CREAT SERPL-MCNC: 0.87 MG/DL (ref 0.57–1)
DEPRECATED RDW RBC AUTO: 43 FL (ref 37–54)
EGFRCR SERPLBLD CKD-EPI 2021: 69.1 ML/MIN/1.73
EOSINOPHIL # BLD AUTO: 0.23 10*3/MM3 (ref 0–0.4)
EOSINOPHIL NFR BLD AUTO: 3.1 % (ref 0.3–6.2)
ERYTHROCYTE [DISTWIDTH] IN BLOOD BY AUTOMATED COUNT: 13.1 % (ref 12.3–15.4)
GLOBULIN UR ELPH-MCNC: 2.5 GM/DL
GLUCOSE BLDC GLUCOMTR-MCNC: 260 MG/DL (ref 70–130)
GLUCOSE SERPL-MCNC: 255 MG/DL (ref 65–99)
HCO3 BLDA-SCNC: 25 MMOL/L (ref 20–26)
HCT VFR BLD AUTO: 41 % (ref 34–46.6)
HCT VFR BLD CALC: 43.8 % (ref 38–51)
HGB BLD-MCNC: 13.5 G/DL (ref 12–15.9)
HGB BLDA-MCNC: 14.3 G/DL (ref 13.5–17.5)
IMM GRANULOCYTES # BLD AUTO: 0.03 10*3/MM3 (ref 0–0.05)
IMM GRANULOCYTES NFR BLD AUTO: 0.4 % (ref 0–0.5)
INHALED O2 CONCENTRATION: 21 %
LYMPHOCYTES # BLD AUTO: 2.73 10*3/MM3 (ref 0.7–3.1)
LYMPHOCYTES NFR BLD AUTO: 36.5 % (ref 19.6–45.3)
Lab: ABNORMAL
MCH RBC QN AUTO: 29.7 PG (ref 26.6–33)
MCHC RBC AUTO-ENTMCNC: 32.9 G/DL (ref 31.5–35.7)
MCV RBC AUTO: 90.3 FL (ref 79–97)
METHGB BLD QL: 0.1 % (ref 0–3)
MODALITY: ABNORMAL
MONOCYTES # BLD AUTO: 0.59 10*3/MM3 (ref 0.1–0.9)
MONOCYTES NFR BLD AUTO: 7.9 % (ref 5–12)
NEUTROPHILS NFR BLD AUTO: 3.86 10*3/MM3 (ref 1.7–7)
NEUTROPHILS NFR BLD AUTO: 51.7 % (ref 42.7–76)
NOTE: ABNORMAL
NOTIFIED BY: ABNORMAL
NOTIFIED WHO: ABNORMAL
NRBC BLD AUTO-RTO: 0 /100 WBC (ref 0–0.2)
NT-PROBNP SERPL-MCNC: 78.3 PG/ML (ref 0–1800)
OXYHGB MFR BLDV: 92.6 % (ref 94–99)
PCO2 BLDA: 36.1 MM HG (ref 35–45)
PCO2 TEMP ADJ BLD: ABNORMAL MM[HG]
PH BLDA: 7.45 PH UNITS (ref 7.35–7.45)
PH, TEMP CORRECTED: ABNORMAL
PLATELET # BLD AUTO: 224 10*3/MM3 (ref 140–450)
PMV BLD AUTO: 11.1 FL (ref 6–12)
PO2 BLDA: 65 MM HG (ref 83–108)
PO2 TEMP ADJ BLD: ABNORMAL MM[HG]
POTASSIUM SERPL-SCNC: 3.8 MMOL/L (ref 3.5–5.2)
PROT SERPL-MCNC: 6.3 G/DL (ref 6–8.5)
RBC # BLD AUTO: 4.54 10*6/MM3 (ref 3.77–5.28)
SAO2 % BLDCOA: 93.6 % (ref 94–99)
SODIUM SERPL-SCNC: 141 MMOL/L (ref 136–145)
TROPONIN T SERPL-MCNC: <0.01 NG/ML (ref 0–0.03)
VENTILATOR MODE: ABNORMAL
WBC NRBC COR # BLD: 7.47 10*3/MM3 (ref 3.4–10.8)

## 2022-10-30 PROCEDURE — 93010 ELECTROCARDIOGRAM REPORT: CPT | Performed by: INTERNAL MEDICINE

## 2022-10-30 PROCEDURE — 82375 ASSAY CARBOXYHB QUANT: CPT

## 2022-10-30 PROCEDURE — 82962 GLUCOSE BLOOD TEST: CPT

## 2022-10-30 PROCEDURE — 36600 WITHDRAWAL OF ARTERIAL BLOOD: CPT

## 2022-10-30 PROCEDURE — 85025 COMPLETE CBC W/AUTO DIFF WBC: CPT | Performed by: STUDENT IN AN ORGANIZED HEALTH CARE EDUCATION/TRAINING PROGRAM

## 2022-10-30 PROCEDURE — 71045 X-RAY EXAM CHEST 1 VIEW: CPT

## 2022-10-30 PROCEDURE — 84484 ASSAY OF TROPONIN QUANT: CPT | Performed by: STUDENT IN AN ORGANIZED HEALTH CARE EDUCATION/TRAINING PROGRAM

## 2022-10-30 PROCEDURE — 80053 COMPREHEN METABOLIC PANEL: CPT | Performed by: STUDENT IN AN ORGANIZED HEALTH CARE EDUCATION/TRAINING PROGRAM

## 2022-10-30 PROCEDURE — 83880 ASSAY OF NATRIURETIC PEPTIDE: CPT | Performed by: STUDENT IN AN ORGANIZED HEALTH CARE EDUCATION/TRAINING PROGRAM

## 2022-10-30 PROCEDURE — 36415 COLL VENOUS BLD VENIPUNCTURE: CPT

## 2022-10-30 PROCEDURE — 83050 HGB METHEMOGLOBIN QUAN: CPT

## 2022-10-30 PROCEDURE — 99284 EMERGENCY DEPT VISIT MOD MDM: CPT

## 2022-10-30 PROCEDURE — 82805 BLOOD GASES W/O2 SATURATION: CPT

## 2022-10-30 PROCEDURE — 93005 ELECTROCARDIOGRAM TRACING: CPT | Performed by: STUDENT IN AN ORGANIZED HEALTH CARE EDUCATION/TRAINING PROGRAM

## 2022-10-31 VITALS
OXYGEN SATURATION: 94 % | WEIGHT: 185 LBS | BODY MASS INDEX: 32.78 KG/M2 | HEIGHT: 63 IN | SYSTOLIC BLOOD PRESSURE: 138 MMHG | DIASTOLIC BLOOD PRESSURE: 87 MMHG | HEART RATE: 94 BPM | RESPIRATION RATE: 18 BRPM | TEMPERATURE: 97.9 F

## 2022-10-31 LAB — TROPONIN T SERPL-MCNC: <0.01 NG/ML (ref 0–0.03)

## 2022-10-31 PROCEDURE — 84484 ASSAY OF TROPONIN QUANT: CPT | Performed by: STUDENT IN AN ORGANIZED HEALTH CARE EDUCATION/TRAINING PROGRAM

## 2022-11-01 LAB
QT INTERVAL: 410 MS
QTC INTERVAL: 472 MS

## 2022-11-02 ENCOUNTER — TELEPHONE (OUTPATIENT)
Dept: FAMILY MEDICINE CLINIC | Facility: CLINIC | Age: 77
End: 2022-11-02

## 2022-11-02 NOTE — TELEPHONE ENCOUNTER
----- Message from Carlos William DO sent at 11/2/2022  8:23 AM EDT -----  Please ask how she is feeling, she was in the emergency room a couple nights ago for chest pain.  Her echocardiogram looks all right.  No major concerns seen on your echocardiogram, we can discuss at follow-up.      Spoke with patient she reports she is doing good,was SOB & had a few chest pains she thinks it was gas,is scheduled to see Dr. Alexis.

## 2022-11-03 ENCOUNTER — TELEPHONE (OUTPATIENT)
Dept: FAMILY MEDICINE CLINIC | Facility: CLINIC | Age: 77
End: 2022-11-03

## 2022-11-03 NOTE — TELEPHONE ENCOUNTER
Caller: Magnus Bianka L    Relationship: Self    Best call back number:  [phone]    Requested Prescriptions:   Requested Prescriptions      No prescriptions requested or ordered in this encounter        Pharmacy where request should be sent: [facility] #[id number] - [place], [place] - [address] AT [facility] OF [address] - [phone] PH - [phone] FX     Additional details provided by patient:     INSULIN WAS INCREASED AND SHE IS RUNNING OUT.  PHARMACY SAID THEY WOULD NOT REFILL UNTIL END OF November.  PATIENT NEEDS BEFORE THEN.     PATIENT HAS TWO VIALS OF LEVEMIR BUT RUNNING LOW ON 70/30     Does the patient have less than a 3 day supply:  [x] Yes  [] No    Dat Schaeffer Rep   11/03/22 12:04 EDT

## 2022-11-03 NOTE — TELEPHONE ENCOUNTER
Caller: [facility] #[id number] - [place], [place] - [address] AT [facility] OF [address] - [phone] PH - [phone] FX    Relationship: Pharmacy    Best call back number:  [phone]    Requested Prescriptions:   Requested Prescriptions      No prescriptions requested or ordered in this encounter        Pharmacy where request should be sent: [facility] #[id number] - [place], [place] - [address] AT [facility] OF [address] - [phone] PH - [phone] FX     Additional details provided by patient:     INSULIN WAS INCREASED AND SHE IS RUNNING OUT.  PHARMACY SAID THEY WOULD NOT REFILL UNTIL END OF November.  PATIENT NEEDS BEFORE THEN.     PATIENT HAS TWO VIALS OF LEVEMIR BUT RUNNING LOW ON 70/30     Does the patient have less than a 3 day supply:  [x] Yes  [] No    Ni Schmitz LPN   11/03/22 12:33 EDT       Left a message for pharmacy to return call.        Spoke with pharmacy she does have a rx on file at the pharmacy that she can fill.

## 2022-11-08 ENCOUNTER — OFFICE VISIT (OUTPATIENT)
Dept: FAMILY MEDICINE CLINIC | Facility: CLINIC | Age: 77
End: 2022-11-08

## 2022-11-08 VITALS
DIASTOLIC BLOOD PRESSURE: 84 MMHG | HEART RATE: 71 BPM | TEMPERATURE: 97.7 F | BODY MASS INDEX: 33.56 KG/M2 | SYSTOLIC BLOOD PRESSURE: 136 MMHG | OXYGEN SATURATION: 96 % | HEIGHT: 63 IN | WEIGHT: 189.4 LBS

## 2022-11-08 DIAGNOSIS — E11.649 TYPE 2 DIABETES MELLITUS WITH HYPOGLYCEMIA WITHOUT COMA, WITHOUT LONG-TERM CURRENT USE OF INSULIN: ICD-10-CM

## 2022-11-08 DIAGNOSIS — I25.119 CORONARY ARTERY DISEASE INVOLVING NATIVE CORONARY ARTERY OF NATIVE HEART WITH ANGINA PECTORIS: Primary | ICD-10-CM

## 2022-11-08 PROCEDURE — 99214 OFFICE O/P EST MOD 30 MIN: CPT | Performed by: FAMILY MEDICINE

## 2022-11-08 RX ORDER — BLOOD-GLUCOSE METER
1 KIT MISCELLANEOUS AS NEEDED
Qty: 1 EACH | Refills: 0 | Status: SHIPPED | OUTPATIENT
Start: 2022-11-08

## 2022-11-08 RX ORDER — LANCETS 30 GAUGE
1 EACH MISCELLANEOUS 4 TIMES DAILY
Qty: 120 EACH | Refills: 11 | Status: SHIPPED | OUTPATIENT
Start: 2022-11-08

## 2022-11-08 NOTE — PROGRESS NOTES
Transitional Care Follow Up Visit  Subjective     Bianka Agudelo is a 76 y.o. female who presents for a transitional care management visit.    Within 48 business hours after discharge our office contacted her via telephone to coordinate her care and needs.      I reviewed and discussed the details of that call along with the discharge summary, hospital problems, inpatient lab results, inpatient diagnostic studies, and consultation reports with Bianka.     Current outpatient and discharge medications have been reconciled for the patient.  Reviewed by: SARAH Hadley      No flowsheet data found.  Risk for Readmission (LACE) No data recorded    History of Present Illness   Course During Hospital Stay: Patient presented to the ED on 10/30/2022 complaining of chest pain.  Reported she had intermittent substernal nonexertional chest pain for the last few months.  Symptoms typically lasted a few seconds and then would resolve.  However today she had a sharp pressure that also did resolve.  Patient was found to have a normal cardiac work-up.  EKG within with normal limits, troponin was negative, chest x-ray was negative.  Patient previously seen a cardiologist who is no longer working.  Patient was discharged home.  Patient presents to clinic today stating her chest pain is at baseline now.  Was not set up with a new cardiologist.  Will place referral for cardiologist today.  Patient has follow-up appointment in 2 weeks continue this appointment.     The following portions of the patient's history were reviewed and updated as appropriate: allergies, current medications, past family history, past medical history, past social history, past surgical history and problem list.    Review of Systems    Objective   Physical Exam  Constitutional:       General: She is not in acute distress.     Appearance: Normal appearance. She is well-developed and well-groomed. She is not ill-appearing, toxic-appearing or diaphoretic.   HENT:       Head: Normocephalic.      Right Ear: Tympanic membrane, ear canal and external ear normal.      Left Ear: Tympanic membrane, ear canal and external ear normal.      Nose: Nose normal. No congestion or rhinorrhea.      Mouth/Throat:      Mouth: Mucous membranes are moist.      Pharynx: Oropharynx is clear. No oropharyngeal exudate or posterior oropharyngeal erythema.   Eyes:      General: Lids are normal.         Right eye: No discharge.         Left eye: No discharge.      Extraocular Movements: Extraocular movements intact.      Pupils: Pupils are equal, round, and reactive to light.   Neck:      Vascular: No carotid bruit.   Cardiovascular:      Rate and Rhythm: Normal rate and regular rhythm.      Pulses: Normal pulses.      Heart sounds: Normal heart sounds. No murmur heard.    No friction rub. No gallop.   Pulmonary:      Effort: Pulmonary effort is normal. No respiratory distress.      Breath sounds: Normal breath sounds. No stridor. No wheezing, rhonchi or rales.   Chest:      Chest wall: No tenderness.   Abdominal:      General: Bowel sounds are normal. There is no distension.      Palpations: Abdomen is soft. There is no mass.      Tenderness: There is no abdominal tenderness. There is no right CVA tenderness, left CVA tenderness, guarding or rebound.      Hernia: No hernia is present.   Musculoskeletal:         General: No swelling or tenderness. Normal range of motion.      Cervical back: Normal range of motion and neck supple. No rigidity or tenderness.      Right lower leg: No edema.      Left lower leg: No edema.   Lymphadenopathy:      Cervical: No cervical adenopathy.   Skin:     General: Skin is warm.      Capillary Refill: Capillary refill takes less than 2 seconds.      Coloration: Skin is not jaundiced.      Findings: No bruising, erythema or rash.   Neurological:      General: No focal deficit present.      Mental Status: She is alert and oriented to person, place, and time.      Motor:  Motor function is intact. No weakness.      Coordination: Coordination is intact.      Gait: Gait is intact. Gait normal.   Psychiatric:         Attention and Perception: Attention normal.         Mood and Affect: Mood normal.         Speech: Speech normal.         Behavior: Behavior normal.         Cognition and Memory: Cognition normal.         Judgment: Judgment normal.         Assessment & Plan   Diagnoses and all orders for this visit:    1. Coronary artery disease involving native coronary artery of native heart with angina pectoris (HCC) (Primary)  -     Ambulatory Referral to Cardiology    2. Type 2 diabetes mellitus with hypoglycemia without coma, without long-term current use of insulin ([facility])  -     glucose blood test strip; 1 each by Other route 4 (Four) Times a Day. Use as instructed  Dispense: 120 each; Refill: 11  -     glucose monitor monitoring kit; 1 each As Needed (for glucose checks).  Dispense: 1 each; Refill: 0  -     Lancets misc; 1 application 4 (Four) Times a Day.  Dispense: 120 each; Refill: 11               Procedures     Return if symptoms worsen or fail to improve, for Next scheduled follow up.     This document has been electronically signed by SARAH Hadley  November 9, 2022 10:51 EST

## 2022-11-22 DIAGNOSIS — E11.3393 TYPE 2 DIABETES MELLITUS WITH BOTH EYES AFFECTED BY MODERATE NONPROLIFERATIVE RETINOPATHY WITHOUT MACULAR EDEMA, WITH LONG-TERM CURRENT USE OF INSULIN: ICD-10-CM

## 2022-11-22 DIAGNOSIS — Z79.4 TYPE 2 DIABETES MELLITUS WITH BOTH EYES AFFECTED BY MODERATE NONPROLIFERATIVE RETINOPATHY WITHOUT MACULAR EDEMA, WITH LONG-TERM CURRENT USE OF INSULIN: ICD-10-CM

## 2022-11-22 DIAGNOSIS — M25.551 RIGHT HIP PAIN: ICD-10-CM

## 2022-11-22 RX ORDER — SYRING-NEEDL,DISP,INSUL,0.3 ML 31GX15/64"
SYRINGE, EMPTY DISPOSABLE MISCELLANEOUS
Qty: 200 EACH | Refills: 5 | Status: SHIPPED | OUTPATIENT
Start: 2022-11-22

## 2022-11-27 RX ORDER — ASPIRIN 81 MG/1
TABLET, COATED ORAL
Qty: 90 TABLET | Refills: 1 | Status: SHIPPED | OUTPATIENT
Start: 2022-11-27

## 2022-11-27 RX ORDER — IBUPROFEN 800 MG/1
TABLET ORAL
Qty: 90 TABLET | Refills: 2 | Status: SHIPPED | OUTPATIENT
Start: 2022-11-27

## 2022-11-29 ENCOUNTER — OFFICE VISIT (OUTPATIENT)
Dept: CARDIOLOGY | Facility: CLINIC | Age: 77
End: 2022-11-29

## 2022-11-29 VITALS
SYSTOLIC BLOOD PRESSURE: 124 MMHG | DIASTOLIC BLOOD PRESSURE: 70 MMHG | HEART RATE: 80 BPM | HEIGHT: 63 IN | RESPIRATION RATE: 16 BRPM | WEIGHT: 189.8 LBS | BODY MASS INDEX: 33.63 KG/M2

## 2022-11-29 DIAGNOSIS — I25.119 CORONARY ARTERY DISEASE INVOLVING NATIVE CORONARY ARTERY OF NATIVE HEART WITH ANGINA PECTORIS: ICD-10-CM

## 2022-11-29 DIAGNOSIS — I15.2 HYPERTENSION ASSOCIATED WITH DIABETES: ICD-10-CM

## 2022-11-29 DIAGNOSIS — G47.33 OSA (OBSTRUCTIVE SLEEP APNEA): ICD-10-CM

## 2022-11-29 DIAGNOSIS — R07.2 PRECORDIAL CHEST PAIN: ICD-10-CM

## 2022-11-29 DIAGNOSIS — I49.3 PREMATURE VENTRICULAR BEAT: ICD-10-CM

## 2022-11-29 DIAGNOSIS — R42 DIZZINESS: ICD-10-CM

## 2022-11-29 DIAGNOSIS — R00.2 PALPITATIONS: ICD-10-CM

## 2022-11-29 DIAGNOSIS — E78.5 DYSLIPIDEMIA: ICD-10-CM

## 2022-11-29 DIAGNOSIS — R06.02 SHORTNESS OF BREATH: Primary | ICD-10-CM

## 2022-11-29 DIAGNOSIS — E11.59 HYPERTENSION ASSOCIATED WITH DIABETES: ICD-10-CM

## 2022-11-29 PROCEDURE — 93270 REMOTE 30 DAY ECG REV/REPORT: CPT | Performed by: SPECIALIST

## 2022-11-29 PROCEDURE — 99204 OFFICE O/P NEW MOD 45 MIN: CPT | Performed by: SPECIALIST

## 2022-11-29 PROCEDURE — 93000 ELECTROCARDIOGRAM COMPLETE: CPT | Performed by: SPECIALIST

## 2022-11-29 NOTE — PROGRESS NOTES
Subjective   Initial consultation, shortness of breath, chest pain  Bianka Agudelo is a 76 y.o. female who presents to day for Chest Pain (Er visit recently, previous patient of Dr. Dunn), Shortness of Breath (Routine activity), Edema (feet), and Med Management (presented).    CHIEF COMPLIANT  Chief Complaint   Patient presents with   • Chest Pain     Er visit recently, previous patient of Dr. Dunn   • Shortness of Breath     Routine activity   • Edema     feet   • Med Management     presented       Active Problems:  Problem List Items Addressed This Visit        Cardiac and Vasculature    Hypertension associated with diabetes (HCC)    Dyslipidemia    Coronary artery disease involving native coronary artery of native heart with angina pectoris (HCC)    Relevant Orders    Stress Test With Myocardial Perfusion One Day    Precordial chest pain    Relevant Orders    Stress Test With Myocardial Perfusion One Day    Palpitations    Relevant Orders    Cardiac Event Monitor    Premature ventricular beat    Relevant Orders    Cardiac Event Monitor       Pulmonary and Pneumonias    Shortness of breath - Primary       Sleep    ROD (obstructive sleep apnea)    Relevant Orders    Home Sleep Study       Symptoms and Signs    Dizziness    Relevant Orders    US Carotid Bilateral    Cardiac Event Monitor       HPI  HPI  Patient for about a month has noticed that she is getting worse shortness of breath now she gets short of breath doing some housework she is not extremely active because of severe arthritis and hip pain but she also noted that she gets chest discomfort radiating to the left arm on and off maybe about twice a week elated to exertion such as lifting heavy stuff and this can last for few minutes back in October the 30 years she was talking the phone to her sister when she suddenly started having the discomfort with numbness in the arm and shortness of breath and palpitations she called the paramedics and after  receiving nitroglycerin her symptoms improved she was sent to the ER where she was discharged later on since then she still short of breath she does have also palpitations which wakes her up at night sometimes she also feels fatigued and sleepy during the day and snoring at night she has family history of sleep apnea with her sister and her niece as well as granddaughter she never smoked cigarettes to be exposed to secondhand smoking she was also found to have high blood pressure at the emergency room she is diabetic with neuropathy and retinopathy her brother  2 years ago with heart attack he was 76 and her younger brother had a heart attack and stroke at age 63 and her mother also had a stroke age 60  PRIOR MEDS  Current Outpatient Medications on File Prior to Visit   Medication Sig Dispense Refill   • albuterol sulfate  (90 Base) MCG/ACT inhaler Inhale 2 puffs Every 4 (Four) Hours As Needed for Wheezing. 90 g 0   • amLODIPine (NORVASC) 5 MG tablet Take 1 tablet by mouth Daily. 90 tablet 0   • Aspirin Low Dose 81 MG EC tablet TAKE 1 TABLET BY MOUTH DAILY 90 tablet 1   • atorvastatin (LIPITOR) 10 MG tablet Take 1 tablet by mouth Daily. 90 tablet 0   • clopidogrel (PLAVIX) 75 MG tablet Take 1 tablet by mouth Daily. 90 tablet 0   • cyclobenzaprine (FLEXERIL) 10 MG tablet Take 1 tablet by mouth 2 (Two) Times a Day As Needed for Muscle Spasms. 60 tablet 0   • diazePAM (VALIUM) 5 MG tablet Take 1 tablet by mouth Every 12 (Twelve) Hours As Needed for Anxiety. 60 tablet 0   • Diclofenac Sodium (VOLTAREN) 1 % gel gel APPLY 4 GRAMS TO THE AFFECTED AREA BELOW THE WAIST AND 2 GRAMS TO THE AFFECTED AREA ABOVE THE WAIST FOUR TIMES DAILY AS NEEDED FOR PAIN 100 g 5   • ibuprofen (ADVIL,MOTRIN) 800 MG tablet TAKE 1 TABLET BY MOUTH EVERY 8 HOURS AS NEEDED FOR PAIN 90 tablet 2   • insulin detemir (Levemir) 100 UNIT/ML injection Inject 50 Units under the skin into the appropriate area as directed Every Night. 50 mL 0   •  "isosorbide mononitrate (IMDUR) 30 MG 24 hr tablet Take 1 tablet by mouth Daily. 90 tablet 0   • levothyroxine (SYNTHROID, LEVOTHROID) 150 MCG tablet Take 1 tablet by mouth Daily. 90 tablet 0   • losartan-hydrochlorothiazide (HYZAAR) 50-12.5 MG per tablet Take 1 tablet by mouth Daily. 180 tablet 0   • metFORMIN ER (GLUCOPHAGE-XR) 500 MG 24 hr tablet Take 1 tablet by mouth 2 (Two) Times a Day With Meals. 180 tablet 0   • NovoLOG Mix 70/30 (70-30) 100 UNIT/ML injection Inject 24 Units under the skin into the appropriate area as directed 2 (Two) Times a Day With Meals. 40 mL 1   • omeprazole (priLOSEC) 20 MG capsule Take 1 capsule by mouth 2 (Two) Times a Day. 180 capsule 0   • oxybutynin (DITROPAN) 5 MG tablet Take 1 tablet by mouth 2 (Two) Times a Day. 180 tablet 0   • SITagliptin (Januvia) 100 MG tablet Take 1 tablet by mouth Daily. 90 tablet 0   • vitamin B-12 (CYANOCOBALAMIN) 1000 MCG tablet Take 1 tablet by mouth Daily. 90 tablet 1   • BD Veo Insulin Syringe U/F 31G X 15/64\" 0.5 ML misc USE TO INJECT TWICE DAILY 200 each 5   • glucose blood test strip 1 each by Other route 4 (Four) Times a Day. Use as instructed 120 each 11   • glucose monitor monitoring kit 1 each As Needed (for glucose checks). 1 each 0   • Lancets misc 1 application 4 (Four) Times a Day. 120 each 11   • losartan (COZAAR) 50 MG tablet Take 1 tablet by mouth Daily. 90 tablet 0     No current facility-administered medications on file prior to visit.       ALLERGIES  Nuts    HISTORY  Past Medical History:   Diagnosis Date   • Anxiety    • Asthma    • CAD (coronary artery disease)    • Diabetes mellitus (HCC)    • Dyslipidemia    • GERD (gastroesophageal reflux disease)    • H/O angina pectoris    • History of bone density study     February 2014   • History of mammogram 07/15/2013   • Hypertension    • Hypothyroidism    • Muscle spasms of both lower extremities    • Overactive bladder    • Vitamin B12 deficiency    • Vitamin D deficiency  " "      Social History     Socioeconomic History   • Marital status:    Tobacco Use   • Smoking status: Never   • Smokeless tobacco: Never   Vaping Use   • Vaping Use: Never used   Substance and Sexual Activity   • Alcohol use: No   • Drug use: No   • Sexual activity: Defer       Family History   Problem Relation Age of Onset   • Diabetes Mother    • Heart disease Mother    • Stroke Mother    • Cancer Father    • Pancreatic cancer Sister    • Diabetes Sister    • Heart attack Brother        Review of Systems   Respiratory: Positive for chest tightness and shortness of breath. Negative for apnea, cough, choking, wheezing and stridor.    Cardiovascular: Positive for chest pain, palpitations and leg swelling.       Objective     VITALS: /70 (BP Location: Right arm)   Pulse 80   Resp 16   Ht 160 cm (63\")   Wt 86.1 kg (189 lb 12.8 oz)   BMI 33.62 kg/m²     LABS:   Lab Results (most recent)     None          IMAGING:   XR Chest AP    Result Date: 10/30/2022  No acute cardiopulmonary findings. Signer Name: Bart Mccartney MD  Signed: 10/30/2022 10:36 PM  Workstation Name: [facility]  [facility]      EXAM:  Physical Exam  Vitals reviewed.   Constitutional:       Appearance: She is well-developed.   HENT:      Head: Normocephalic and atraumatic.   Eyes:      Pupils: Pupils are equal, round, and reactive to light.   Neck:      Thyroid: No thyromegaly.      Vascular: No JVD.   Cardiovascular:      Rate and Rhythm: Normal rate and regular rhythm.      Heart sounds: Normal heart sounds. No murmur heard.    No friction rub. No gallop.      Comments: 2/6 ejection systolic murmur heard best at the base radiating across the sternal border  Pulmonary:      Effort: Pulmonary effort is normal. No respiratory distress.      Breath sounds: Normal breath sounds. No stridor. No wheezing or rales.   Chest:      Chest wall: No tenderness.   Musculoskeletal:         General: No tenderness or " deformity.      Cervical back: Neck supple.   Skin:     General: Skin is warm and dry.   Neurological:      Mental Status: She is alert and oriented to person, place, and time.      Cranial Nerves: No cranial nerve deficit.      Coordination: Coordination normal.         Procedure     ECG 12 Lead    Date/Time: 11/29/2022 9:54 AM  Performed by: Danielle Webber MD  Authorized by: Danielle Webber MD           EKG: Normal sinus rhythm with left axis deviation otherwise essentially unremarkable EKG compared with EKG on October 30, 2022 PVCs are not seen today       Assessment & Plan     Diagnoses and all orders for this visit:    1. Shortness of breath (Primary)    2. Coronary artery disease involving native coronary artery of native heart with angina pectoris (HCC)  -     Stress Test With Myocardial Perfusion One Day; Future    3. Dyslipidemia    4. Precordial chest pain  -     Stress Test With Myocardial Perfusion One Day; Future    5. Hypertension associated with diabetes (HCC)    6. Dizziness  -     US Carotid Bilateral; Future  -     Cardiac Event Monitor; Future    7. ROD (obstructive sleep apnea)  -     Home Sleep Study; Future    8. Palpitations  -     Cardiac Event Monitor; Future    9. Premature ventricular beat  -     Cardiac Event Monitor; Future    Other orders  -     ECG 12 Lead    1.  She has multiple risk factors for coronary artery disease with chest pain with exertional element she said she had a cardiac catheterization about 20 years ago and she was told that she has angina but no no intervention was done for this reason I am going to proceed with pharmacological stress testing for assessment of ischemia as she has also severe arthritis tolerate taking aspirin we will continue with that  2.  I reviewed her labs she is already on atorvastatin with LDL of 72 and HDL of 55 triglycerides mildly evaded at 153 also her labs at the emergency room showed elevated sugar  3.  She is having frequent palpitations at  night someone to get an event monitor for assessment of arrhythmias with possible atrial fibrillation  4.  Her symptoms are consistent with sleep apnea but proceed with home sleep study  5.  Currently her blood pressure is well controlled we will continue current management  6.  She has a cardiac murmur and I reviewed the echocardiogram which was done on October and that did show aortic sclerosis but otherwise normal wall left ventricular systolic function with diastolic dysfunction, advised her to eat low-salt diet  7.  On the EKG on the emergency room showed frequent premature ventricular complexes and as well as palpitations we will also get the monitor as mentioned above  8.  Her symptoms are consistent with sleep apnea going to refer her for home sleep study  9.  She is also having dizziness with multiple risk factors and want to do carotid Doppler for assessment of carotid stenosis.    Return After stress test.      Advance Care Planning   ACP discussion was declined by the patient. Patient does not have an advance directive, declines further assistance.             MEDS ORDERED DURING VISIT:  No orders of the defined types were placed in this encounter.      As always, Eva Elliott MD  I appreciate very much the opportunity to participate in the cardiovascular care of your patients. Please do not hesitate to call me with any questions with regards to Bianka Agudelo evaluation and management.         This document has been electronically signed by Danielle Webber MD  November 29, 2022 10:57 EST

## 2022-12-06 DIAGNOSIS — F41.9 ANXIETY: ICD-10-CM

## 2022-12-06 NOTE — TELEPHONE ENCOUNTER
Caller: Bianka Agudelo    Relationship: Self    Best call back number: [phone]    Requested Prescriptions:   Requested Prescriptions     Pending Prescriptions Disp Refills   • diazePAM (VALIUM) 5 MG tablet 60 tablet 0     Sig: Take 1 tablet by mouth Every 12 (Twelve) Hours As Needed for Anxiety.        Pharmacy where request should be sent: [facility][id number] - [facility] [place] - [address] AT [facility] [address] - [phone] [facility] [phone] FX       Does the patient have less than a 3 day supply:  [x] Yes  [] No    Would you like a call back once the refill request has been completed: [x] Yes [] No    If the office needs to give you a call back, can they leave a voicemail: [x] Yes [] No    Dat Tom Rep   12/06/22 12:22 EST

## 2022-12-07 RX ORDER — DIAZEPAM 5 MG/1
5 TABLET ORAL EVERY 12 HOURS PRN
Qty: 60 TABLET | Refills: 0 | Status: SHIPPED | OUTPATIENT
Start: 2022-12-07 | End: 2023-01-06 | Stop reason: SDUPTHER

## 2022-12-15 ENCOUNTER — TREATMENT (OUTPATIENT)
Dept: CARDIOLOGY | Facility: CLINIC | Age: 77
End: 2022-12-15

## 2022-12-15 DIAGNOSIS — I49.3 PREMATURE VENTRICULAR BEAT: ICD-10-CM

## 2022-12-15 DIAGNOSIS — R00.2 PALPITATIONS: ICD-10-CM

## 2022-12-15 DIAGNOSIS — R42 DIZZINESS: ICD-10-CM

## 2022-12-15 PROCEDURE — 93272 ECG/REVIEW INTERPRET ONLY: CPT | Performed by: SPECIALIST

## 2022-12-21 ENCOUNTER — TELEPHONE (OUTPATIENT)
Dept: CARDIOLOGY | Facility: CLINIC | Age: 77
End: 2022-12-21

## 2022-12-21 NOTE — TELEPHONE ENCOUNTER
Caller: Nadya Agudelo    Relationship to patient: Emergency Contact    Best call back number: [phone]    Patient is needing: PT HAD A SLEEP TEST ORDERED AND IT WAS SUPPOSED TO BE MAILED IN TO HER. THEY HAVE NOT YET RECEIVED IT AFTER 2 WEEKS. PLEASE ADVISE. THEY ARE WANTING TO MAKE SURE SHE IS NOT CHARGED FOR THE DEVICE THAT HAS NOT BEEN IN HER POSSESSION. THANK YOU.

## 2023-01-06 DIAGNOSIS — F41.9 ANXIETY: ICD-10-CM

## 2023-01-06 NOTE — TELEPHONE ENCOUNTER
Caller: Bianka Agudelo    Relationship: Self        Requested Prescriptions:   Requested Prescriptions     Pending Prescriptions Disp Refills   • diazePAM (VALIUM) 5 MG tablet 60 tablet 0     Sig: Take 1 tablet by mouth Every 12 (Twelve) Hours As Needed for Anxiety.        Pharmacy where request should be sent: [facility][id number] - [place] - [address] AT [facility] [address] - [phone]  - [phone] FX       Does the patient have less than a 3 day supply:  [] Yes  [x] No    Would you like a call back once the refill request has been completed: [x] Yes [] No    If the office needs to give you a call back, can they leave a voicemail: [x] Yes [] No    Dat Tom Rep   01/06/23 10:16 EST

## 2023-01-09 ENCOUNTER — OFFICE VISIT (OUTPATIENT)
Dept: FAMILY MEDICINE CLINIC | Facility: CLINIC | Age: 78
End: 2023-01-09
Payer: MEDICARE

## 2023-01-09 VITALS
SYSTOLIC BLOOD PRESSURE: 140 MMHG | WEIGHT: 189.8 LBS | HEIGHT: 63 IN | BODY MASS INDEX: 33.63 KG/M2 | OXYGEN SATURATION: 96 % | DIASTOLIC BLOOD PRESSURE: 88 MMHG | TEMPERATURE: 95.7 F | HEART RATE: 98 BPM

## 2023-01-09 DIAGNOSIS — Z79.4 TYPE 2 DIABETES MELLITUS WITH BOTH EYES AFFECTED BY MODERATE NONPROLIFERATIVE RETINOPATHY WITHOUT MACULAR EDEMA, WITH LONG-TERM CURRENT USE OF INSULIN: ICD-10-CM

## 2023-01-09 DIAGNOSIS — E11.649 TYPE 2 DIABETES MELLITUS WITH HYPOGLYCEMIA WITHOUT COMA, WITHOUT LONG-TERM CURRENT USE OF INSULIN: Primary | ICD-10-CM

## 2023-01-09 DIAGNOSIS — M25.551 RIGHT HIP PAIN: ICD-10-CM

## 2023-01-09 DIAGNOSIS — R35.0 FREQUENCY OF MICTURITION: ICD-10-CM

## 2023-01-09 DIAGNOSIS — N32.81 OVERACTIVE BLADDER: ICD-10-CM

## 2023-01-09 DIAGNOSIS — I25.119 CORONARY ARTERY DISEASE INVOLVING NATIVE CORONARY ARTERY OF NATIVE HEART WITH ANGINA PECTORIS: ICD-10-CM

## 2023-01-09 DIAGNOSIS — E78.5 HYPERLIPIDEMIA DUE TO TYPE 2 DIABETES MELLITUS: ICD-10-CM

## 2023-01-09 DIAGNOSIS — E11.3393 TYPE 2 DIABETES MELLITUS WITH BOTH EYES AFFECTED BY MODERATE NONPROLIFERATIVE RETINOPATHY WITHOUT MACULAR EDEMA, WITH LONG-TERM CURRENT USE OF INSULIN: ICD-10-CM

## 2023-01-09 DIAGNOSIS — M62.838 NIGHT MUSCLE SPASMS: ICD-10-CM

## 2023-01-09 DIAGNOSIS — E11.69 HYPERLIPIDEMIA DUE TO TYPE 2 DIABETES MELLITUS: ICD-10-CM

## 2023-01-09 PROCEDURE — 99214 OFFICE O/P EST MOD 30 MIN: CPT | Performed by: FAMILY MEDICINE

## 2023-01-09 RX ORDER — CYCLOBENZAPRINE HCL 10 MG
10 TABLET ORAL 3 TIMES DAILY PRN
Qty: 60 TABLET | Refills: 0 | Status: SHIPPED | OUTPATIENT
Start: 2023-01-09 | End: 2023-02-21 | Stop reason: SDUPTHER

## 2023-01-09 NOTE — PROGRESS NOTES
Chief Complaint  Diabetes (F/u )    Subjective          Bianka Agudelo presents to [facility]  Diabetes  She presents for her follow-up diabetic visit. She has type 2 diabetes mellitus. Her disease course has been fluctuating. Current diabetic treatment includes oral agent (dual therapy) and insulin injections. She is compliant with treatment most of the time. Her weight is stable. She is following a generally unhealthy diet. Meal planning includes avoidance of concentrated sweets.   Hip Pain   The incident occurred 5 to 7 days ago. The incident occurred at home. There was no injury mechanism. The pain is present in the left hip. The pain is moderate. The pain has been fluctuating since onset. Associated symptoms include muscle weakness. Pertinent negatives include no numbness or tingling. She has tried rest and acetaminophen for the symptoms. The treatment provided moderate relief.       Review of Systems   Neurological: Negative for tingling and numbness.         Objective   Vital Signs:   /88 (BP Location: Right arm, Patient Position: Sitting, Cuff Size: Large Adult)   Pulse 98   Temp 95.7 °F (35.4 °C) (Temporal)   Ht 160 cm (63\")   Wt 86.1 kg (189 lb 12.8 oz)   SpO2 96%   BMI 33.62 kg/m²     Physical Exam  Constitutional:       General: She is not in acute distress.     Appearance: Normal appearance. She is well-developed and well-groomed. She is not ill-appearing, toxic-appearing or diaphoretic.   HENT:      Head: Normocephalic.      Nose: Nose normal. No congestion or rhinorrhea.      Mouth/Throat:      Mouth: Mucous membranes are moist.      Pharynx: Oropharynx is clear. No oropharyngeal exudate or posterior oropharyngeal erythema.   Eyes:      General: Lids are normal.         Right eye: No discharge.         Left eye: No discharge.      Extraocular Movements: Extraocular movements intact.      Pupils: Pupils are equal, round, and reactive to light.   Neck:       Vascular: No carotid bruit.   Cardiovascular:      Rate and Rhythm: Normal rate and regular rhythm.      Pulses: Normal pulses.      Heart sounds: Normal heart sounds. No murmur heard.    No friction rub. No gallop.   Pulmonary:      Effort: Pulmonary effort is normal. No respiratory distress.      Breath sounds: Normal breath sounds. No stridor. No wheezing, rhonchi or rales.   Chest:      Chest wall: No tenderness.   Abdominal:      General: Bowel sounds are normal. There is no distension.      Palpations: Abdomen is soft. There is no mass.      Tenderness: There is no abdominal tenderness. There is no right CVA tenderness, left CVA tenderness, guarding or rebound.      Hernia: No hernia is present.   Musculoskeletal:         General: No swelling or tenderness. Normal range of motion.      Cervical back: Normal range of motion and neck supple. No rigidity or tenderness.      Right lower leg: No edema.      Left lower leg: No edema.   Lymphadenopathy:      Cervical: No cervical adenopathy.   Skin:     General: Skin is warm.      Capillary Refill: Capillary refill takes less than 2 seconds.      Coloration: Skin is not jaundiced.      Findings: No bruising, erythema or rash.   Neurological:      General: No focal deficit present.      Mental Status: She is alert and oriented to person, place, and time.      Motor: Motor function is intact. No weakness.      Coordination: Coordination is intact.      Gait: Gait is intact. Gait normal.   Psychiatric:         Attention and Perception: Attention normal.         Mood and Affect: Mood normal.         Speech: Speech normal.         Behavior: Behavior normal.         Cognition and Memory: Cognition normal.         Judgment: Judgment normal.        Result Review :                 Assessment and Plan    Diagnoses and all orders for this visit:    1. Type 2 diabetes mellitus with both eyes affected by moderate nonproliferative retinopathy without macular edema, with long-term  current use of insulin (HCC)  -     CBC Auto Differential; Future  -     Comprehensive Metabolic Panel; Future  -     Hemoglobin A1c; Future  -     MicroAlbumin, Urine, Random - Urine, Clean Catch; Future    2. Right hip pain  -     cyclobenzaprine (FLEXERIL) 10 MG tablet; Take 1 tablet by mouth 3 (Three) Times a Day As Needed for Muscle Spasms.  Dispense: 60 tablet; Refill: 0    3. Night muscle spasms  -     cyclobenzaprine (FLEXERIL) 10 MG tablet; Take 1 tablet by mouth 3 (Three) Times a Day As Needed for Muscle Spasms.  Dispense: 60 tablet; Refill: 0      Patient's Body mass index is 33.62 kg/m². indicating that she is obese (BMI >30). Obesity-related health conditions include the following: hypertension, diabetes mellitus, dyslipidemias and GERD. Obesity is unchanged. BMI is is above average; BMI management plan is completed. We discussed low calorie, low carb based diet program, portion control and increasing exercise..    Follow Up   Return labs today, for Next scheduled follow up.  Patient was given instructions and counseling regarding her condition or for health maintenance advice. Please see specific information pulled into the AVS if appropriate.     This document has been electronically signed by SARAH Hadley  January 9, 2023 10:30 EST

## 2023-01-10 LAB
ALBUMIN SERPL-MCNC: 4.3 G/DL (ref 3.5–5.2)
ALBUMIN/GLOB SERPL: 2.2 G/DL
ALP SERPL-CCNC: 68 U/L (ref 39–117)
ALT SERPL-CCNC: 27 U/L (ref 1–33)
AST SERPL-CCNC: 26 U/L (ref 1–32)
BASOPHILS # BLD AUTO: 0.05 10*3/MM3 (ref 0–0.2)
BASOPHILS NFR BLD AUTO: 0.6 % (ref 0–1.5)
BILIRUB SERPL-MCNC: 0.8 MG/DL (ref 0–1.2)
BUN SERPL-MCNC: 14 MG/DL (ref 8–23)
BUN/CREAT SERPL: 23.3 (ref 7–25)
CALCIUM SERPL-MCNC: 9.9 MG/DL (ref 8.6–10.5)
CHLORIDE SERPL-SCNC: 99 MMOL/L (ref 98–107)
CO2 SERPL-SCNC: 28 MMOL/L (ref 22–29)
CREAT SERPL-MCNC: 0.6 MG/DL (ref 0.57–1)
EGFRCR SERPLBLD CKD-EPI 2021: 92.6 ML/MIN/1.73
EOSINOPHIL # BLD AUTO: 0.23 10*3/MM3 (ref 0–0.4)
EOSINOPHIL NFR BLD AUTO: 2.9 % (ref 0.3–6.2)
ERYTHROCYTE [DISTWIDTH] IN BLOOD BY AUTOMATED COUNT: 13 % (ref 12.3–15.4)
GLOBULIN SER CALC-MCNC: 2 GM/DL
GLUCOSE SERPL-MCNC: 284 MG/DL (ref 65–99)
HBA1C MFR BLD: 10.5 % (ref 4.8–5.6)
HCT VFR BLD AUTO: 42.6 % (ref 34–46.6)
HGB BLD-MCNC: 14 G/DL (ref 12–15.9)
IMM GRANULOCYTES # BLD AUTO: 0.03 10*3/MM3 (ref 0–0.05)
IMM GRANULOCYTES NFR BLD AUTO: 0.4 % (ref 0–0.5)
LYMPHOCYTES # BLD AUTO: 2.37 10*3/MM3 (ref 0.7–3.1)
LYMPHOCYTES NFR BLD AUTO: 29.9 % (ref 19.6–45.3)
MCH RBC QN AUTO: 29.5 PG (ref 26.6–33)
MCHC RBC AUTO-ENTMCNC: 32.9 G/DL (ref 31.5–35.7)
MCV RBC AUTO: 89.7 FL (ref 79–97)
MONOCYTES # BLD AUTO: 0.62 10*3/MM3 (ref 0.1–0.9)
MONOCYTES NFR BLD AUTO: 7.8 % (ref 5–12)
NEUTROPHILS # BLD AUTO: 4.62 10*3/MM3 (ref 1.7–7)
NEUTROPHILS NFR BLD AUTO: 58.4 % (ref 42.7–76)
NRBC BLD AUTO-RTO: 0.1 /100 WBC (ref 0–0.2)
PLATELET # BLD AUTO: 246 10*3/MM3 (ref 140–450)
POTASSIUM SERPL-SCNC: 4.2 MMOL/L (ref 3.5–5.2)
PROT SERPL-MCNC: 6.3 G/DL (ref 6–8.5)
RBC # BLD AUTO: 4.75 10*6/MM3 (ref 3.77–5.28)
SODIUM SERPL-SCNC: 137 MMOL/L (ref 136–145)
WBC # BLD AUTO: 7.92 10*3/MM3 (ref 3.4–10.8)

## 2023-01-10 RX ORDER — DIAZEPAM 5 MG/1
5 TABLET ORAL EVERY 12 HOURS PRN
Qty: 60 TABLET | Refills: 0 | Status: SHIPPED | OUTPATIENT
Start: 2023-01-10 | End: 2023-02-21 | Stop reason: SDUPTHER

## 2023-01-12 ENCOUNTER — TELEPHONE (OUTPATIENT)
Dept: FAMILY MEDICINE CLINIC | Facility: CLINIC | Age: 78
End: 2023-01-12
Payer: MEDICARE

## 2023-01-12 NOTE — TELEPHONE ENCOUNTER
----- Message from SARAH Hadley sent at 1/12/2023  2:35 PM EST -----  Please call the patient regarding her abnormal result. Her a1c is still 10.5. She needs to increase her insulin as we discussed. Make sure she is taking both like prescribe. Focus on limiting her calories and decreasing her carbs and sugars. Make sure she keeps her follow up she has upcoming with Dr. Elliott.       Left a message to return call.    Spoke with daughter & left a message for patient to return our call.      Spoke with patient & she verbalized understanding.

## 2023-01-13 LAB
BACTERIA UR CULT: ABNORMAL
BACTERIA UR CULT: ABNORMAL
OTHER ANTIBIOTIC SUSC ISLT: ABNORMAL

## 2023-01-16 ENCOUNTER — TELEPHONE (OUTPATIENT)
Dept: FAMILY MEDICINE CLINIC | Facility: CLINIC | Age: 78
End: 2023-01-16
Payer: MEDICARE

## 2023-01-16 RX ORDER — NITROFURANTOIN 25; 75 MG/1; MG/1
100 CAPSULE ORAL 2 TIMES DAILY
Qty: 10 CAPSULE | Refills: 0 | Status: SHIPPED | OUTPATIENT
Start: 2023-01-16 | End: 2023-02-21

## 2023-01-16 NOTE — TELEPHONE ENCOUNTER
----- Message from SARAH Hadley sent at 1/16/2023  2:16 PM EST -----  Please call the patient regarding her abnormal result. I sent in bactrim to the pharmacy       Left a message to return call.    Daughter returned call & verbalized understanding.

## 2023-01-30 DIAGNOSIS — E11.3393 TYPE 2 DIABETES MELLITUS WITH BOTH EYES AFFECTED BY MODERATE NONPROLIFERATIVE RETINOPATHY WITHOUT MACULAR EDEMA, WITH LONG-TERM CURRENT USE OF INSULIN: ICD-10-CM

## 2023-01-30 DIAGNOSIS — Z79.4 TYPE 2 DIABETES MELLITUS WITH BOTH EYES AFFECTED BY MODERATE NONPROLIFERATIVE RETINOPATHY WITHOUT MACULAR EDEMA, WITH LONG-TERM CURRENT USE OF INSULIN: ICD-10-CM

## 2023-01-30 RX ORDER — METFORMIN HYDROCHLORIDE 500 MG/1
TABLET, EXTENDED RELEASE ORAL
Qty: 180 TABLET | Refills: 0 | Status: SHIPPED | OUTPATIENT
Start: 2023-01-30

## 2023-02-10 DIAGNOSIS — E78.5 HYPERLIPIDEMIA DUE TO TYPE 2 DIABETES MELLITUS: ICD-10-CM

## 2023-02-10 DIAGNOSIS — E11.69 HYPERLIPIDEMIA DUE TO TYPE 2 DIABETES MELLITUS: ICD-10-CM

## 2023-02-10 RX ORDER — ATORVASTATIN CALCIUM 10 MG/1
10 TABLET, FILM COATED ORAL DAILY
Qty: 90 TABLET | Refills: 0 | Status: SHIPPED | OUTPATIENT
Start: 2023-02-10 | End: 2023-02-28 | Stop reason: SDUPTHER

## 2023-02-12 DIAGNOSIS — I25.119 CORONARY ARTERY DISEASE INVOLVING NATIVE CORONARY ARTERY OF NATIVE HEART WITH ANGINA PECTORIS: ICD-10-CM

## 2023-02-13 RX ORDER — ISOSORBIDE MONONITRATE 30 MG/1
30 TABLET, EXTENDED RELEASE ORAL DAILY
Qty: 90 TABLET | Refills: 0 | Status: SHIPPED | OUTPATIENT
Start: 2023-02-13 | End: 2023-02-28 | Stop reason: SDUPTHER

## 2023-02-21 ENCOUNTER — OFFICE VISIT (OUTPATIENT)
Dept: FAMILY MEDICINE CLINIC | Facility: CLINIC | Age: 78
End: 2023-02-21
Payer: MEDICARE

## 2023-02-21 VITALS
SYSTOLIC BLOOD PRESSURE: 124 MMHG | OXYGEN SATURATION: 95 % | TEMPERATURE: 96.8 F | DIASTOLIC BLOOD PRESSURE: 74 MMHG | HEART RATE: 88 BPM | BODY MASS INDEX: 33.35 KG/M2 | HEIGHT: 63 IN | WEIGHT: 188.2 LBS

## 2023-02-21 DIAGNOSIS — N32.81 OVERACTIVE BLADDER: ICD-10-CM

## 2023-02-21 DIAGNOSIS — M62.838 NIGHT MUSCLE SPASMS: ICD-10-CM

## 2023-02-21 DIAGNOSIS — M25.551 RIGHT HIP PAIN: ICD-10-CM

## 2023-02-21 DIAGNOSIS — I15.2 HYPERTENSION ASSOCIATED WITH DIABETES: ICD-10-CM

## 2023-02-21 DIAGNOSIS — G89.29 CHRONIC LOW BACK PAIN WITH RIGHT-SIDED SCIATICA, UNSPECIFIED BACK PAIN LATERALITY: ICD-10-CM

## 2023-02-21 DIAGNOSIS — Z79.4 TYPE 2 DIABETES MELLITUS WITH BOTH EYES AFFECTED BY MODERATE NONPROLIFERATIVE RETINOPATHY WITHOUT MACULAR EDEMA, WITH LONG-TERM CURRENT USE OF INSULIN: Primary | ICD-10-CM

## 2023-02-21 DIAGNOSIS — E11.3393 TYPE 2 DIABETES MELLITUS WITH BOTH EYES AFFECTED BY MODERATE NONPROLIFERATIVE RETINOPATHY WITHOUT MACULAR EDEMA, WITH LONG-TERM CURRENT USE OF INSULIN: Primary | ICD-10-CM

## 2023-02-21 DIAGNOSIS — K21.9 GASTROESOPHAGEAL REFLUX DISEASE WITHOUT ESOPHAGITIS: ICD-10-CM

## 2023-02-21 DIAGNOSIS — M54.41 CHRONIC LOW BACK PAIN WITH RIGHT-SIDED SCIATICA, UNSPECIFIED BACK PAIN LATERALITY: ICD-10-CM

## 2023-02-21 DIAGNOSIS — E55.9 VITAMIN D DEFICIENCY, UNSPECIFIED: ICD-10-CM

## 2023-02-21 DIAGNOSIS — E03.8 OTHER SPECIFIED HYPOTHYROIDISM: ICD-10-CM

## 2023-02-21 DIAGNOSIS — E11.59 HYPERTENSION ASSOCIATED WITH DIABETES: ICD-10-CM

## 2023-02-21 DIAGNOSIS — F41.9 ANXIETY: ICD-10-CM

## 2023-02-21 DIAGNOSIS — I25.119 CORONARY ARTERY DISEASE INVOLVING NATIVE CORONARY ARTERY OF NATIVE HEART WITH ANGINA PECTORIS: ICD-10-CM

## 2023-02-21 LAB — GLUCOSE BLDC GLUCOMTR-MCNC: 249 MG/DL (ref 70–130)

## 2023-02-21 PROCEDURE — 82962 GLUCOSE BLOOD TEST: CPT | Performed by: FAMILY MEDICINE

## 2023-02-21 PROCEDURE — 99214 OFFICE O/P EST MOD 30 MIN: CPT | Performed by: FAMILY MEDICINE

## 2023-02-21 PROCEDURE — 1160F RVW MEDS BY RX/DR IN RCRD: CPT | Performed by: FAMILY MEDICINE

## 2023-02-21 PROCEDURE — 3078F DIAST BP <80 MM HG: CPT | Performed by: FAMILY MEDICINE

## 2023-02-21 PROCEDURE — 1159F MED LIST DOCD IN RCRD: CPT | Performed by: FAMILY MEDICINE

## 2023-02-21 PROCEDURE — 3074F SYST BP LT 130 MM HG: CPT | Performed by: FAMILY MEDICINE

## 2023-02-21 RX ORDER — OMEPRAZOLE 20 MG/1
20 CAPSULE, DELAYED RELEASE ORAL 2 TIMES DAILY
Qty: 180 CAPSULE | Refills: 1 | Status: SHIPPED | OUTPATIENT
Start: 2023-02-21

## 2023-02-21 RX ORDER — DIAZEPAM 5 MG/1
5 TABLET ORAL EVERY 12 HOURS PRN
Qty: 60 TABLET | Refills: 0 | Status: SHIPPED | OUTPATIENT
Start: 2023-02-21

## 2023-02-21 RX ORDER — LEVOTHYROXINE SODIUM 0.15 MG/1
137 TABLET ORAL DAILY
Qty: 90 TABLET | Refills: 0 | Status: SHIPPED | OUTPATIENT
Start: 2023-02-21

## 2023-02-21 RX ORDER — CLOPIDOGREL BISULFATE 75 MG/1
75 TABLET ORAL DAILY
Qty: 90 TABLET | Refills: 1 | Status: SHIPPED | OUTPATIENT
Start: 2023-02-21 | End: 2023-02-28

## 2023-02-21 RX ORDER — LOSARTAN POTASSIUM AND HYDROCHLOROTHIAZIDE 12.5; 5 MG/1; MG/1
1 TABLET ORAL DAILY
Qty: 180 TABLET | Refills: 1 | Status: SHIPPED | OUTPATIENT
Start: 2023-02-21 | End: 2023-02-28 | Stop reason: SDUPTHER

## 2023-02-21 RX ORDER — LOSARTAN POTASSIUM 50 MG/1
50 TABLET ORAL DAILY
Qty: 90 TABLET | Refills: 1 | Status: SHIPPED | OUTPATIENT
Start: 2023-02-21 | End: 2023-02-28 | Stop reason: SDUPTHER

## 2023-02-21 RX ORDER — CYCLOBENZAPRINE HCL 10 MG
10 TABLET ORAL 2 TIMES DAILY
Qty: 180 TABLET | Refills: 1 | Status: SHIPPED | OUTPATIENT
Start: 2023-02-21

## 2023-02-21 RX ORDER — AMLODIPINE BESYLATE 5 MG/1
5 TABLET ORAL DAILY
Qty: 90 TABLET | Refills: 1 | Status: SHIPPED | OUTPATIENT
Start: 2023-02-21

## 2023-02-21 RX ORDER — OXYBUTYNIN CHLORIDE 5 MG/1
5 TABLET ORAL 2 TIMES DAILY
Qty: 180 TABLET | Refills: 1 | Status: SHIPPED | OUTPATIENT
Start: 2023-02-21

## 2023-02-21 RX ORDER — DIAZEPAM 5 MG/1
5 TABLET ORAL EVERY 12 HOURS PRN
Qty: 60 TABLET | Refills: 0 | Status: CANCELLED | OUTPATIENT
Start: 2023-02-21

## 2023-02-22 ENCOUNTER — HOSPITAL ENCOUNTER (OUTPATIENT)
Dept: CARDIOLOGY | Facility: HOSPITAL | Age: 78
Discharge: HOME | End: 2023-02-22
Payer: MEDICARE

## 2023-02-22 ENCOUNTER — HOSPITAL ENCOUNTER (OUTPATIENT)
Dept: NUCLEAR MEDICINE | Facility: HOSPITAL | Age: 78
Discharge: HOME | End: 2023-02-22
Payer: MEDICARE

## 2023-02-22 DIAGNOSIS — R07.2 PRECORDIAL CHEST PAIN: ICD-10-CM

## 2023-02-22 DIAGNOSIS — R42 DIZZINESS: ICD-10-CM

## 2023-02-22 DIAGNOSIS — I25.119 CORONARY ARTERY DISEASE INVOLVING NATIVE CORONARY ARTERY OF NATIVE HEART WITH ANGINA PECTORIS: ICD-10-CM

## 2023-02-22 LAB
25(OH)D3+25(OH)D2 SERPL-MCNC: 8.9 NG/ML (ref 30–100)
BH CV NUCLEAR PRIOR STUDY: 3
BH CV REST NUCLEAR ISOTOPE DOSE: 10.1 MCI
BH CV STRESS BP STAGE 1: NORMAL
BH CV STRESS COMMENTS STAGE 1: NORMAL
BH CV STRESS DOSE REGADENOSON STAGE 1: 0.4
BH CV STRESS DURATION MIN STAGE 1: 0
BH CV STRESS DURATION SEC STAGE 1: 10
BH CV STRESS HR STAGE 1: 89
BH CV STRESS NUCLEAR ISOTOPE DOSE: 29.8 MCI
BH CV STRESS PROTOCOL 1: NORMAL
BH CV STRESS RECOVERY BP: NORMAL MMHG
BH CV STRESS RECOVERY HR: 85 BPM
BH CV STRESS STAGE 1: 1
HBA1C MFR BLD: 9.5 % (ref 4.8–5.6)
LV EF NUC BP: 67 %
MAGNESIUM SERPL-MCNC: 1.5 MG/DL (ref 1.6–2.4)
MAXIMAL PREDICTED HEART RATE: 143 BPM
PERCENT MAX PREDICTED HR: 62.24 %
STRESS BASELINE BP: NORMAL MMHG
STRESS BASELINE HR: 84 BPM
STRESS PERCENT HR: 73 %
STRESS POST PEAK BP: NORMAL MMHG
STRESS POST PEAK HR: 89 BPM
STRESS TARGET HR: 122 BPM
T4 FREE SERPL-MCNC: 1.27 NG/DL (ref 0.93–1.7)
TSH SERPL DL<=0.005 MIU/L-ACNC: 0.96 UIU/ML (ref 0.27–4.2)
VIT B12 SERPL-MCNC: 890 PG/ML (ref 211–946)

## 2023-02-22 PROCEDURE — 93880 EXTRACRANIAL BILAT STUDY: CPT | Performed by: RADIOLOGY

## 2023-02-22 PROCEDURE — A9500 TC99M SESTAMIBI: HCPCS | Performed by: SPECIALIST

## 2023-02-22 PROCEDURE — 93017 CV STRESS TEST TRACING ONLY: CPT

## 2023-02-22 PROCEDURE — 0 TECHNETIUM SESTAMIBI: Performed by: SPECIALIST

## 2023-02-22 PROCEDURE — 93880 EXTRACRANIAL BILAT STUDY: CPT

## 2023-02-22 PROCEDURE — 93018 CV STRESS TEST I&R ONLY: CPT | Performed by: SPECIALIST

## 2023-02-22 PROCEDURE — 78452 HT MUSCLE IMAGE SPECT MULT: CPT | Performed by: SPECIALIST

## 2023-02-22 PROCEDURE — 78452 HT MUSCLE IMAGE SPECT MULT: CPT

## 2023-02-22 PROCEDURE — 25010000002 REGADENOSON 0.4 MG/5ML SOLUTION: Performed by: SPECIALIST

## 2023-02-22 RX ADMIN — TECHNETIUM TC 99M SESTAMIBI 1 DOSE: 1 INJECTION INTRAVENOUS at 10:50

## 2023-02-22 RX ADMIN — TECHNETIUM TC 99M SESTAMIBI 1 DOSE: 1 INJECTION INTRAVENOUS at 08:57

## 2023-02-22 RX ADMIN — REGADENOSON 0.4 MG: 0.08 INJECTION, SOLUTION INTRAVENOUS at 10:50

## 2023-02-24 ENCOUNTER — PATIENT ROUNDING (BHMG ONLY) (OUTPATIENT)
Dept: FAMILY MEDICINE CLINIC | Facility: CLINIC | Age: 78
End: 2023-02-24
Payer: MEDICARE

## 2023-02-24 NOTE — PROGRESS NOTES
February 24, 2023    Hello, may I speak with Bianka Agudelo?    My name is Bessy    I am  with [facility] [place]  [facility]  [address] [place] [address] [phone].    Before we get started may I verify your date of birth? 1945    I am calling to officially welcome you to our practice and ask about your recent visit. Is this a good time to talk? Yes    Tell me about your visit with us. What things went well? Everything went just fine and everyone makes me feel special.       We're always looking for ways to make our patients' experiences even better. Do you have recommendations on ways we may improve?  No    Overall were you satisfied with your first visit to our practice? Yes       I appreciate you taking the time to speak with me today. Is there anything else I can do for you? No      Thank you, and have a great day.

## 2023-02-28 ENCOUNTER — OFFICE VISIT (OUTPATIENT)
Dept: CARDIOLOGY | Facility: CLINIC | Age: 78
End: 2023-02-28
Payer: MEDICARE

## 2023-02-28 VITALS
WEIGHT: 189.2 LBS | HEART RATE: 83 BPM | HEIGHT: 63 IN | DIASTOLIC BLOOD PRESSURE: 69 MMHG | SYSTOLIC BLOOD PRESSURE: 146 MMHG | BODY MASS INDEX: 33.52 KG/M2

## 2023-02-28 DIAGNOSIS — I49.3 PREMATURE VENTRICULAR BEAT: ICD-10-CM

## 2023-02-28 DIAGNOSIS — E78.5 HYPERLIPIDEMIA DUE TO TYPE 2 DIABETES MELLITUS: ICD-10-CM

## 2023-02-28 DIAGNOSIS — Z79.4 TYPE 2 DIABETES MELLITUS WITH BOTH EYES AFFECTED BY MODERATE NONPROLIFERATIVE RETINOPATHY WITHOUT MACULAR EDEMA, WITH LONG-TERM CURRENT USE OF INSULIN: ICD-10-CM

## 2023-02-28 DIAGNOSIS — I47.29 NONSUSTAINED VENTRICULAR TACHYCARDIA: ICD-10-CM

## 2023-02-28 DIAGNOSIS — E11.69 HYPERLIPIDEMIA DUE TO TYPE 2 DIABETES MELLITUS: ICD-10-CM

## 2023-02-28 DIAGNOSIS — E78.5 DYSLIPIDEMIA: ICD-10-CM

## 2023-02-28 DIAGNOSIS — R06.02 SHORTNESS OF BREATH: ICD-10-CM

## 2023-02-28 DIAGNOSIS — I25.119 CORONARY ARTERY DISEASE INVOLVING NATIVE CORONARY ARTERY OF NATIVE HEART WITH ANGINA PECTORIS: Primary | ICD-10-CM

## 2023-02-28 DIAGNOSIS — E11.3393 TYPE 2 DIABETES MELLITUS WITH BOTH EYES AFFECTED BY MODERATE NONPROLIFERATIVE RETINOPATHY WITHOUT MACULAR EDEMA, WITH LONG-TERM CURRENT USE OF INSULIN: ICD-10-CM

## 2023-02-28 DIAGNOSIS — G47.33 OSA (OBSTRUCTIVE SLEEP APNEA): ICD-10-CM

## 2023-02-28 DIAGNOSIS — I10 ESSENTIAL HYPERTENSION: ICD-10-CM

## 2023-02-28 PROCEDURE — 99214 OFFICE O/P EST MOD 30 MIN: CPT | Performed by: SPECIALIST

## 2023-02-28 RX ORDER — LOSARTAN POTASSIUM AND HYDROCHLOROTHIAZIDE 12.5; 1 MG/1; MG/1
1 TABLET ORAL DAILY
Qty: 90 TABLET | Refills: 1 | Status: SHIPPED | OUTPATIENT
Start: 2023-02-28

## 2023-02-28 RX ORDER — ATORVASTATIN CALCIUM 10 MG/1
10 TABLET, FILM COATED ORAL DAILY
Qty: 90 TABLET | Refills: 0 | Status: SHIPPED | OUTPATIENT
Start: 2023-02-28

## 2023-02-28 RX ORDER — METOPROLOL SUCCINATE 25 MG/1
25 TABLET, EXTENDED RELEASE ORAL DAILY
Qty: 90 TABLET | Refills: 3 | Status: SHIPPED | OUTPATIENT
Start: 2023-02-28

## 2023-02-28 RX ORDER — ISOSORBIDE MONONITRATE 30 MG/1
30 TABLET, EXTENDED RELEASE ORAL DAILY
Qty: 90 TABLET | Refills: 0 | Status: SHIPPED | OUTPATIENT
Start: 2023-02-28

## 2023-02-28 NOTE — PROGRESS NOTES
Subjective   Follow up, stress test result  Bianka Aguedlo is a 77 y.o. female who presents to day for Follow-up (CAROTID, EVENT MONITOR, SLEEP STUDY).    CHIEF COMPLIANT  Chief Complaint   Patient presents with   • Follow-up     CAROTID, EVENT MONITOR, SLEEP STUDY       Active Problems:  Problem List Items Addressed This Visit        Cardiac and Vasculature    Essential hypertension    Relevant Medications    metoprolol succinate XL (TOPROL-XL) 25 MG 24 hr tablet    losartan-hydrochlorothiazide (Hyzaar) 100-12.5 MG per tablet    Coronary artery disease involving native coronary artery of native heart with angina pectoris (HCC) - Primary    Relevant Medications    metoprolol succinate XL (TOPROL-XL) 25 MG 24 hr tablet    isosorbide mononitrate (IMDUR) 30 MG 24 hr tablet    Hyperlipidemia due to type 2 diabetes mellitus (HCC)    Relevant Medications    atorvastatin (LIPITOR) 10 MG tablet    Dyslipidemia    Relevant Orders    Lipid Panel    Comprehensive Metabolic Panel    Premature ventricular beat    Relevant Medications    metoprolol succinate XL (TOPROL-XL) 25 MG 24 hr tablet    isosorbide mononitrate (IMDUR) 30 MG 24 hr tablet    Nonsustained ventricular tachycardia    Relevant Medications    metoprolol succinate XL (TOPROL-XL) 25 MG 24 hr tablet    isosorbide mononitrate (IMDUR) 30 MG 24 hr tablet       Endocrine and Metabolic    Type 2 diabetes mellitus with both eyes affected by moderate nonproliferative retinopathy without macular edema, with long-term current use of insulin (HCC)       Pulmonary and Pneumonias    Shortness of breath       Sleep    ROD (obstructive sleep apnea)    Relevant Orders    Ambulatory Referral to Pulmonology       HPI  HPI  Doing better she is denying any recent chest pain or told she continues to have mild shortness of breath moderate exertion and intermittent edema no recent palpitations since work-up was done  PRIOR MEDS  Current Outpatient Medications on File Prior to Visit  "  Medication Sig Dispense Refill   • albuterol sulfate  (90 Base) MCG/ACT inhaler Inhale 2 puffs Every 4 (Four) Hours As Needed for Wheezing. 90 g 0   • amLODIPine (NORVASC) 5 MG tablet Take 1 tablet by mouth Daily. 90 tablet 1   • Aspirin Low Dose 81 MG EC tablet TAKE 1 TABLET BY MOUTH DAILY 90 tablet 1   • BD Veo Insulin Syringe U/F 31G X 15/64\" 0.5 ML misc USE TO INJECT TWICE DAILY 200 each 5   • cyclobenzaprine (FLEXERIL) 10 MG tablet Take 1 tablet by mouth 2 (Two) Times a Day. 180 tablet 1   • diazePAM (VALIUM) 5 MG tablet Take 1 tablet by mouth Every 12 (Twelve) Hours As Needed for Anxiety. 60 tablet 0   • Diclofenac Sodium (VOLTAREN) 1 % gel gel APPLY 4 GRAMS TO THE AFFECTED AREA BELOW THE WAIST AND 2 GRAMS TO THE AFFECTED AREA ABOVE THE WAIST FOUR TIMES DAILY AS NEEDED FOR PAIN 100 g 5   • glucose blood test strip 1 each by Other route 4 (Four) Times a Day. Use as instructed 120 each 11   • glucose monitor monitoring kit 1 each As Needed (for glucose checks). 1 each 0   • ibuprofen (ADVIL,MOTRIN) 800 MG tablet TAKE 1 TABLET BY MOUTH EVERY 8 HOURS AS NEEDED FOR PAIN 90 tablet 2   • insulin detemir (Levemir) 100 UNIT/ML injection Inject 50 Units under the skin into the appropriate area as directed Every Night. 50 mL 0   • Lancets misc 1 application 4 (Four) Times a Day. 120 each 11   • levothyroxine (SYNTHROID, LEVOTHROID) 150 MCG tablet Take 1 tablet by mouth Daily. 90 tablet 0   • metFORMIN ER (GLUCOPHAGE-XR) 500 MG 24 hr tablet TAKE 1 TABLET BY MOUTH TWICE DAILY WITH MEALS 180 tablet 0   • NovoLOG Mix 70/30 (70-30) 100 UNIT/ML injection Inject 24 Units under the skin into the appropriate area as directed 2 (Two) Times a Day With Meals. 40 mL 1   • omeprazole (priLOSEC) 20 MG capsule Take 1 capsule by mouth 2 (Two) Times a Day. 180 capsule 1   • oxybutynin (DITROPAN) 5 MG tablet Take 1 tablet by mouth 2 (Two) Times a Day. 180 tablet 1   • SITagliptin (Januvia) 100 MG tablet Take 1 tablet by mouth " Daily. 90 tablet 1   • vitamin B-12 (CYANOCOBALAMIN) 1000 MCG tablet Take 1 tablet by mouth Daily. 90 tablet 1   • [DISCONTINUED] atorvastatin (LIPITOR) 10 MG tablet TAKE 1 TABLET BY MOUTH DAILY 90 tablet 0   • [DISCONTINUED] clopidogrel (PLAVIX) 75 MG tablet Take 1 tablet by mouth Daily. 90 tablet 1   • [DISCONTINUED] isosorbide mononitrate (IMDUR) 30 MG 24 hr tablet TAKE 1 TABLET BY MOUTH DAILY 90 tablet 0   • [DISCONTINUED] losartan-hydrochlorothiazide (HYZAAR) 50-12.5 MG per tablet Take 1 tablet by mouth Daily. 180 tablet 1   • [DISCONTINUED] losartan (COZAAR) 50 MG tablet Take 1 tablet by mouth Daily. 90 tablet 1     No current facility-administered medications on file prior to visit.       ALLERGIES  Nuts    HISTORY  Past Medical History:   Diagnosis Date   • Anxiety    • Asthma    • CAD (coronary artery disease)    • Diabetes mellitus (HCC)    • Dyslipidemia    • GERD (gastroesophageal reflux disease)    • H/O angina pectoris    • History of bone density study     February 2014   • History of mammogram 07/15/2013   • Hypertension    • Hypothyroidism    • Muscle spasms of both lower extremities    • Overactive bladder    • Vitamin B12 deficiency    • Vitamin D deficiency        Social History     Socioeconomic History   • Marital status:    Tobacco Use   • Smoking status: Never   • Smokeless tobacco: Never   Vaping Use   • Vaping Use: Never used   Substance and Sexual Activity   • Alcohol use: No   • Drug use: No   • Sexual activity: Defer       Family History   Problem Relation Age of Onset   • Diabetes Mother    • Heart disease Mother    • Stroke Mother    • Cancer Father    • Pancreatic cancer Sister    • Diabetes Sister    • Heart attack Brother        Review of Systems   Respiratory: Positive for shortness of breath. Negative for apnea, cough, choking, chest tightness, wheezing and stridor.    Cardiovascular: Positive for palpitations and leg swelling. Negative for chest pain.       Objective  "    VITALS: /69 (BP Location: Right arm, Patient Position: Sitting)   Pulse 83   Ht 160 cm (63\")   Wt 85.8 kg (189 lb 3.2 oz)   BMI 33.52 kg/m²     LABS:   Lab Results (most recent)     None          IMAGING:   US Carotid Bilateral    Result Date: 2/22/2023  1. Minimal plaque right carotid system. No occlusion. 2. No hemodynamically significant stenosis of either RIGHT or LEFT ICA. 3. Antegrade flow noted both vertebral arteries.  This report was finalized on 2/22/2023 8:47 AM by Dr. Peewee Morgan MD.        EXAM:  Physical Exam  Vitals reviewed.   Constitutional:       Appearance: She is well-developed.   HENT:      Head: Normocephalic and atraumatic.   Eyes:      Pupils: Pupils are equal, round, and reactive to light.   Neck:      Thyroid: No thyromegaly.      Vascular: No JVD.   Cardiovascular:      Rate and Rhythm: Normal rate and regular rhythm.      Heart sounds: Normal heart sounds. No murmur heard.    No friction rub. No gallop.   Pulmonary:      Effort: Pulmonary effort is normal. No respiratory distress.      Breath sounds: Normal breath sounds. No stridor. No wheezing or rales.   Chest:      Chest wall: No tenderness.   Musculoskeletal:         General: No tenderness or deformity.      Cervical back: Neck supple.   Skin:     General: Skin is warm and dry.   Neurological:      Mental Status: She is alert and oriented to person, place, and time.      Cranial Nerves: No cranial nerve deficit.      Coordination: Coordination normal.         Procedure   Procedures       Assessment & Plan     Diagnoses and all orders for this visit:    1. Coronary artery disease involving native coronary artery of native heart with angina pectoris (HCC) (Primary)  -     isosorbide mononitrate (IMDUR) 30 MG 24 hr tablet; Take 1 tablet by mouth Daily.  Dispense: 90 tablet; Refill: 0    2. Nonsustained ventricular tachycardia  -     metoprolol succinate XL (TOPROL-XL) 25 MG 24 hr tablet; Take 1 tablet by mouth Daily.  " Dispense: 90 tablet; Refill: 3    3. Type 2 diabetes mellitus with both eyes affected by moderate nonproliferative retinopathy without macular edema, with long-term current use of insulin ([facility])    4. Dyslipidemia  -     Lipid Panel; Future  -     Comprehensive Metabolic Panel; Future    5. Premature ventricular beat  -     metoprolol succinate XL (TOPROL-XL) 25 MG 24 hr tablet; Take 1 tablet by mouth Daily.  Dispense: 90 tablet; Refill: 3    6. Shortness of breath    7. ROD (obstructive sleep apnea)  -     Ambulatory Referral to Pulmonology    8. Hyperlipidemia due to type 2 diabetes mellitus (HCC)  -     atorvastatin (LIPITOR) 10 MG tablet; Take 1 tablet by mouth Daily.  Dispense: 90 tablet; Refill: 0    9. Essential hypertension  -     losartan-hydrochlorothiazide (Hyzaar) 100-12.5 MG per tablet; Take 1 tablet by mouth Daily.  Dispense: 90 tablet; Refill: 1    1.  We discussed the results of the stress test which she did not show ischemia currently she has no chest pain we will continue current management  2.  We discussed also the carotid Doppler which showed some plaque formation but no stenosis  3.  We discussed the event monitor which showed short runs of nonsustained ventricular tachycardia I advised her to avoid caffeinated products it could also be related to sleep apnea so I am going to treat her with a CPAP machine and starting her a small dose of Toprol-XL to control the arrhythmia  4.  Regarding sleep apnea as mentioned above going to refer her to pulmonology for CPAP trial she has an AHI of 10  5.  Asked her stress test was negative for ischemia and going to discontinue the clopidogrel and continue with aspirin    Return in about 3 months (around 5/28/2023).                 MEDS ORDERED DURING VISIT:  New Medications Ordered This Visit   Medications   • metoprolol succinate XL (TOPROL-XL) 25 MG 24 hr tablet     Sig: Take 1 tablet by mouth Daily.     Dispense:  90 tablet     Refill:  3   • atorvastatin  (LIPITOR) 10 MG tablet     Sig: Take 1 tablet by mouth Daily.     Dispense:  90 tablet     Refill:  0   • losartan-hydrochlorothiazide (Hyzaar) 100-12.5 MG per tablet     Sig: Take 1 tablet by mouth Daily.     Dispense:  90 tablet     Refill:  1   • isosorbide mononitrate (IMDUR) 30 MG 24 hr tablet     Sig: Take 1 tablet by mouth Daily.     Dispense:  90 tablet     Refill:  0       As always, Eva Elliott MD  I appreciate very much the opportunity to participate in the cardiovascular care of your patients. Please do not hesitate to call me with any questions with regards to Bianka Agudelo evaluation and management.         This document has been electronically signed by Danielle Webber MD  February 28, 2023 11:25 EST    This note is dictated utilizing voice recognition software.

## 2023-03-01 ENCOUNTER — TELEPHONE (OUTPATIENT)
Dept: CARDIOLOGY | Facility: CLINIC | Age: 78
End: 2023-03-01
Payer: MEDICARE

## 2023-03-01 NOTE — TELEPHONE ENCOUNTER
Caller: Dony Agudelo    Relationship: Emergency Contact    Best call back number: [phone] (TRY THIS ONE FIRST)    What is the best time to reach you: ANYTIME    Who are you requesting to speak with (clinical staff, provider,  specific staff member): CLINICAL STAFF    Do you know the name of the person who called: DONY AGUDELO    What was the call regarding: PT NEEDS TO KNOW WHAT THE ATORVASTATIN IS FOR. SHE ALSO NEEDS TO KNOW IF SHE NEEDS TO CONTINUE TAKING IT.    Do you require a callback: YES

## 2023-03-15 ENCOUNTER — TELEPHONE (OUTPATIENT)
Dept: FAMILY MEDICINE CLINIC | Facility: CLINIC | Age: 78
End: 2023-03-15
Payer: MEDICARE

## 2023-03-15 DIAGNOSIS — E11.3393 TYPE 2 DIABETES MELLITUS WITH BOTH EYES AFFECTED BY MODERATE NONPROLIFERATIVE RETINOPATHY WITHOUT MACULAR EDEMA, WITH LONG-TERM CURRENT USE OF INSULIN: ICD-10-CM

## 2023-03-15 DIAGNOSIS — Z79.4 TYPE 2 DIABETES MELLITUS WITH BOTH EYES AFFECTED BY MODERATE NONPROLIFERATIVE RETINOPATHY WITHOUT MACULAR EDEMA, WITH LONG-TERM CURRENT USE OF INSULIN: ICD-10-CM

## 2023-03-15 DIAGNOSIS — I25.119 CORONARY ARTERY DISEASE INVOLVING NATIVE CORONARY ARTERY OF NATIVE HEART WITH ANGINA PECTORIS: ICD-10-CM

## 2023-03-15 RX ORDER — INSULIN DETEMIR 100 [IU]/ML
50 INJECTION, SOLUTION SUBCUTANEOUS NIGHTLY
Qty: 50 ML | Refills: 0 | Status: SHIPPED | OUTPATIENT
Start: 2023-03-15

## 2023-03-15 NOTE — TELEPHONE ENCOUNTER
Caller: Bianka Agudelo    Relationship: Self    Best call back number: [phone]   What medication are you requesting: CATY    What are your current symptoms: COUGHING UP STUFF, BODY ACHES, HEADACHE,   How long have you been experiencing symptoms: 3 DAYS  Have you had these symptoms before:    [x] Yes  [] No    Have you been treated for these symptoms before:   [x] Yes  [] No    If a prescription is needed, what is your preferred pharmacy and phone number: [facility] #[id number] - [place] - [address] AT [facility] [address] - [phone]  - [phone] FX     Additional notes: CALL WHEN READY

## 2023-03-15 NOTE — TELEPHONE ENCOUNTER
Caller: Bianka Agudelo    Relationship: Self    Best call back number: [phone]   What medication are you requesting: CATY    What are your current symptoms: COUGHING UP STUFF, BODY ACHES, HEADACHE,   How long have you been experiencing symptoms: 3 DAYS  Have you had these symptoms before:    [x] Yes  [] No    Have you been treated for these symptoms before:   [x] Yes  [] No    If a prescription is needed, what is your preferred pharmacy and phone number: [facility] #[id number] - [place] - [address] AT [facility] [address] - [phone]  - [phone] FX     Additional notes: CALL WHEN READY    Spoke with patient offered a same day with an NP,she stated she feels too bad to get out of the house,does not have a home Covid test or any way to check her VS/O2 sat,reports it is a bad head cold that she gets every spring,green nasal drainage,no fevers,slight cough,HA,Body aches but mostly sinus,please advise.

## 2023-03-15 NOTE — TELEPHONE ENCOUNTER
Please let her know that we are seeing COVID right now masquerading as sinus infection. I've seen several this week. As a result, so she can get a correct medication, we would really recommend her being seen. She could get in and out faster with the NPs.

## 2023-03-15 NOTE — TELEPHONE ENCOUNTER
Please let her know that we are seeing COVID right now masquerading as sinus infection. I've seen several this week. As a result, so she can get a correct medication, we would really recommend her being seen. She could get in and out faster with the NPs.    Spoke with patient & she verbalized understanding,declined an appointment said she would just tuff it out.

## 2023-03-15 NOTE — TELEPHONE ENCOUNTER
Caller: Bianka Agudelo    Relationship: Self    Best call back number: [phone]   Requested Prescriptions:   Requested Prescriptions     Pending Prescriptions Disp Refills   • insulin detemir (Levemir) 100 UNIT/ML injection 50 mL 0     Sig: Inject 50 Units under the skin into the appropriate area as directed Every Night.        Pharmacy where request should be sent: [facility] #[id number] - [facility] [place] - [address] AT [facility] [address] - [phone]  - [phone] FX         Does the patient have less than a 3 day supply:  [x] Yes  [] No    Would you like a call back once the refill request has been completed: [x] Yes [] No    If the office needs to give you a call back, can they leave a voicemail: [x] Yes [] No    Dat Sanders Rep   03/15/23 12:53 EDT

## 2023-03-16 RX ORDER — CLOPIDOGREL BISULFATE 75 MG/1
75 TABLET ORAL DAILY
Qty: 90 TABLET | Refills: 0 | OUTPATIENT
Start: 2023-03-16

## 2023-03-17 DIAGNOSIS — R09.81 CHRONIC NASAL CONGESTION: Primary | ICD-10-CM

## 2023-03-17 RX ORDER — GUAIFENESIN 600 MG/1
1200 TABLET, EXTENDED RELEASE ORAL 2 TIMES DAILY
Qty: 120 TABLET | Refills: 0 | Status: SHIPPED | OUTPATIENT
Start: 2023-03-17

## 2023-03-17 RX ORDER — PSEUDOEPHEDRINE HYDROCHLORIDE 60 MG/1
60 TABLET, FILM COATED ORAL 2 TIMES DAILY
Qty: 60 TABLET | Refills: 0 | Status: SHIPPED | OUTPATIENT
Start: 2023-03-17

## 2023-03-17 NOTE — TELEPHONE ENCOUNTER
I can send her in mucinex and sudafed if she wants.     Spoke with patient she would like both sent in.

## 2023-03-23 ENCOUNTER — OFFICE VISIT (OUTPATIENT)
Dept: PULMONOLOGY | Facility: CLINIC | Age: 78
End: 2023-03-23
Payer: MEDICARE

## 2023-03-23 VITALS
SYSTOLIC BLOOD PRESSURE: 108 MMHG | OXYGEN SATURATION: 94 % | BODY MASS INDEX: 31.58 KG/M2 | WEIGHT: 185 LBS | HEIGHT: 64 IN | DIASTOLIC BLOOD PRESSURE: 62 MMHG | TEMPERATURE: 97.7 F | HEART RATE: 73 BPM

## 2023-03-23 DIAGNOSIS — E66.9 OBESITY (BMI 30-39.9): ICD-10-CM

## 2023-03-23 DIAGNOSIS — G47.33 OSA (OBSTRUCTIVE SLEEP APNEA): Primary | ICD-10-CM

## 2023-03-23 PROCEDURE — 3078F DIAST BP <80 MM HG: CPT | Performed by: PHYSICIAN ASSISTANT

## 2023-03-23 PROCEDURE — 99204 OFFICE O/P NEW MOD 45 MIN: CPT | Performed by: PHYSICIAN ASSISTANT

## 2023-03-23 PROCEDURE — 3074F SYST BP LT 130 MM HG: CPT | Performed by: PHYSICIAN ASSISTANT

## 2023-03-23 NOTE — PROGRESS NOTES
"    Subjective      Chief Complaint  Sleep Apnea (HST IN MEDIA, 01/23)    Subjective      History of Present Illness  Bianka Agudelo is a 77 y.o. female who presents today to [facility] for ROD. This visit is a initial evaluation  appointment.     Sleep Apnea (HST IN MEDIA, 01/23):  Patient had home sleep study ordered by cardiologist. Her sleep study revealed mild sleep apnea. The study also revealed significant hypoxia in which her saturation dropped to 76% while sleeping. She states that she has never had to use a PAP device in the past.  She continues to feel fatigued throughout the day. She has not been told that she snores or that she stops breathing during her sleep but lives alone.       Current Outpatient Medications:   •  albuterol sulfate  (90 Base) MCG/ACT inhaler, Inhale 2 puffs Every 4 (Four) Hours As Needed for Wheezing., Disp: 90 g, Rfl: 0  •  amLODIPine (NORVASC) 5 MG tablet, Take 1 tablet by mouth Daily., Disp: 90 tablet, Rfl: 1  •  Aspirin Low Dose 81 MG EC tablet, TAKE 1 TABLET BY MOUTH DAILY, Disp: 90 tablet, Rfl: 1  •  atorvastatin (LIPITOR) 10 MG tablet, Take 1 tablet by mouth Daily., Disp: 90 tablet, Rfl: 0  •  BD Veo Insulin Syringe U/F 31G X 15/64\" 0.5 ML misc, USE TO INJECT TWICE DAILY, Disp: 200 each, Rfl: 5  •  cyclobenzaprine (FLEXERIL) 10 MG tablet, Take 1 tablet by mouth 2 (Two) Times a Day., Disp: 180 tablet, Rfl: 1  •  diazePAM (VALIUM) 5 MG tablet, Take 1 tablet by mouth Every 12 (Twelve) Hours As Needed for Anxiety., Disp: 60 tablet, Rfl: 0  •  Diclofenac Sodium (VOLTAREN) 1 % gel gel, APPLY 4 GRAMS TO THE AFFECTED AREA BELOW THE WAIST AND 2 GRAMS TO THE AFFECTED AREA ABOVE THE WAIST FOUR TIMES DAILY AS NEEDED FOR PAIN, Disp: 100 g, Rfl: 5  •  glucose blood test strip, 1 each by Other route 4 (Four) Times a Day. Use as instructed, Disp: 120 each, Rfl: 11  •  glucose monitor monitoring kit, 1 each As Needed (for glucose " checks)., Disp: 1 each, Rfl: 0  •  guaiFENesin (Mucinex) 600 MG 12 hr tablet, Take 2 tablets by mouth 2 (Two) Times a Day., Disp: 120 tablet, Rfl: 0  •  ibuprofen (ADVIL,MOTRIN) 800 MG tablet, TAKE 1 TABLET BY MOUTH EVERY 8 HOURS AS NEEDED FOR PAIN, Disp: 90 tablet, Rfl: 2  •  insulin detemir (Levemir) 100 UNIT/ML injection, Inject 50 Units under the skin into the appropriate area as directed Every Night., Disp: 50 mL, Rfl: 0  •  isosorbide mononitrate (IMDUR) 30 MG 24 hr tablet, Take 1 tablet by mouth Daily., Disp: 90 tablet, Rfl: 0  •  Lancets misc, 1 application 4 (Four) Times a Day., Disp: 120 each, Rfl: 11  •  levothyroxine (SYNTHROID, LEVOTHROID) 150 MCG tablet, Take 1 tablet by mouth Daily., Disp: 90 tablet, Rfl: 0  •  losartan-hydrochlorothiazide (Hyzaar) 100-12.5 MG per tablet, Take 1 tablet by mouth Daily., Disp: 90 tablet, Rfl: 1  •  metFORMIN ER (GLUCOPHAGE-XR) 500 MG 24 hr tablet, TAKE 1 TABLET BY MOUTH TWICE DAILY WITH MEALS, Disp: 180 tablet, Rfl: 0  •  NovoLOG Mix 70/30 (70-30) 100 UNIT/ML injection, Inject 24 Units under the skin into the appropriate area as directed 2 (Two) Times a Day With Meals., Disp: 40 mL, Rfl: 1  •  omeprazole (priLOSEC) 20 MG capsule, Take 1 capsule by mouth 2 (Two) Times a Day., Disp: 180 capsule, Rfl: 1  •  oxybutynin (DITROPAN) 5 MG tablet, Take 1 tablet by mouth 2 (Two) Times a Day., Disp: 180 tablet, Rfl: 1  •  pseudoephedrine (SUDAFED) 60 MG tablet, Take 1 tablet by mouth 2 (Two) Times a Day., Disp: 60 tablet, Rfl: 0  •  SITagliptin (Januvia) 100 MG tablet, Take 1 tablet by mouth Daily., Disp: 90 tablet, Rfl: 1  •  vitamin B-12 (CYANOCOBALAMIN) 1000 MCG tablet, Take 1 tablet by mouth Daily., Disp: 90 tablet, Rfl: 1  •  metoprolol succinate XL (TOPROL-XL) 25 MG 24 hr tablet, Take 1 tablet by mouth Daily., Disp: 90 tablet, Rfl: 3      Allergies   Allergen Reactions   • Nuts Anaphylaxis       Objective     Objective   Vital Signs:  /62 (BP Location: Left arm,  "Patient Position: Sitting)   Pulse 73   Temp 97.7 °F (36.5 °C)   Ht 161.3 cm (63.5\")   Wt 83.9 kg (185 lb)   SpO2 94%   BMI 32.26 kg/m²   Estimated body mass index is 32.26 kg/m² as calculated from the following:    Height as of this encounter: 161.3 cm (63.5\").    Weight as of this encounter: 83.9 kg (185 lb).      Past Medical History:   Diagnosis Date   • Anxiety    • Asthma    • CAD (coronary artery disease)    • Diabetes mellitus (HCC)    • Dyslipidemia    • GERD (gastroesophageal reflux disease)    • H/O angina pectoris    • History of bone density study     February 2014   • History of mammogram 07/15/2013   • Hypertension    • Hypothyroidism    • Muscle spasms of both lower extremities    • Overactive bladder    • Vitamin B12 deficiency    • Vitamin D deficiency      Past Surgical History:   Procedure Laterality Date   • CARDIAC CATHETERIZATION     • COLONOSCOPY  01/01/2010   • EYE SURGERY  10/04/2021    right eye, bleeding behind the eye    • OOPHORECTOMY      1 removed   • TUBAL ABDOMINAL LIGATION       Social History     Socioeconomic History   • Marital status:    Tobacco Use   • Smoking status: Never     Passive exposure: Current   • Smokeless tobacco: Never   Vaping Use   • Vaping Use: Never used   Substance and Sexual Activity   • Alcohol use: No   • Drug use: No   • Sexual activity: Defer        Physical Exam  Constitutional:       Appearance: Normal appearance. She is obese.   HENT:      Head: Normocephalic and atraumatic.      Nose: Nose normal.      Mouth/Throat:      Mouth: Mucous membranes are moist.      Pharynx: Oropharynx is clear.   Eyes:      Extraocular Movements: Extraocular movements intact.      Conjunctiva/sclera: Conjunctivae normal.      Pupils: Pupils are equal, round, and reactive to light.   Cardiovascular:      Rate and Rhythm: Normal rate and regular rhythm.      Pulses: Normal pulses.      Heart sounds: Normal heart sounds. No murmur heard.    No friction rub. No " gallop.   Pulmonary:      Effort: Pulmonary effort is normal. No respiratory distress.      Breath sounds: Normal breath sounds. No wheezing, rhonchi or rales.   Musculoskeletal:         General: Normal range of motion.      Cervical back: Normal range of motion.   Skin:     General: Skin is warm and dry.   Neurological:      General: No focal deficit present.      Mental Status: She is alert and oriented to person, place, and time. Mental status is at baseline.   Psychiatric:         Mood and Affect: Mood normal.         Behavior: Behavior normal.         Thought Content: Thought content normal.            Result Review :  The following labs and radiology results have been reviewed.    Lab Review:   No results found for: FEV1, FVC, UQL7AQZ, TLC, DLCO  Hospital Outpatient Visit on 02/22/2023   Component Date Value Ref Range Status   • Target HR (85%) 02/22/2023 122  bpm Final   • Max. Pred. HR (100%) 02/22/2023 143  bpm Final   • Nuclear Prior Study 02/22/2023 3.0   Final   • BH CV REST NUCLEAR ISOTOPE DOSE 02/22/2023 10.1  mCi Final   • BH CV STRESS NUCLEAR ISOTOPE DOSE 02/22/2023 29.8  mCi Final   • BH CV STRESS PROTOCOL 1 02/22/2023 Pharmacologic   Final   • Stage 1 02/22/2023 1   Final   • HR Stage 1 02/22/2023 89   Final   • BP Stage 1 02/22/2023 143/68   Final   • Duration Min Stage 1 02/22/2023 0   Final   • Duration Sec Stage 1 02/22/2023 10   Final   • Stress Dose Regadenoson Stage 1 02/22/2023 0.4   Final   • Stress Comments Stage 1 02/22/2023 10 sec bolus injection   Final   • Baseline HR 02/22/2023 84  bpm Final   • Baseline BP 02/22/2023 129/68  mmHg Final   • Peak HR 02/22/2023 89  bpm Final   • Percent Max Pred HR 02/22/2023 62.24  % Final   • Percent Target HR 02/22/2023 73  % Final   • Peak BP 02/22/2023 143/68  mmHg Final   • Recovery HR 02/22/2023 85  bpm Final   • Recovery BP 02/22/2023 159/69  mmHg Final   • Nuc Stress EF 02/22/2023 67  % Final   Office Visit on 02/21/2023   Component Date Value  Ref Range Status   • Glucose 02/21/2023 249 (A)  70 - 130 mg/dL Final   • Free T4 02/21/2023 1.27  0.93 - 1.70 ng/dL Final    Results may be falsely increased if patient taking Biotin.   • TSH 02/21/2023 0.963  0.270 - 4.200 uIU/mL Final   • Hemoglobin A1C 02/21/2023 9.50 (H)  4.80 - 5.60 % Final    Comment: Hemoglobin A1C Ranges:  Increased Risk for Diabetes  5.7% to 6.4%  Diabetes                     >= 6.5%  Diabetic Goal                < 7.0%     • 25 Hydroxy, Vitamin D 02/21/2023 8.9 (L)  30.0 - 100.0 ng/ml Final    Comment: Reference Range for Total Vitamin D 25(OH)  Deficiency <20.0 ng/mL  Insufficiency 21-29 ng/mL  Sufficiency  ng/mL  Toxicity >100 ng/ml     • Vitamin B-12 02/21/2023 890  211 - 946 pg/mL Final    Results may be falsely increased if patient taking Biotin.   • Magnesium 02/21/2023 1.5 (L)  1.6 - 2.4 mg/dL Final   Office Visit on 01/09/2023   Component Date Value Ref Range Status   • WBC 01/09/2023 7.92  3.40 - 10.80 10*3/mm3 Final   • RBC 01/09/2023 4.75  3.77 - 5.28 10*6/mm3 Final   • Hemoglobin 01/09/2023 14.0  12.0 - 15.9 g/dL Final   • Hematocrit 01/09/2023 42.6  34.0 - 46.6 % Final   • MCV 01/09/2023 89.7  79.0 - 97.0 fL Final   • MCH 01/09/2023 29.5  26.6 - 33.0 pg Final   • MCHC 01/09/2023 32.9  31.5 - 35.7 g/dL Final   • RDW 01/09/2023 13.0  12.3 - 15.4 % Final   • Platelets 01/09/2023 246  140 - 450 10*3/mm3 Final   • Neutrophil Rel % 01/09/2023 58.4  42.7 - 76.0 % Final   • Lymphocyte Rel % 01/09/2023 29.9  19.6 - 45.3 % Final   • Monocyte Rel % 01/09/2023 7.8  5.0 - 12.0 % Final   • Eosinophil Rel % 01/09/2023 2.9  0.3 - 6.2 % Final   • Basophil Rel % 01/09/2023 0.6  0.0 - 1.5 % Final   • Neutrophils Absolute 01/09/2023 4.62  1.70 - 7.00 10*3/mm3 Final   • Lymphocytes Absolute 01/09/2023 2.37  0.70 - 3.10 10*3/mm3 Final   • Monocytes Absolute 01/09/2023 0.62  0.10 - 0.90 10*3/mm3 Final   • Eosinophils Absolute 01/09/2023 0.23  0.00 - 0.40 10*3/mm3 Final   • Basophils Absolute  01/09/2023 0.05  0.00 - 0.20 10*3/mm3 Final   • Immature Granulocyte Rel % 01/09/2023 0.4  0.0 - 0.5 % Final   • Immature Grans Absolute 01/09/2023 0.03  0.00 - 0.05 10*3/mm3 Final   • nRBC 01/09/2023 0.1  0.0 - 0.2 /100 WBC Final   • Glucose 01/09/2023 284 (H)  65 - 99 mg/dL Final   • BUN 01/09/2023 14  8 - 23 mg/dL Final   • Creatinine 01/09/2023 0.60  0.57 - 1.00 mg/dL Final   • EGFR Result 01/09/2023 92.6  >60.0 mL/min/1.73 Final    Comment: National Kidney Foundation and American Society of  Nephrology (ASN) Task Force recommended calculation based  on the Chronic Kidney Disease Epidemiology Collaboration  (CKD-EPI) equation refit without adjustment for race.  GFR Normal >60  Chronic Kidney Disease <60  Kidney Failure <15  The GFR formula is only valid for adults with stable renal  function between ages 18 and 70.     • BUN/Creatinine Ratio 01/09/2023 23.3  7.0 - 25.0 Final   • Sodium 01/09/2023 137  136 - 145 mmol/L Final   • Potassium 01/09/2023 4.2  3.5 - 5.2 mmol/L Final   • Chloride 01/09/2023 99  98 - 107 mmol/L Final   • Total CO2 01/09/2023 28.0  22.0 - 29.0 mmol/L Final   • Calcium 01/09/2023 9.9  8.6 - 10.5 mg/dL Final   • Total Protein 01/09/2023 6.3  6.0 - 8.5 g/dL Final   • Albumin 01/09/2023 4.3  3.5 - 5.2 g/dL Final   • Globulin 01/09/2023 2.0  gm/dL Final   • A/G Ratio 01/09/2023 2.2  g/dL Final   • Total Bilirubin 01/09/2023 0.8  0.0 - 1.2 mg/dL Final   • Alkaline Phosphatase 01/09/2023 68  39 - 117 U/L Final   • AST (SGOT) 01/09/2023 26  1 - 32 U/L Final   • ALT (SGPT) 01/09/2023 27  1 - 33 U/L Final   • Hemoglobin A1C 01/09/2023 10.50 (H)  4.80 - 5.60 % Final    Comment: Hemoglobin A1C Ranges:  Increased Risk for Diabetes  5.7% to 6.4%  Diabetes                     >= 6.5%  Diabetic Goal                < 7.0%     • Urine Culture 01/09/2023 Final report (A)   Final   • Result 1 01/09/2023 Klebsiella pneumoniae (A)   Final    Comment: Cefazolin <=4 ug/mL  Cefazolin with an CHARLENE <=16 predicts  susceptibility to the oral agents  cefaclor, cefdinir, cefpodoxime, cefprozil, cefuroxime, cephalexin,  and loracarbef when used for therapy of uncomplicated urinary tract  infections due to E. coli, Klebsiella pneumoniae, and Proteus  mirabilis.  Greater than 100,000 colony forming units per mL     • Susceptibility Testing 01/09/2023 Comment   Final    Comment:       ** S = Susceptible; I = Intermediate; R = Resistant **                     P = Positive; N = Negative              MICS are expressed in micrograms per mL     Antibiotic                 RSLT#1    RSLT#2    RSLT#3    RSLT#4  Amoxicillin/Clavulanic Acid    S  Ampicillin                     R  Cefepime                       S  Ceftriaxone                    S  Cefuroxime                     S  Ciprofloxacin                  S  Ertapenem                      S  Gentamicin                     S  Imipenem                       S  Levofloxacin                   S  Meropenem                      S  Nitrofurantoin                 S  Piperacillin/Tazobactam        S  Tetracycline                   S  Tobramycin                     S  Trimethoprim/Sulfa             S        Reviewed sleep study from 01/2023    Assessment / Plan         Assessment   Diagnoses and all orders for this visit:    1. ROD (obstructive sleep apnea) (Primary)  -     Polysomnography 4 or More Parameters With CPAP; Future  -     Detailed AutoPAP Order    2. Obesity (BMI 30-39.9)    Reviewed sleep study which revealed mild sleep apnea and also noted severe hypoxia in which saturations dropped to 76% and were below 88% for approximately 5 hrs 39 minutes.    Ordered AutoPAP device with pressure settings 6-20 cmH2O (sent to [facility])    Ordered titration study to determine if needs oxygen through PAP device or Bi-level would be more appropriate.     Management obstructive sleep apnea also includes lifestyle modifications including weight loss, avoidance of alcohol, sedated, caffeine especially  before bedtime, allowing adequate sleep time, and body position during sleep such as side versus back. 10% weight loss can result in a 50% improvement of the apnea-hypopnea index. Untreated sleep apnea can lead to cardiovascular/metabolic disorder, neurocognitive deficit, daytime sleepiness, motor vehicle accidents, depression, mood disorders and reduced quality of life.  Patient understands the risk of untreated obstructive sleep apnea and benefit benefits of the treatment. Recommended at least 4 hours of use per night for 70% of every month (approximately 21 out of 30 days) per CMS guidelines.     Follow Up   Return in about 3 months (around 6/23/2023), or if symptoms worsen or fail to improve, for Next scheduled follow up.    Patient was given instructions and counseling regarding her condition or for health maintenance advice. Please see specific information pulled into the AVS if appropriate.       This document has been electronically signed by Cassandra Fernández PA-C   March 24, 2023 15:03 EDT    Dictated Utilizing Dragon Dictation: Part of this note may be an electronic transcription/translation of spoken language to printed text using the Dragon Dictation System.

## 2023-03-24 ENCOUNTER — PATIENT ROUNDING (BHMG ONLY) (OUTPATIENT)
Dept: PULMONOLOGY | Facility: CLINIC | Age: 78
End: 2023-03-24
Payer: MEDICARE

## 2023-03-24 NOTE — PROGRESS NOTES
March 24, 2023    Hello, may I speak with Bianka Agudelo?    My name is Rita Bustos      I am  with [facility]  [address]  [place] [address] [phone].    Before we get started may I verify your date of birth? 1945    I am calling to officially welcome you to our practice and ask about your recent visit. Is this a good time to talk? yes    Tell me about your visit with us. What things went well?  It was a good visit and I was pleased with the staff and provider        We're always looking for ways to make our patients' experiences even better. Do you have recommendations on ways we may improve?  no    Overall were you satisfied with your first visit to our practice? yes       I appreciate you taking the time to speak with me today. Is there anything else I can do for you? no      Thank you, and have a great day.

## 2023-04-18 ENCOUNTER — OFFICE VISIT (OUTPATIENT)
Dept: FAMILY MEDICINE CLINIC | Facility: CLINIC | Age: 78
End: 2023-04-18
Payer: MEDICARE

## 2023-04-18 VITALS
BODY MASS INDEX: 32.58 KG/M2 | WEIGHT: 190.8 LBS | HEART RATE: 81 BPM | SYSTOLIC BLOOD PRESSURE: 138 MMHG | DIASTOLIC BLOOD PRESSURE: 80 MMHG | HEIGHT: 64 IN | TEMPERATURE: 97.5 F | OXYGEN SATURATION: 93 %

## 2023-04-18 DIAGNOSIS — R30.0 DYSURIA: Primary | ICD-10-CM

## 2023-04-18 DIAGNOSIS — R09.81 CHRONIC NASAL CONGESTION: ICD-10-CM

## 2023-04-18 DIAGNOSIS — E11.3393 TYPE 2 DIABETES MELLITUS WITH BOTH EYES AFFECTED BY MODERATE NONPROLIFERATIVE RETINOPATHY WITHOUT MACULAR EDEMA, WITH LONG-TERM CURRENT USE OF INSULIN: ICD-10-CM

## 2023-04-18 DIAGNOSIS — Z79.4 TYPE 2 DIABETES MELLITUS WITH BOTH EYES AFFECTED BY MODERATE NONPROLIFERATIVE RETINOPATHY WITHOUT MACULAR EDEMA, WITH LONG-TERM CURRENT USE OF INSULIN: ICD-10-CM

## 2023-04-18 LAB
BILIRUB BLD-MCNC: NEGATIVE MG/DL
CLARITY, POC: ABNORMAL
COLOR UR: YELLOW
EXPIRATION DATE: ABNORMAL
GLUCOSE UR STRIP-MCNC: ABNORMAL MG/DL
KETONES UR QL: NEGATIVE
LEUKOCYTE EST, POC: NEGATIVE
Lab: ABNORMAL
NITRITE UR-MCNC: NEGATIVE MG/ML
PH UR: 6 [PH] (ref 5–8)
PROT UR STRIP-MCNC: ABNORMAL MG/DL
RBC # UR STRIP: NEGATIVE /UL
SP GR UR: 1.01 (ref 1–1.03)
UROBILINOGEN UR QL: NORMAL

## 2023-04-18 PROCEDURE — 99213 OFFICE O/P EST LOW 20 MIN: CPT | Performed by: FAMILY MEDICINE

## 2023-04-18 PROCEDURE — 1159F MED LIST DOCD IN RCRD: CPT | Performed by: FAMILY MEDICINE

## 2023-04-18 PROCEDURE — 3079F DIAST BP 80-89 MM HG: CPT | Performed by: FAMILY MEDICINE

## 2023-04-18 PROCEDURE — 3075F SYST BP GE 130 - 139MM HG: CPT | Performed by: FAMILY MEDICINE

## 2023-04-18 PROCEDURE — 81003 URINALYSIS AUTO W/O SCOPE: CPT | Performed by: FAMILY MEDICINE

## 2023-04-18 PROCEDURE — 1160F RVW MEDS BY RX/DR IN RCRD: CPT | Performed by: FAMILY MEDICINE

## 2023-04-18 RX ORDER — SYRING-NEEDL,DISP,INSUL,0.3 ML 31GX15/64"
SYRINGE, EMPTY DISPOSABLE MISCELLANEOUS
Qty: 200 EACH | Refills: 5 | Status: CANCELLED | OUTPATIENT
Start: 2023-04-18

## 2023-04-18 RX ORDER — AMOXICILLIN AND CLAVULANATE POTASSIUM 875; 125 MG/1; MG/1
1 TABLET, FILM COATED ORAL 2 TIMES DAILY
Qty: 14 TABLET | Refills: 0 | Status: SHIPPED | OUTPATIENT
Start: 2023-04-18

## 2023-04-18 RX ORDER — GUAIFENESIN 600 MG/1
1200 TABLET, EXTENDED RELEASE ORAL 2 TIMES DAILY
Qty: 120 TABLET | Refills: 0 | Status: SHIPPED | OUTPATIENT
Start: 2023-04-18

## 2023-04-18 RX ORDER — PSEUDOEPHEDRINE HYDROCHLORIDE 60 MG/1
60 TABLET, FILM COATED ORAL 2 TIMES DAILY
Qty: 60 TABLET | Refills: 0 | Status: SHIPPED | OUTPATIENT
Start: 2023-04-18

## 2023-04-18 NOTE — PROGRESS NOTES
"Chief Complaint  Sinusitis    Subjective          Bianka Agudelo presents to [facility]  Sinusitis  This is a new problem. The current episode started more than 1 month ago. The problem is unchanged. The pain is moderate. Associated symptoms include congestion, coughing, headaches, sinus pressure, sneezing and a sore throat. Past treatments include acetaminophen, lying down and oral decongestants. The treatment provided mild relief.       Review of Systems   HENT: Positive for congestion, sinus pressure, sneezing and sore throat.    Respiratory: Positive for cough.    Neurological: Positive for headaches.         Objective   Vital Signs:   /80 (BP Location: Right arm, Patient Position: Sitting, Cuff Size: Large Adult)   Pulse 81   Temp 97.5 °F (36.4 °C) (Temporal)   Ht 161.3 cm (63.5\")   Wt 86.5 kg (190 lb 12.8 oz)   SpO2 93%   BMI 33.27 kg/m²     Physical Exam  Constitutional:       General: She is not in acute distress.     Appearance: Normal appearance. She is well-developed and well-groomed. She is not ill-appearing, toxic-appearing or diaphoretic.   HENT:      Head: Normocephalic.      Right Ear: Tympanic membrane, ear canal and external ear normal.      Left Ear: Tympanic membrane, ear canal and external ear normal.      Nose: Congestion and rhinorrhea present.      Mouth/Throat:      Mouth: Mucous membranes are moist.      Pharynx: Oropharynx is clear. Posterior oropharyngeal erythema present. No oropharyngeal exudate.   Eyes:      General: Lids are normal.         Right eye: No discharge.         Left eye: No discharge.      Extraocular Movements: Extraocular movements intact.      Pupils: Pupils are equal, round, and reactive to light.   Neck:      Vascular: No carotid bruit.   Cardiovascular:      Rate and Rhythm: Normal rate and regular rhythm.      Pulses: Normal pulses.      Heart sounds: Normal heart sounds. No murmur heard.    No friction rub. No gallop. "   Pulmonary:      Effort: Pulmonary effort is normal. No respiratory distress.      Breath sounds: Normal breath sounds. No stridor. No wheezing, rhonchi or rales.   Chest:      Chest wall: No tenderness.   Abdominal:      General: Bowel sounds are normal. There is no distension.      Palpations: Abdomen is soft. There is no mass.      Tenderness: There is no abdominal tenderness. There is no right CVA tenderness, left CVA tenderness, guarding or rebound.      Hernia: No hernia is present.   Musculoskeletal:         General: No swelling or tenderness. Normal range of motion.      Cervical back: Normal range of motion and neck supple. No rigidity or tenderness.      Right lower leg: No edema.      Left lower leg: No edema.   Lymphadenopathy:      Cervical: No cervical adenopathy.   Skin:     General: Skin is warm.      Capillary Refill: Capillary refill takes less than 2 seconds.      Coloration: Skin is not jaundiced.      Findings: No bruising, erythema or rash.   Neurological:      General: No focal deficit present.      Mental Status: She is alert and oriented to person, place, and time.      Motor: Motor function is intact. No weakness.      Coordination: Coordination is intact.      Gait: Gait is intact. Gait normal.   Psychiatric:         Attention and Perception: Attention normal.         Mood and Affect: Mood normal.         Speech: Speech normal.         Behavior: Behavior normal.         Cognition and Memory: Cognition normal.         Judgment: Judgment normal.        Result Review :                 Assessment and Plan    Diagnoses and all orders for this visit:    1. Dysuria (Primary)  -     POCT urinalysis dipstick, automated    2. Type 2 diabetes mellitus with both eyes affected by moderate nonproliferative retinopathy without macular edema, with long-term current use of insulin    3. Chronic nasal congestion  -     guaiFENesin (Mucinex) 600 MG 12 hr tablet; Take 2 tablets by mouth 2 (Two) Times a Day.   Dispense: 120 tablet; Refill: 0  -     pseudoephedrine (SUDAFED) 60 MG tablet; Take 1 tablet by mouth 2 (Two) Times a Day.  Dispense: 60 tablet; Refill: 0    Other orders  -     amoxicillin-clavulanate (Augmentin) 875-125 MG per tablet; Take 1 tablet by mouth 2 (Two) Times a Day.  Dispense: 14 tablet; Refill: 0      Patient's Body mass index is 33.27 kg/m². indicating that she is obese (BMI >30). Obesity-related health conditions include the following: hypertension, diabetes mellitus and GERD. Obesity is unchanged. BMI is is above average; BMI management plan is completed. We discussed low calorie, low carb based diet program, portion control and increasing exercise..    Follow Up   No follow-ups on file.  Patient was given instructions and counseling regarding her condition or for health maintenance advice. Please see specific information pulled into the AVS if appropriate.     This document has been electronically signed by SARAH Hadley  April 18, 2023 15:21 EDT

## 2023-05-03 DIAGNOSIS — Z79.4 TYPE 2 DIABETES MELLITUS WITH BOTH EYES AFFECTED BY MODERATE NONPROLIFERATIVE RETINOPATHY WITHOUT MACULAR EDEMA, WITH LONG-TERM CURRENT USE OF INSULIN: ICD-10-CM

## 2023-05-03 DIAGNOSIS — F41.9 ANXIETY: ICD-10-CM

## 2023-05-03 DIAGNOSIS — E11.3393 TYPE 2 DIABETES MELLITUS WITH BOTH EYES AFFECTED BY MODERATE NONPROLIFERATIVE RETINOPATHY WITHOUT MACULAR EDEMA, WITH LONG-TERM CURRENT USE OF INSULIN: ICD-10-CM

## 2023-05-03 RX ORDER — METFORMIN HYDROCHLORIDE 500 MG/1
TABLET, EXTENDED RELEASE ORAL
Qty: 180 TABLET | Refills: 0 | Status: SHIPPED | OUTPATIENT
Start: 2023-05-03

## 2023-05-05 RX ORDER — DIAZEPAM 5 MG/1
TABLET ORAL
Qty: 60 TABLET | Refills: 0 | Status: SHIPPED | OUTPATIENT
Start: 2023-05-05

## 2023-05-05 NOTE — TELEPHONE ENCOUNTER
Pete # in epic  Reviewed and is consistent.  UDS 2/2023 reviewed and is consistent.  Patient comfort assessment guide reviewed and updated today.    As part of the patient's treatment plan they are being prescribed a controlled substance/ substances with potential for abuse.  This patient has been made aware of the appropriate use of such medications, including potential risk of somnolence, limited ability to drive and/or work safely, and potential for overdose.  It has also been made clear these medications are for use by the patient only, without concomitant use of alcohol or other substances unless prescribed/advised by medical provider.    Patient has completed prescribing agreement detailing terms of continued prescribing of controlled substances including monitoring PETE reports, urine drug screens, and pill counts.  The patient is aware PETE reports are reviewed on a regular basis and scanned into the chart.    History and physical exam exhibit continued safe and appropriate use of controlled substances.

## 2023-05-26 DIAGNOSIS — E03.8 OTHER SPECIFIED HYPOTHYROIDISM: ICD-10-CM

## 2023-05-26 RX ORDER — LEVOTHYROXINE SODIUM 0.15 MG/1
137 TABLET ORAL DAILY
Qty: 90 TABLET | Refills: 0 | Status: SHIPPED | OUTPATIENT
Start: 2023-05-26

## 2023-05-30 ENCOUNTER — TELEPHONE (OUTPATIENT)
Dept: CARDIOLOGY | Facility: CLINIC | Age: 78
End: 2023-05-30

## 2023-05-30 NOTE — TELEPHONE ENCOUNTER
Tried to call patient in regards to not having labs-VM full on mobile -prefferred #-LM on home phone.  We need to r/s until she can have these labs done as she was started on a statin at [facility] and has not had these checked.

## 2023-05-31 ENCOUNTER — OFFICE VISIT (OUTPATIENT)
Dept: FAMILY MEDICINE CLINIC | Facility: CLINIC | Age: 78
End: 2023-05-31
Payer: MEDICARE

## 2023-05-31 VITALS
BODY MASS INDEX: 32.85 KG/M2 | OXYGEN SATURATION: 96 % | WEIGHT: 192.4 LBS | HEIGHT: 64 IN | HEART RATE: 92 BPM | TEMPERATURE: 97.5 F | DIASTOLIC BLOOD PRESSURE: 68 MMHG | SYSTOLIC BLOOD PRESSURE: 132 MMHG

## 2023-05-31 DIAGNOSIS — I15.2 HYPERTENSION ASSOCIATED WITH DIABETES: ICD-10-CM

## 2023-05-31 DIAGNOSIS — H43.12 VITREOUS HEMORRHAGE, LEFT EYE: ICD-10-CM

## 2023-05-31 DIAGNOSIS — F41.9 ANXIETY: ICD-10-CM

## 2023-05-31 DIAGNOSIS — I73.89 OTHER SPECIFIED PERIPHERAL VASCULAR DISEASES: ICD-10-CM

## 2023-05-31 DIAGNOSIS — E03.8 OTHER SPECIFIED HYPOTHYROIDISM: ICD-10-CM

## 2023-05-31 DIAGNOSIS — E55.9 VITAMIN D DEFICIENCY: ICD-10-CM

## 2023-05-31 DIAGNOSIS — Z79.4 TYPE 2 DIABETES MELLITUS WITH BOTH EYES AFFECTED BY MODERATE NONPROLIFERATIVE RETINOPATHY WITHOUT MACULAR EDEMA, WITH LONG-TERM CURRENT USE OF INSULIN: ICD-10-CM

## 2023-05-31 DIAGNOSIS — K21.9 GASTROESOPHAGEAL REFLUX DISEASE WITHOUT ESOPHAGITIS: ICD-10-CM

## 2023-05-31 DIAGNOSIS — E11.3393 TYPE 2 DIABETES MELLITUS WITH BOTH EYES AFFECTED BY MODERATE NONPROLIFERATIVE RETINOPATHY WITHOUT MACULAR EDEMA, WITH LONG-TERM CURRENT USE OF INSULIN: ICD-10-CM

## 2023-05-31 DIAGNOSIS — N32.81 OVERACTIVE BLADDER: ICD-10-CM

## 2023-05-31 DIAGNOSIS — Z00.00 ENCOUNTER FOR SUBSEQUENT ANNUAL WELLNESS VISIT (AWV) IN MEDICARE PATIENT: Primary | ICD-10-CM

## 2023-05-31 DIAGNOSIS — E53.8 B12 DEFICIENCY: ICD-10-CM

## 2023-05-31 DIAGNOSIS — E11.59 HYPERTENSION ASSOCIATED WITH DIABETES: ICD-10-CM

## 2023-05-31 LAB — GLUCOSE BLDC GLUCOMTR-MCNC: 296 MG/DL (ref 70–130)

## 2023-05-31 RX ORDER — ASPIRIN 81 MG/1
81 TABLET ORAL DAILY
Qty: 90 TABLET | Refills: 1 | Status: SHIPPED | OUTPATIENT
Start: 2023-05-31

## 2023-05-31 RX ORDER — OXYBUTYNIN CHLORIDE 5 MG/1
5 TABLET ORAL 2 TIMES DAILY
Qty: 180 TABLET | Refills: 1 | Status: SHIPPED | OUTPATIENT
Start: 2023-05-31

## 2023-05-31 RX ORDER — LANOLIN ALCOHOL/MO/W.PET/CERES
1000 CREAM (GRAM) TOPICAL DAILY
Qty: 90 TABLET | Refills: 1 | Status: SHIPPED | OUTPATIENT
Start: 2023-05-31

## 2023-05-31 RX ORDER — AMLODIPINE BESYLATE 5 MG/1
5 TABLET ORAL DAILY
Qty: 90 TABLET | Refills: 1 | Status: SHIPPED | OUTPATIENT
Start: 2023-05-31

## 2023-05-31 RX ORDER — DIAZEPAM 5 MG/1
5 TABLET ORAL EVERY 12 HOURS PRN
Qty: 60 TABLET | Refills: 0 | Status: SHIPPED | OUTPATIENT
Start: 2023-05-31

## 2023-05-31 RX ORDER — OMEPRAZOLE 20 MG/1
20 CAPSULE, DELAYED RELEASE ORAL 2 TIMES DAILY
Qty: 180 CAPSULE | Refills: 1 | Status: SHIPPED | OUTPATIENT
Start: 2023-05-31

## 2023-05-31 NOTE — PROGRESS NOTES
"The ABCs of the Annual Wellness Visit  Subsequent Medicare Wellness Visit    Chief Complaint   Patient presents with    Medicare wellness-subsequent      Subjective    History of Present Illness:  Bianka Agudelo is a 77 y.o. female with medical conditions significant for CAD, dyslipidemia, hypertension, and anxiety who presents for a Subsequent Medicare Wellness Visit.    The patient states that her blood glucose levels are not doing well. She notes she ate some cornbread that her daughter made that elevated her levels, but other than today, she has been feeling okay, which means her blood glucose levels should have been normal. The patient notes she has been increasingly tired recently. She also notes she has been having cereal cravings, like Honey Nut Cheerios. She used to take Trulicity in 2019 but discontinued it because the medication was too expensive. The patient states her appetite has changed since she had COVID-19.     The following portions of the patient's history were reviewed and   updated as appropriate: allergies, current medications, past family history, past medical history, past social history, past surgical history, and problem list.    Compared to one year ago, the patient feels her physical   health is the same.    Compared to one year ago, the patient feels her mental   health is the same.    Recent Hospitalizations:  She was not admitted to the hospital during the last year.       Current Medical Providers:  Patient Care Team:  Eva Elliott MD as PCP - General (Family Medicine)  Eva Elliott MD as PCP - Family Medicine    Outpatient Medications Prior to Visit   Medication Sig Dispense Refill    albuterol sulfate  (90 Base) MCG/ACT inhaler Inhale 2 puffs Every 4 (Four) Hours As Needed for Wheezing. 90 g 0    atorvastatin (LIPITOR) 10 MG tablet Take 1 tablet by mouth Daily. 90 tablet 0    BD Veo Insulin Syringe U/F 31G X 15/64\" 0.5 ML misc USE TO INJECT TWICE DAILY 200 each 5 "    cyclobenzaprine (FLEXERIL) 10 MG tablet Take 1 tablet by mouth 2 (Two) Times a Day. 180 tablet 1    Diclofenac Sodium (VOLTAREN) 1 % gel gel APPLY 4 GRAMS TO THE AFFECTED AREA BELOW THE WAIST AND 2 GRAMS TO THE AFFECTED AREA ABOVE THE WAIST FOUR TIMES DAILY AS NEEDED FOR PAIN 100 g 5    glucose blood test strip 1 each by Other route 4 (Four) Times a Day. Use as instructed 120 each 11    glucose monitor monitoring kit 1 each As Needed (for glucose checks). 1 each 0    ibuprofen (ADVIL,MOTRIN) 800 MG tablet TAKE 1 TABLET BY MOUTH EVERY 8 HOURS AS NEEDED FOR PAIN 90 tablet 2    insulin detemir (Levemir) 100 UNIT/ML injection Inject 50 Units under the skin into the appropriate area as directed Every Night. 50 mL 0    isosorbide mononitrate (IMDUR) 30 MG 24 hr tablet Take 1 tablet by mouth Daily. 90 tablet 0    Lancets misc 1 application 4 (Four) Times a Day. 120 each 11    levothyroxine (SYNTHROID, LEVOTHROID) 150 MCG tablet TAKE 1 TABLET BY MOUTH DAILY 90 tablet 0    losartan-hydrochlorothiazide (Hyzaar) 100-12.5 MG per tablet Take 1 tablet by mouth Daily. 90 tablet 1    metFORMIN ER (GLUCOPHAGE-XR) 500 MG 24 hr tablet TAKE 1 TABLET BY MOUTH TWICE DAILY WITH MEALS 180 tablet 0    metoprolol succinate XL (TOPROL-XL) 25 MG 24 hr tablet Take 1 tablet by mouth Daily. 90 tablet 3    NovoLOG Mix 70/30 (70-30) 100 UNIT/ML injection Inject 24 Units under the skin into the appropriate area as directed 2 (Two) Times a Day With Meals. 40 mL 1    amLODIPine (NORVASC) 5 MG tablet Take 1 tablet by mouth Daily. 90 tablet 1    Aspirin Low Dose 81 MG EC tablet TAKE 1 TABLET BY MOUTH DAILY 90 tablet 1    diazePAM (VALIUM) 5 MG tablet TAKE 1 TABLET BY MOUTH EVERY 12 HOURS AS NEEDED FOR ANXIETY 60 tablet 0    omeprazole (priLOSEC) 20 MG capsule Take 1 capsule by mouth 2 (Two) Times a Day. 180 capsule 1    oxybutynin (DITROPAN) 5 MG tablet Take 1 tablet by mouth 2 (Two) Times a Day. 180 tablet 1    SITagliptin (Januvia) 100 MG tablet  Take 1 tablet by mouth Daily. 90 tablet 1    vitamin B-12 (CYANOCOBALAMIN) 1000 MCG tablet Take 1 tablet by mouth Daily. 90 tablet 1    amoxicillin-clavulanate (Augmentin) 875-125 MG per tablet Take 1 tablet by mouth 2 (Two) Times a Day. (Patient not taking: Reported on 5/31/2023) 14 tablet 0    guaiFENesin (Mucinex) 600 MG 12 hr tablet Take 2 tablets by mouth 2 (Two) Times a Day. (Patient not taking: Reported on 5/31/2023) 120 tablet 0    pseudoephedrine (SUDAFED) 60 MG tablet Take 1 tablet by mouth 2 (Two) Times a Day. (Patient not taking: Reported on 5/31/2023) 60 tablet 0     No facility-administered medications prior to visit.       No opioid medication identified on active medication list. I have reviewed chart for other potential  high risk medication/s and harmful drug interactions in the elderly.          Aspirin is on active medication list. Aspirin use is indicated based on review of current medical condition/s. Pros and cons of this therapy have been discussed today. Benefits of this medication outweigh potential harm.  Patient has been encouraged to continue taking this medication.  .      Patient Active Problem List   Diagnosis    Vitamin D deficiency    Vitamin B12 deficiency    Overactive bladder    Hypothyroidism    Essential hypertension    GERD (gastroesophageal reflux disease)    Type 2 diabetes mellitus with both eyes affected by moderate nonproliferative retinopathy without macular edema, with long-term current use of insulin    Anxiety    Coronary artery disease involving native coronary artery of native heart with angina pectoris    Hypercalcemia    History of laceration of skin    Hyperlipidemia due to type 2 diabetes mellitus    Shortness of breath    Precordial chest pain    Dyslipidemia    Dizziness    ROD (obstructive sleep apnea)    Palpitations    Premature ventricular beat    Nonsustained ventricular tachycardia     Advance Care Planning  Advance Directive is not on file.  ACP  "discussion was held with the patient during this visit. Patient does not have an advance directive, information provided.          Objective    Vitals:    05/31/23 0931   BP: 132/68   BP Location: Right arm   Patient Position: Sitting   Cuff Size: Adult   Pulse: 92   Temp: 97.5 °F (36.4 °C)   SpO2: 96%   Weight: 87.3 kg (192 lb 6.4 oz)   Height: 161.3 cm (63.5\")     Estimated body mass index is 33.55 kg/m² as calculated from the following:    Height as of this encounter: 161.3 cm (63.5\").    Weight as of this encounter: 87.3 kg (192 lb 6.4 oz).    BMI is >= 30 and <35. (Class 1 Obesity). The following options were offered after discussion;: exercise counseling/recommendations and nutrition counseling/recommendations      Does the patient have evidence of cognitive impairment? No    Physical Exam        Gen: Patient in NAD. Pleasant and answers appropriately. A&Ox3.    Skin: Warm and dry with normal turgor. No purpura, rashes, or unusual pigmentation noted. Hair is normal in appearance and distribution.    HEENT: NC/AT. No lesions noted. Conjunctiva clear, sclera nonicteric. PERRL. EOMI without nystagmus or strabismus. Fundi appear benign. No hemorrhages or exudates of eyes. Auditory canals are patent bilaterally without lesions. TMs intact,  nonerythematous, nonbulging without lesions. Nasal mucosa erythematous, and nonedematous. Frontal and maxillary sinuses are nontender. O/P erythematous and moist without exudate.    Neck: Supple without lymph nodes palpated. FROM.     Lungs: Slightly decreased B/L without rales, rhonchi, crackles, or wheezes.    Heart: RRR. S1 and S2 normal. No S3 or S4. No MRGT.    Abd: Soft, nontender,nondistended. (+)BSx4 quadrants.     Extrem: No CC.  Trace edema bilateral lower extremities.  Radial pulses 2+/4 and equal B/L. FROMx4.  Positive joint tenderness noted.    Neuro: No focal motor/sensory deficits.      HEALTH RISK ASSESSMENT    Smoking Status:  Social History     Tobacco Use "   Smoking Status Never    Passive exposure: Current   Smokeless Tobacco Never     Alcohol Consumption:  Social History     Substance and Sexual Activity   Alcohol Use No     Fall Risk Screen:    DAPHNEADI Fall Risk Assessment was completed, and patient is at LOW risk for falls.Assessment completed on:2023    Depression Screenin/31/2023     9:56 AM   PHQ-2/PHQ-9 Depression Screening   Little Interest or Pleasure in Doing Things 0-->not at all   Feeling Down, Depressed or Hopeless 0-->not at all   PHQ-9: Brief Depression Severity Measure Score 0       Health Habits and Functional and Cognitive Screenin/31/2023     9:56 AM   Functional & Cognitive Status   Do you have difficulty preparing food and eating? No   Do you have difficulty bathing yourself, getting dressed or grooming yourself? No   Do you have difficulty using the toilet? No   Do you have difficulty moving around from place to place? No   Do you have trouble with steps or getting out of a bed or a chair? Yes   Current Diet Well Balanced Diet   Dental Exam Up to date   Eye Exam Up to date   Exercise (times per week) 1 times per week   Current Exercises Include Walking   Do you need help using the phone?  No   Are you deaf or do you have serious difficulty hearing?  Yes   Do you need help with transportation? Yes   Do you need help shopping? No   Do you need help preparing meals?  No   Do you need help with housework?  No   Do you need help with laundry? No   Do you need help taking your medications? Yes   Do you need help managing money? Yes   Have you felt unusual stress, anger or loneliness in the last month? No   Who do you live with? Other   If you need help, do you have trouble finding someone available to you? Yes   Do you have difficulty concentrating, remembering or making decisions? No       Age-appropriate Screening Schedule:  Refer to the list below for future screening recommendations based on patient's age, sex and/or medical  conditions. Orders for these recommended tests are listed in the plan section. The patient has been provided with a written plan.    Health Maintenance   Topic Date Due    COVID-19 Vaccine (1) Never done    DXA SCAN  02/21/2016    HEPATITIS C SCREENING  Never done    ZOSTER VACCINE (2 of 2) 07/06/2017    MAMMOGRAM  01/11/2021    Pneumococcal Vaccine 65+ (2 - PPSV23 if available, else PCV20) 09/15/2021    URINE MICROALBUMIN  01/05/2022    DIABETIC EYE EXAM  03/24/2022    INFLUENZA VACCINE  08/01/2023    HEMOGLOBIN A1C  08/21/2023    LIPID PANEL  10/13/2023    ANNUAL WELLNESS VISIT  05/31/2024    TDAP/TD VACCINES (3 - Td or Tdap) 05/11/2027    COLORECTAL CANCER SCREENING  Discontinued              Assessment & Plan   CMS Preventative Services Quick Reference  Risk Factors Identified During Encounter  Chronic Pain: Natural history and expected course discussed. Questions answered.  Depression/Dysphoria:  Continue to monitor  Fall Risk-High or Moderate: Discussed Fall Prevention in the home  Inactivity/Sedentary: Patient was advised to exercise at least 150 minutes a week per CDC recommendations.  Polypharmacy: Medication List reviewed  The above risks/problems have been discussed with the patient.  Follow up actions/plans if indicated are seen below in the Assessment/Plan Section.  Pertinent information has been shared with the patient in the After Visit Summary.    Diagnoses and all orders for this visit:    1. Encounter for subsequent annual wellness visit (AWV) in Medicare patient (Primary)  -     MicroAlbumin, Urine, Random - Urine, Clean Catch  -     Hemoglobin A1c  -     T4, Free  -     TSH  -     Comprehensive Metabolic Panel    2. Type 2 diabetes mellitus with both eyes affected by moderate nonproliferative retinopathy without macular edema, with long-term current use of insulin  -     POCT Glucose  -     aspirin (Aspirin Low Dose) 81 MG EC tablet; Take 1 tablet by mouth Daily.  Dispense: 90 tablet; Refill:  1  -     SITagliptin (Januvia) 100 MG tablet; Take 1 tablet by mouth Daily.  Dispense: 90 tablet; Refill: 1  -     Cancel: Comprehensive Metabolic Panel; Future  -     Cancel: Hemoglobin A1c; Future  -     Cancel: MicroAlbumin, Urine, Random - Urine, Clean Catch; Future  -     Dulaglutide 0.75 MG/0.5ML solution pen-injector; Inject 0.75 mg under the skin into the appropriate area as directed 1 (One) Time Per Week.  Dispense: 2 mL; Refill: 2  -     MicroAlbumin, Urine, Random - Urine, Clean Catch  -     Hemoglobin A1c  -     T4, Free  -     TSH  -     Comprehensive Metabolic Panel    3. Hypertension associated with diabetes  -     amLODIPine (NORVASC) 5 MG tablet; Take 1 tablet by mouth Daily.  Dispense: 90 tablet; Refill: 1  -     Cancel: Comprehensive Metabolic Panel; Future  -     MicroAlbumin, Urine, Random - Urine, Clean Catch  -     Hemoglobin A1c  -     T4, Free  -     TSH  -     Comprehensive Metabolic Panel    4. Anxiety  -     diazePAM (VALIUM) 5 MG tablet; Take 1 tablet by mouth Every 12 (Twelve) Hours As Needed for Anxiety. for anxiety  Dispense: 60 tablet; Refill: 0  -     Cancel: Comprehensive Metabolic Panel; Future  -     MicroAlbumin, Urine, Random - Urine, Clean Catch  -     Hemoglobin A1c  -     T4, Free  -     TSH  -     Comprehensive Metabolic Panel    5. Gastroesophageal reflux disease without esophagitis  -     omeprazole (priLOSEC) 20 MG capsule; Take 1 capsule by mouth 2 (Two) Times a Day.  Dispense: 180 capsule; Refill: 1  -     Cancel: Comprehensive Metabolic Panel; Future  -     MicroAlbumin, Urine, Random - Urine, Clean Catch  -     Hemoglobin A1c  -     T4, Free  -     TSH  -     Comprehensive Metabolic Panel    6. Overactive bladder  -     oxybutynin (DITROPAN) 5 MG tablet; Take 1 tablet by mouth 2 (Two) Times a Day.  Dispense: 180 tablet; Refill: 1  -     Cancel: Comprehensive Metabolic Panel; Future  -     MicroAlbumin, Urine, Random - Urine, Clean Catch  -     Hemoglobin A1c  -      T4, Free  -     TSH  -     Comprehensive Metabolic Panel    7. B12 deficiency  -     vitamin B-12 (CYANOCOBALAMIN) 1000 MCG tablet; Take 1 tablet by mouth Daily.  Dispense: 90 tablet; Refill: 1  -     Cancel: Comprehensive Metabolic Panel; Future  -     MicroAlbumin, Urine, Random - Urine, Clean Catch  -     Hemoglobin A1c  -     T4, Free  -     TSH  -     Comprehensive Metabolic Panel    8. Vitamin D deficiency  -     Cancel: Vitamin D,25-Hydroxy; Future  -     Vitamin D 25 Hydroxy    9. Other specified hypothyroidism  -     Cancel: TSH; Future  -     Cancel: T4, Free; Future  -     MicroAlbumin, Urine, Random - Urine, Clean Catch  -     Hemoglobin A1c  -     T4, Free  -     TSH  -     Comprehensive Metabolic Panel    Type 2 diabetes mellitus.  - The patient will be prescribed Trulicity. If the medication is too expensive, the patient will contact the office.  - She will continue taking her insulin and the rest of her regular medications.  - Routine laboratory tests ordered today.     Follow Up:   Return in about 3 months (around 8/31/2023), or LABS.     An After Visit Summary and PPPS were made available to the patient.                   Transcribed from ambient dictation for Eva Elliott MD by Farzana Suero.  05/31/23   13:31 EDT    Patient or patient representative verbalized consent to the visit recording.  I have personally performed the services described in this document as transcribed by the above individual, and it is both accurate and complete.

## 2023-06-01 LAB
25(OH)D3+25(OH)D2 SERPL-MCNC: 13.7 NG/ML (ref 30–100)
ALBUMIN SERPL-MCNC: 3.9 G/DL (ref 3.5–5.2)
ALBUMIN/GLOB SERPL: 1.9 G/DL
ALP SERPL-CCNC: 67 U/L (ref 39–117)
ALT SERPL-CCNC: 30 U/L (ref 1–33)
AST SERPL-CCNC: 24 U/L (ref 1–32)
BILIRUB SERPL-MCNC: 0.5 MG/DL (ref 0–1.2)
BUN SERPL-MCNC: 21 MG/DL (ref 8–23)
BUN/CREAT SERPL: 27.6 (ref 7–25)
CALCIUM SERPL-MCNC: 10 MG/DL (ref 8.6–10.5)
CHLORIDE SERPL-SCNC: 99 MMOL/L (ref 98–107)
CO2 SERPL-SCNC: 23.6 MMOL/L (ref 22–29)
CREAT SERPL-MCNC: 0.76 MG/DL (ref 0.57–1)
EGFRCR SERPLBLD CKD-EPI 2021: 80.8 ML/MIN/1.73
GLOBULIN SER CALC-MCNC: 2.1 GM/DL
GLUCOSE SERPL-MCNC: 354 MG/DL (ref 65–99)
HBA1C MFR BLD: 10.5 % (ref 4.8–5.6)
MICROALBUMIN UR-MCNC: 61.4 UG/ML
POTASSIUM SERPL-SCNC: 4.5 MMOL/L (ref 3.5–5.2)
PROT SERPL-MCNC: 6 G/DL (ref 6–8.5)
SODIUM SERPL-SCNC: 136 MMOL/L (ref 136–145)
T4 FREE SERPL-MCNC: 1.25 NG/DL (ref 0.93–1.7)
TSH SERPL DL<=0.005 MIU/L-ACNC: 2.2 UIU/ML (ref 0.27–4.2)

## 2023-06-12 ENCOUNTER — TELEPHONE (OUTPATIENT)
Dept: CARDIOLOGY | Facility: CLINIC | Age: 78
End: 2023-06-12

## 2023-06-12 NOTE — TELEPHONE ENCOUNTER
Caller: Nadya Agudelo    Relationship to patient: Emergency Contact    Best call back number [phone]        Type of visit:  PATIENT 3 MONTH FOLLOW UP    Requested date:ASAP     Additional notes: PATIENT DAUGHTER CALLED. PATIENT MISSED APPOINTMENT IN MAY AND NEEDS TO BE RESCHEDULED.  PATIENT LAST IN IN FEB. NEEDED A 3 MONTH FOLLOW UP.  OUTSIDE OF TIME FRAME FOR THE HUB.  PLEASE REACH OUT TO PATIENT DAUGHTER TO STACIA

## 2023-06-19 ENCOUNTER — TELEPHONE (OUTPATIENT)
Dept: FAMILY MEDICINE CLINIC | Facility: CLINIC | Age: 78
End: 2023-06-19
Payer: MEDICARE

## 2023-06-19 DIAGNOSIS — E11.3393 TYPE 2 DIABETES MELLITUS WITH BOTH EYES AFFECTED BY MODERATE NONPROLIFERATIVE RETINOPATHY WITHOUT MACULAR EDEMA, WITH LONG-TERM CURRENT USE OF INSULIN: ICD-10-CM

## 2023-06-19 DIAGNOSIS — Z79.4 TYPE 2 DIABETES MELLITUS WITH BOTH EYES AFFECTED BY MODERATE NONPROLIFERATIVE RETINOPATHY WITHOUT MACULAR EDEMA, WITH LONG-TERM CURRENT USE OF INSULIN: ICD-10-CM

## 2023-06-19 RX ORDER — ERGOCALCIFEROL 1.25 MG/1
50000 CAPSULE ORAL WEEKLY
Qty: 12 CAPSULE | Refills: 1 | Status: SHIPPED | OUTPATIENT
Start: 2023-06-19

## 2023-06-19 RX ORDER — INSULIN DETEMIR 100 [IU]/ML
48 INJECTION, SOLUTION SUBCUTANEOUS NIGHTLY
Qty: 50 ML | Refills: 0
Start: 2023-06-19

## 2023-06-19 NOTE — TELEPHONE ENCOUNTER
----- Message from Eva Elliott MD sent at 6/19/2023  7:17 AM EDT -----  Please call the patient regarding her abnormal result.  1) Her diabetes is elevated. Was she able to restart the trulicity? Is she still on 50 units of the insulin at night?  2) Her vitamin D is low. If she is okay with repletion, please send in Vitamin D 74944 IU weekly, #12, with 1 refill.       Left a message to return call.

## 2023-06-19 NOTE — TELEPHONE ENCOUNTER
----- Message from Eva Elliott MD sent at 6/19/2023  7:17 AM EDT -----  Please call the patient regarding her abnormal result.  1) Her diabetes is elevated. Was she able to restart the trulicity? Is she still on 50 units of the insulin at night?  2) Her vitamin D is low. If she is okay with repletion, please send in Vitamin D 95121 IU weekly, #12, with 1 refill.

## 2023-06-19 NOTE — TELEPHONE ENCOUNTER
Have her go to 48 units at night with the insulin. If her fasting glucose levels go below 100 and she is feeling it, have her call us back.

## 2023-06-19 NOTE — TELEPHONE ENCOUNTER
----- Message from Eva Elliott MD sent at 6/19/2023  7:17 AM EDT -----  Please call the patient regarding her abnormal result.  1) Her diabetes is elevated. Was she able to restart the trulicity? Is she still on 50 units of the insulin at night?  2) Her vitamin D is low. If she is okay with repletion, please send in Vitamin D 28383 IU weekly, #12, with 1 refill.       Left a message to return call.      Bianka returned call reports she is scared of medications but yesterday her sister was there & told her the Trulicity works good so she started it yesterday,as to the Levemir she is only injecting 44 units @ night,agreeable to Vitamin D sent to pharmacy as ordered.

## 2023-06-22 NOTE — TELEPHONE ENCOUNTER
Called requesting a refill on valium,torsten is on your desk.   General Surgery History and Physical    Patient's Name/Date of Birth: Maryse Velazco / 1975    Date: 6/22/2023    PCP: Conchita Wilson DO    Referring Physician:   [place]  [phone]    CHIEF COMPLAINT:    Chief Complaint   Patient presents with    New Patient    Colonoscopy     Colonoscopy 1 yr recall, poor prep, fm hx of colon cancer         HISTORY OF PRESENT ILLNESS:    Maryse Velazco is an 50 y.o. female who presents for a colonoscopy. The patient said she had a poor prep last year but does not think she is constipated. No nausea, vomiting, diarrhea, constipation. No changes in stool caliber. No bloody or black stools. No abdominal pain. No unintentional weight loss. She has a family history of colon cancer - her aunt. The patient has a known history of: no known risk factors. The patient has had a colonoscopy before - last year she had a poor prep and it was recommended she have another this year. Past Medical History:   Past Medical History:   Diagnosis Date    Chronic hip pain     Urinary incontinence         Past Surgical History:   Past Surgical History:   Procedure Laterality Date    CARDIAC CATHETERIZATION  06/13/2023    COLONOSCOPY N/A 01/12/2022    COLORECTAL CANCER SCREENING, NOT HIGH RISK performed by Sara Maravilla DO at [address] [facility] Left     upper femur  (approx 2017)    HIP SURGERY Left 07/09/2016    IM NAILING LEFT HIP    TOTAL HIP ARTHROPLASTY Left 02/08/2022    CONVERSION OF PRIOR HIP SURGERY TO LEFT TOTAL HIP ARTHROPLASTY ROBOTIC ASSISTED CIARA performed by Miguel Chong DO at [address]          Allergies: Patient has no known allergies.      Medications:   Current Outpatient Medications   Medication Sig Dispense Refill    aspirin 81 MG chewable tablet Take 1 tablet by mouth daily 30 tablet 3    atorvastatin (LIPITOR) 40 MG tablet Take 1 tablet by mouth nightly 30 tablet 3    metoprolol succinate (TOPROL XL) 25 MG

## 2023-07-26 DIAGNOSIS — E11.3393 TYPE 2 DIABETES MELLITUS WITH BOTH EYES AFFECTED BY MODERATE NONPROLIFERATIVE RETINOPATHY WITHOUT MACULAR EDEMA, WITH LONG-TERM CURRENT USE OF INSULIN: ICD-10-CM

## 2023-07-26 DIAGNOSIS — Z79.4 TYPE 2 DIABETES MELLITUS WITH BOTH EYES AFFECTED BY MODERATE NONPROLIFERATIVE RETINOPATHY WITHOUT MACULAR EDEMA, WITH LONG-TERM CURRENT USE OF INSULIN: ICD-10-CM

## 2023-07-26 RX ORDER — INSULIN ASPART 100 [IU]/ML
INJECTION, SUSPENSION SUBCUTANEOUS
Qty: 40 ML | Refills: 1 | Status: SHIPPED | OUTPATIENT
Start: 2023-07-26

## 2023-07-26 NOTE — TELEPHONE ENCOUNTER
Caller: Agudelo Bianka L    Relationship: Self    Best call back number: [phone]     Requested Prescriptions:   Requested Prescriptions     Pending Prescriptions Disp Refills    NovoLOG Mix 70/30 (70-30) 100 UNIT/ML injection [Pharmacy Med Name: NOVOLOG MIX 70/30 BOBBY [id number](BLUE)] 40 mL 1     Sig: ADMINISTER 24 UNITS UNDER THE SKIN TWICE DAILY WITH MEALS AS DIRECTED        Pharmacy where request should be sent: [id number] - [place] - [id number] AT [facility] [address] - [phone]  - [phone] FX     Last office visit with prescribing clinician: 10/13/2022   Last telemedicine visit with prescribing clinician: Visit date not found   Next office visit with prescribing clinician: Visit date not found     Additional details provided by patient: HAS 1 DAY LEFT, NEEDS REFILLS     Does the patient have less than a 3 day supply:  [x] Yes  [] No    Would you like a call back once the refill request has been completed: [x] Yes [] No    If the office needs to give you a call back, can they leave a voicemail: [] Yes [] No    [employer] Rep   07/26/23 16:06 EDT

## 2023-08-04 DIAGNOSIS — E11.3393 TYPE 2 DIABETES MELLITUS WITH BOTH EYES AFFECTED BY MODERATE NONPROLIFERATIVE RETINOPATHY WITHOUT MACULAR EDEMA, WITH LONG-TERM CURRENT USE OF INSULIN: ICD-10-CM

## 2023-08-04 DIAGNOSIS — Z79.4 TYPE 2 DIABETES MELLITUS WITH BOTH EYES AFFECTED BY MODERATE NONPROLIFERATIVE RETINOPATHY WITHOUT MACULAR EDEMA, WITH LONG-TERM CURRENT USE OF INSULIN: ICD-10-CM

## 2023-08-04 DIAGNOSIS — I25.119 CORONARY ARTERY DISEASE INVOLVING NATIVE CORONARY ARTERY OF NATIVE HEART WITH ANGINA PECTORIS: ICD-10-CM

## 2023-08-04 DIAGNOSIS — F41.9 ANXIETY: ICD-10-CM

## 2023-08-04 RX ORDER — ISOSORBIDE MONONITRATE 30 MG/1
30 TABLET, EXTENDED RELEASE ORAL DAILY
Qty: 90 TABLET | Refills: 0 | Status: SHIPPED | OUTPATIENT
Start: 2023-08-04

## 2023-08-08 RX ORDER — METFORMIN HYDROCHLORIDE 500 MG/1
TABLET, EXTENDED RELEASE ORAL
Qty: 180 TABLET | Refills: 1 | Status: SHIPPED | OUTPATIENT
Start: 2023-08-08

## 2023-08-08 RX ORDER — DIAZEPAM 5 MG/1
TABLET ORAL
Qty: 60 TABLET | Refills: 0 | Status: SHIPPED | OUTPATIENT
Start: 2023-08-08

## 2023-08-30 ENCOUNTER — OFFICE VISIT (OUTPATIENT)
Dept: FAMILY MEDICINE CLINIC | Facility: CLINIC | Age: 78
End: 2023-08-30
Payer: MEDICARE

## 2023-08-30 VITALS
HEART RATE: 83 BPM | TEMPERATURE: 97.5 F | HEIGHT: 64 IN | WEIGHT: 189.8 LBS | DIASTOLIC BLOOD PRESSURE: 68 MMHG | SYSTOLIC BLOOD PRESSURE: 126 MMHG | BODY MASS INDEX: 32.4 KG/M2 | OXYGEN SATURATION: 95 %

## 2023-08-30 DIAGNOSIS — Z79.4 TYPE 2 DIABETES MELLITUS WITH BOTH EYES AFFECTED BY MODERATE NONPROLIFERATIVE RETINOPATHY WITHOUT MACULAR EDEMA, WITH LONG-TERM CURRENT USE OF INSULIN: Primary | ICD-10-CM

## 2023-08-30 DIAGNOSIS — N32.81 OVERACTIVE BLADDER: ICD-10-CM

## 2023-08-30 DIAGNOSIS — M25.551 RIGHT HIP PAIN: ICD-10-CM

## 2023-08-30 DIAGNOSIS — E11.59 HYPERTENSION ASSOCIATED WITH DIABETES: ICD-10-CM

## 2023-08-30 DIAGNOSIS — K21.9 GASTROESOPHAGEAL REFLUX DISEASE WITHOUT ESOPHAGITIS: ICD-10-CM

## 2023-08-30 DIAGNOSIS — E78.5 HYPERLIPIDEMIA DUE TO TYPE 2 DIABETES MELLITUS: ICD-10-CM

## 2023-08-30 DIAGNOSIS — I15.2 HYPERTENSION ASSOCIATED WITH DIABETES: ICD-10-CM

## 2023-08-30 DIAGNOSIS — E11.3393 TYPE 2 DIABETES MELLITUS WITH BOTH EYES AFFECTED BY MODERATE NONPROLIFERATIVE RETINOPATHY WITHOUT MACULAR EDEMA, WITH LONG-TERM CURRENT USE OF INSULIN: Primary | ICD-10-CM

## 2023-08-30 DIAGNOSIS — F41.9 ANXIETY: ICD-10-CM

## 2023-08-30 DIAGNOSIS — M62.838 NIGHT MUSCLE SPASMS: ICD-10-CM

## 2023-08-30 DIAGNOSIS — E11.69 HYPERLIPIDEMIA DUE TO TYPE 2 DIABETES MELLITUS: ICD-10-CM

## 2023-08-30 DIAGNOSIS — E03.8 OTHER SPECIFIED HYPOTHYROIDISM: ICD-10-CM

## 2023-08-30 LAB — GLUCOSE BLDC GLUCOMTR-MCNC: 139 MG/DL (ref 70–130)

## 2023-08-30 PROCEDURE — 1160F RVW MEDS BY RX/DR IN RCRD: CPT | Performed by: FAMILY MEDICINE

## 2023-08-30 PROCEDURE — 82948 REAGENT STRIP/BLOOD GLUCOSE: CPT | Performed by: FAMILY MEDICINE

## 2023-08-30 PROCEDURE — 3078F DIAST BP <80 MM HG: CPT | Performed by: FAMILY MEDICINE

## 2023-08-30 PROCEDURE — 3074F SYST BP LT 130 MM HG: CPT | Performed by: FAMILY MEDICINE

## 2023-08-30 PROCEDURE — 99214 OFFICE O/P EST MOD 30 MIN: CPT | Performed by: FAMILY MEDICINE

## 2023-08-30 PROCEDURE — 1159F MED LIST DOCD IN RCRD: CPT | Performed by: FAMILY MEDICINE

## 2023-08-30 RX ORDER — LEVOTHYROXINE SODIUM 0.15 MG/1
137 TABLET ORAL DAILY
Qty: 90 TABLET | Refills: 0 | Status: SHIPPED | OUTPATIENT
Start: 2023-08-30

## 2023-08-30 RX ORDER — OMEPRAZOLE 20 MG/1
20 CAPSULE, DELAYED RELEASE ORAL 2 TIMES DAILY
Qty: 180 CAPSULE | Refills: 1 | Status: SHIPPED | OUTPATIENT
Start: 2023-08-30

## 2023-08-30 RX ORDER — INSULIN DETEMIR 100 [IU]/ML
48 INJECTION, SOLUTION SUBCUTANEOUS NIGHTLY
Qty: 50 ML | Refills: 1 | Status: SHIPPED | OUTPATIENT
Start: 2023-08-30

## 2023-08-30 RX ORDER — AMLODIPINE BESYLATE 5 MG/1
5 TABLET ORAL DAILY
Qty: 90 TABLET | Refills: 1 | Status: SHIPPED | OUTPATIENT
Start: 2023-08-30

## 2023-08-30 RX ORDER — OXYBUTYNIN CHLORIDE 5 MG/1
5 TABLET ORAL 2 TIMES DAILY
Qty: 180 TABLET | Refills: 1 | Status: SHIPPED | OUTPATIENT
Start: 2023-08-30

## 2023-08-30 RX ORDER — ASPIRIN 81 MG/1
81 TABLET ORAL DAILY
Qty: 90 TABLET | Refills: 1 | Status: SHIPPED | OUTPATIENT
Start: 2023-08-30

## 2023-08-30 NOTE — PROGRESS NOTES
"Bianka Agudelo     VITALS: Blood pressure 126/68, pulse 83, temperature 97.5 °F (36.4 °C), temperature source Temporal, height 161.3 cm (63.5\"), weight 86.1 kg (189 lb 12.8 oz), SpO2 95 %, not currently breastfeeding.    Subjective  Chief Complaint  Diabetes    Subjective          History of Present Illness:  Patient is a 77 y.o. female with medical conditions significant for CAD, dyslipidemia, hypertension, and anxiety who presents to clinic secondary to medical follow-up.    The patient's blood glucose level is 139 mg/dL. She is maintaining her weight, which is about the same as it was when she was here before. She does not get dizzy or sleepy since she has been on Trulicity.She denies any stomach issues. She has been diabetic for years. Her appetite has decreased.     She has a lump on her buttocks that has been there for over 2 years. She has not done anything to it. It has not gotten bigger. Her cardiologist put her on a cholesterol medication. She has sleep apnea and was given a CPAP machine but she does not wear it. She does not want a mammogram.    The following portions of the patient's history were reviewed and updated as appropriate: allergies, current medications, past family history, past medical history, past social history, past surgical history and problem list.    Past Medical History  Past Medical History:   Diagnosis Date    Anxiety     Asthma     CAD (coronary artery disease)     Diabetes mellitus     Dyslipidemia     GERD (gastroesophageal reflux disease)     H/O angina pectoris     History of bone density study     February 2014    History of mammogram 07/15/2013    Hypertension     Hypothyroidism     Muscle spasms of both lower extremities     Overactive bladder     Vitamin B12 deficiency     Vitamin D deficiency        Surgical History  Past Surgical History:   Procedure Laterality Date    CARDIAC CATHETERIZATION      COLONOSCOPY  01/01/2010    EYE SURGERY  10/04/2021    right eye, bleeding " "behind the eye     OOPHORECTOMY      1 removed    TUBAL ABDOMINAL LIGATION         Family History  Family History   Problem Relation Age of Onset    Diabetes Mother     Heart disease Mother     Stroke Mother     Cancer Father     Pancreatic cancer Sister     Diabetes Sister     Heart attack Brother        Social History  Social History     Socioeconomic History    Marital status:    Tobacco Use    Smoking status: Never     Passive exposure: Current    Smokeless tobacco: Never   Vaping Use    Vaping Use: Never used   Substance and Sexual Activity    Alcohol use: No    Drug use: No    Sexual activity: Defer       Objective   Vital Signs:   /68 (BP Location: Right arm, Patient Position: Sitting, Cuff Size: Adult)   Pulse 83   Temp 97.5 °F (36.4 °C) (Temporal)   Ht 161.3 cm (63.5\")   Wt 86.1 kg (189 lb 12.8 oz)   SpO2 95%   BMI 33.09 kg/m²     Physical Exam     Gen: Patient in NAD. Pleasant and answers appropriately. A&Ox3.    Skin: Warm and dry with normal turgor. No purpura, rashes, or unusual pigmentation noted. Hair is normal in appearance and distribution.  Sebaceous cyst on right buttock.    HEENT: NC/AT. No lesions noted. Conjunctiva clear, sclera nonicteric. PERRL. EOMI without nystagmus or strabismus. Fundi appear benign. No hemorrhages or exudates of eyes. Auditory canals are patent bilaterally without lesions. TMs intact,  nonerythematous, bulging without lesions. Nasal mucosa pink, nonerythematous, and nonedematous. Frontal and maxillary sinuses are nontender. O/P nonerythematous and moist without exudate.    Neck: Supple without lymph nodes palpated. FROM.     Lungs: Decreased B/L without rales, rhonchi, crackles, or wheezes.    Heart: RRR. S1 and S2 normal. No S3 or S4. No MRGT.    Abd: Soft, nontender,nondistended. (+)BSx4 quadrants.     Extrem: No CCE. Radial pulses 2+/4 and equal B/L. FROMx4.  Positive joint tenderness noted.    Neuro: No focal motor/sensory " deficits.    Procedures    Result Review :   The following data was reviewed by: Eva Elliott MD  on 08/30/2023:                Assessment and Plan    This is a 77-year-old female presents to clinic for medical follow-up.    Diagnoses and all orders for this visit:    1. Type 2 diabetes mellitus with both eyes affected by moderate nonproliferative retinopathy without macular edema, with long-term current use of insulin (Primary)  -     POCT Glucose  -     aspirin (Aspirin Low Dose) 81 MG EC tablet; Take 1 tablet by mouth Daily.  Dispense: 90 tablet; Refill: 1  -     insulin detemir (Levemir) 100 UNIT/ML injection; Inject 48 Units under the skin into the appropriate area as directed Every Night.  Dispense: 50 mL; Refill: 1  -     SITagliptin (Januvia) 100 MG tablet; Take 1 tablet by mouth Daily.  Dispense: 90 tablet; Refill: 1  -     Dulaglutide 3 MG/0.5ML solution pen-injector; Inject 0.5 mL under the skin into the appropriate area as directed 1 (One) Time Per Week.  Dispense: 6 mL; Refill: 0  -     Cancel: Comprehensive Metabolic Panel; Future  -     Cancel: Hemoglobin A1c; Future  -     T4, Free  -     TSH  -     Lipid Panel  -     Hemoglobin A1c  -     Comprehensive Metabolic Panel    2. Hypertension associated with diabetes  -     amLODIPine (NORVASC) 5 MG tablet; Take 1 tablet by mouth Daily.  Dispense: 90 tablet; Refill: 1  -     Cancel: Comprehensive Metabolic Panel; Future  -     T4, Free  -     TSH  -     Lipid Panel  -     Hemoglobin A1c  -     Comprehensive Metabolic Panel    3. Right hip pain  -     Cancel: Comprehensive Metabolic Panel; Future  -     T4, Free  -     TSH  -     Lipid Panel  -     Hemoglobin A1c  -     Comprehensive Metabolic Panel    4. Night muscle spasms  -     Cancel: Comprehensive Metabolic Panel; Future  -     T4, Free  -     TSH  -     Lipid Panel  -     Hemoglobin A1c  -     Comprehensive Metabolic Panel    5. Anxiety  -     Cancel: Comprehensive Metabolic Panel; Future  -      T4, Free  -     TSH  -     Lipid Panel  -     Hemoglobin A1c  -     Comprehensive Metabolic Panel    6. Other specified hypothyroidism  -     levothyroxine (SYNTHROID, LEVOTHROID) 150 MCG tablet; Take 1 tablet by mouth Daily.  Dispense: 90 tablet; Refill: 0  -     Cancel: Comprehensive Metabolic Panel; Future  -     Cancel: TSH; Future  -     Cancel: T4, Free; Future  -     T4, Free  -     TSH  -     Lipid Panel  -     Hemoglobin A1c  -     Comprehensive Metabolic Panel    7. Gastroesophageal reflux disease without esophagitis  -     omeprazole (priLOSEC) 20 MG capsule; Take 1 capsule by mouth 2 (Two) Times a Day.  Dispense: 180 capsule; Refill: 1  -     Cancel: Comprehensive Metabolic Panel; Future  -     T4, Free  -     TSH  -     Lipid Panel  -     Hemoglobin A1c  -     Comprehensive Metabolic Panel    8. Overactive bladder  -     oxybutynin (DITROPAN) 5 MG tablet; Take 1 tablet by mouth 2 (Two) Times a Day.  Dispense: 180 tablet; Refill: 1  -     Cancel: Comprehensive Metabolic Panel; Future  -     T4, Free  -     TSH  -     Lipid Panel  -     Hemoglobin A1c  -     Comprehensive Metabolic Panel    9. Hyperlipidemia due to type 2 diabetes mellitus  -     Cancel: Lipid Panel; Future  -     T4, Free  -     TSH  -     Lipid Panel  -     Hemoglobin A1c  -     Comprehensive Metabolic Panel    Other orders  -     Cardiovascular Risk Assessment        Type 2 diabetes mellitus  - Her Hemoglobin A1c will be rechecked. She will continue taking her Trulicity. She will conswer changing to Mounjaro if her insurance will cover the medication. She will continue to monitor her blood glucose at home.    Lump on buttock  - She was recommended to soak her buttock in Epsom salt. She was advised to sterilize the area and call the clinic if the area becomes erythematous.    Health maintenance  - She is due for a mammogram, however, she declined.    Problem List Items Addressed This Visit          Cardiac and Vasculature     Hyperlipidemia due to type 2 diabetes mellitus    Relevant Medications    insulin detemir (Levemir) 100 UNIT/ML injection    SITagliptin (Januvia) 100 MG tablet    Dulaglutide 3 MG/0.5ML solution pen-injector    Other Relevant Orders    T4, Free    TSH    Lipid Panel    Hemoglobin A1c    Comprehensive Metabolic Panel       Endocrine and Metabolic    Hypothyroidism    Relevant Medications    levothyroxine (SYNTHROID, LEVOTHROID) 150 MCG tablet    Other Relevant Orders    T4, Free    TSH    Lipid Panel    Hemoglobin A1c    Comprehensive Metabolic Panel    Type 2 diabetes mellitus with both eyes affected by moderate nonproliferative retinopathy without macular edema, with long-term current use of insulin - Primary    Relevant Medications    aspirin (Aspirin Low Dose) 81 MG EC tablet    insulin detemir (Levemir) 100 UNIT/ML injection    SITagliptin (Januvia) 100 MG tablet    Dulaglutide 3 MG/0.5ML solution pen-injector    Other Relevant Orders    POCT Glucose (Completed)    T4, Free    TSH    Lipid Panel    Hemoglobin A1c    Comprehensive Metabolic Panel       Gastrointestinal Abdominal     GERD (gastroesophageal reflux disease)    Relevant Medications    omeprazole (priLOSEC) 20 MG capsule    Other Relevant Orders    T4, Free    TSH    Lipid Panel    Hemoglobin A1c    Comprehensive Metabolic Panel       Genitourinary and Reproductive     Overactive bladder    Relevant Medications    oxybutynin (DITROPAN) 5 MG tablet    Other Relevant Orders    T4, Free    TSH    Lipid Panel    Hemoglobin A1c    Comprehensive Metabolic Panel       Mental Health    Anxiety    Relevant Orders    T4, Free    TSH    Lipid Panel    Hemoglobin A1c    Comprehensive Metabolic Panel     Other Visit Diagnoses       Hypertension associated with diabetes        Relevant Medications    amLODIPine (NORVASC) 5 MG tablet    insulin detemir (Levemir) 100 UNIT/ML injection    SITagliptin (Januvia) 100 MG tablet    Dulaglutide 3 MG/0.5ML solution  pen-injector    Other Relevant Orders    T4, Free    TSH    Lipid Panel    Hemoglobin A1c    Comprehensive Metabolic Panel    Right hip pain        Relevant Orders    T4, Free    TSH    Lipid Panel    Hemoglobin A1c    Comprehensive Metabolic Panel    Night muscle spasms        Relevant Orders    T4, Free    TSH    Lipid Panel    Hemoglobin A1c    Comprehensive Metabolic Panel                         Follow Up   Return in about 3 months (around 11/30/2023), or LABS.  Findings and plans discussed with patient who verbalizes understanding and agreement. Will followup with patient once results are in. Patient was given instructions and counseling regarding her condition or for health maintenance advice. Please see specific information pulled into the AVS if appropriate.       Eva Elliott MD     Transcribed from ambient dictation for Eva Elliott MD by Kelsea Bear.  08/30/23   12:18 EDT    Patient or patient representative verbalized consent to the visit recording.  I have personally performed the services described in this document as transcribed by the above individual, and it is both accurate and complete.

## 2023-08-31 DIAGNOSIS — K21.9 GASTROESOPHAGEAL REFLUX DISEASE WITHOUT ESOPHAGITIS: ICD-10-CM

## 2023-09-01 LAB
ALBUMIN SERPL-MCNC: 4.3 G/DL (ref 3.5–5.2)
ALBUMIN/GLOB SERPL: 1.9 G/DL
ALP SERPL-CCNC: 61 U/L (ref 39–117)
ALT SERPL-CCNC: 33 U/L (ref 1–33)
AST SERPL-CCNC: 28 U/L (ref 1–32)
BILIRUB SERPL-MCNC: 0.6 MG/DL (ref 0–1.2)
BUN SERPL-MCNC: 10 MG/DL (ref 8–23)
BUN/CREAT SERPL: 13 (ref 7–25)
CALCIUM SERPL-MCNC: 10 MG/DL (ref 8.6–10.5)
CHLORIDE SERPL-SCNC: 100 MMOL/L (ref 98–107)
CHOLEST SERPL-MCNC: 248 MG/DL (ref 0–200)
CO2 SERPL-SCNC: 25.8 MMOL/L (ref 22–29)
CREAT SERPL-MCNC: 0.77 MG/DL (ref 0.57–1)
EGFRCR SERPLBLD CKD-EPI 2021: 79.6 ML/MIN/1.73
GLOBULIN SER CALC-MCNC: 2.3 GM/DL
GLUCOSE SERPL-MCNC: 75 MG/DL (ref 65–99)
HBA1C MFR BLD: 8.9 % (ref 4.8–5.6)
HDLC SERPL-MCNC: 48 MG/DL (ref 40–60)
IMP & REVIEW OF LAB RESULTS: NORMAL
LDLC SERPL CALC-MCNC: 141 MG/DL (ref 0–100)
POTASSIUM SERPL-SCNC: 4.9 MMOL/L (ref 3.5–5.2)
PROT SERPL-MCNC: 6.6 G/DL (ref 6–8.5)
SODIUM SERPL-SCNC: 138 MMOL/L (ref 136–145)
T4 FREE SERPL-MCNC: 1.43 NG/DL (ref 0.93–1.7)
TRIGL SERPL-MCNC: 325 MG/DL (ref 0–150)
TSH SERPL DL<=0.005 MIU/L-ACNC: 0.28 UIU/ML (ref 0.27–4.2)
VLDLC SERPL CALC-MCNC: 59 MG/DL (ref 5–40)

## 2023-09-05 DIAGNOSIS — E11.69 HYPERLIPIDEMIA DUE TO TYPE 2 DIABETES MELLITUS: ICD-10-CM

## 2023-09-05 DIAGNOSIS — E78.5 HYPERLIPIDEMIA DUE TO TYPE 2 DIABETES MELLITUS: ICD-10-CM

## 2023-09-05 RX ORDER — ATORVASTATIN CALCIUM 10 MG/1
10 TABLET, FILM COATED ORAL DAILY
Qty: 90 TABLET | Refills: 0 | Status: SHIPPED | OUTPATIENT
Start: 2023-09-05

## 2023-09-28 DIAGNOSIS — F41.9 ANXIETY: ICD-10-CM

## 2023-10-02 ENCOUNTER — TELEPHONE (OUTPATIENT)
Dept: FAMILY MEDICINE CLINIC | Facility: CLINIC | Age: 78
End: 2023-10-02
Payer: MEDICARE

## 2023-10-02 RX ORDER — DIAZEPAM 5 MG/1
TABLET ORAL
Qty: 60 TABLET | Refills: 0 | Status: SHIPPED | OUTPATIENT
Start: 2023-10-02

## 2023-10-02 NOTE — TELEPHONE ENCOUNTER
Pete # in epic  Reviewed and is consistent.  UDS  reviewed and is consistent.  Patient comfort assessment guide reviewed and updated today.    As part of the patient's treatment plan they are being prescribed a controlled substance/ substances with potential for abuse.  This patient has been made aware of the appropriate use of such medications, including potential risk of somnolence, limited ability to drive and/or work safely, and potential for overdose.  It has also been made clear these medications are for use by the patient only, without concomitant use of alcohol or other substances unless prescribed/advised by medical provider.    Patient has completed prescribing agreement detailing terms of continued prescribing of controlled substances including monitoring PETE reports, urine drug screens, and pill counts.  The patient is aware PETE reports are reviewed on a regular basis and scanned into the chart.    History and physical exam exhibit continued safe and appropriate use of controlled substances.

## 2023-11-03 DIAGNOSIS — I25.119 CORONARY ARTERY DISEASE INVOLVING NATIVE CORONARY ARTERY OF NATIVE HEART WITH ANGINA PECTORIS: ICD-10-CM

## 2023-11-06 RX ORDER — ISOSORBIDE MONONITRATE 30 MG/1
30 TABLET, EXTENDED RELEASE ORAL DAILY
Qty: 90 TABLET | Refills: 0 | Status: SHIPPED | OUTPATIENT
Start: 2023-11-06

## 2023-11-08 NOTE — TELEPHONE ENCOUNTER
----- Message from Eva Elliott MD sent at 6/16/2020  6:01 AM EDT -----  Please call Bianka. Labs are okay except for her diabetes, which is a little worse. How much levemir is she taking? 50 units? Please increase to 54 units. Have her call us next week with a fasting glucose number. Thanks.      Patient called & was notified if labs,she reports she is injecting 42 units not 50,also she requested a refill on her Levothyroxine,pended,please advise.  
Synthroid refilled. Please have her increase to 46 units of levemir. Thanks.   
Synthroid refilled. Please have her increase to 46 units of levemir. Thanks.       Patient notified & verbalized understanding.  
No

## 2023-11-13 DIAGNOSIS — E11.649 TYPE 2 DIABETES MELLITUS WITH HYPOGLYCEMIA WITHOUT COMA, WITHOUT LONG-TERM CURRENT USE OF INSULIN: ICD-10-CM

## 2023-11-14 RX ORDER — BLOOD SUGAR DIAGNOSTIC
1 STRIP MISCELLANEOUS 4 TIMES DAILY
Qty: 100 EACH | Refills: 11 | Status: SHIPPED | OUTPATIENT
Start: 2023-11-14

## 2023-12-03 DIAGNOSIS — E78.5 HYPERLIPIDEMIA DUE TO TYPE 2 DIABETES MELLITUS: ICD-10-CM

## 2023-12-03 DIAGNOSIS — Z79.4 TYPE 2 DIABETES MELLITUS WITH BOTH EYES AFFECTED BY MODERATE NONPROLIFERATIVE RETINOPATHY WITHOUT MACULAR EDEMA, WITH LONG-TERM CURRENT USE OF INSULIN: ICD-10-CM

## 2023-12-03 DIAGNOSIS — E03.8 OTHER SPECIFIED HYPOTHYROIDISM: ICD-10-CM

## 2023-12-03 DIAGNOSIS — E11.3393 TYPE 2 DIABETES MELLITUS WITH BOTH EYES AFFECTED BY MODERATE NONPROLIFERATIVE RETINOPATHY WITHOUT MACULAR EDEMA, WITH LONG-TERM CURRENT USE OF INSULIN: ICD-10-CM

## 2023-12-03 DIAGNOSIS — E11.69 HYPERLIPIDEMIA DUE TO TYPE 2 DIABETES MELLITUS: ICD-10-CM

## 2023-12-04 DIAGNOSIS — I47.29 NONSUSTAINED VENTRICULAR TACHYCARDIA: ICD-10-CM

## 2023-12-04 DIAGNOSIS — K21.9 GASTROESOPHAGEAL REFLUX DISEASE WITHOUT ESOPHAGITIS: ICD-10-CM

## 2023-12-04 DIAGNOSIS — I49.3 PREMATURE VENTRICULAR BEAT: ICD-10-CM

## 2023-12-04 DIAGNOSIS — I15.2 HYPERTENSION ASSOCIATED WITH DIABETES: ICD-10-CM

## 2023-12-04 DIAGNOSIS — E11.59 HYPERTENSION ASSOCIATED WITH DIABETES: ICD-10-CM

## 2023-12-04 RX ORDER — AMLODIPINE BESYLATE 5 MG/1
5 TABLET ORAL DAILY
Qty: 90 TABLET | Refills: 1 | OUTPATIENT
Start: 2023-12-04

## 2023-12-04 RX ORDER — ATORVASTATIN CALCIUM 10 MG/1
10 TABLET, FILM COATED ORAL DAILY
Qty: 90 TABLET | Refills: 0 | Status: SHIPPED | OUTPATIENT
Start: 2023-12-04

## 2023-12-04 RX ORDER — METOPROLOL SUCCINATE 25 MG/1
25 TABLET, EXTENDED RELEASE ORAL DAILY
Qty: 90 TABLET | Refills: 3 | OUTPATIENT
Start: 2023-12-04

## 2023-12-04 RX ORDER — OMEPRAZOLE 20 MG/1
20 CAPSULE, DELAYED RELEASE ORAL 2 TIMES DAILY
Qty: 180 CAPSULE | Refills: 1 | OUTPATIENT
Start: 2023-12-04

## 2023-12-04 NOTE — TELEPHONE ENCOUNTER
Caller: Magnus Bianka ANNIE    Relationship: Self    Best call back number: [phone]     Requested Prescriptions:   Requested Prescriptions     Pending Prescriptions Disp Refills    metoprolol succinate XL (TOPROL-XL) 25 MG 24 hr tablet 90 tablet 3     Sig: Take 1 tablet by mouth Daily.    omeprazole (priLOSEC) 20 MG capsule 180 capsule 1     Sig: Take 1 capsule by mouth 2 (Two) Times a Day.    amLODIPine (NORVASC) 5 MG tablet 90 tablet 1     Sig: Take 1 tablet by mouth Daily.        Pharmacy where request should be sent: [id number] - [facility] [place] - [address] AT [facility] [address] - [phone] PH - [phone] FX     Last office visit with prescribing clinician: 8/30/2023   Last telemedicine visit with prescribing clinician: Visit date not found   Next office visit with prescribing clinician: 12/8/2023     Additional details provided by patient: PATIENT HAS CALLED REQUESTING NEW 90 DAY PRESCRIPTIONS ON ABOVE MEDICATION    Does the patient have less than a 3 day supply:  [x] Yes  [] No    Would you like a call back once the refill request has been completed: [] Yes [x] No    If the office needs to give you a call back, can they leave a voicemail: [] Yes [x] No    Jose Sood   12/04/23 11:17 EST

## 2023-12-06 DIAGNOSIS — E03.8 OTHER SPECIFIED HYPOTHYROIDISM: ICD-10-CM

## 2023-12-06 RX ORDER — LEVOTHYROXINE SODIUM 0.15 MG/1
137 TABLET ORAL DAILY
Qty: 90 TABLET | Refills: 0 | OUTPATIENT
Start: 2023-12-06

## 2023-12-07 RX ORDER — LEVOTHYROXINE SODIUM 0.15 MG/1
137 TABLET ORAL DAILY
Qty: 90 TABLET | Refills: 0 | Status: SHIPPED | OUTPATIENT
Start: 2023-12-07

## 2023-12-07 RX ORDER — DULAGLUTIDE 3 MG/.5ML
INJECTION, SOLUTION SUBCUTANEOUS
Qty: 6 ML | Refills: 0 | Status: SHIPPED | OUTPATIENT
Start: 2023-12-07

## 2023-12-07 RX ORDER — LEVOTHYROXINE SODIUM 0.15 MG/1
137 TABLET ORAL DAILY
Qty: 90 TABLET | Refills: 0 | OUTPATIENT
Start: 2023-12-07

## 2023-12-08 ENCOUNTER — OFFICE VISIT (OUTPATIENT)
Dept: FAMILY MEDICINE CLINIC | Facility: CLINIC | Age: 78
End: 2023-12-08
Payer: MEDICARE

## 2023-12-08 VITALS
OXYGEN SATURATION: 98 % | HEART RATE: 84 BPM | TEMPERATURE: 97.3 F | BODY MASS INDEX: 32.1 KG/M2 | WEIGHT: 188 LBS | DIASTOLIC BLOOD PRESSURE: 82 MMHG | HEIGHT: 64 IN | SYSTOLIC BLOOD PRESSURE: 124 MMHG

## 2023-12-08 DIAGNOSIS — N32.81 OVERACTIVE BLADDER: ICD-10-CM

## 2023-12-08 DIAGNOSIS — I15.2 HYPERTENSION ASSOCIATED WITH DIABETES: ICD-10-CM

## 2023-12-08 DIAGNOSIS — E03.8 OTHER SPECIFIED HYPOTHYROIDISM: ICD-10-CM

## 2023-12-08 DIAGNOSIS — I25.119 CORONARY ARTERY DISEASE INVOLVING NATIVE CORONARY ARTERY OF NATIVE HEART WITH ANGINA PECTORIS: ICD-10-CM

## 2023-12-08 DIAGNOSIS — E11.59 HYPERTENSION ASSOCIATED WITH DIABETES: ICD-10-CM

## 2023-12-08 DIAGNOSIS — E55.9 VITAMIN D DEFICIENCY: ICD-10-CM

## 2023-12-08 DIAGNOSIS — Z79.4 TYPE 2 DIABETES MELLITUS WITH BOTH EYES AFFECTED BY MODERATE NONPROLIFERATIVE RETINOPATHY WITHOUT MACULAR EDEMA, WITH LONG-TERM CURRENT USE OF INSULIN: Primary | ICD-10-CM

## 2023-12-08 DIAGNOSIS — E11.3393 TYPE 2 DIABETES MELLITUS WITH BOTH EYES AFFECTED BY MODERATE NONPROLIFERATIVE RETINOPATHY WITHOUT MACULAR EDEMA, WITH LONG-TERM CURRENT USE OF INSULIN: Primary | ICD-10-CM

## 2023-12-08 DIAGNOSIS — F41.9 ANXIETY: ICD-10-CM

## 2023-12-08 DIAGNOSIS — K21.9 GASTROESOPHAGEAL REFLUX DISEASE WITHOUT ESOPHAGITIS: ICD-10-CM

## 2023-12-08 DIAGNOSIS — H35.3231 EXUDATIVE AGE-RELATED MACULAR DEGENERATION, BILATERAL, WITH ACTIVE CHOROIDAL NEOVASCULARIZATION: ICD-10-CM

## 2023-12-08 DIAGNOSIS — R09.81 CHRONIC NASAL CONGESTION: ICD-10-CM

## 2023-12-08 DIAGNOSIS — E53.8 B12 DEFICIENCY: ICD-10-CM

## 2023-12-08 DIAGNOSIS — I49.3 PREMATURE VENTRICULAR BEAT: ICD-10-CM

## 2023-12-08 DIAGNOSIS — I47.29 NONSUSTAINED VENTRICULAR TACHYCARDIA: ICD-10-CM

## 2023-12-08 LAB — GLUCOSE BLDC GLUCOMTR-MCNC: 209 MG/DL (ref 70–130)

## 2023-12-08 PROCEDURE — 82948 REAGENT STRIP/BLOOD GLUCOSE: CPT | Performed by: FAMILY MEDICINE

## 2023-12-08 PROCEDURE — 1160F RVW MEDS BY RX/DR IN RCRD: CPT | Performed by: FAMILY MEDICINE

## 2023-12-08 PROCEDURE — 3074F SYST BP LT 130 MM HG: CPT | Performed by: FAMILY MEDICINE

## 2023-12-08 PROCEDURE — 3079F DIAST BP 80-89 MM HG: CPT | Performed by: FAMILY MEDICINE

## 2023-12-08 PROCEDURE — 99214 OFFICE O/P EST MOD 30 MIN: CPT | Performed by: FAMILY MEDICINE

## 2023-12-08 PROCEDURE — 1159F MED LIST DOCD IN RCRD: CPT | Performed by: FAMILY MEDICINE

## 2023-12-08 RX ORDER — INSULIN ASPART 100 [IU]/ML
INJECTION, SUSPENSION SUBCUTANEOUS
Qty: 50 ML | Refills: 1 | Status: SHIPPED | OUTPATIENT
Start: 2023-12-08

## 2023-12-08 RX ORDER — OMEPRAZOLE 20 MG/1
20 CAPSULE, DELAYED RELEASE ORAL 2 TIMES DAILY
Qty: 180 CAPSULE | Refills: 1 | Status: SHIPPED | OUTPATIENT
Start: 2023-12-08

## 2023-12-08 RX ORDER — METFORMIN HYDROCHLORIDE 500 MG/1
500 TABLET, EXTENDED RELEASE ORAL 2 TIMES DAILY WITH MEALS
Qty: 180 TABLET | Refills: 1 | Status: SHIPPED | OUTPATIENT
Start: 2023-12-08

## 2023-12-08 RX ORDER — ASPIRIN 81 MG/1
81 TABLET ORAL DAILY
Qty: 90 TABLET | Refills: 1 | Status: SHIPPED | OUTPATIENT
Start: 2023-12-08

## 2023-12-08 RX ORDER — OMEPRAZOLE 20 MG/1
CAPSULE, DELAYED RELEASE ORAL
Qty: 180 CAPSULE | Refills: 1 | OUTPATIENT
Start: 2023-12-08

## 2023-12-08 RX ORDER — OMEPRAZOLE 20 MG/1
20 CAPSULE, DELAYED RELEASE ORAL 2 TIMES DAILY
Qty: 180 CAPSULE | Refills: 1 | Status: SHIPPED | OUTPATIENT
Start: 2023-12-08 | End: 2023-12-08 | Stop reason: SDUPTHER

## 2023-12-08 RX ORDER — INSULIN DETEMIR 100 [IU]/ML
48 INJECTION, SOLUTION SUBCUTANEOUS NIGHTLY
Qty: 50 ML | Refills: 1 | Status: SHIPPED | OUTPATIENT
Start: 2023-12-08

## 2023-12-08 RX ORDER — ERGOCALCIFEROL 1.25 MG/1
50000 CAPSULE ORAL WEEKLY
Qty: 12 CAPSULE | Refills: 1 | Status: SHIPPED | OUTPATIENT
Start: 2023-12-08

## 2023-12-08 RX ORDER — ALBUTEROL SULFATE 90 UG/1
2 AEROSOL, METERED RESPIRATORY (INHALATION) EVERY 4 HOURS PRN
Qty: 90 G | Refills: 0 | Status: SHIPPED | OUTPATIENT
Start: 2023-12-08

## 2023-12-08 RX ORDER — AMLODIPINE BESYLATE 5 MG/1
5 TABLET ORAL DAILY
Qty: 90 TABLET | Refills: 1 | Status: SHIPPED | OUTPATIENT
Start: 2023-12-08

## 2023-12-08 RX ORDER — LANOLIN ALCOHOL/MO/W.PET/CERES
1000 CREAM (GRAM) TOPICAL DAILY
Qty: 90 TABLET | Refills: 1 | Status: SHIPPED | OUTPATIENT
Start: 2023-12-08

## 2023-12-08 RX ORDER — DIAZEPAM 5 MG/1
5 TABLET ORAL EVERY 12 HOURS PRN
Qty: 60 TABLET | Refills: 0 | Status: SHIPPED | OUTPATIENT
Start: 2023-12-08

## 2023-12-08 RX ORDER — LOSARTAN POTASSIUM AND HYDROCHLOROTHIAZIDE 12.5; 1 MG/1; MG/1
1 TABLET ORAL DAILY
Qty: 90 TABLET | Refills: 1 | Status: SHIPPED | OUTPATIENT
Start: 2023-12-08

## 2023-12-08 RX ORDER — OXYBUTYNIN CHLORIDE 5 MG/1
5 TABLET ORAL 2 TIMES DAILY
Qty: 180 TABLET | Refills: 1 | Status: SHIPPED | OUTPATIENT
Start: 2023-12-08

## 2023-12-08 RX ORDER — ISOSORBIDE MONONITRATE 30 MG/1
30 TABLET, EXTENDED RELEASE ORAL DAILY
Qty: 90 TABLET | Refills: 1 | Status: SHIPPED | OUTPATIENT
Start: 2023-12-08

## 2023-12-08 RX ORDER — PSEUDOEPHEDRINE HYDROCHLORIDE 60 MG/1
60 TABLET, FILM COATED ORAL 2 TIMES DAILY
Qty: 60 TABLET | Refills: 2 | Status: SHIPPED | OUTPATIENT
Start: 2023-12-08

## 2023-12-09 LAB
ALBUMIN SERPL-MCNC: 4.6 G/DL (ref 3.5–5.2)
ALBUMIN/GLOB SERPL: 2.2 G/DL
ALP SERPL-CCNC: 59 U/L (ref 39–117)
ALT SERPL-CCNC: 33 U/L (ref 1–33)
AST SERPL-CCNC: 24 U/L (ref 1–32)
BILIRUB SERPL-MCNC: 0.5 MG/DL (ref 0–1.2)
BUN SERPL-MCNC: 14 MG/DL (ref 8–23)
BUN/CREAT SERPL: 21.5 (ref 7–25)
CALCIUM SERPL-MCNC: 10.4 MG/DL (ref 8.6–10.5)
CHLORIDE SERPL-SCNC: 102 MMOL/L (ref 98–107)
CO2 SERPL-SCNC: 28.3 MMOL/L (ref 22–29)
CREAT SERPL-MCNC: 0.65 MG/DL (ref 0.57–1)
EGFRCR SERPLBLD CKD-EPI 2021: 90.8 ML/MIN/1.73
GLOBULIN SER CALC-MCNC: 2.1 GM/DL
GLUCOSE SERPL-MCNC: 131 MG/DL (ref 65–99)
HBA1C MFR BLD: 8.4 % (ref 4.8–5.6)
POTASSIUM SERPL-SCNC: 3.9 MMOL/L (ref 3.5–5.2)
PROT SERPL-MCNC: 6.7 G/DL (ref 6–8.5)
SODIUM SERPL-SCNC: 140 MMOL/L (ref 136–145)
T4 FREE SERPL-MCNC: 1.54 NG/DL (ref 0.93–1.7)
TSH SERPL DL<=0.005 MIU/L-ACNC: 0.44 UIU/ML (ref 0.27–4.2)

## 2023-12-09 NOTE — PROGRESS NOTES
"Bianka Agudelo     VITALS: Blood pressure 124/82, pulse 84, temperature 97.3 °F (36.3 °C), temperature source Temporal, height 161.3 cm (63.5\"), weight 85.3 kg (188 lb), SpO2 98%, not currently breastfeeding.    Subjective  Chief Complaint  Diabetes    Subjective          History of Present Illness:  Patient is a 77 y.o.  female with medical conditions significant for CAD, hyperlipidemia, hypertension, and anxiety who presents to clinic for medical follow-up.    She was last seen in 08/2023. She is unsure of her blood glucose levels.    Her anxiety is severe.      No complaints about any of the medications.    The following portions of the patient's history were reviewed and updated as appropriate: allergies, current medications, past family history, past medical history, past social history, past surgical history and problem list.    Past Medical History  Past Medical History:   Diagnosis Date    Anxiety     Asthma     CAD (coronary artery disease)     Diabetes mellitus     Dyslipidemia     GERD (gastroesophageal reflux disease)     H/O angina pectoris     History of bone density study     February 2014    History of mammogram 07/15/2013    Hypertension     Hypothyroidism     Muscle spasms of both lower extremities     Overactive bladder     Vitamin B12 deficiency     Vitamin D deficiency        Surgical History  Past Surgical History:   Procedure Laterality Date    CARDIAC CATHETERIZATION      COLONOSCOPY  01/01/2010    EYE SURGERY  10/04/2021    right eye, bleeding behind the eye     OOPHORECTOMY      1 removed    TUBAL ABDOMINAL LIGATION         Family History  Family History   Problem Relation Age of Onset    Diabetes Mother     Heart disease Mother     Stroke Mother     Cancer Father     Pancreatic cancer Sister     Diabetes Sister     Heart attack Brother        Social History  Social History     Socioeconomic History    Marital status:    Tobacco Use    Smoking status: Never     Passive exposure: " "Current    Smokeless tobacco: Never   Vaping Use    Vaping Use: Never used   Substance and Sexual Activity    Alcohol use: No    Drug use: No    Sexual activity: Defer       Objective   Vital Signs:   /82 (BP Location: Right arm, Patient Position: Sitting, Cuff Size: Adult)   Pulse 84   Temp 97.3 °F (36.3 °C) (Temporal)   Ht 161.3 cm (63.5\")   Wt 85.3 kg (188 lb)   SpO2 98%   BMI 32.78 kg/m²     Physical Exam     Gen: Patient in NAD. Pleasant and answers appropriately. A&Ox3.    Skin: Warm and dry with normal turgor. No purpura, rashes, or unusual pigmentation noted. Hair is normal in appearance and distribution.    HEENT: NC/AT. No lesions noted. Conjunctiva clear, sclera nonicteric. PERRL. EOMI without nystagmus or strabismus. Fundi appear benign. No hemorrhages or exudates of eyes. Auditory canals are patent bilaterally without lesions. TMs intact,  nonerythematous, nonbulging without lesions. Nasal mucosa pink, nonerythematous, and nonedematous. Frontal and maxillary sinuses are nontender. O/P nonerythematous and moist without exudate.    Neck: Supple without lymph nodes palpated. FROM.     Lungs: Decreased B/L without rales, rhonchi, crackles, or wheezes.    Heart: RRR. S1 and S2 normal. No S3 or S4. No MRGT.    Abd: Soft, nontender,nondistended. (+)BSx4 quadrants.     Extrem: No CC.  Trace edema bilateral lower extremities.  Radial pulses 2+/4 and equal B/L. FROMx4.  Positive joint pain tenderness noted.    Neuro: No focal motor/sensory deficits.    Procedures    Result Review :   The following data was reviewed by: Eva Elliott MD  on 12/08/2023:                Assessment and Plan    Bianka VALENCIA Agudelo is a 77 y.o. here for medical followup.    Diagnoses and all orders for this visit:    1. Type 2 diabetes mellitus with both eyes affected by moderate nonproliferative retinopathy without macular edema, with long-term current use of insulin (Primary)  -     aspirin (Aspirin Low Dose) 81 MG EC " tablet; Take 1 tablet by mouth Daily.  Dispense: 90 tablet; Refill: 1  -     insulin detemir (Levemir) 100 UNIT/ML injection; Inject 48 Units under the skin into the appropriate area as directed Every Night.  Dispense: 50 mL; Refill: 1  -     metFORMIN ER (GLUCOPHAGE-XR) 500 MG 24 hr tablet; Take 1 tablet by mouth 2 (Two) Times a Day With Meals.  Dispense: 180 tablet; Refill: 1  -     NovoLOG Mix 70/30 (70-30) 100 UNIT/ML injection; ADMINISTER 24 UNITS UNDER THE SKIN TWICE DAILY WITH MEALS AS DIRECTED  Dispense: 50 mL; Refill: 1  -     SITagliptin (Januvia) 100 MG tablet; Take 1 tablet by mouth Daily.  Dispense: 90 tablet; Refill: 1  -     POCT Glucose  -     Comprehensive Metabolic Panel; Future  -     Hemoglobin A1c; Future  -     Cancel: Comprehensive Metabolic Panel  -     Cancel: Hemoglobin A1c  -     TSH  -     T4, free  -     Hemoglobin A1c  -     Comprehensive metabolic panel    2. Anxiety  -     albuterol sulfate  (90 Base) MCG/ACT inhaler; Inhale 2 puffs Every 4 (Four) Hours As Needed for Wheezing.  Dispense: 90 g; Refill: 0  -     Comprehensive Metabolic Panel; Future  -     diazePAM (VALIUM) 5 MG tablet; Take 1 tablet by mouth Every 12 (Twelve) Hours As Needed for Anxiety. for anxiety  Dispense: 60 tablet; Refill: 0  -     Cancel: Comprehensive Metabolic Panel    3. Hypertension associated with diabetes  -     amLODIPine (NORVASC) 5 MG tablet; Take 1 tablet by mouth Daily.  Dispense: 90 tablet; Refill: 1  -     losartan-hydrochlorothiazide (Hyzaar) 100-12.5 MG per tablet; Take 1 tablet by mouth Daily.  Dispense: 90 tablet; Refill: 1  -     Comprehensive Metabolic Panel; Future  -     Cancel: Comprehensive Metabolic Panel    4. Overactive bladder  -     oxybutynin (DITROPAN) 5 MG tablet; Take 1 tablet by mouth 2 (Two) Times a Day.  Dispense: 180 tablet; Refill: 1  -     Comprehensive Metabolic Panel; Future  -     Cancel: Comprehensive Metabolic Panel    5. Chronic nasal congestion  -      pseudoephedrine (SUDAFED) 60 MG tablet; Take 1 tablet by mouth 2 (Two) Times a Day.  Dispense: 60 tablet; Refill: 2  -     Comprehensive Metabolic Panel; Future  -     Cancel: Comprehensive Metabolic Panel    6. B12 deficiency  -     vitamin B-12 (CYANOCOBALAMIN) 1000 MCG tablet; Take 1 tablet by mouth Daily.  Dispense: 90 tablet; Refill: 1  -     Comprehensive Metabolic Panel; Future  -     Cancel: Comprehensive Metabolic Panel  -     TSH  -     T4, free  -     Hemoglobin A1c  -     Comprehensive metabolic panel    7. Exudative age-related macular degeneration, bilateral, with active choroidal neovascularization  -     Comprehensive Metabolic Panel; Future  -     Cancel: Comprehensive Metabolic Panel    8. Vitamin D deficiency  -     vitamin D (ERGOCALCIFEROL) 1.25 MG (69462 UT) capsule capsule; Take 1 capsule by mouth 1 (One) Time Per Week.  Dispense: 12 capsule; Refill: 1  -     Comprehensive Metabolic Panel; Future  -     Cancel: Comprehensive Metabolic Panel  -     TSH  -     T4, free  -     Hemoglobin A1c  -     Comprehensive metabolic panel    9. Gastroesophageal reflux disease without esophagitis  -     Discontinue: omeprazole (priLOSEC) 20 MG capsule; Take 1 capsule by mouth 2 (Two) Times a Day.  Dispense: 180 capsule; Refill: 1  -     Comprehensive Metabolic Panel; Future  -     Cancel: Comprehensive Metabolic Panel  -     Discontinue: omeprazole (priLOSEC) 20 MG capsule; Take 1 capsule by mouth 2 (Two) Times a Day.  Dispense: 180 capsule; Refill: 1    10. Coronary artery disease involving native coronary artery of native heart with angina pectoris  -     isosorbide mononitrate (IMDUR) 30 MG 24 hr tablet; Take 1 tablet by mouth Daily.  Dispense: 90 tablet; Refill: 1  -     Comprehensive Metabolic Panel; Future  -     Cancel: Comprehensive Metabolic Panel    11. Nonsustained ventricular tachycardia  -     Comprehensive Metabolic Panel; Future  -     Cancel: Comprehensive Metabolic Panel    12. Premature  ventricular beat  -     Comprehensive Metabolic Panel; Future  -     Cancel: Comprehensive Metabolic Panel    13. Other specified hypothyroidism  -     T4, Free; Future  -     TSH; Future  -     Cancel: T4, Free  -     Cancel: TSH  -     TSH  -     T4, free  -     Hemoglobin A1c  -     Comprehensive metabolic panel          Problem List Items Addressed This Visit          Cardiac and Vasculature    Coronary artery disease involving native coronary artery of native heart with angina pectoris    Relevant Medications    amLODIPine (NORVASC) 5 MG tablet    isosorbide mononitrate (IMDUR) 30 MG 24 hr tablet    Other Relevant Orders    Comprehensive Metabolic Panel    Premature ventricular beat    Relevant Medications    amLODIPine (NORVASC) 5 MG tablet    isosorbide mononitrate (IMDUR) 30 MG 24 hr tablet    Other Relevant Orders    Comprehensive Metabolic Panel    Nonsustained ventricular tachycardia    Relevant Medications    amLODIPine (NORVASC) 5 MG tablet    isosorbide mononitrate (IMDUR) 30 MG 24 hr tablet    Other Relevant Orders    Comprehensive Metabolic Panel       Endocrine and Metabolic    Vitamin D deficiency    Relevant Medications    vitamin D (ERGOCALCIFEROL) 1.25 MG (32032 UT) capsule capsule    Other Relevant Orders    Comprehensive Metabolic Panel    TSH    T4, free    Hemoglobin A1c    Comprehensive metabolic panel    Hypothyroidism    Relevant Orders    T4, Free    TSH    TSH    T4, free    Hemoglobin A1c    Comprehensive metabolic panel    Type 2 diabetes mellitus with both eyes affected by moderate nonproliferative retinopathy without macular edema, with long-term current use of insulin - Primary    Relevant Medications    aspirin (Aspirin Low Dose) 81 MG EC tablet    insulin detemir (Levemir) 100 UNIT/ML injection    metFORMIN ER (GLUCOPHAGE-XR) 500 MG 24 hr tablet    NovoLOG Mix 70/30 (70-30) 100 UNIT/ML injection    SITagliptin (Januvia) 100 MG tablet    Other Relevant Orders    POCT Glucose  (Completed)    Comprehensive Metabolic Panel    Hemoglobin A1c    TSH    T4, free    Hemoglobin A1c    Comprehensive metabolic panel       Gastrointestinal Abdominal     GERD (gastroesophageal reflux disease)    Relevant Orders    Comprehensive Metabolic Panel       Genitourinary and Reproductive     Overactive bladder    Relevant Medications    oxybutynin (DITROPAN) 5 MG tablet    Other Relevant Orders    Comprehensive Metabolic Panel       Mental Health    Anxiety    Relevant Medications    albuterol sulfate  (90 Base) MCG/ACT inhaler    diazePAM (VALIUM) 5 MG tablet    Other Relevant Orders    Comprehensive Metabolic Panel     Other Visit Diagnoses       Hypertension associated with diabetes        Relevant Medications    amLODIPine (NORVASC) 5 MG tablet    insulin detemir (Levemir) 100 UNIT/ML injection    metFORMIN ER (GLUCOPHAGE-XR) 500 MG 24 hr tablet    NovoLOG Mix 70/30 (70-30) 100 UNIT/ML injection    SITagliptin (Januvia) 100 MG tablet    losartan-hydrochlorothiazide (Hyzaar) 100-12.5 MG per tablet    Other Relevant Orders    Comprehensive Metabolic Panel    Chronic nasal congestion        Relevant Medications    pseudoephedrine (SUDAFED) 60 MG tablet    Other Relevant Orders    Comprehensive Metabolic Panel    B12 deficiency        Relevant Medications    vitamin B-12 (CYANOCOBALAMIN) 1000 MCG tablet    Other Relevant Orders    Comprehensive Metabolic Panel    TSH    T4, free    Hemoglobin A1c    Comprehensive metabolic panel    Exudative age-related macular degeneration, bilateral, with active choroidal neovascularization        Relevant Orders    Comprehensive Metabolic Panel                Follow Up   Return in about 3 months (around 3/8/2024), or LABS.  Findings and plans discussed with patient who verbalizes understanding and agreement. Will followup with patient once results are in. Patient was given instructions and counseling regarding her condition or for health maintenance advice. Please  see specific information pulled into the AVS if appropriate.       Eva Elliott MD      Transcribed from ambient dictation for Eva Elliott MD by Madeline Cheung.  12/08/23   20:40 EST    Patient or patient representative verbalized consent to the visit recording.  I have personally performed the services described in this document as transcribed by the above individual, and it is both accurate and complete.

## 2023-12-31 NOTE — PROGRESS NOTES
Reason for Disposition  • [1] COVID-19 diagnosed by positive lab test (e.g., PCR, rapid self-test kit) AND [2] mild symptoms (e.g., cough, fever, others) AND [3] no complications or SOB    Answer Assessment - Initial Assessment Questions  Are you experiencing any symptoms attributed to the virus?  (Assess for SOB, cough, fever, difficulty breathing)   Lost taste and smell  Patient is color blind and isn't sure if his home test is positive.    HIGH RISK: Do you have any history heart or lung conditions, weakened immune system, diabetes, Asthma, CHF, HIV, COPD, Chemo, renal failure, sickle cell, etc?   DM    Protocols used: Coronavirus (COVID-19) Diagnosed or Suspected-ADULT-     Subjective   Bianka Agudelo is a 72 y.o. female.   Pt presents today with CC of Leg Problem (left leg, possible infection. )      History of Present Illness   Patient presented for labs today.  She was noted to have a lesion on her left shin.  As she is diabetic, and the lesion was surrounded by some erythema, she needed to be seen to rule out infection.  Her most recent hemoglobin A1c was over 10.  She reports that since then her blood glucoses have been much better controlled.  She hit her shin on a bed frame 2 or 3 days ago.  Bled initially, though has since healed, she has been keeping it clean and dry, followed by a small layer of Neosporin to prevent infection.  It has not bled since soon after the injury, currently it hurts only at the spot the bed hit her leg.  She denies fevers or chills.  She denies draining, spreading erythema, or pain anywhere else.         The following portions of the patient's history were reviewed and updated as appropriate: allergies, current medications, past family history, past social history, past surgical history and problem list.    Review of Systems   Constitutional: Negative for chills and fever.   Eyes: Negative for double vision and visual disturbance.   Respiratory: Negative for cough, chest tightness and shortness of breath.    Cardiovascular: Negative for chest pain and palpitations.   Gastrointestinal: Negative for abdominal pain.   Musculoskeletal: Negative for gait problem.   Skin: Positive for skin lesions.   Hematological: Negative for adenopathy.       Objective   Physical Exam   Constitutional: She is oriented to person, place, and time. She appears well-developed and well-nourished.   HENT:   Head: Normocephalic and atraumatic.   Eyes: Conjunctivae are normal.   Neck: Neck supple.   Cardiovascular: Normal rate, regular rhythm and normal heart sounds.    Pulmonary/Chest: Effort normal and breath sounds normal.   Musculoskeletal:   The feet are neurovascularly  intact   Lymphadenopathy:     She has no cervical adenopathy.   Neurological: She is alert and oriented to person, place, and time.   Skin: Skin is warm and dry.   Location in question is on her left shin slightly lateral, there is a 2 cm well-healed laceration, no drainage, no bleeding, no ulceration.  It appears as though it is healing well having only happened 2 or 3 days ago.  There is approximately a 1 cm border around the lesion of erythema, skin is intact, does not appear to be infected.         Assessment/Plan      History of laceration of the skin  Patient has a well-healing laceration of her left shin, no sign of infection at this time though she is at high risk being an uncontrolled diabetic.  I do not recommend antibiotics at this time.  I recommend she keep covered to protect from infection with a Band-Aid, otherwise I expect it to heal normally.  If the skin begins to break down, or if the wound begins to drain, or the pain worsens, or her leg swells she should return to clinic or go to emergency room.

## 2024-01-08 ENCOUNTER — APPOINTMENT (OUTPATIENT)
Dept: GENERAL RADIOLOGY | Facility: HOSPITAL | Age: 79
End: 2024-01-08
Payer: MEDICARE

## 2024-01-08 ENCOUNTER — HOSPITAL ENCOUNTER (EMERGENCY)
Facility: HOSPITAL | Age: 79
Discharge: HOME | End: 2024-01-08
Attending: STUDENT IN AN ORGANIZED HEALTH CARE EDUCATION/TRAINING PROGRAM | Admitting: STUDENT IN AN ORGANIZED HEALTH CARE EDUCATION/TRAINING PROGRAM
Payer: MEDICARE

## 2024-01-08 VITALS
WEIGHT: 180 LBS | SYSTOLIC BLOOD PRESSURE: 113 MMHG | TEMPERATURE: 97.4 F | HEART RATE: 71 BPM | RESPIRATION RATE: 18 BRPM | DIASTOLIC BLOOD PRESSURE: 64 MMHG | OXYGEN SATURATION: 97 % | HEIGHT: 64 IN | BODY MASS INDEX: 30.73 KG/M2

## 2024-01-08 DIAGNOSIS — N39.0 ACUTE UTI: ICD-10-CM

## 2024-01-08 DIAGNOSIS — M54.32 SCIATICA OF LEFT SIDE: Primary | ICD-10-CM

## 2024-01-08 LAB
ALBUMIN SERPL-MCNC: 4.1 G/DL (ref 3.5–5.2)
ALBUMIN/GLOB SERPL: 1.4 G/DL
ALP SERPL-CCNC: 77 U/L (ref 39–117)
ALT SERPL W P-5'-P-CCNC: 31 U/L (ref 1–33)
ANION GAP SERPL CALCULATED.3IONS-SCNC: 7.9 MMOL/L (ref 5–15)
AST SERPL-CCNC: 23 U/L (ref 1–32)
BACTERIA UR QL AUTO: ABNORMAL /HPF
BASOPHILS # BLD AUTO: 0.04 10*3/MM3 (ref 0–0.2)
BASOPHILS NFR BLD AUTO: 0.7 % (ref 0–1.5)
BILIRUB SERPL-MCNC: 0.6 MG/DL (ref 0–1.2)
BILIRUB UR QL STRIP: NEGATIVE
BUN SERPL-MCNC: 18 MG/DL (ref 8–23)
BUN/CREAT SERPL: 23.7 (ref 7–25)
CALCIUM SPEC-SCNC: 10.1 MG/DL (ref 8.6–10.5)
CHLORIDE SERPL-SCNC: 103 MMOL/L (ref 98–107)
CLARITY UR: CLEAR
CO2 SERPL-SCNC: 29.1 MMOL/L (ref 22–29)
COLOR UR: YELLOW
CREAT SERPL-MCNC: 0.76 MG/DL (ref 0.57–1)
DEPRECATED RDW RBC AUTO: 43.7 FL (ref 37–54)
EGFRCR SERPLBLD CKD-EPI 2021: 80.3 ML/MIN/1.73
EOSINOPHIL # BLD AUTO: 0.26 10*3/MM3 (ref 0–0.4)
EOSINOPHIL NFR BLD AUTO: 4.4 % (ref 0.3–6.2)
ERYTHROCYTE [DISTWIDTH] IN BLOOD BY AUTOMATED COUNT: 13.1 % (ref 12.3–15.4)
GLOBULIN UR ELPH-MCNC: 3 GM/DL
GLUCOSE SERPL-MCNC: 218 MG/DL (ref 65–99)
GLUCOSE UR STRIP-MCNC: ABNORMAL MG/DL
HCT VFR BLD AUTO: 44 % (ref 34–46.6)
HGB BLD-MCNC: 14.5 G/DL (ref 12–15.9)
HGB UR QL STRIP.AUTO: NEGATIVE
HOLD SPECIMEN: NORMAL
HYALINE CASTS UR QL AUTO: ABNORMAL /LPF
IMM GRANULOCYTES # BLD AUTO: 0.04 10*3/MM3 (ref 0–0.05)
IMM GRANULOCYTES NFR BLD AUTO: 0.7 % (ref 0–0.5)
KETONES UR QL STRIP: NEGATIVE
LEUKOCYTE ESTERASE UR QL STRIP.AUTO: ABNORMAL
LYMPHOCYTES # BLD AUTO: 2.72 10*3/MM3 (ref 0.7–3.1)
LYMPHOCYTES NFR BLD AUTO: 45.8 % (ref 19.6–45.3)
MCH RBC QN AUTO: 30.1 PG (ref 26.6–33)
MCHC RBC AUTO-ENTMCNC: 33 G/DL (ref 31.5–35.7)
MCV RBC AUTO: 91.5 FL (ref 79–97)
MONOCYTES # BLD AUTO: 0.48 10*3/MM3 (ref 0.1–0.9)
MONOCYTES NFR BLD AUTO: 8.1 % (ref 5–12)
NEUTROPHILS NFR BLD AUTO: 2.4 10*3/MM3 (ref 1.7–7)
NEUTROPHILS NFR BLD AUTO: 40.3 % (ref 42.7–76)
NITRITE UR QL STRIP: NEGATIVE
NRBC BLD AUTO-RTO: 0 /100 WBC (ref 0–0.2)
PH UR STRIP.AUTO: 5.5 [PH] (ref 5–8)
PLATELET # BLD AUTO: 253 10*3/MM3 (ref 140–450)
PMV BLD AUTO: 11 FL (ref 6–12)
POTASSIUM SERPL-SCNC: 4 MMOL/L (ref 3.5–5.2)
PROT SERPL-MCNC: 7.1 G/DL (ref 6–8.5)
PROT UR QL STRIP: ABNORMAL
RBC # BLD AUTO: 4.81 10*6/MM3 (ref 3.77–5.28)
RBC # UR STRIP: ABNORMAL /HPF
REF LAB TEST METHOD: ABNORMAL
SODIUM SERPL-SCNC: 140 MMOL/L (ref 136–145)
SP GR UR STRIP: 1.01 (ref 1–1.03)
SQUAMOUS #/AREA URNS HPF: ABNORMAL /HPF
UROBILINOGEN UR QL STRIP: ABNORMAL
WBC # UR STRIP: ABNORMAL /HPF
WBC NRBC COR # BLD AUTO: 5.94 10*3/MM3 (ref 3.4–10.8)
WHOLE BLOOD HOLD COAG: NORMAL
WHOLE BLOOD HOLD SPECIMEN: NORMAL

## 2024-01-08 PROCEDURE — 25010000002 MORPHINE PER 10 MG: Performed by: NURSE PRACTITIONER

## 2024-01-08 PROCEDURE — 81001 URINALYSIS AUTO W/SCOPE: CPT | Performed by: STUDENT IN AN ORGANIZED HEALTH CARE EDUCATION/TRAINING PROGRAM

## 2024-01-08 PROCEDURE — 87086 URINE CULTURE/COLONY COUNT: CPT | Performed by: NURSE PRACTITIONER

## 2024-01-08 PROCEDURE — 96375 TX/PRO/DX INJ NEW DRUG ADDON: CPT

## 2024-01-08 PROCEDURE — 25010000002 CEFTRIAXONE PER 250 MG: Performed by: NURSE PRACTITIONER

## 2024-01-08 PROCEDURE — 87186 SC STD MICRODIL/AGAR DIL: CPT | Performed by: NURSE PRACTITIONER

## 2024-01-08 PROCEDURE — 72110 X-RAY EXAM L-2 SPINE 4/>VWS: CPT

## 2024-01-08 PROCEDURE — 96365 THER/PROPH/DIAG IV INF INIT: CPT

## 2024-01-08 PROCEDURE — 73502 X-RAY EXAM HIP UNI 2-3 VIEWS: CPT

## 2024-01-08 PROCEDURE — 72110 X-RAY EXAM L-2 SPINE 4/>VWS: CPT | Performed by: RADIOLOGY

## 2024-01-08 PROCEDURE — 73502 X-RAY EXAM HIP UNI 2-3 VIEWS: CPT | Performed by: RADIOLOGY

## 2024-01-08 PROCEDURE — 36415 COLL VENOUS BLD VENIPUNCTURE: CPT

## 2024-01-08 PROCEDURE — 25010000002 ONDANSETRON PER 1 MG: Performed by: NURSE PRACTITIONER

## 2024-01-08 PROCEDURE — 87077 CULTURE AEROBIC IDENTIFY: CPT | Performed by: NURSE PRACTITIONER

## 2024-01-08 PROCEDURE — 80053 COMPREHEN METABOLIC PANEL: CPT | Performed by: NURSE PRACTITIONER

## 2024-01-08 PROCEDURE — 99283 EMERGENCY DEPT VISIT LOW MDM: CPT

## 2024-01-08 PROCEDURE — 85025 COMPLETE CBC W/AUTO DIFF WBC: CPT | Performed by: NURSE PRACTITIONER

## 2024-01-08 RX ORDER — CEFDINIR 300 MG/1
300 CAPSULE ORAL 2 TIMES DAILY
Qty: 14 CAPSULE | Refills: 0 | Status: SHIPPED | OUTPATIENT
Start: 2024-01-08 | End: 2024-01-15

## 2024-01-08 RX ORDER — MORPHINE SULFATE 2 MG/ML
2 INJECTION, SOLUTION INTRAMUSCULAR; INTRAVENOUS ONCE
Status: COMPLETED | OUTPATIENT
Start: 2024-01-08 | End: 2024-01-08

## 2024-01-08 RX ORDER — HYDROCODONE BITARTRATE AND ACETAMINOPHEN 5; 325 MG/1; MG/1
1 TABLET ORAL EVERY 6 HOURS PRN
Qty: 10 TABLET | Refills: 0 | Status: SHIPPED | OUTPATIENT
Start: 2024-01-08

## 2024-01-08 RX ORDER — ONDANSETRON 2 MG/ML
4 INJECTION INTRAMUSCULAR; INTRAVENOUS ONCE
Status: COMPLETED | OUTPATIENT
Start: 2024-01-08 | End: 2024-01-08

## 2024-01-08 RX ORDER — NALOXONE HYDROCHLORIDE 4 MG/.1ML
SPRAY NASAL
Qty: 2 EACH | Refills: 0 | Status: SHIPPED | OUTPATIENT
Start: 2024-01-08

## 2024-01-08 RX ADMIN — ONDANSETRON 4 MG: 2 INJECTION INTRAMUSCULAR; INTRAVENOUS at 14:50

## 2024-01-08 RX ADMIN — MORPHINE SULFATE 2 MG: 2 INJECTION, SOLUTION INTRAMUSCULAR; INTRAVENOUS at 14:50

## 2024-01-08 RX ADMIN — SODIUM CHLORIDE 2000 MG: 9 INJECTION, SOLUTION INTRAVENOUS at 14:50

## 2024-01-08 NOTE — ED PROVIDER NOTES
Subjective   History of Present Illness  Patient is a 78-year-old female who presents today complaining of left lower back pain that radiates down her left leg.  Patient reports pain starts at the left SI area that goes down to her left leg and hip and radiates down to almost her knee.  Patient reports a history of osteoporosis but denies known back problems.  Patient denies fever.  Patient denies chest pain or shortness of breath.    Flank Pain      Review of Systems   Constitutional: Negative.    HENT: Negative.     Eyes: Negative.    Respiratory: Negative.     Cardiovascular: Negative.    Gastrointestinal: Negative.    Endocrine: Negative.    Genitourinary:  Positive for flank pain.   Skin: Negative.    Allergic/Immunologic: Negative.    Neurological: Negative.    Hematological: Negative.    Psychiatric/Behavioral: Negative.         Past Medical History:   Diagnosis Date    Anxiety     Asthma     CAD (coronary artery disease)     Diabetes mellitus     Dyslipidemia     GERD (gastroesophageal reflux disease)     H/O angina pectoris     History of bone density study     February 2014    History of mammogram 07/15/2013    Hypertension     Hypothyroidism     Muscle spasms of both lower extremities     Overactive bladder     Vitamin B12 deficiency     Vitamin D deficiency        Allergies   Allergen Reactions    Nuts Anaphylaxis       Past Surgical History:   Procedure Laterality Date    CARDIAC CATHETERIZATION      COLONOSCOPY  01/01/2010    EYE SURGERY  10/04/2021    right eye, bleeding behind the eye     OOPHORECTOMY      1 removed    TUBAL ABDOMINAL LIGATION         Family History   Problem Relation Age of Onset    Diabetes Mother     Heart disease Mother     Stroke Mother     Cancer Father     Pancreatic cancer Sister     Diabetes Sister     Heart attack Brother        Social History     Socioeconomic History    Marital status:    Tobacco Use    Smoking status: Never     Passive exposure: Current     Smokeless tobacco: Never   Vaping Use    Vaping Use: Never used   Substance and Sexual Activity    Alcohol use: No    Drug use: No    Sexual activity: Defer           Objective   Physical Exam  Vitals and nursing note reviewed.   Constitutional:       Appearance: She is well-developed.   HENT:      Head: Normocephalic.      Right Ear: External ear normal.      Left Ear: External ear normal.   Eyes:      Conjunctiva/sclera: Conjunctivae normal.      Pupils: Pupils are equal, round, and reactive to light.   Cardiovascular:      Rate and Rhythm: Normal rate and regular rhythm.      Heart sounds: Normal heart sounds.   Pulmonary:      Effort: Pulmonary effort is normal.      Breath sounds: Normal breath sounds.   Abdominal:      General: Bowel sounds are normal.      Palpations: Abdomen is soft.   Musculoskeletal:         General: Normal range of motion.      Cervical back: Normal range of motion and neck supple.      Comments: Tender over Left SI joint; pain with ROM left hip    Skin:     General: Skin is warm and dry.      Capillary Refill: Capillary refill takes less than 2 seconds.   Neurological:      Mental Status: She is alert and oriented to person, place, and time.   Psychiatric:         Behavior: Behavior normal.         Thought Content: Thought content normal.         Procedures           ED Course                                             Medical Decision Making  Problems Addressed:  Acute UTI: complicated acute illness or injury  Sciatica of left side: complicated acute illness or injury    Amount and/or Complexity of Data Reviewed  Labs: ordered.  Radiology: ordered.    Risk  Prescription drug management.        Final diagnoses:   Sciatica of left side   Acute UTI       ED Disposition  ED Disposition       ED Disposition   Discharge    Condition   Stable    Comment   --               Eva Elliott MD  [address] [place] [address]  [phone]    Schedule an appointment as soon as possible for a  visit   For further evaluation         Medication List        New Prescriptions      cefdinir 300 MG capsule  Commonly known as: OMNICEF  Take 1 capsule by mouth 2 (Two) Times a Day for 7 days.     HYDROcodone-acetaminophen 5-325 MG per tablet  Commonly known as: NORCO  Take 1 tablet by mouth Every 6 (Six) Hours As Needed for Severe Pain.     naloxone 4 MG/0.1ML nasal spray  Commonly known as: NARCAN  Call 911. Don't prime. [place] in 1 nostril for overdose. Repeat in 2-3 minutes in other nostril if no or minimal breathing/responsiveness.               Where to Get Your Medications        These medications were sent to [id number] - [place], [place] - [address] [facility] [address] [facility] [address] - [phone]  - [phone]   [address]  [facility] [address] [place] [address]      Phone: [phone]   cefdinir 300 MG capsule  HYDROcodone-acetaminophen 5-325 MG per tablet  naloxone 4 MG/0.1ML nasal spray            Marbin Damian P, APRN  01/12/24 1007

## 2024-01-08 NOTE — DISCHARGE INSTRUCTIONS
Follow up with your primary care provider    Return to the emergency room for worsening symptoms.

## 2024-01-10 LAB — BACTERIA SPEC AEROBE CULT: ABNORMAL

## 2024-01-16 DIAGNOSIS — E11.3393 TYPE 2 DIABETES MELLITUS WITH BOTH EYES AFFECTED BY MODERATE NONPROLIFERATIVE RETINOPATHY WITHOUT MACULAR EDEMA, WITH LONG-TERM CURRENT USE OF INSULIN: ICD-10-CM

## 2024-01-16 DIAGNOSIS — Z79.4 TYPE 2 DIABETES MELLITUS WITH BOTH EYES AFFECTED BY MODERATE NONPROLIFERATIVE RETINOPATHY WITHOUT MACULAR EDEMA, WITH LONG-TERM CURRENT USE OF INSULIN: ICD-10-CM

## 2024-01-17 RX ORDER — SYRING-NEEDL,DISP,INSUL,0.3 ML 31GX15/64"
SYRINGE, EMPTY DISPOSABLE MISCELLANEOUS 2 TIMES DAILY
Qty: 200 EACH | Refills: 5 | Status: SHIPPED | OUTPATIENT
Start: 2024-01-17

## 2024-02-23 ENCOUNTER — TRANSCRIBE ORDERS (OUTPATIENT)
Dept: ADMINISTRATIVE | Facility: HOSPITAL | Age: 79
End: 2024-02-23
Payer: MEDICARE

## 2024-02-23 DIAGNOSIS — M81.0 OSTEOPOROSIS, POSTMENOPAUSAL: Primary | ICD-10-CM

## 2024-03-06 DIAGNOSIS — E03.8 OTHER SPECIFIED HYPOTHYROIDISM: ICD-10-CM

## 2024-03-06 RX ORDER — LEVOTHYROXINE SODIUM 0.15 MG/1
137 TABLET ORAL DAILY
Qty: 90 TABLET | Refills: 0 | Status: SHIPPED | OUTPATIENT
Start: 2024-03-06

## 2024-03-08 DIAGNOSIS — I49.3 PREMATURE VENTRICULAR BEAT: ICD-10-CM

## 2024-03-08 DIAGNOSIS — E03.8 OTHER SPECIFIED HYPOTHYROIDISM: ICD-10-CM

## 2024-03-08 DIAGNOSIS — I47.29 NONSUSTAINED VENTRICULAR TACHYCARDIA: ICD-10-CM

## 2024-03-08 RX ORDER — LEVOTHYROXINE SODIUM 0.15 MG/1
137 TABLET ORAL DAILY
Qty: 90 TABLET | Refills: 0 | OUTPATIENT
Start: 2024-03-08

## 2024-03-11 RX ORDER — METOPROLOL SUCCINATE 25 MG/1
25 TABLET, EXTENDED RELEASE ORAL DAILY
Qty: 90 TABLET | Refills: 0 | Status: SHIPPED | OUTPATIENT
Start: 2024-03-11

## 2024-03-13 ENCOUNTER — HOSPITAL ENCOUNTER (OUTPATIENT)
Facility: HOSPITAL | Age: 79
Discharge: HOME | End: 2024-03-13
Admitting: NURSE PRACTITIONER
Payer: MEDICARE

## 2024-03-13 DIAGNOSIS — M81.0 OSTEOPOROSIS, POSTMENOPAUSAL: ICD-10-CM

## 2024-03-13 PROCEDURE — 77080 DXA BONE DENSITY AXIAL: CPT

## 2024-03-13 PROCEDURE — 77080 DXA BONE DENSITY AXIAL: CPT | Performed by: RADIOLOGY

## 2024-04-18 ENCOUNTER — TELEPHONE (OUTPATIENT)
Dept: FAMILY MEDICINE CLINIC | Facility: CLINIC | Age: 79
End: 2024-04-18
Payer: MEDICARE

## 2024-04-22 NOTE — TELEPHONE ENCOUNTER
PA for Levemir was denied. The denial states that the drug is not listed in the preferred drug list. The preferred drugs are:   Tresiba FlexTouch U-100  Tresiba FlexTouch U-200  Tresiba U-100 Insulin    Dr. Elliott, what would you like done?

## 2024-05-06 DIAGNOSIS — E11.69 HYPERLIPIDEMIA DUE TO TYPE 2 DIABETES MELLITUS: ICD-10-CM

## 2024-05-06 DIAGNOSIS — E78.5 HYPERLIPIDEMIA DUE TO TYPE 2 DIABETES MELLITUS: ICD-10-CM

## 2024-05-06 RX ORDER — ATORVASTATIN CALCIUM 10 MG/1
10 TABLET, FILM COATED ORAL DAILY
Qty: 30 TABLET | Refills: 0 | Status: SHIPPED | OUTPATIENT
Start: 2024-05-06

## 2024-09-26 ENCOUNTER — TRANSCRIBE ORDERS (OUTPATIENT)
Dept: ADMINISTRATIVE | Facility: HOSPITAL | Age: 79
End: 2024-09-26
Payer: MEDICARE

## 2024-09-26 DIAGNOSIS — R80.9 PROTEINURIA, UNSPECIFIED TYPE: Primary | ICD-10-CM

## 2024-11-11 ENCOUNTER — HOSPITAL ENCOUNTER (OUTPATIENT)
Dept: ULTRASOUND IMAGING | Facility: HOSPITAL | Age: 79
Discharge: HOME | End: 2024-11-11
Admitting: INTERNAL MEDICINE
Payer: MEDICARE

## 2024-11-11 DIAGNOSIS — R80.9 PROTEINURIA, UNSPECIFIED TYPE: ICD-10-CM

## 2024-11-11 PROCEDURE — 76775 US EXAM ABDO BACK WALL LIM: CPT | Performed by: RADIOLOGY

## 2024-11-11 PROCEDURE — 76775 US EXAM ABDO BACK WALL LIM: CPT

## 2025-02-21 ENCOUNTER — HOSPITAL ENCOUNTER (EMERGENCY)
Facility: HOSPITAL | Age: 80
Discharge: HOME | End: 2025-02-22
Attending: STUDENT IN AN ORGANIZED HEALTH CARE EDUCATION/TRAINING PROGRAM
Payer: MEDICARE

## 2025-02-21 ENCOUNTER — APPOINTMENT (OUTPATIENT)
Dept: GENERAL RADIOLOGY | Facility: HOSPITAL | Age: 80
End: 2025-02-21
Payer: MEDICARE

## 2025-02-21 VITALS
SYSTOLIC BLOOD PRESSURE: 121 MMHG | OXYGEN SATURATION: 93 % | RESPIRATION RATE: 16 BRPM | WEIGHT: 198 LBS | TEMPERATURE: 99.7 F | HEIGHT: 64 IN | DIASTOLIC BLOOD PRESSURE: 69 MMHG | HEART RATE: 68 BPM | BODY MASS INDEX: 33.8 KG/M2

## 2025-02-21 DIAGNOSIS — N39.0 URINARY TRACT INFECTION WITHOUT HEMATURIA, SITE UNSPECIFIED: Primary | ICD-10-CM

## 2025-02-21 DIAGNOSIS — R73.9 HYPERGLYCEMIA: ICD-10-CM

## 2025-02-21 LAB
ACETONE BLD QL: NEGATIVE
ALBUMIN SERPL-MCNC: 3.6 G/DL (ref 3.5–5.2)
ALBUMIN SERPL-MCNC: 3.7 G/DL (ref 3.5–5.2)
ALBUMIN/GLOB SERPL: 1.2 G/DL
ALBUMIN/GLOB SERPL: 1.4 G/DL
ALP SERPL-CCNC: 64 U/L (ref 39–117)
ALP SERPL-CCNC: 67 U/L (ref 39–117)
ALT SERPL W P-5'-P-CCNC: 18 U/L (ref 1–33)
ALT SERPL W P-5'-P-CCNC: 20 U/L (ref 1–33)
ANION GAP SERPL CALCULATED.3IONS-SCNC: 2.5 MMOL/L (ref 5–15)
ANION GAP SERPL CALCULATED.3IONS-SCNC: 8.4 MMOL/L (ref 5–15)
AST SERPL-CCNC: 17 U/L (ref 1–32)
AST SERPL-CCNC: 17 U/L (ref 1–32)
B PARAPERT DNA SPEC QL NAA+PROBE: NOT DETECTED
B PERT DNA SPEC QL NAA+PROBE: NOT DETECTED
BACTERIA UR QL AUTO: ABNORMAL /HPF
BASOPHILS # BLD AUTO: 0.06 10*3/MM3 (ref 0–0.2)
BASOPHILS NFR BLD AUTO: 0.7 % (ref 0–1.5)
BILIRUB SERPL-MCNC: 0.8 MG/DL (ref 0–1.2)
BILIRUB SERPL-MCNC: 0.9 MG/DL (ref 0–1.2)
BILIRUB UR QL STRIP: NEGATIVE
BUN SERPL-MCNC: 12 MG/DL (ref 8–23)
BUN SERPL-MCNC: 12 MG/DL (ref 8–23)
BUN/CREAT SERPL: 17.6 (ref 7–25)
BUN/CREAT SERPL: 17.6 (ref 7–25)
C PNEUM DNA NPH QL NAA+NON-PROBE: NOT DETECTED
CALCIUM SPEC-SCNC: 9 MG/DL (ref 8.6–10.5)
CALCIUM SPEC-SCNC: 9.6 MG/DL (ref 8.6–10.5)
CHLORIDE SERPL-SCNC: 96 MMOL/L (ref 98–107)
CHLORIDE SERPL-SCNC: 97 MMOL/L (ref 98–107)
CLARITY UR: CLEAR
CO2 SERPL-SCNC: 29.6 MMOL/L (ref 22–29)
CO2 SERPL-SCNC: 31.5 MMOL/L (ref 22–29)
COLOR UR: YELLOW
CREAT SERPL-MCNC: 0.68 MG/DL (ref 0.57–1)
CREAT SERPL-MCNC: 0.68 MG/DL (ref 0.57–1)
DEPRECATED RDW RBC AUTO: 42.9 FL (ref 37–54)
EGFRCR SERPLBLD CKD-EPI 2021: 88.7 ML/MIN/1.73
EGFRCR SERPLBLD CKD-EPI 2021: 88.7 ML/MIN/1.73
EOSINOPHIL # BLD AUTO: 0.17 10*3/MM3 (ref 0–0.4)
EOSINOPHIL NFR BLD AUTO: 1.9 % (ref 0.3–6.2)
ERYTHROCYTE [DISTWIDTH] IN BLOOD BY AUTOMATED COUNT: 13.1 % (ref 12.3–15.4)
FLUAV SUBTYP SPEC NAA+PROBE: NOT DETECTED
FLUBV RNA ISLT QL NAA+PROBE: NOT DETECTED
GEN 5 1HR TROPONIN T REFLEX: 19 NG/L
GLOBULIN UR ELPH-MCNC: 2.7 GM/DL
GLOBULIN UR ELPH-MCNC: 2.9 GM/DL
GLUCOSE BLDC GLUCOMTR-MCNC: 344 MG/DL (ref 70–130)
GLUCOSE SERPL-MCNC: 195 MG/DL (ref 65–99)
GLUCOSE SERPL-MCNC: 368 MG/DL (ref 65–99)
GLUCOSE UR STRIP-MCNC: ABNORMAL MG/DL
HADV DNA SPEC NAA+PROBE: NOT DETECTED
HCOV 229E RNA SPEC QL NAA+PROBE: NOT DETECTED
HCOV HKU1 RNA SPEC QL NAA+PROBE: NOT DETECTED
HCOV NL63 RNA SPEC QL NAA+PROBE: NOT DETECTED
HCOV OC43 RNA SPEC QL NAA+PROBE: NOT DETECTED
HCT VFR BLD AUTO: 45.5 % (ref 34–46.6)
HGB BLD-MCNC: 14.8 G/DL (ref 12–15.9)
HGB UR QL STRIP.AUTO: ABNORMAL
HMPV RNA NPH QL NAA+NON-PROBE: NOT DETECTED
HOLD SPECIMEN: NORMAL
HOLD SPECIMEN: NORMAL
HPIV1 RNA ISLT QL NAA+PROBE: NOT DETECTED
HPIV2 RNA SPEC QL NAA+PROBE: NOT DETECTED
HPIV3 RNA NPH QL NAA+PROBE: NOT DETECTED
HPIV4 P GENE NPH QL NAA+PROBE: NOT DETECTED
HYALINE CASTS UR QL AUTO: ABNORMAL /LPF
IMM GRANULOCYTES # BLD AUTO: 0.04 10*3/MM3 (ref 0–0.05)
IMM GRANULOCYTES NFR BLD AUTO: 0.4 % (ref 0–0.5)
KETONES UR QL STRIP: NEGATIVE
LEUKOCYTE ESTERASE UR QL STRIP.AUTO: NEGATIVE
LYMPHOCYTES # BLD AUTO: 2.98 10*3/MM3 (ref 0.7–3.1)
LYMPHOCYTES NFR BLD AUTO: 32.6 % (ref 19.6–45.3)
M PNEUMO IGG SER IA-ACNC: NOT DETECTED
MAGNESIUM SERPL-MCNC: 1.5 MG/DL (ref 1.6–2.4)
MCH RBC QN AUTO: 29 PG (ref 26.6–33)
MCHC RBC AUTO-ENTMCNC: 32.5 G/DL (ref 31.5–35.7)
MCV RBC AUTO: 89 FL (ref 79–97)
MONOCYTES # BLD AUTO: 0.51 10*3/MM3 (ref 0.1–0.9)
MONOCYTES NFR BLD AUTO: 5.6 % (ref 5–12)
NEUTROPHILS NFR BLD AUTO: 5.38 10*3/MM3 (ref 1.7–7)
NEUTROPHILS NFR BLD AUTO: 58.8 % (ref 42.7–76)
NITRITE UR QL STRIP: NEGATIVE
NRBC BLD AUTO-RTO: 0 /100 WBC (ref 0–0.2)
PH UR STRIP.AUTO: 6.5 [PH] (ref 5–8)
PLATELET # BLD AUTO: 229 10*3/MM3 (ref 140–450)
PMV BLD AUTO: 10.9 FL (ref 6–12)
POTASSIUM SERPL-SCNC: 4.3 MMOL/L (ref 3.5–5.2)
POTASSIUM SERPL-SCNC: 4.6 MMOL/L (ref 3.5–5.2)
PROT SERPL-MCNC: 6.4 G/DL (ref 6–8.5)
PROT SERPL-MCNC: 6.5 G/DL (ref 6–8.5)
PROT UR QL STRIP: ABNORMAL
RBC # BLD AUTO: 5.11 10*6/MM3 (ref 3.77–5.28)
RBC # UR STRIP: ABNORMAL /HPF
REF LAB TEST METHOD: ABNORMAL
RHINOVIRUS RNA SPEC NAA+PROBE: NOT DETECTED
RSV RNA NPH QL NAA+NON-PROBE: NOT DETECTED
SARS-COV-2 RNA RESP QL NAA+PROBE: NOT DETECTED
SODIUM SERPL-SCNC: 130 MMOL/L (ref 136–145)
SODIUM SERPL-SCNC: 135 MMOL/L (ref 136–145)
SP GR UR STRIP: 1.03 (ref 1–1.03)
SQUAMOUS #/AREA URNS HPF: ABNORMAL /HPF
TROPONIN T % DELTA: 0
TROPONIN T NUMERIC DELTA: 0 NG/L
TROPONIN T SERPL HS-MCNC: 19 NG/L
UROBILINOGEN UR QL STRIP: ABNORMAL
WBC # UR STRIP: ABNORMAL /HPF
WBC NRBC COR # BLD AUTO: 9.14 10*3/MM3 (ref 3.4–10.8)
WHOLE BLOOD HOLD COAG: NORMAL
WHOLE BLOOD HOLD SPECIMEN: NORMAL

## 2025-02-21 PROCEDURE — 71045 X-RAY EXAM CHEST 1 VIEW: CPT

## 2025-02-21 PROCEDURE — 96367 TX/PROPH/DG ADDL SEQ IV INF: CPT

## 2025-02-21 PROCEDURE — 25010000002 CEFTRIAXONE PER 250 MG: Performed by: STUDENT IN AN ORGANIZED HEALTH CARE EDUCATION/TRAINING PROGRAM

## 2025-02-21 PROCEDURE — 84484 ASSAY OF TROPONIN QUANT: CPT

## 2025-02-21 PROCEDURE — 87186 SC STD MICRODIL/AGAR DIL: CPT | Performed by: STUDENT IN AN ORGANIZED HEALTH CARE EDUCATION/TRAINING PROGRAM

## 2025-02-21 PROCEDURE — 71045 X-RAY EXAM CHEST 1 VIEW: CPT | Performed by: RADIOLOGY

## 2025-02-21 PROCEDURE — 25810000003 SODIUM CHLORIDE 0.9 % SOLUTION: Performed by: STUDENT IN AN ORGANIZED HEALTH CARE EDUCATION/TRAINING PROGRAM

## 2025-02-21 PROCEDURE — 87077 CULTURE AEROBIC IDENTIFY: CPT | Performed by: STUDENT IN AN ORGANIZED HEALTH CARE EDUCATION/TRAINING PROGRAM

## 2025-02-21 PROCEDURE — 25010000002 MAGNESIUM SULFATE IN D5W 1G/100ML (PREMIX) 1-5 GM/100ML-% SOLUTION: Performed by: STUDENT IN AN ORGANIZED HEALTH CARE EDUCATION/TRAINING PROGRAM

## 2025-02-21 PROCEDURE — 82009 KETONE BODYS QUAL: CPT | Performed by: STUDENT IN AN ORGANIZED HEALTH CARE EDUCATION/TRAINING PROGRAM

## 2025-02-21 PROCEDURE — 93005 ELECTROCARDIOGRAM TRACING: CPT

## 2025-02-21 PROCEDURE — 80053 COMPREHEN METABOLIC PANEL: CPT

## 2025-02-21 PROCEDURE — 82948 REAGENT STRIP/BLOOD GLUCOSE: CPT

## 2025-02-21 PROCEDURE — 36415 COLL VENOUS BLD VENIPUNCTURE: CPT

## 2025-02-21 PROCEDURE — 96365 THER/PROPH/DIAG IV INF INIT: CPT

## 2025-02-21 PROCEDURE — 87086 URINE CULTURE/COLONY COUNT: CPT | Performed by: STUDENT IN AN ORGANIZED HEALTH CARE EDUCATION/TRAINING PROGRAM

## 2025-02-21 PROCEDURE — 81001 URINALYSIS AUTO W/SCOPE: CPT

## 2025-02-21 PROCEDURE — 0202U NFCT DS 22 TRGT SARS-COV-2: CPT | Performed by: STUDENT IN AN ORGANIZED HEALTH CARE EDUCATION/TRAINING PROGRAM

## 2025-02-21 PROCEDURE — 93010 ELECTROCARDIOGRAM REPORT: CPT | Performed by: SPECIALIST

## 2025-02-21 PROCEDURE — 85025 COMPLETE CBC W/AUTO DIFF WBC: CPT

## 2025-02-21 PROCEDURE — 93005 ELECTROCARDIOGRAM TRACING: CPT | Performed by: STUDENT IN AN ORGANIZED HEALTH CARE EDUCATION/TRAINING PROGRAM

## 2025-02-21 PROCEDURE — 80053 COMPREHEN METABOLIC PANEL: CPT | Performed by: STUDENT IN AN ORGANIZED HEALTH CARE EDUCATION/TRAINING PROGRAM

## 2025-02-21 PROCEDURE — 83735 ASSAY OF MAGNESIUM: CPT

## 2025-02-21 PROCEDURE — 99284 EMERGENCY DEPT VISIT MOD MDM: CPT

## 2025-02-21 RX ORDER — MAGNESIUM SULFATE 1 G/100ML
1 INJECTION INTRAVENOUS ONCE
Status: COMPLETED | OUTPATIENT
Start: 2025-02-21 | End: 2025-02-21

## 2025-02-21 RX ORDER — SODIUM CHLORIDE 0.9 % (FLUSH) 0.9 %
10 SYRINGE (ML) INJECTION AS NEEDED
Status: DISCONTINUED | OUTPATIENT
Start: 2025-02-21 | End: 2025-02-22 | Stop reason: HOSPADM

## 2025-02-21 RX ORDER — NITROFURANTOIN 25; 75 MG/1; MG/1
100 CAPSULE ORAL 2 TIMES DAILY
Qty: 14 CAPSULE | Refills: 0 | Status: SHIPPED | OUTPATIENT
Start: 2025-02-21 | End: 2025-02-21

## 2025-02-21 RX ORDER — NITROFURANTOIN 25; 75 MG/1; MG/1
100 CAPSULE ORAL 2 TIMES DAILY
Qty: 14 CAPSULE | Refills: 0 | Status: SHIPPED | OUTPATIENT
Start: 2025-02-21 | End: 2025-02-28

## 2025-02-21 RX ADMIN — SODIUM CHLORIDE 1000 ML: 9 INJECTION, SOLUTION INTRAVENOUS at 21:15

## 2025-02-21 RX ADMIN — MAGNESIUM SULFATE HEPTAHYDRATE 1 G: 1 INJECTION, SOLUTION INTRAVENOUS at 20:23

## 2025-02-21 RX ADMIN — SODIUM CHLORIDE 2000 MG: 9 INJECTION, SOLUTION INTRAVENOUS at 21:43

## 2025-02-21 NOTE — ED NOTES
MEDICAL SCREENING:    Reason for Visit: hypergylcemia with headache    Patient initially seen in triage.  The patient was advised further evaluation and diagnostic testing will be needed, some of the treatment and testing will be initiated in the lobby in order to begin the process.  The patient will be returned to the waiting area for the time being and possibly be re-assessed by a subsequent ED provider.  The patient will be brought back to the treatment area in as timely manner as possible.       Camille Torres DO  02/21/25 1816

## 2025-02-22 LAB
GLUCOSE BLDC GLUCOMTR-MCNC: 197 MG/DL (ref 70–130)
QT INTERVAL: 396 MS
QTC INTERVAL: 424 MS

## 2025-02-22 PROCEDURE — 82948 REAGENT STRIP/BLOOD GLUCOSE: CPT

## 2025-02-22 NOTE — ED PROVIDER NOTES
Subjective   History of Present Illness  Patient is a 79-year-old female with a history of diabetes, coronary artery disease, hypertension and hypothyroidism presents to the ER with 1 week of generalized malaise.  She states that she has not felt well for the past week complaining of headache intermittently frontal sinus pressure and tenderness and an intermittent dry cough.  Patient admits that she does not frequently check her blood sugar but her sugar is more elevated than usual her average usually runs in the 200s.  She is also reports a diminished appetite and intermittent nausea.  She also reports that she has had some dysuria and urinary urgency over the past several days.      Review of Systems    Past Medical History:   Diagnosis Date    Anxiety     Asthma     CAD (coronary artery disease)     Diabetes mellitus     Dyslipidemia     GERD (gastroesophageal reflux disease)     H/O angina pectoris     History of bone density study     February 2014    History of mammogram 07/15/2013    Hypertension     Hypothyroidism     Muscle spasms of both lower extremities     Overactive bladder     Vitamin B12 deficiency     Vitamin D deficiency        Allergies   Allergen Reactions    Nuts Anaphylaxis       Past Surgical History:   Procedure Laterality Date    CARDIAC CATHETERIZATION      COLONOSCOPY  01/01/2010    EYE SURGERY  10/04/2021    right eye, bleeding behind the eye     OOPHORECTOMY      1 removed    TUBAL ABDOMINAL LIGATION         Family History   Problem Relation Age of Onset    Diabetes Mother     Heart disease Mother     Stroke Mother     Cancer Father     Pancreatic cancer Sister     Diabetes Sister     Heart attack Brother        Social History     Socioeconomic History    Marital status:    Tobacco Use    Smoking status: Never     Passive exposure: Current    Smokeless tobacco: Never   Vaping Use    Vaping status: Never Used   Substance and Sexual Activity    Alcohol use: No    Drug use: No     Sexual activity: Defer           Objective   Physical Exam  Vitals and nursing note reviewed.   Constitutional:       General: She is not in acute distress.     Appearance: She is well-developed. She is not diaphoretic.      Comments: Patient appears to feel unwell but not clinically toxic.  GCS 15, awake alert and oriented sitting in bed.   HENT:      Head: Normocephalic and atraumatic.      Right Ear: External ear normal.      Left Ear: External ear normal.      Nose: Nose normal. Congestion present.   Eyes:      Conjunctiva/sclera: Conjunctivae normal.      Pupils: Pupils are equal, round, and reactive to light.   Neck:      Vascular: No JVD.      Trachea: No tracheal deviation.   Cardiovascular:      Rate and Rhythm: Normal rate and regular rhythm.      Heart sounds: Normal heart sounds. No murmur heard.  Pulmonary:      Effort: Pulmonary effort is normal. No respiratory distress.      Breath sounds: Normal breath sounds. No wheezing.   Abdominal:      General: Bowel sounds are normal.      Palpations: Abdomen is soft.      Tenderness: There is no abdominal tenderness.      Comments: Mild suprapubic tenderness   Musculoskeletal:         General: No deformity. Normal range of motion.      Cervical back: Normal range of motion and neck supple.   Skin:     General: Skin is warm and dry.      Coloration: Skin is not pale.      Findings: No erythema or rash.   Neurological:      Mental Status: She is alert and oriented to person, place, and time.      Cranial Nerves: No cranial nerve deficit.   Psychiatric:         Behavior: Behavior normal.         Thought Content: Thought content normal.         Procedures  Results for orders placed or performed during the hospital encounter of 02/21/25   POC Glucose Once    Collection Time: 02/21/25  3:02 PM    Specimen: Blood   Result Value Ref Range    Glucose 344 (H) 70 - 130 mg/dL   ECG 12 Lead ED Triage Standing Order; Weak / Dizzy / AMS    Collection Time: 02/21/25  3:12 PM    Result Value Ref Range    QT Interval 396 ms    QTC Interval 424 ms   Comprehensive Metabolic Panel    Collection Time: 02/21/25  3:27 PM    Specimen: Arm, Right; Blood   Result Value Ref Range    Glucose 368 (H) 65 - 99 mg/dL    BUN 12 8 - 23 mg/dL    Creatinine 0.68 0.57 - 1.00 mg/dL    Sodium 130 (L) 136 - 145 mmol/L    Potassium 4.6 3.5 - 5.2 mmol/L    Chloride 96 (L) 98 - 107 mmol/L    CO2 31.5 (H) 22.0 - 29.0 mmol/L    Calcium 9.0 8.6 - 10.5 mg/dL    Total Protein 6.4 6.0 - 8.5 g/dL    Albumin 3.7 3.5 - 5.2 g/dL    ALT (SGPT) 20 1 - 33 U/L    AST (SGOT) 17 1 - 32 U/L    Alkaline Phosphatase 67 39 - 117 U/L    Total Bilirubin 0.8 0.0 - 1.2 mg/dL    Globulin 2.7 gm/dL    A/G Ratio 1.4 g/dL    BUN/Creatinine Ratio 17.6 7.0 - 25.0    Anion Gap 2.5 (L) 5.0 - 15.0 mmol/L    eGFR 88.7 >60.0 mL/min/1.73   High Sensitivity Troponin T    Collection Time: 02/21/25  3:27 PM    Specimen: Arm, Right; Blood   Result Value Ref Range    HS Troponin T 19 (H) <14 ng/L   Magnesium    Collection Time: 02/21/25  3:27 PM    Specimen: Arm, Right; Blood   Result Value Ref Range    Magnesium 1.5 (L) 1.6 - 2.4 mg/dL   CBC Auto Differential    Collection Time: 02/21/25  3:27 PM    Specimen: Arm, Right; Blood   Result Value Ref Range    WBC 9.14 3.40 - 10.80 10*3/mm3    RBC 5.11 3.77 - 5.28 10*6/mm3    Hemoglobin 14.8 12.0 - 15.9 g/dL    Hematocrit 45.5 34.0 - 46.6 %    MCV 89.0 79.0 - 97.0 fL    MCH 29.0 26.6 - 33.0 pg    MCHC 32.5 31.5 - 35.7 g/dL    RDW 13.1 12.3 - 15.4 %    RDW-SD 42.9 37.0 - 54.0 fl    MPV 10.9 6.0 - 12.0 fL    Platelets 229 140 - 450 10*3/mm3    Neutrophil % 58.8 42.7 - 76.0 %    Lymphocyte % 32.6 19.6 - 45.3 %    Monocyte % 5.6 5.0 - 12.0 %    Eosinophil % 1.9 0.3 - 6.2 %    Basophil % 0.7 0.0 - 1.5 %    Immature Grans % 0.4 0.0 - 0.5 %    Neutrophils, Absolute 5.38 1.70 - 7.00 10*3/mm3    Lymphocytes, Absolute 2.98 0.70 - 3.10 10*3/mm3    Monocytes, Absolute 0.51 0.10 - 0.90 10*3/mm3    Eosinophils, Absolute  0.17 0.00 - 0.40 10*3/mm3    Basophils, Absolute 0.06 0.00 - 0.20 10*3/mm3    Immature Grans, Absolute 0.04 0.00 - 0.05 10*3/mm3    nRBC 0.0 0.0 - 0.2 /100 WBC   Acetone    Collection Time: 02/21/25  3:27 PM    Specimen: Arm, Right; Blood   Result Value Ref Range    Acetone Negative Negative   Green Top (Gel)    Collection Time: 02/21/25  3:27 PM   Result Value Ref Range    Extra Tube Hold for add-ons.    Lavender Top    Collection Time: 02/21/25  3:27 PM   Result Value Ref Range    Extra Tube hold for add-on    Gold Top - SST    Collection Time: 02/21/25  3:27 PM   Result Value Ref Range    Extra Tube Hold for add-ons.    Light Blue Top    Collection Time: 02/21/25  3:27 PM   Result Value Ref Range    Extra Tube Hold for add-ons.    Urinalysis With Microscopic If Indicated (No Culture) - Urine, Clean Catch    Collection Time: 02/21/25  3:30 PM    Specimen: Urine, Clean Catch   Result Value Ref Range    Color, UA Yellow Yellow, Straw    Appearance, UA Clear Clear    pH, UA 6.5 5.0 - 8.0    Specific Gravity, UA 1.028 1.005 - 1.030    Glucose, UA >=1000 mg/dL (3+) (A) Negative    Ketones, UA Negative Negative    Bilirubin, UA Negative Negative    Blood, UA Trace (A) Negative    Protein, UA >=300 mg/dL (3+) (A) Negative    Leuk Esterase, UA Negative Negative    Nitrite, UA Negative Negative    Urobilinogen, UA 0.2 E.U./dL 0.2 - 1.0 E.U./dL   Urinalysis, Microscopic Only - Urine, Clean Catch    Collection Time: 02/21/25  3:30 PM    Specimen: Urine, Clean Catch   Result Value Ref Range    RBC, UA 0-2 None Seen, 0-2 /HPF    WBC, UA 0-2 None Seen, 0-2 /HPF    Bacteria, UA 3+ (A) None Seen /HPF    Squamous Epithelial Cells, UA 0-2 None Seen, 0-2 /HPF    Hyaline Casts, UA 0-2 None Seen /LPF    Methodology Manual Light Microscopy    High Sensitivity Troponin T 1Hr    Collection Time: 02/21/25  4:45 PM    Specimen: Arm, Right; Blood   Result Value Ref Range    HS Troponin T 19 (H) <14 ng/L    Troponin T Numeric Delta 0 ng/L     Troponin T % Delta 0 Abnormal if >/= 20%   Respiratory Panel PCR w/COVID-19(SARS-CoV-2) [facility]ELIQUE/ALLIE/PAD/[facility]I In-House, NP Swab in UTM/VTM, 2 HR TAT - Swab, Nasopharynx    Collection Time: 02/21/25  7:55 PM    Specimen: Nasopharynx; Swab   Result Value Ref Range    ADENOVIRUS, PCR Not Detected Not Detected    Coronavirus 229E Not Detected Not Detected    Coronavirus HKU1 Not Detected Not Detected    Coronavirus NL63 Not Detected Not Detected    Coronavirus OC43 Not Detected Not Detected    COVID19 Not Detected Not Detected - Ref. Range    Human Metapneumovirus Not Detected Not Detected    Human Rhinovirus/Enterovirus Not Detected Not Detected    Influenza A PCR Not Detected Not Detected    Influenza B PCR Not Detected Not Detected    Parainfluenza Virus 1 Not Detected Not Detected    Parainfluenza Virus 2 Not Detected Not Detected    Parainfluenza Virus 3 Not Detected Not Detected    Parainfluenza Virus 4 Not Detected Not Detected    RSV, PCR Not Detected Not Detected    Bordetella pertussis pcr Not Detected Not Detected    Bordetella parapertussis PCR Not Detected Not Detected    Chlamydophila pneumoniae PCR Not Detected Not Detected    Mycoplasma pneumo by PCR Not Detected Not Detected   Comprehensive Metabolic Panel    Collection Time: 02/21/25  7:57 PM    Specimen: Blood   Result Value Ref Range    Glucose 195 (H) 65 - 99 mg/dL    BUN 12 8 - 23 mg/dL    Creatinine 0.68 0.57 - 1.00 mg/dL    Sodium 135 (L) 136 - 145 mmol/L    Potassium 4.3 3.5 - 5.2 mmol/L    Chloride 97 (L) 98 - 107 mmol/L    CO2 29.6 (H) 22.0 - 29.0 mmol/L    Calcium 9.6 8.6 - 10.5 mg/dL    Total Protein 6.5 6.0 - 8.5 g/dL    Albumin 3.6 3.5 - 5.2 g/dL    ALT (SGPT) 18 1 - 33 U/L    AST (SGOT) 17 1 - 32 U/L    Alkaline Phosphatase 64 39 - 117 U/L    Total Bilirubin 0.9 0.0 - 1.2 mg/dL    Globulin 2.9 gm/dL    A/G Ratio 1.2 g/dL    BUN/Creatinine Ratio 17.6 7.0 - 25.0    Anion Gap 8.4 5.0 - 15.0 mmol/L    eGFR 88.7 >60.0 mL/min/1.73               ED Course  ED Course as of 02/21/25 2354 Fri Feb 21, 2025 2027 Patient reports that she has not been feeling well for about a week has had upper respiratory congestion as well as frontal sinus tenderness as well as a dry cough.  Chest x-ray shows no gross focal consolidation but appears to have viral presentation. [LK]   2038 Low magnesium has been replaced with 1 g IVP x 1 [LK]   2344 Antibiotics were sent to the pharmacy to treat urinary tract infection.  Patient was counseled regarding blood sugar and needing to titrate insulin given acute infection.  Reported feeling significantly better prior to discharge [LK]   2353 ABX sent to [facility]. [LK]      ED Course User Index  [LK] Camille Torres DO                                                       Medical Decision Making  Problems Addressed:  Hyperglycemia: complicated acute illness or injury  Urinary tract infection without hematuria, site unspecified: complicated acute illness or injury    Amount and/or Complexity of Data Reviewed  Labs: ordered.  Radiology: ordered.  ECG/medicine tests: ordered.    Risk  Prescription drug management.        Final diagnoses:   Hyperglycemia   Urinary tract infection without hematuria, site unspecified       ED Disposition  ED Disposition       ED Disposition   Discharge    Condition   Stable    Comment   --               Nayeli Boels, APRN  [address] [facility] [address] [phone]               Medication List        New Prescriptions      nitrofurantoin (macrocrystal-monohydrate) 100 MG capsule  Commonly known as: MACROBID  Take 1 capsule by mouth 2 (Two) Times a Day for 7 days.               Where to Get Your Medications        These medications were sent to [id number] - [place] - [address] [facility] [address] - [phone]  - [phone] FX  [address] [place] [address]      Phone: [phone]   nitrofurantoin (macrocrystal-monohydrate)  100 MG capsule            Camille Torres DO  02/21/25 1702

## 2025-02-24 LAB — BACTERIA SPEC AEROBE CULT: ABNORMAL

## 2025-03-07 DIAGNOSIS — E11.3393 TYPE 2 DIABETES MELLITUS WITH BOTH EYES AFFECTED BY MODERATE NONPROLIFERATIVE RETINOPATHY WITHOUT MACULAR EDEMA, WITH LONG-TERM CURRENT USE OF INSULIN: ICD-10-CM

## 2025-03-07 DIAGNOSIS — Z79.4 TYPE 2 DIABETES MELLITUS WITH BOTH EYES AFFECTED BY MODERATE NONPROLIFERATIVE RETINOPATHY WITHOUT MACULAR EDEMA, WITH LONG-TERM CURRENT USE OF INSULIN: ICD-10-CM

## 2025-03-07 RX ORDER — INSULIN DETEMIR 100 [IU]/ML
INJECTION, SOLUTION SUBCUTANEOUS
Qty: 50 ML | Refills: 1 | OUTPATIENT
Start: 2025-03-07

## 2025-03-28 ENCOUNTER — HOSPITAL ENCOUNTER (OUTPATIENT)
Facility: HOSPITAL | Age: 80
Discharge: HOME | End: 2025-03-28
Payer: MEDICARE

## 2025-03-28 ENCOUNTER — TRANSCRIBE ORDERS (OUTPATIENT)
Dept: ADMINISTRATIVE | Facility: HOSPITAL | Age: 80
End: 2025-03-28
Payer: MEDICARE

## 2025-03-28 DIAGNOSIS — R80.9 PROTEINURIA, UNSPECIFIED TYPE: ICD-10-CM

## 2025-03-28 DIAGNOSIS — R80.9 PROTEINURIA, UNSPECIFIED TYPE: Primary | ICD-10-CM

## 2025-03-28 LAB
ALBUMIN SERPL-MCNC: 3.6 G/DL (ref 3.5–5.2)
ALBUMIN/GLOB SERPL: 1.4 G/DL
ALP SERPL-CCNC: 63 U/L (ref 39–117)
ALT SERPL W P-5'-P-CCNC: 20 U/L (ref 1–33)
ANION GAP SERPL CALCULATED.3IONS-SCNC: 8.7 MMOL/L (ref 5–15)
AST SERPL-CCNC: 19 U/L (ref 1–32)
BILIRUB SERPL-MCNC: 0.7 MG/DL (ref 0–1.2)
BUN SERPL-MCNC: 15 MG/DL (ref 8–23)
BUN/CREAT SERPL: 21.1 (ref 7–25)
CALCIUM SPEC-SCNC: 9.3 MG/DL (ref 8.6–10.5)
CHLORIDE SERPL-SCNC: 98 MMOL/L (ref 98–107)
CO2 SERPL-SCNC: 27.3 MMOL/L (ref 22–29)
CREAT SERPL-MCNC: 0.71 MG/DL (ref 0.57–1)
EGFRCR SERPLBLD CKD-EPI 2021: 86.6 ML/MIN/1.73
GLOBULIN UR ELPH-MCNC: 2.6 GM/DL
GLUCOSE SERPL-MCNC: 302 MG/DL (ref 65–99)
POTASSIUM SERPL-SCNC: 4 MMOL/L (ref 3.5–5.2)
PROT SERPL-MCNC: 6.2 G/DL (ref 6–8.5)
SODIUM SERPL-SCNC: 134 MMOL/L (ref 136–145)

## 2025-03-28 PROCEDURE — 84156 ASSAY OF PROTEIN URINE: CPT | Performed by: INTERNAL MEDICINE

## 2025-03-28 PROCEDURE — 80053 COMPREHEN METABOLIC PANEL: CPT | Performed by: INTERNAL MEDICINE

## 2025-03-28 PROCEDURE — 81001 URINALYSIS AUTO W/SCOPE: CPT | Performed by: INTERNAL MEDICINE

## 2025-03-28 PROCEDURE — 82570 ASSAY OF URINE CREATININE: CPT | Performed by: INTERNAL MEDICINE

## 2025-03-28 PROCEDURE — 36415 COLL VENOUS BLD VENIPUNCTURE: CPT | Performed by: INTERNAL MEDICINE

## 2025-03-28 PROCEDURE — 82043 UR ALBUMIN QUANTITATIVE: CPT | Performed by: INTERNAL MEDICINE

## 2025-03-28 NOTE — TELEPHONE ENCOUNTER
Have her go to 48 units at night with the insulin. If her fasting glucose levels go below 100 and she is feeling it, have her call us back.      Patient notified & verbalized understanding.   Render In Bullet Format When Appropriate: No Post-Care Instructions: I reviewed with the patient in detail post-care instructions. Patient is to wear sunprotection, and avoid picking at any of the treated lesions. Pt may apply Vaseline to crusted or scabbing areas. Duration Of Freeze Thaw-Cycle (Seconds): 0 Total Number Of Aks Treated: 2 Consent: The patient's consent was obtained including but not limited to risks of crusting, scabbing, blistering, scarring, darker or lighter pigmentary change, recurrence, incomplete removal and infection. Detail Level: Detailed

## 2025-03-29 LAB
ALBUMIN UR-MCNC: 77.8 MG/DL
BACTERIA UR QL AUTO: ABNORMAL /HPF
BILIRUB UR QL STRIP: NEGATIVE
CLARITY UR: CLEAR
COLOR UR: YELLOW
CREAT UR-MCNC: 42 MG/DL
CREAT UR-MCNC: 42 MG/DL
GLUCOSE UR STRIP-MCNC: ABNORMAL MG/DL
HGB UR QL STRIP.AUTO: NEGATIVE
HYALINE CASTS UR QL AUTO: ABNORMAL /LPF
KETONES UR QL STRIP: NEGATIVE
LEUKOCYTE ESTERASE UR QL STRIP.AUTO: NEGATIVE
MICROALBUMIN/CREAT UR: 1852.4 MG/G (ref 0–29)
NITRITE UR QL STRIP: NEGATIVE
PH UR STRIP.AUTO: 6 [PH] (ref 5–8)
PROT ?TM UR-MCNC: 124.8 MG/DL
PROT UR QL STRIP: ABNORMAL
PROT/CREAT UR: 2971.4 MG/G CREA (ref 0–200)
RBC # UR STRIP: ABNORMAL /HPF
REF LAB TEST METHOD: ABNORMAL
SP GR UR STRIP: 1.02 (ref 1–1.03)
SQUAMOUS #/AREA URNS HPF: ABNORMAL /HPF
UROBILINOGEN UR QL STRIP: ABNORMAL
WBC # UR STRIP: ABNORMAL /HPF

## 2025-05-15 ENCOUNTER — TRANSCRIBE ORDERS (OUTPATIENT)
Dept: ADMINISTRATIVE | Facility: HOSPITAL | Age: 80
End: 2025-05-15
Payer: MEDICARE

## 2025-05-15 ENCOUNTER — HOSPITAL ENCOUNTER (OUTPATIENT)
Facility: HOSPITAL | Age: 80
Discharge: HOME | End: 2025-05-15
Admitting: INTERNAL MEDICINE
Payer: MEDICARE

## 2025-05-15 DIAGNOSIS — R80.9 PROTEINURIA, UNSPECIFIED TYPE: Primary | ICD-10-CM

## 2025-05-15 DIAGNOSIS — R80.9 PROTEINURIA, UNSPECIFIED TYPE: ICD-10-CM

## 2025-05-15 LAB
ALBUMIN SERPL-MCNC: 3.8 G/DL (ref 3.5–5.2)
ALBUMIN/GLOB SERPL: 1.4 G/DL
ALP SERPL-CCNC: 56 U/L (ref 39–117)
ALT SERPL W P-5'-P-CCNC: 20 U/L (ref 1–33)
ANION GAP SERPL CALCULATED.3IONS-SCNC: 11.7 MMOL/L (ref 5–15)
AST SERPL-CCNC: 21 U/L (ref 1–32)
BILIRUB SERPL-MCNC: 0.7 MG/DL (ref 0–1.2)
BUN SERPL-MCNC: 16 MG/DL (ref 8–23)
BUN/CREAT SERPL: 20 (ref 7–25)
CALCIUM SPEC-SCNC: 10.2 MG/DL (ref 8.6–10.5)
CHLORIDE SERPL-SCNC: 101 MMOL/L (ref 98–107)
CO2 SERPL-SCNC: 26.3 MMOL/L (ref 22–29)
CREAT SERPL-MCNC: 0.8 MG/DL (ref 0.57–1)
EGFRCR SERPLBLD CKD-EPI 2021: 75.1 ML/MIN/1.73
GLOBULIN UR ELPH-MCNC: 2.8 GM/DL
GLUCOSE SERPL-MCNC: 143 MG/DL (ref 65–99)
POTASSIUM SERPL-SCNC: 4.1 MMOL/L (ref 3.5–5.2)
PROT SERPL-MCNC: 6.6 G/DL (ref 6–8.5)
SODIUM SERPL-SCNC: 139 MMOL/L (ref 136–145)

## 2025-05-15 PROCEDURE — 83516 IMMUNOASSAY NONANTIBODY: CPT | Performed by: INTERNAL MEDICINE

## 2025-05-15 PROCEDURE — 80053 COMPREHEN METABOLIC PANEL: CPT | Performed by: INTERNAL MEDICINE

## 2025-05-15 PROCEDURE — 86037 ANCA TITER EACH ANTIBODY: CPT | Performed by: INTERNAL MEDICINE

## 2025-05-15 PROCEDURE — 36415 COLL VENOUS BLD VENIPUNCTURE: CPT | Performed by: INTERNAL MEDICINE

## 2025-05-15 PROCEDURE — 86160 COMPLEMENT ANTIGEN: CPT | Performed by: INTERNAL MEDICINE

## 2025-05-15 PROCEDURE — 82570 ASSAY OF URINE CREATININE: CPT | Performed by: INTERNAL MEDICINE

## 2025-05-15 PROCEDURE — 86334 IMMUNOFIX E-PHORESIS SERUM: CPT | Performed by: INTERNAL MEDICINE

## 2025-05-15 PROCEDURE — 84165 PROTEIN E-PHORESIS SERUM: CPT | Performed by: INTERNAL MEDICINE

## 2025-05-15 PROCEDURE — 82784 ASSAY IGA/IGD/IGG/IGM EACH: CPT | Performed by: INTERNAL MEDICINE

## 2025-05-15 PROCEDURE — 84156 ASSAY OF PROTEIN URINE: CPT | Performed by: INTERNAL MEDICINE

## 2025-05-15 PROCEDURE — 81001 URINALYSIS AUTO W/SCOPE: CPT | Performed by: INTERNAL MEDICINE

## 2025-05-16 LAB
BACTERIA UR QL AUTO: ABNORMAL /HPF
BILIRUB UR QL STRIP: NEGATIVE
C-ANCA TITR SER IF: NORMAL TITER
C3 SERPL-MCNC: 142 MG/DL (ref 82–167)
C4 SERPL-MCNC: 19 MG/DL (ref 14–44)
CLARITY UR: CLEAR
COLOR UR: YELLOW
CREAT UR-MCNC: 60.2 MG/DL
GLUCOSE UR STRIP-MCNC: NEGATIVE MG/DL
HGB UR QL STRIP.AUTO: NEGATIVE
HYALINE CASTS UR QL AUTO: ABNORMAL /LPF
KETONES UR QL STRIP: NEGATIVE
LEUKOCYTE ESTERASE UR QL STRIP.AUTO: ABNORMAL
MYELOPEROXIDASE AB SER IA-ACNC: <0.2 UNITS (ref 0–0.9)
NITRITE UR QL STRIP: NEGATIVE
P-ANCA ATYPICAL TITR SER IF: NORMAL TITER
P-ANCA TITR SER IF: NORMAL TITER
PH UR STRIP.AUTO: 6.5 [PH] (ref 5–8)
PROT ?TM UR-MCNC: 146.8 MG/DL
PROT UR QL STRIP: ABNORMAL
PROT/CREAT UR: 2438.5 MG/G CREA (ref 0–200)
PROTEINASE3 AB SER IA-ACNC: <0.2 UNITS (ref 0–0.9)
RBC # UR STRIP: ABNORMAL /HPF
REF LAB TEST METHOD: ABNORMAL
SP GR UR STRIP: 1.01 (ref 1–1.03)
SQUAMOUS #/AREA URNS HPF: ABNORMAL /HPF
UROBILINOGEN UR QL STRIP: ABNORMAL
WBC # UR STRIP: ABNORMAL /HPF

## 2025-05-19 LAB
ALBUMIN SERPL ELPH-MCNC: 3.4 G/DL (ref 2.9–4.4)
ALBUMIN/GLOB SERPL: 1.4 {RATIO} (ref 0.7–1.7)
ALPHA1 GLOB SERPL ELPH-MCNC: 0.2 G/DL (ref 0–0.4)
ALPHA2 GLOB SERPL ELPH-MCNC: 0.7 G/DL (ref 0.4–1)
B-GLOBULIN SERPL ELPH-MCNC: 1 G/DL (ref 0.7–1.3)
GAMMA GLOB SERPL ELPH-MCNC: 0.6 G/DL (ref 0.4–1.8)
GLOBULIN SER-MCNC: 2.5 G/DL (ref 2.2–3.9)
IGA SERPL-MCNC: 288 MG/DL (ref 64–422)
IGG SERPL-MCNC: 632 MG/DL (ref 586–1602)
IGM SERPL-MCNC: 46 MG/DL (ref 26–217)
INTERPRETATION SERPL IEP-IMP: ABNORMAL
LABORATORY COMMENT REPORT: ABNORMAL
M PROTEIN SERPL ELPH-MCNC: ABNORMAL G/DL
PROT SERPL-MCNC: 5.9 G/DL (ref 6–8.5)

## 2025-05-22 ENCOUNTER — HOSPITAL ENCOUNTER (OUTPATIENT)
Facility: HOSPITAL | Age: 80
Discharge: HOME | End: 2025-05-22
Admitting: INTERNAL MEDICINE
Payer: MEDICARE

## 2025-05-22 DIAGNOSIS — R80.9 PROTEINURIA, UNSPECIFIED TYPE: ICD-10-CM

## 2025-05-22 LAB
COLLECT DURATION TIME UR: 24 HRS
PROT 24H UR-MRATE: 2997 MG/24HOURS (ref 0–150)
SPECIMEN VOL 24H UR: 4500 ML

## 2025-05-22 PROCEDURE — 81050 URINALYSIS VOLUME MEASURE: CPT | Performed by: INTERNAL MEDICINE

## 2025-05-22 PROCEDURE — 82570 ASSAY OF URINE CREATININE: CPT | Performed by: INTERNAL MEDICINE

## 2025-05-22 PROCEDURE — 84166 PROTEIN E-PHORESIS/URINE/CSF: CPT | Performed by: INTERNAL MEDICINE

## 2025-05-22 PROCEDURE — 84156 ASSAY OF PROTEIN URINE: CPT | Performed by: INTERNAL MEDICINE

## 2025-05-22 PROCEDURE — 86335 IMMUNFIX E-PHORSIS/URINE/CSF: CPT | Performed by: INTERNAL MEDICINE

## 2025-05-27 LAB
ALBUMIN 24H MFR UR ELPH: 81.6 %
ALPHA1 GLOB 24H MFR UR ELPH: 4.5 %
ALPHA2 GLOB 24H MFR UR ELPH: 3.1 %
B-GLOBULIN MFR UR ELPH: 8.6 %
CREAT 24H UR-MRATE: 968 MG/24 HR (ref 800–1800)
CREAT UR-MCNC: 21.5 MG/DL
GAMMA GLOB 24H MFR UR ELPH: 2.2 %
INTERPRETATION UR IFE-IMP: NORMAL
Lab: NORMAL
M PROTEIN 24H MFR UR ELPH: NORMAL %
PROT UR-MCNC: 81.8 MG/DL

## 2025-08-21 ENCOUNTER — TRANSCRIBE ORDERS (OUTPATIENT)
Dept: ADMINISTRATIVE | Facility: HOSPITAL | Age: 80
End: 2025-08-21
Payer: MEDICARE

## 2025-08-21 ENCOUNTER — HOSPITAL ENCOUNTER (OUTPATIENT)
Facility: HOSPITAL | Age: 80
Discharge: HOME | End: 2025-08-21
Admitting: INTERNAL MEDICINE
Payer: MEDICARE

## 2025-08-21 DIAGNOSIS — R80.9 PROTEINURIA, UNSPECIFIED TYPE: Primary | ICD-10-CM

## 2025-08-21 DIAGNOSIS — R80.9 PROTEINURIA, UNSPECIFIED TYPE: ICD-10-CM

## 2025-08-21 LAB
ALBUMIN SERPL-MCNC: 3.9 G/DL (ref 3.5–5.2)
ALBUMIN/GLOB SERPL: 1.6 G/DL
ALP SERPL-CCNC: 49 U/L (ref 39–117)
ALT SERPL W P-5'-P-CCNC: 22 U/L (ref 1–33)
ANION GAP SERPL CALCULATED.3IONS-SCNC: 11.8 MMOL/L (ref 5–15)
AST SERPL-CCNC: 21 U/L (ref 1–32)
BILIRUB SERPL-MCNC: 0.6 MG/DL (ref 0–1.2)
BUN SERPL-MCNC: 16.1 MG/DL (ref 8–23)
BUN/CREAT SERPL: 23 (ref 7–25)
CALCIUM SPEC-SCNC: 9.7 MG/DL (ref 8.6–10.5)
CHLORIDE SERPL-SCNC: 102 MMOL/L (ref 98–107)
CO2 SERPL-SCNC: 26.2 MMOL/L (ref 22–29)
CREAT SERPL-MCNC: 0.7 MG/DL (ref 0.57–1)
EGFRCR SERPLBLD CKD-EPI 2021: 88.1 ML/MIN/1.73
GLOBULIN UR ELPH-MCNC: 2.5 GM/DL
GLUCOSE SERPL-MCNC: 194 MG/DL (ref 65–99)
POTASSIUM SERPL-SCNC: 3.9 MMOL/L (ref 3.5–5.2)
PROT SERPL-MCNC: 6.4 G/DL (ref 6–8.5)
SODIUM SERPL-SCNC: 140 MMOL/L (ref 136–145)

## 2025-08-21 PROCEDURE — 36415 COLL VENOUS BLD VENIPUNCTURE: CPT | Performed by: INTERNAL MEDICINE

## 2025-08-21 PROCEDURE — 82043 UR ALBUMIN QUANTITATIVE: CPT | Performed by: INTERNAL MEDICINE

## 2025-08-21 PROCEDURE — 82570 ASSAY OF URINE CREATININE: CPT | Performed by: INTERNAL MEDICINE

## 2025-08-21 PROCEDURE — 80053 COMPREHEN METABOLIC PANEL: CPT | Performed by: INTERNAL MEDICINE

## 2025-08-21 PROCEDURE — 81001 URINALYSIS AUTO W/SCOPE: CPT | Performed by: INTERNAL MEDICINE

## 2025-08-21 PROCEDURE — 84156 ASSAY OF PROTEIN URINE: CPT | Performed by: INTERNAL MEDICINE

## 2025-08-22 LAB
ALBUMIN UR-MCNC: 19.9 MG/DL
BACTERIA UR QL AUTO: ABNORMAL /HPF
BILIRUB UR QL STRIP: NEGATIVE
CLARITY UR: CLEAR
COLOR UR: YELLOW
CREAT UR-MCNC: 23.4 MG/DL
CREAT UR-MCNC: 23.4 MG/DL
GLUCOSE UR STRIP-MCNC: ABNORMAL MG/DL
HGB UR QL STRIP.AUTO: NEGATIVE
HYALINE CASTS UR QL AUTO: ABNORMAL /LPF
KETONES UR QL STRIP: NEGATIVE
LEUKOCYTE ESTERASE UR QL STRIP.AUTO: ABNORMAL
MICROALBUMIN/CREAT UR: 850.4 MG/G (ref 0–29)
NITRITE UR QL STRIP: NEGATIVE
PH UR STRIP.AUTO: 6 [PH] (ref 5–8)
PROT ?TM UR-MCNC: 37.8 MG/DL
PROT UR QL STRIP: ABNORMAL
PROT/CREAT UR: 1615.4 MG/G CREA (ref 0–200)
RBC # UR STRIP: ABNORMAL /HPF
REF LAB TEST METHOD: ABNORMAL
SP GR UR STRIP: 1.01 (ref 1–1.03)
SQUAMOUS #/AREA URNS HPF: ABNORMAL /HPF
UROBILINOGEN UR QL STRIP: ABNORMAL
WBC # UR STRIP: ABNORMAL /HPF